# Patient Record
Sex: FEMALE | Race: BLACK OR AFRICAN AMERICAN | NOT HISPANIC OR LATINO | Employment: OTHER | URBAN - METROPOLITAN AREA
[De-identification: names, ages, dates, MRNs, and addresses within clinical notes are randomized per-mention and may not be internally consistent; named-entity substitution may affect disease eponyms.]

---

## 2023-03-27 LAB — BLOOD CULTURE: ABNORMAL

## 2023-03-30 LAB — BLOOD CULTURE: NORMAL

## 2024-10-03 ENCOUNTER — APPOINTMENT (OUTPATIENT)
Dept: DIALYSIS | Facility: HOSPITAL | Age: 69
End: 2024-10-03
Payer: MEDICARE

## 2024-10-03 ENCOUNTER — APPOINTMENT (OUTPATIENT)
Dept: RADIOLOGY | Facility: HOSPITAL | Age: 69
DRG: 480 | End: 2024-10-03
Payer: MEDICARE

## 2024-10-03 ENCOUNTER — CLINICAL SUPPORT (OUTPATIENT)
Dept: EMERGENCY MEDICINE | Facility: HOSPITAL | Age: 69
DRG: 480 | End: 2024-10-03
Payer: MEDICARE

## 2024-10-03 ENCOUNTER — HOSPITAL ENCOUNTER (INPATIENT)
Facility: HOSPITAL | Age: 69
End: 2024-10-03
Attending: EMERGENCY MEDICINE | Admitting: INTERNAL MEDICINE
Payer: MEDICARE

## 2024-10-03 ENCOUNTER — APPOINTMENT (OUTPATIENT)
Dept: CARDIOLOGY | Facility: HOSPITAL | Age: 69
DRG: 480 | End: 2024-10-03
Payer: MEDICARE

## 2024-10-03 DIAGNOSIS — N18.6 ESRD (END STAGE RENAL DISEASE) ON DIALYSIS (MULTI): ICD-10-CM

## 2024-10-03 DIAGNOSIS — E87.5 HYPERKALEMIA: Primary | ICD-10-CM

## 2024-10-03 DIAGNOSIS — R60.0 EDEMA OF BOTH UPPER EXTREMITIES: ICD-10-CM

## 2024-10-03 DIAGNOSIS — S72.144A NONDISPLACED INTERTROCHANTERIC FRACTURE OF RIGHT FEMUR, INITIAL ENCOUNTER FOR CLOSED FRACTURE: ICD-10-CM

## 2024-10-03 DIAGNOSIS — M79.89 OTHER SPECIFIED SOFT TISSUE DISORDERS: ICD-10-CM

## 2024-10-03 DIAGNOSIS — Z95.0 PACEMAKER: ICD-10-CM

## 2024-10-03 DIAGNOSIS — I27.20 PULMONARY HYPERTENSION (MULTI): ICD-10-CM

## 2024-10-03 DIAGNOSIS — E16.2 HYPOGLYCEMIA: ICD-10-CM

## 2024-10-03 DIAGNOSIS — I50.83 HIGH OUTPUT CONGESTIVE HEART FAILURE: ICD-10-CM

## 2024-10-03 DIAGNOSIS — R57.9 SHOCK (MULTI): ICD-10-CM

## 2024-10-03 DIAGNOSIS — I48.91 ATRIAL FIBRILLATION, UNSPECIFIED TYPE (MULTI): ICD-10-CM

## 2024-10-03 DIAGNOSIS — R57.0 CARDIOGENIC SHOCK (MULTI): ICD-10-CM

## 2024-10-03 DIAGNOSIS — Z99.2 ESRD (END STAGE RENAL DISEASE) ON DIALYSIS (MULTI): ICD-10-CM

## 2024-10-03 DIAGNOSIS — R09.02 HYPOXIA: ICD-10-CM

## 2024-10-03 DIAGNOSIS — I31.39 PERICARDIAL EFFUSION (HHS-HCC): ICD-10-CM

## 2024-10-03 LAB
ABO GROUP (TYPE) IN BLOOD: NORMAL
ALBUMIN SERPL BCP-MCNC: 3.3 G/DL (ref 3.4–5)
ALBUMIN SERPL BCP-MCNC: 3.3 G/DL (ref 3.4–5)
ALBUMIN SERPL BCP-MCNC: 3.5 G/DL (ref 3.4–5)
ALBUMIN SERPL BCP-MCNC: 3.6 G/DL (ref 3.4–5)
ALBUMIN SERPL BCP-MCNC: 3.7 G/DL (ref 3.4–5)
ALP SERPL-CCNC: 163 U/L (ref 33–136)
ALP SERPL-CCNC: 165 U/L (ref 33–136)
ALP SERPL-CCNC: 174 U/L (ref 33–136)
ALT SERPL W P-5'-P-CCNC: 12 U/L (ref 7–45)
ALT SERPL W P-5'-P-CCNC: 15 U/L (ref 7–45)
ALT SERPL W P-5'-P-CCNC: 17 U/L (ref 7–45)
ANION GAP BLDA CALCULATED.4IONS-SCNC: 30 MMO/L (ref 10–25)
ANION GAP BLDV CALCULATED.4IONS-SCNC: 12 MMOL/L (ref 10–25)
ANION GAP BLDV CALCULATED.4IONS-SCNC: 13 MMOL/L (ref 10–25)
ANION GAP BLDV CALCULATED.4IONS-SCNC: 20 MMOL/L (ref 10–25)
ANION GAP BLDV CALCULATED.4IONS-SCNC: 20 MMOL/L (ref 10–25)
ANION GAP BLDV CALCULATED.4IONS-SCNC: 25 MMOL/L (ref 10–25)
ANION GAP BLDV CALCULATED.4IONS-SCNC: 27 MMOL/L (ref 10–25)
ANION GAP BLDV CALCULATED.4IONS-SCNC: ABNORMAL MMOL/L
ANION GAP SERPL CALC-SCNC: 17 MMOL/L (ref 10–20)
ANION GAP SERPL CALC-SCNC: 28 MMOL/L (ref 10–20)
ANION GAP SERPL CALC-SCNC: 34 MMOL/L (ref 10–20)
ANION GAP SERPL CALC-SCNC: 35 MMOL/L (ref 10–20)
ANTIBODY SCREEN: NORMAL
APTT PPP: 30 SECONDS (ref 27–38)
APTT PPP: 33 SECONDS (ref 27–38)
AST SERPL W P-5'-P-CCNC: 22 U/L (ref 9–39)
AST SERPL W P-5'-P-CCNC: 23 U/L (ref 9–39)
AST SERPL W P-5'-P-CCNC: 24 U/L (ref 9–39)
ATRIAL RATE: 77 BPM
BASE EXCESS BLDA CALC-SCNC: -12.5 MMOL/L (ref -2–3)
BASE EXCESS BLDV CALC-SCNC: -0.5 MMOL/L (ref -2–3)
BASE EXCESS BLDV CALC-SCNC: -12.9 MMOL/L (ref -2–3)
BASE EXCESS BLDV CALC-SCNC: -2.5 MMOL/L (ref -2–3)
BASE EXCESS BLDV CALC-SCNC: -8.5 MMOL/L (ref -2–3)
BASE EXCESS BLDV CALC-SCNC: -8.8 MMOL/L (ref -2–3)
BASE EXCESS BLDV CALC-SCNC: -9.7 MMOL/L (ref -2–3)
BASE EXCESS BLDV CALC-SCNC: 3.9 MMOL/L (ref -2–3)
BASOPHILS # BLD AUTO: 0.02 X10*3/UL (ref 0–0.1)
BASOPHILS # BLD AUTO: 0.02 X10*3/UL (ref 0–0.1)
BASOPHILS # BLD AUTO: 0.03 X10*3/UL (ref 0–0.1)
BASOPHILS NFR BLD AUTO: 0.2 %
BILIRUB DIRECT SERPL-MCNC: 0.8 MG/DL (ref 0–0.3)
BILIRUB SERPL-MCNC: 1.8 MG/DL (ref 0–1.2)
BILIRUB SERPL-MCNC: 1.8 MG/DL (ref 0–1.2)
BILIRUB SERPL-MCNC: 1.9 MG/DL (ref 0–1.2)
BODY TEMPERATURE: 37 DEGREES CELSIUS
BUN SERPL-MCNC: 31 MG/DL (ref 6–23)
BUN SERPL-MCNC: 59 MG/DL (ref 6–23)
BUN SERPL-MCNC: 60 MG/DL (ref 6–23)
BUN SERPL-MCNC: 65 MG/DL (ref 6–23)
C PEPTIDE SERPL-MCNC: 26 NG/ML (ref 0.7–3.9)
CA-I BLDA-SCNC: 1.09 MMOL/L (ref 1.1–1.33)
CA-I BLDV-SCNC: 0.9 MMOL/L (ref 1.1–1.33)
CA-I BLDV-SCNC: 1.01 MMOL/L (ref 1.1–1.33)
CA-I BLDV-SCNC: 1.06 MMOL/L (ref 1.1–1.33)
CA-I BLDV-SCNC: 1.08 MMOL/L (ref 1.1–1.33)
CA-I BLDV-SCNC: 1.11 MMOL/L (ref 1.1–1.33)
CA-I BLDV-SCNC: 1.12 MMOL/L (ref 1.1–1.33)
CA-I BLDV-SCNC: 1.17 MMOL/L (ref 1.1–1.33)
CALCIUM SERPL-MCNC: 9 MG/DL (ref 8.6–10.6)
CALCIUM SERPL-MCNC: 9.2 MG/DL (ref 8.6–10.6)
CALCIUM SERPL-MCNC: 9.6 MG/DL (ref 8.6–10.6)
CALCIUM SERPL-MCNC: 9.7 MG/DL (ref 8.6–10.6)
CHLORIDE BLDA-SCNC: 97 MMOL/L (ref 98–107)
CHLORIDE BLDV-SCNC: 109 MMOL/L (ref 98–107)
CHLORIDE BLDV-SCNC: 93 MMOL/L (ref 98–107)
CHLORIDE BLDV-SCNC: 96 MMOL/L (ref 98–107)
CHLORIDE BLDV-SCNC: 98 MMOL/L (ref 98–107)
CHLORIDE BLDV-SCNC: 98 MMOL/L (ref 98–107)
CHLORIDE SERPL-SCNC: 94 MMOL/L (ref 98–107)
CHLORIDE SERPL-SCNC: 94 MMOL/L (ref 98–107)
CHLORIDE SERPL-SCNC: 95 MMOL/L (ref 98–107)
CHLORIDE SERPL-SCNC: 96 MMOL/L (ref 98–107)
CO2 SERPL-SCNC: 12 MMOL/L (ref 21–32)
CO2 SERPL-SCNC: 18 MMOL/L (ref 21–32)
CO2 SERPL-SCNC: 23 MMOL/L (ref 21–32)
CO2 SERPL-SCNC: 30 MMOL/L (ref 21–32)
CORTIS SERPL-MCNC: 10 UG/DL (ref 2.5–20)
CORTIS SERPL-MCNC: 11.3 UG/DL (ref 2.5–20)
CREAT SERPL-MCNC: 4.31 MG/DL (ref 0.5–1.05)
CREAT SERPL-MCNC: 7.4 MG/DL (ref 0.5–1.05)
CREAT SERPL-MCNC: 7.59 MG/DL (ref 0.5–1.05)
CREAT SERPL-MCNC: 7.86 MG/DL (ref 0.5–1.05)
EGFRCR SERPLBLD CKD-EPI 2021: 11 ML/MIN/1.73M*2
EGFRCR SERPLBLD CKD-EPI 2021: 5 ML/MIN/1.73M*2
EGFRCR SERPLBLD CKD-EPI 2021: 5 ML/MIN/1.73M*2
EGFRCR SERPLBLD CKD-EPI 2021: 6 ML/MIN/1.73M*2
EOSINOPHIL # BLD AUTO: 0 X10*3/UL (ref 0–0.7)
EOSINOPHIL NFR BLD AUTO: 0 %
ERYTHROCYTE [DISTWIDTH] IN BLOOD BY AUTOMATED COUNT: 17.4 % (ref 11.5–14.5)
ERYTHROCYTE [DISTWIDTH] IN BLOOD BY AUTOMATED COUNT: 18 % (ref 11.5–14.5)
ERYTHROCYTE [DISTWIDTH] IN BLOOD BY AUTOMATED COUNT: 18.4 % (ref 11.5–14.5)
GLUCOSE BLD MANUAL STRIP-MCNC: 102 MG/DL (ref 74–99)
GLUCOSE BLD MANUAL STRIP-MCNC: 12 MG/DL (ref 74–99)
GLUCOSE BLD MANUAL STRIP-MCNC: 171 MG/DL (ref 74–99)
GLUCOSE BLD MANUAL STRIP-MCNC: 419 MG/DL (ref 74–99)
GLUCOSE BLD MANUAL STRIP-MCNC: 52 MG/DL (ref 74–99)
GLUCOSE BLD MANUAL STRIP-MCNC: 79 MG/DL (ref 74–99)
GLUCOSE BLD MANUAL STRIP-MCNC: 83 MG/DL (ref 74–99)
GLUCOSE BLD MANUAL STRIP-MCNC: 95 MG/DL (ref 74–99)
GLUCOSE BLDA-MCNC: 48 MG/DL (ref 74–99)
GLUCOSE BLDV-MCNC: 114 MG/DL (ref 74–99)
GLUCOSE BLDV-MCNC: 39 MG/DL (ref 74–99)
GLUCOSE BLDV-MCNC: 444 MG/DL (ref 74–99)
GLUCOSE BLDV-MCNC: 54 MG/DL (ref 74–99)
GLUCOSE BLDV-MCNC: 72 MG/DL (ref 74–99)
GLUCOSE BLDV-MCNC: 77 MG/DL (ref 74–99)
GLUCOSE BLDV-MCNC: 78 MG/DL (ref 74–99)
GLUCOSE SERPL-MCNC: 107 MG/DL (ref 74–99)
GLUCOSE SERPL-MCNC: 40 MG/DL (ref 74–99)
GLUCOSE SERPL-MCNC: 71 MG/DL (ref 74–99)
GLUCOSE SERPL-MCNC: 87 MG/DL (ref 74–99)
HBV SURFACE AB SER-ACNC: 4 MIU/ML
HBV SURFACE AG SERPL QL IA: NONREACTIVE
HCO3 BLDA-SCNC: 12.5 MMOL/L (ref 22–26)
HCO3 BLDV-SCNC: 15.6 MMOL/L (ref 22–26)
HCO3 BLDV-SCNC: 17.6 MMOL/L (ref 22–26)
HCO3 BLDV-SCNC: 19.5 MMOL/L (ref 22–26)
HCO3 BLDV-SCNC: 19.9 MMOL/L (ref 22–26)
HCO3 BLDV-SCNC: 24.5 MMOL/L (ref 22–26)
HCO3 BLDV-SCNC: 25.8 MMOL/L (ref 22–26)
HCO3 BLDV-SCNC: 29.7 MMOL/L (ref 22–26)
HCT VFR BLD AUTO: 31.2 % (ref 36–46)
HCT VFR BLD AUTO: 33.6 % (ref 36–46)
HCT VFR BLD AUTO: 42 % (ref 36–46)
HCT VFR BLD EST: 28 % (ref 36–46)
HCT VFR BLD EST: 30 % (ref 36–46)
HCT VFR BLD EST: 34 % (ref 36–46)
HCT VFR BLD EST: 35 % (ref 36–46)
HCT VFR BLD EST: 37 % (ref 36–46)
HCT VFR BLD EST: 37 % (ref 36–46)
HCT VFR BLD EST: 38 % (ref 36–46)
HCT VFR BLD EST: 39 % (ref 36–46)
HGB BLD-MCNC: 10.8 G/DL (ref 12–16)
HGB BLD-MCNC: 11.2 G/DL (ref 12–16)
HGB BLD-MCNC: 13.4 G/DL (ref 12–16)
HGB BLDA-MCNC: 12.2 G/DL (ref 12–16)
HGB BLDV-MCNC: 10.1 G/DL (ref 12–16)
HGB BLDV-MCNC: 11.2 G/DL (ref 12–16)
HGB BLDV-MCNC: 11.8 G/DL (ref 12–16)
HGB BLDV-MCNC: 12.3 G/DL (ref 12–16)
HGB BLDV-MCNC: 12.6 G/DL (ref 12–16)
HGB BLDV-MCNC: 13.1 G/DL (ref 12–16)
HGB BLDV-MCNC: 9.3 G/DL (ref 12–16)
HOLD SPECIMEN: NORMAL
IMM GRANULOCYTES # BLD AUTO: 0.12 X10*3/UL (ref 0–0.7)
IMM GRANULOCYTES # BLD AUTO: 0.16 X10*3/UL (ref 0–0.7)
IMM GRANULOCYTES # BLD AUTO: 0.28 X10*3/UL (ref 0–0.7)
IMM GRANULOCYTES NFR BLD AUTO: 1.1 % (ref 0–0.9)
IMM GRANULOCYTES NFR BLD AUTO: 1.4 % (ref 0–0.9)
IMM GRANULOCYTES NFR BLD AUTO: 1.6 % (ref 0–0.9)
INHALED O2 CONCENTRATION: 0 %
INHALED O2 CONCENTRATION: 50 %
INHALED O2 CONCENTRATION: 50 %
INR PPP: 1.9 (ref 0.9–1.1)
INR PPP: 2 (ref 0.9–1.1)
LACTATE BLDA-SCNC: 10.7 MMOL/L (ref 0.4–2)
LACTATE BLDV-SCNC: 2.5 MMOL/L (ref 0.4–2)
LACTATE BLDV-SCNC: 4 MMOL/L (ref 0.4–2)
LACTATE BLDV-SCNC: 4.3 MMOL/L (ref 0.4–2)
LACTATE BLDV-SCNC: 5.7 MMOL/L (ref 0.4–2)
LACTATE BLDV-SCNC: 7.4 MMOL/L (ref 0.4–2)
LACTATE BLDV-SCNC: 8.5 MMOL/L (ref 0.4–2)
LACTATE BLDV-SCNC: 9.3 MMOL/L (ref 0.4–2)
LACTATE SERPL-SCNC: 2.5 MMOL/L (ref 0.4–2)
LACTATE SERPL-SCNC: 5.1 MMOL/L (ref 0.4–2)
LYMPHOCYTES # BLD AUTO: 0.37 X10*3/UL (ref 1.2–4.8)
LYMPHOCYTES # BLD AUTO: 0.49 X10*3/UL (ref 1.2–4.8)
LYMPHOCYTES # BLD AUTO: 1.17 X10*3/UL (ref 1.2–4.8)
LYMPHOCYTES NFR BLD AUTO: 10.1 %
LYMPHOCYTES NFR BLD AUTO: 2.8 %
LYMPHOCYTES NFR BLD AUTO: 3.4 %
MAGNESIUM SERPL-MCNC: 2 MG/DL (ref 1.6–2.4)
MAGNESIUM SERPL-MCNC: 2.39 MG/DL (ref 1.6–2.4)
MCH RBC QN AUTO: 30.5 PG (ref 26–34)
MCH RBC QN AUTO: 30.9 PG (ref 26–34)
MCH RBC QN AUTO: 31.5 PG (ref 26–34)
MCHC RBC AUTO-ENTMCNC: 31.9 G/DL (ref 32–36)
MCHC RBC AUTO-ENTMCNC: 33.3 G/DL (ref 32–36)
MCHC RBC AUTO-ENTMCNC: 34.6 G/DL (ref 32–36)
MCV RBC AUTO: 89 FL (ref 80–100)
MCV RBC AUTO: 92 FL (ref 80–100)
MCV RBC AUTO: 99 FL (ref 80–100)
MONOCYTES # BLD AUTO: 0.58 X10*3/UL (ref 0.1–1)
MONOCYTES # BLD AUTO: 1.15 X10*3/UL (ref 0.1–1)
MONOCYTES # BLD AUTO: 1.7 X10*3/UL (ref 0.1–1)
MONOCYTES NFR BLD AUTO: 10 %
MONOCYTES NFR BLD AUTO: 5.3 %
MONOCYTES NFR BLD AUTO: 9.8 %
NEUTROPHILS # BLD AUTO: 14.76 X10*3/UL (ref 1.2–7.7)
NEUTROPHILS # BLD AUTO: 9.03 X10*3/UL (ref 1.2–7.7)
NEUTROPHILS # BLD AUTO: 9.76 X10*3/UL (ref 1.2–7.7)
NEUTROPHILS NFR BLD AUTO: 78.3 %
NEUTROPHILS NFR BLD AUTO: 85.6 %
NEUTROPHILS NFR BLD AUTO: 90 %
NRBC BLD-RTO: 0.4 /100 WBCS (ref 0–0)
NRBC BLD-RTO: 0.5 /100 WBCS (ref 0–0)
NRBC BLD-RTO: 0.6 /100 WBCS (ref 0–0)
OXYHGB MFR BLDA: 98.3 % (ref 94–98)
OXYHGB MFR BLDV: 19.3 % (ref 45–75)
OXYHGB MFR BLDV: 22.7 % (ref 45–75)
OXYHGB MFR BLDV: 29.1 % (ref 45–75)
OXYHGB MFR BLDV: 33 % (ref 45–75)
OXYHGB MFR BLDV: 35.8 % (ref 45–75)
OXYHGB MFR BLDV: 40.1 % (ref 45–75)
OXYHGB MFR BLDV: 47.3 % (ref 45–75)
P OFFSET: 143 MS
P ONSET: 112 MS
PCO2 BLDA: 26 MM HG (ref 38–42)
PCO2 BLDV: 43 MM HG (ref 41–51)
PCO2 BLDV: 47 MM HG (ref 41–51)
PCO2 BLDV: 49 MM HG (ref 41–51)
PCO2 BLDV: 49 MM HG (ref 41–51)
PCO2 BLDV: 51 MM HG (ref 41–51)
PCO2 BLDV: 51 MM HG (ref 41–51)
PCO2 BLDV: 52 MM HG (ref 41–51)
PH BLDA: 7.29 PH (ref 7.38–7.42)
PH BLDV: 7.13 PH (ref 7.33–7.43)
PH BLDV: 7.19 PH (ref 7.33–7.43)
PH BLDV: 7.19 PH (ref 7.33–7.43)
PH BLDV: 7.22 PH (ref 7.33–7.43)
PH BLDV: 7.29 PH (ref 7.33–7.43)
PH BLDV: 7.33 PH (ref 7.33–7.43)
PH BLDV: 7.39 PH (ref 7.33–7.43)
PHOSPHATE SERPL-MCNC: 10.5 MG/DL (ref 2.5–4.9)
PHOSPHATE SERPL-MCNC: 5.6 MG/DL (ref 2.5–4.9)
PLATELET # BLD AUTO: 122 X10*3/UL (ref 150–450)
PLATELET # BLD AUTO: 127 X10*3/UL (ref 150–450)
PLATELET # BLD AUTO: 133 X10*3/UL (ref 150–450)
PO2 BLDA: 502 MM HG (ref 85–95)
PO2 BLDV: 28 MM HG (ref 35–45)
PO2 BLDV: 29 MM HG (ref 35–45)
PO2 BLDV: 33 MM HG (ref 35–45)
PO2 BLDV: 34 MM HG (ref 35–45)
PO2 BLDV: 36 MM HG (ref 35–45)
PO2 BLDV: 36 MM HG (ref 35–45)
PO2 BLDV: 38 MM HG (ref 35–45)
POTASSIUM BLDA-SCNC: 7.8 MMOL/L (ref 3.5–5.3)
POTASSIUM BLDV-SCNC: 4.6 MMOL/L (ref 3.5–5.3)
POTASSIUM BLDV-SCNC: 5 MMOL/L (ref 3.5–5.3)
POTASSIUM BLDV-SCNC: 5.6 MMOL/L (ref 3.5–5.3)
POTASSIUM BLDV-SCNC: 6.1 MMOL/L (ref 3.5–5.3)
POTASSIUM BLDV-SCNC: 7.7 MMOL/L (ref 3.5–5.3)
POTASSIUM BLDV-SCNC: 8.8 MMOL/L (ref 3.5–5.3)
POTASSIUM BLDV-SCNC: >10 MMOL/L (ref 3.5–5.3)
POTASSIUM SERPL-SCNC: 4.5 MMOL/L (ref 3.5–5.3)
POTASSIUM SERPL-SCNC: 5.8 MMOL/L (ref 3.5–5.3)
POTASSIUM SERPL-SCNC: 7.4 MMOL/L (ref 3.5–5.3)
POTASSIUM SERPL-SCNC: 7.6 MMOL/L (ref 3.5–5.3)
PR INTERVAL: 126 MS
PROT SERPL-MCNC: 7.2 G/DL (ref 6.4–8.2)
PROT SERPL-MCNC: 7.3 G/DL (ref 6.4–8.2)
PROT SERPL-MCNC: 7.7 G/DL (ref 6.4–8.2)
PROTHROMBIN TIME: 21.5 SECONDS (ref 9.8–12.8)
PROTHROMBIN TIME: 22.6 SECONDS (ref 9.8–12.8)
Q ONSET: 175 MS
QRS COUNT: 12 BEATS
QRS DURATION: 188 MS
QT INTERVAL: 470 MS
QTC CALCULATION(BAZETT): 531 MS
QTC FREDERICIA: 510 MS
R AXIS: 149 DEGREES
RBC # BLD AUTO: 3.5 X10*6/UL (ref 4–5.2)
RBC # BLD AUTO: 3.67 X10*6/UL (ref 4–5.2)
RBC # BLD AUTO: 4.26 X10*6/UL (ref 4–5.2)
RH FACTOR (ANTIGEN D): NORMAL
SAO2 % BLDA: 100 % (ref 94–100)
SAO2 % BLDV: 19 % (ref 45–75)
SAO2 % BLDV: 23 % (ref 45–75)
SAO2 % BLDV: 29 % (ref 45–75)
SAO2 % BLDV: 33 % (ref 45–75)
SAO2 % BLDV: 36 % (ref 45–75)
SAO2 % BLDV: 41 % (ref 45–75)
SAO2 % BLDV: 48 % (ref 45–75)
SODIUM BLDA-SCNC: 132 MMOL/L (ref 136–145)
SODIUM BLDV-SCNC: 127 MMOL/L (ref 136–145)
SODIUM BLDV-SCNC: 131 MMOL/L (ref 136–145)
SODIUM BLDV-SCNC: 133 MMOL/L (ref 136–145)
SODIUM BLDV-SCNC: 133 MMOL/L (ref 136–145)
SODIUM BLDV-SCNC: 134 MMOL/L (ref 136–145)
SODIUM BLDV-SCNC: 134 MMOL/L (ref 136–145)
SODIUM BLDV-SCNC: 139 MMOL/L (ref 136–145)
SODIUM SERPL-SCNC: 136 MMOL/L (ref 136–145)
SODIUM SERPL-SCNC: 136 MMOL/L (ref 136–145)
SODIUM SERPL-SCNC: 139 MMOL/L (ref 136–145)
SODIUM SERPL-SCNC: 139 MMOL/L (ref 136–145)
T AXIS: -28 DEGREES
T OFFSET: 410 MS
T3FREE SERPL-MCNC: 1 PG/ML (ref 2.3–4.2)
T4 FREE SERPL-MCNC: 1.7 NG/DL (ref 0.78–1.48)
TSH SERPL-ACNC: 2.55 MIU/L (ref 0.44–3.98)
URATE SERPL-MCNC: 5.3 MG/DL (ref 2.3–6.7)
VENTRICULAR RATE: 77 BPM
WBC # BLD AUTO: 10.9 X10*3/UL (ref 4.4–11.3)
WBC # BLD AUTO: 11.5 X10*3/UL (ref 4.4–11.3)
WBC # BLD AUTO: 17.3 X10*3/UL (ref 4.4–11.3)

## 2024-10-03 PROCEDURE — 71275 CT ANGIOGRAPHY CHEST: CPT | Performed by: RADIOLOGY

## 2024-10-03 PROCEDURE — 2020000001 HC ICU ROOM DAILY

## 2024-10-03 PROCEDURE — 2500000001 HC RX 250 WO HCPCS SELF ADMINISTERED DRUGS (ALT 637 FOR MEDICARE OP)

## 2024-10-03 PROCEDURE — 85025 COMPLETE CBC W/AUTO DIFF WBC: CPT

## 2024-10-03 PROCEDURE — 76377 3D RENDER W/INTRP POSTPROCES: CPT

## 2024-10-03 PROCEDURE — 83735 ASSAY OF MAGNESIUM: CPT

## 2024-10-03 PROCEDURE — 86901 BLOOD TYPING SEROLOGIC RH(D): CPT

## 2024-10-03 PROCEDURE — 74177 CT ABD & PELVIS W/CONTRAST: CPT

## 2024-10-03 PROCEDURE — 5A1D70Z PERFORMANCE OF URINARY FILTRATION, INTERMITTENT, LESS THAN 6 HOURS PER DAY: ICD-10-PCS | Performed by: INTERNAL MEDICINE

## 2024-10-03 PROCEDURE — 2500000005 HC RX 250 GENERAL PHARMACY W/O HCPCS

## 2024-10-03 PROCEDURE — 73502 X-RAY EXAM HIP UNI 2-3 VIEWS: CPT | Mod: RIGHT SIDE | Performed by: STUDENT IN AN ORGANIZED HEALTH CARE EDUCATION/TRAINING PROGRAM

## 2024-10-03 PROCEDURE — 2500000005 HC RX 250 GENERAL PHARMACY W/O HCPCS: Mod: SE

## 2024-10-03 PROCEDURE — 70486 CT MAXILLOFACIAL W/O DYE: CPT

## 2024-10-03 PROCEDURE — 36556 INSERT NON-TUNNEL CV CATH: CPT | Performed by: EMERGENCY MEDICINE

## 2024-10-03 PROCEDURE — 73560 X-RAY EXAM OF KNEE 1 OR 2: CPT | Mod: RIGHT SIDE | Performed by: RADIOLOGY

## 2024-10-03 PROCEDURE — 84481 FREE ASSAY (FT-3): CPT

## 2024-10-03 PROCEDURE — 2550000001 HC RX 255 CONTRASTS: Mod: SE

## 2024-10-03 PROCEDURE — 2500000004 HC RX 250 GENERAL PHARMACY W/ HCPCS (ALT 636 FOR OP/ED)

## 2024-10-03 PROCEDURE — 94660 CPAP INITIATION&MGMT: CPT

## 2024-10-03 PROCEDURE — 84681 ASSAY OF C-PEPTIDE: CPT

## 2024-10-03 PROCEDURE — 84439 ASSAY OF FREE THYROXINE: CPT

## 2024-10-03 PROCEDURE — 37799 UNLISTED PX VASCULAR SURGERY: CPT

## 2024-10-03 PROCEDURE — 96365 THER/PROPH/DIAG IV INF INIT: CPT

## 2024-10-03 PROCEDURE — 76937 US GUIDE VASCULAR ACCESS: CPT | Performed by: EMERGENCY MEDICINE

## 2024-10-03 PROCEDURE — 99291 CRITICAL CARE FIRST HOUR: CPT | Performed by: EMERGENCY MEDICINE

## 2024-10-03 PROCEDURE — 94645 CONT INHLJ TX EACH ADDL HOUR: CPT

## 2024-10-03 PROCEDURE — 72125 CT NECK SPINE W/O DYE: CPT

## 2024-10-03 PROCEDURE — 83605 ASSAY OF LACTIC ACID: CPT

## 2024-10-03 PROCEDURE — 73551 X-RAY EXAM OF FEMUR 1: CPT | Mod: RT

## 2024-10-03 PROCEDURE — 96375 TX/PRO/DX INJ NEW DRUG ADDON: CPT

## 2024-10-03 PROCEDURE — 80400 ACTH STIMULATION PANEL: CPT

## 2024-10-03 PROCEDURE — 84443 ASSAY THYROID STIM HORMONE: CPT

## 2024-10-03 PROCEDURE — 93005 ELECTROCARDIOGRAM TRACING: CPT

## 2024-10-03 PROCEDURE — 93010 ELECTROCARDIOGRAM REPORT: CPT | Performed by: INTERNAL MEDICINE

## 2024-10-03 PROCEDURE — 84132 ASSAY OF SERUM POTASSIUM: CPT

## 2024-10-03 PROCEDURE — 82533 TOTAL CORTISOL: CPT

## 2024-10-03 PROCEDURE — 36415 COLL VENOUS BLD VENIPUNCTURE: CPT

## 2024-10-03 PROCEDURE — 83527 ASSAY OF INSULIN: CPT

## 2024-10-03 PROCEDURE — 84550 ASSAY OF BLOOD/URIC ACID: CPT

## 2024-10-03 PROCEDURE — 74177 CT ABD & PELVIS W/CONTRAST: CPT | Performed by: RADIOLOGY

## 2024-10-03 PROCEDURE — 73551 X-RAY EXAM OF FEMUR 1: CPT | Mod: RIGHT SIDE | Performed by: RADIOLOGY

## 2024-10-03 PROCEDURE — 82947 ASSAY GLUCOSE BLOOD QUANT: CPT

## 2024-10-03 PROCEDURE — 2500000002 HC RX 250 W HCPCS SELF ADMINISTERED DRUGS (ALT 637 FOR MEDICARE OP, ALT 636 FOR OP/ED): Mod: SE

## 2024-10-03 PROCEDURE — 85610 PROTHROMBIN TIME: CPT

## 2024-10-03 PROCEDURE — 73502 X-RAY EXAM HIP UNI 2-3 VIEWS: CPT | Mod: RT

## 2024-10-03 PROCEDURE — 80053 COMPREHEN METABOLIC PANEL: CPT

## 2024-10-03 PROCEDURE — 87040 BLOOD CULTURE FOR BACTERIA: CPT

## 2024-10-03 PROCEDURE — 82435 ASSAY OF BLOOD CHLORIDE: CPT

## 2024-10-03 PROCEDURE — 87340 HEPATITIS B SURFACE AG IA: CPT

## 2024-10-03 PROCEDURE — 2500000002 HC RX 250 W HCPCS SELF ADMINISTERED DRUGS (ALT 637 FOR MEDICARE OP, ALT 636 FOR OP/ED)

## 2024-10-03 PROCEDURE — 70450 CT HEAD/BRAIN W/O DYE: CPT

## 2024-10-03 PROCEDURE — 80076 HEPATIC FUNCTION PANEL: CPT | Mod: CCI

## 2024-10-03 PROCEDURE — 84075 ASSAY ALKALINE PHOSPHATASE: CPT

## 2024-10-03 PROCEDURE — 99285 EMERGENCY DEPT VISIT HI MDM: CPT | Mod: 25

## 2024-10-03 PROCEDURE — 71045 X-RAY EXAM CHEST 1 VIEW: CPT | Performed by: STUDENT IN AN ORGANIZED HEALTH CARE EDUCATION/TRAINING PROGRAM

## 2024-10-03 PROCEDURE — 73560 X-RAY EXAM OF KNEE 1 OR 2: CPT | Mod: RT

## 2024-10-03 PROCEDURE — 2500000004 HC RX 250 GENERAL PHARMACY W/ HCPCS (ALT 636 FOR OP/ED): Mod: SE

## 2024-10-03 PROCEDURE — 94644 CONT INHLJ TX 1ST HOUR: CPT

## 2024-10-03 PROCEDURE — 2500000004 HC RX 250 GENERAL PHARMACY W/ HCPCS (ALT 636 FOR OP/ED): Mod: JZ,JG,SE

## 2024-10-03 PROCEDURE — 8010000001 HC DIALYSIS - HEMODIALYSIS PER DAY

## 2024-10-03 PROCEDURE — 93010 ELECTROCARDIOGRAM REPORT: CPT | Performed by: EMERGENCY MEDICINE

## 2024-10-03 PROCEDURE — 71045 X-RAY EXAM CHEST 1 VIEW: CPT

## 2024-10-03 PROCEDURE — 2500000004 HC RX 250 GENERAL PHARMACY W/ HCPCS (ALT 636 FOR OP/ED): Performed by: STUDENT IN AN ORGANIZED HEALTH CARE EDUCATION/TRAINING PROGRAM

## 2024-10-03 PROCEDURE — 86706 HEP B SURFACE ANTIBODY: CPT

## 2024-10-03 PROCEDURE — 36556 INSERT NON-TUNNEL CV CATH: CPT

## 2024-10-03 PROCEDURE — 96376 TX/PRO/DX INJ SAME DRUG ADON: CPT

## 2024-10-03 PROCEDURE — 71275 CT ANGIOGRAPHY CHEST: CPT

## 2024-10-03 RX ORDER — CALCIUM CHLORIDE INJECTION 100 MG/ML
INJECTION, SOLUTION INTRAVENOUS
Status: DISPENSED
Start: 2024-10-03 | End: 2024-10-04

## 2024-10-03 RX ORDER — OXYCODONE HYDROCHLORIDE 5 MG/1
10 TABLET ORAL EVERY 4 HOURS PRN
Status: DISCONTINUED | OUTPATIENT
Start: 2024-10-03 | End: 2024-10-16

## 2024-10-03 RX ORDER — PANTOPRAZOLE SODIUM 40 MG/1
40 TABLET, DELAYED RELEASE ORAL
Status: DISCONTINUED | OUTPATIENT
Start: 2024-10-04 | End: 2024-10-18 | Stop reason: HOSPADM

## 2024-10-03 RX ORDER — NOREPINEPHRINE BITARTRATE/D5W 8 MG/250ML
.01-1 PLASTIC BAG, INJECTION (ML) INTRAVENOUS CONTINUOUS
Status: DISCONTINUED | OUTPATIENT
Start: 2024-10-03 | End: 2024-10-04

## 2024-10-03 RX ORDER — ALBUTEROL SULFATE 90 UG/1
2 INHALANT RESPIRATORY (INHALATION) EVERY 6 HOURS PRN
Status: DISCONTINUED | OUTPATIENT
Start: 2024-10-03 | End: 2024-10-18 | Stop reason: HOSPADM

## 2024-10-03 RX ORDER — HYDROMORPHONE HYDROCHLORIDE 1 MG/ML
0.5 INJECTION, SOLUTION INTRAMUSCULAR; INTRAVENOUS; SUBCUTANEOUS ONCE
Status: COMPLETED | OUTPATIENT
Start: 2024-10-03 | End: 2024-10-03

## 2024-10-03 RX ORDER — HYDROMORPHONE HYDROCHLORIDE 1 MG/ML
0.2 INJECTION, SOLUTION INTRAMUSCULAR; INTRAVENOUS; SUBCUTANEOUS ONCE
Status: COMPLETED | OUTPATIENT
Start: 2024-10-03 | End: 2024-10-03

## 2024-10-03 RX ORDER — OXYCODONE HYDROCHLORIDE 5 MG/1
5 TABLET ORAL EVERY 4 HOURS PRN
Status: DISCONTINUED | OUTPATIENT
Start: 2024-10-03 | End: 2024-10-10

## 2024-10-03 RX ORDER — ONDANSETRON HYDROCHLORIDE 2 MG/ML
4 INJECTION, SOLUTION INTRAVENOUS ONCE
Status: COMPLETED | OUTPATIENT
Start: 2024-10-03 | End: 2024-10-03

## 2024-10-03 RX ORDER — ALBUTEROL SULFATE 0.83 MG/ML
SOLUTION RESPIRATORY (INHALATION)
Status: COMPLETED
Start: 2024-10-03 | End: 2024-10-03

## 2024-10-03 RX ORDER — DEXTROSE 50 % IN WATER (D50W) INTRAVENOUS SYRINGE
Status: COMPLETED
Start: 2024-10-03 | End: 2024-10-03

## 2024-10-03 RX ORDER — HYDROMORPHONE HYDROCHLORIDE 1 MG/ML
INJECTION, SOLUTION INTRAMUSCULAR; INTRAVENOUS; SUBCUTANEOUS
Status: COMPLETED
Start: 2024-10-03 | End: 2024-10-03

## 2024-10-03 RX ORDER — DEXTROSE 50 % IN WATER (D50W) INTRAVENOUS SYRINGE
25 ONCE
Status: COMPLETED | OUTPATIENT
Start: 2024-10-03 | End: 2024-10-03

## 2024-10-03 RX ORDER — NOREPINEPHRINE BITARTRATE/D5W 8 MG/250ML
PLASTIC BAG, INJECTION (ML) INTRAVENOUS
Status: COMPLETED
Start: 2024-10-03 | End: 2024-10-03

## 2024-10-03 RX ORDER — ALBUTEROL SULFATE 5 MG/ML
10 SOLUTION RESPIRATORY (INHALATION) ONCE
Status: COMPLETED | OUTPATIENT
Start: 2024-10-03 | End: 2024-10-03

## 2024-10-03 RX ORDER — DEXTROSE MONOHYDRATE 100 MG/ML
INJECTION, SOLUTION INTRAVENOUS
Status: COMPLETED
Start: 2024-10-03 | End: 2024-10-04

## 2024-10-03 RX ORDER — DEXTROSE MONOHYDRATE 50 MG/ML
50 INJECTION, SOLUTION INTRAVENOUS CONTINUOUS
Status: DISCONTINUED | OUTPATIENT
Start: 2024-10-03 | End: 2024-10-03

## 2024-10-03 RX ORDER — AMIODARONE HYDROCHLORIDE 200 MG/1
200 TABLET ORAL DAILY
Status: DISCONTINUED | OUTPATIENT
Start: 2024-10-03 | End: 2024-10-18 | Stop reason: HOSPADM

## 2024-10-03 RX ORDER — ALLOPURINOL 100 MG/1
100 TABLET ORAL DAILY
Status: DISCONTINUED | OUTPATIENT
Start: 2024-10-03 | End: 2024-10-17

## 2024-10-03 RX ORDER — DEXTROSE 50 % IN WATER (D50W) INTRAVENOUS SYRINGE
25
Status: DISCONTINUED | OUTPATIENT
Start: 2024-10-03 | End: 2024-10-18 | Stop reason: HOSPADM

## 2024-10-03 RX ORDER — INDOMETHACIN 25 MG/1
50 CAPSULE ORAL ONCE
Status: COMPLETED | OUTPATIENT
Start: 2024-10-03 | End: 2024-10-03

## 2024-10-03 RX ORDER — ATORVASTATIN CALCIUM 40 MG/1
40 TABLET, FILM COATED ORAL NIGHTLY
Status: DISCONTINUED | OUTPATIENT
Start: 2024-10-03 | End: 2024-10-18 | Stop reason: HOSPADM

## 2024-10-03 RX ORDER — DEXTROSE 50 % IN WATER (D50W) INTRAVENOUS SYRINGE
12.5
Status: DISCONTINUED | OUTPATIENT
Start: 2024-10-03 | End: 2024-10-18 | Stop reason: HOSPADM

## 2024-10-03 RX ORDER — PREDNISONE 5 MG/1
5 TABLET ORAL DAILY
Status: DISCONTINUED | OUTPATIENT
Start: 2024-10-03 | End: 2024-10-04

## 2024-10-03 RX ORDER — ACETAMINOPHEN 325 MG/1
650 TABLET ORAL EVERY 6 HOURS
Status: DISCONTINUED | OUTPATIENT
Start: 2024-10-03 | End: 2024-10-12

## 2024-10-03 RX ORDER — PANTOPRAZOLE SODIUM 40 MG/10ML
40 INJECTION, POWDER, LYOPHILIZED, FOR SOLUTION INTRAVENOUS
Status: DISCONTINUED | OUTPATIENT
Start: 2024-10-04 | End: 2024-10-10

## 2024-10-03 RX ORDER — CALCIUM GLUCONATE 20 MG/ML
2 INJECTION, SOLUTION INTRAVENOUS ONCE
Status: COMPLETED | OUTPATIENT
Start: 2024-10-03 | End: 2024-10-03

## 2024-10-03 RX ORDER — SODIUM BICARBONATE 1 MEQ/ML
SYRINGE (ML) INTRAVENOUS
Status: COMPLETED
Start: 2024-10-03 | End: 2024-10-03

## 2024-10-03 RX ORDER — POLYETHYLENE GLYCOL 3350 17 G/17G
17 POWDER, FOR SOLUTION ORAL DAILY
Status: DISCONTINUED | OUTPATIENT
Start: 2024-10-03 | End: 2024-10-09

## 2024-10-03 RX ORDER — DEXTROSE MONOHYDRATE 100 MG/ML
10 INJECTION, SOLUTION INTRAVENOUS CONTINUOUS
Status: DISCONTINUED | OUTPATIENT
Start: 2024-10-04 | End: 2024-10-05

## 2024-10-03 RX ADMIN — DEXTROSE MONOHYDRATE 25 G: 500 INJECTION PARENTERAL at 13:05

## 2024-10-03 RX ADMIN — DEXTROSE MONOHYDRATE 25 G: 25 INJECTION, SOLUTION INTRAVENOUS at 22:54

## 2024-10-03 RX ADMIN — INDOMETHACIN 50 MEQ: 25 CAPSULE ORAL at 12:31

## 2024-10-03 RX ADMIN — AMIODARONE HYDROCHLORIDE 200 MG: 200 TABLET ORAL at 20:31

## 2024-10-03 RX ADMIN — ALBUTEROL SULFATE 10 MG: 5 SOLUTION RESPIRATORY (INHALATION) at 12:57

## 2024-10-03 RX ADMIN — ATORVASTATIN CALCIUM 40 MG: 40 TABLET, FILM COATED ORAL at 20:31

## 2024-10-03 RX ADMIN — HYDROCORTISONE SODIUM SUCCINATE 100 MG: 100 INJECTION, POWDER, FOR SOLUTION INTRAMUSCULAR; INTRAVENOUS at 22:44

## 2024-10-03 RX ADMIN — Medication 100 PERCENT: at 12:00

## 2024-10-03 RX ADMIN — CALCIUM GLUCONATE 2 G: 20 INJECTION, SOLUTION INTRAVENOUS at 12:33

## 2024-10-03 RX ADMIN — SODIUM BICARBONATE 50 MEQ: 84 INJECTION INTRAVENOUS at 12:31

## 2024-10-03 RX ADMIN — HYDROMORPHONE HYDROCHLORIDE 0.2 MG: 1 INJECTION, SOLUTION INTRAMUSCULAR; INTRAVENOUS; SUBCUTANEOUS at 14:40

## 2024-10-03 RX ADMIN — Medication 0.04 MCG/KG/MIN: at 22:37

## 2024-10-03 RX ADMIN — DEXTROSE MONOHYDRATE 25 G: 25 INJECTION, SOLUTION INTRAVENOUS at 22:42

## 2024-10-03 RX ADMIN — Medication 6 L/MIN: at 17:00

## 2024-10-03 RX ADMIN — PHYTONADIONE 10 MG: 10 INJECTION, EMULSION INTRAMUSCULAR; INTRAVENOUS; SUBCUTANEOUS at 22:41

## 2024-10-03 RX ADMIN — OXYCODONE HYDROCHLORIDE 5 MG: 5 TABLET ORAL at 20:30

## 2024-10-03 RX ADMIN — DEXTROSE MONOHYDRATE 25 G: 25 INJECTION, SOLUTION INTRAVENOUS at 12:30

## 2024-10-03 RX ADMIN — NOREPINEPHRINE BITARTRATE 0.04 MCG/KG/MIN: 8 INJECTION, SOLUTION INTRAVENOUS at 22:37

## 2024-10-03 RX ADMIN — IOHEXOL 80 ML: 350 INJECTION, SOLUTION INTRAVENOUS at 15:36

## 2024-10-03 RX ADMIN — ONDANSETRON 4 MG: 2 INJECTION INTRAMUSCULAR; INTRAVENOUS at 10:35

## 2024-10-03 RX ADMIN — DEXTROSE 50 % IN WATER (D50W) INTRAVENOUS SYRINGE 25 G: at 12:30

## 2024-10-03 RX ADMIN — DEXTROSE 50 % IN WATER (D50W) INTRAVENOUS SYRINGE 25 G: at 13:05

## 2024-10-03 RX ADMIN — HYDROMORPHONE HYDROCHLORIDE 0.2 MG: 1 INJECTION, SOLUTION INTRAMUSCULAR; INTRAVENOUS; SUBCUTANEOUS at 18:22

## 2024-10-03 RX ADMIN — Medication 6 L/MIN: at 20:00

## 2024-10-03 RX ADMIN — ACETAMINOPHEN 650 MG: 325 TABLET, FILM COATED ORAL at 20:30

## 2024-10-03 RX ADMIN — PREDNISONE 5 MG: 5 TABLET ORAL at 18:13

## 2024-10-03 RX ADMIN — HYDROMORPHONE HYDROCHLORIDE 0.5 MG: 1 INJECTION, SOLUTION INTRAMUSCULAR; INTRAVENOUS; SUBCUTANEOUS at 10:34

## 2024-10-03 RX ADMIN — INSULIN HUMAN 5 UNITS: 100 INJECTION, SOLUTION PARENTERAL at 12:34

## 2024-10-03 RX ADMIN — ALBUTEROL SULFATE 2.5 MG: 2.5 SOLUTION RESPIRATORY (INHALATION) at 12:31

## 2024-10-03 SDOH — SOCIAL STABILITY: SOCIAL INSECURITY: ARE THERE ANY APPARENT SIGNS OF INJURIES/BEHAVIORS THAT COULD BE RELATED TO ABUSE/NEGLECT?: NO

## 2024-10-03 SDOH — SOCIAL STABILITY: SOCIAL INSECURITY: WERE YOU ABLE TO COMPLETE ALL THE BEHAVIORAL HEALTH SCREENINGS?: YES

## 2024-10-03 SDOH — SOCIAL STABILITY: SOCIAL INSECURITY: WITHIN THE LAST YEAR, HAVE YOU BEEN AFRAID OF YOUR PARTNER OR EX-PARTNER?: NO

## 2024-10-03 SDOH — ECONOMIC STABILITY: TRANSPORTATION INSECURITY
IN THE PAST 12 MONTHS, HAS LACK OF TRANSPORTATION KEPT YOU FROM MEDICAL APPOINTMENTS OR FROM GETTING MEDICATIONS?: PATIENT DECLINED

## 2024-10-03 SDOH — SOCIAL STABILITY: SOCIAL INSECURITY: HAVE YOU HAD ANY THOUGHTS OF HARMING ANYONE ELSE?: NO

## 2024-10-03 SDOH — SOCIAL STABILITY: SOCIAL INSECURITY: DO YOU FEEL ANYONE HAS EXPLOITED OR TAKEN ADVANTAGE OF YOU FINANCIALLY OR OF YOUR PERSONAL PROPERTY?: NO

## 2024-10-03 SDOH — SOCIAL STABILITY: SOCIAL INSECURITY: HAS ANYONE EVER THREATENED TO HURT YOUR FAMILY OR YOUR PETS?: NO

## 2024-10-03 SDOH — SOCIAL STABILITY: SOCIAL INSECURITY
WITHIN THE LAST YEAR, HAVE TO BEEN RAPED OR FORCED TO HAVE ANY KIND OF SEXUAL ACTIVITY BY YOUR PARTNER OR EX-PARTNER?: NO

## 2024-10-03 SDOH — ECONOMIC STABILITY: HOUSING INSECURITY: AT ANY TIME IN THE PAST 12 MONTHS, WERE YOU HOMELESS OR LIVING IN A SHELTER (INCLUDING NOW)?: PATIENT DECLINED

## 2024-10-03 SDOH — SOCIAL STABILITY: SOCIAL INSECURITY: WITHIN THE LAST YEAR, HAVE YOU BEEN HUMILIATED OR EMOTIONALLY ABUSED IN OTHER WAYS BY YOUR PARTNER OR EX-PARTNER?: NO

## 2024-10-03 SDOH — ECONOMIC STABILITY: FOOD INSECURITY: WITHIN THE PAST 12 MONTHS, THE FOOD YOU BOUGHT JUST DIDN'T LAST AND YOU DIDN'T HAVE MONEY TO GET MORE.: PATIENT DECLINED

## 2024-10-03 SDOH — ECONOMIC STABILITY: INCOME INSECURITY: IN THE PAST 12 MONTHS, HAS THE ELECTRIC, GAS, OIL, OR WATER COMPANY THREATENED TO SHUT OFF SERVICE IN YOUR HOME?: NO

## 2024-10-03 SDOH — SOCIAL STABILITY: SOCIAL INSECURITY
WITHIN THE LAST YEAR, HAVE YOU BEEN KICKED, HIT, SLAPPED, OR OTHERWISE PHYSICALLY HURT BY YOUR PARTNER OR EX-PARTNER?: NO

## 2024-10-03 SDOH — ECONOMIC STABILITY: HOUSING INSECURITY: IN THE LAST 12 MONTHS, WAS THERE A TIME WHEN YOU WERE NOT ABLE TO PAY THE MORTGAGE OR RENT ON TIME?: PATIENT DECLINED

## 2024-10-03 SDOH — ECONOMIC STABILITY: HOUSING INSECURITY: IN THE PAST 12 MONTHS, HOW MANY TIMES HAVE YOU MOVED WHERE YOU WERE LIVING?: 1

## 2024-10-03 SDOH — ECONOMIC STABILITY: INCOME INSECURITY: IN THE LAST 12 MONTHS, WAS THERE A TIME WHEN YOU WERE NOT ABLE TO PAY THE MORTGAGE OR RENT ON TIME?: PATIENT DECLINED

## 2024-10-03 SDOH — SOCIAL STABILITY: SOCIAL INSECURITY
WITHIN THE LAST YEAR, HAVE YOU BEEN RAPED OR FORCED TO HAVE ANY KIND OF SEXUAL ACTIVITY BY YOUR PARTNER OR EX-PARTNER?: NO

## 2024-10-03 SDOH — ECONOMIC STABILITY: INCOME INSECURITY: IN THE PAST 12 MONTHS HAS THE ELECTRIC, GAS, OIL, OR WATER COMPANY THREATENED TO SHUT OFF SERVICES IN YOUR HOME?: NO

## 2024-10-03 SDOH — ECONOMIC STABILITY: FOOD INSECURITY: HOW HARD IS IT FOR YOU TO PAY FOR THE VERY BASICS LIKE FOOD, HOUSING, MEDICAL CARE, AND HEATING?: PATIENT DECLINED

## 2024-10-03 SDOH — SOCIAL STABILITY: SOCIAL INSECURITY: DO YOU FEEL UNSAFE GOING BACK TO THE PLACE WHERE YOU ARE LIVING?: NO

## 2024-10-03 SDOH — ECONOMIC STABILITY: FOOD INSECURITY
WITHIN THE PAST 12 MONTHS, YOU WORRIED THAT YOUR FOOD WOULD RUN OUT BEFORE YOU GOT THE MONEY TO BUY MORE.: PATIENT DECLINED

## 2024-10-03 SDOH — SOCIAL STABILITY: SOCIAL INSECURITY: DOES ANYONE TRY TO KEEP YOU FROM HAVING/CONTACTING OTHER FRIENDS OR DOING THINGS OUTSIDE YOUR HOME?: NO

## 2024-10-03 SDOH — SOCIAL STABILITY: SOCIAL INSECURITY: ABUSE: ADULT

## 2024-10-03 SDOH — ECONOMIC STABILITY: INCOME INSECURITY: HOW HARD IS IT FOR YOU TO PAY FOR THE VERY BASICS LIKE FOOD, HOUSING, MEDICAL CARE, AND HEATING?: PATIENT DECLINED

## 2024-10-03 SDOH — ECONOMIC STABILITY: TRANSPORTATION INSECURITY
IN THE PAST 12 MONTHS, HAS THE LACK OF TRANSPORTATION KEPT YOU FROM MEDICAL APPOINTMENTS OR FROM GETTING MEDICATIONS?: PATIENT DECLINED

## 2024-10-03 SDOH — ECONOMIC STABILITY: TRANSPORTATION INSECURITY
IN THE PAST 12 MONTHS, HAS LACK OF TRANSPORTATION KEPT YOU FROM MEETINGS, WORK, OR FROM GETTING THINGS NEEDED FOR DAILY LIVING?: PATIENT DECLINED

## 2024-10-03 SDOH — SOCIAL STABILITY: SOCIAL INSECURITY: HAVE YOU HAD THOUGHTS OF HARMING ANYONE ELSE?: NO

## 2024-10-03 SDOH — SOCIAL STABILITY: SOCIAL INSECURITY: ARE YOU OR HAVE YOU BEEN THREATENED OR ABUSED PHYSICALLY, EMOTIONALLY, OR SEXUALLY BY ANYONE?: NO

## 2024-10-03 SDOH — ECONOMIC STABILITY: FOOD INSECURITY: WITHIN THE PAST 12 MONTHS, YOU WORRIED THAT YOUR FOOD WOULD RUN OUT BEFORE YOU GOT MONEY TO BUY MORE.: PATIENT DECLINED

## 2024-10-03 ASSESSMENT — ACTIVITIES OF DAILY LIVING (ADL)
BATHING: NEEDS ASSISTANCE
LACK_OF_TRANSPORTATION: PATIENT DECLINED
DRESSING YOURSELF: NEEDS ASSISTANCE
HEARING - RIGHT EAR: FUNCTIONAL
LACK_OF_TRANSPORTATION: PATIENT DECLINED
PATIENT'S MEMORY ADEQUATE TO SAFELY COMPLETE DAILY ACTIVITIES?: YES
ASSISTIVE_DEVICE: EYEGLASSES
FEEDING YOURSELF: NEEDS ASSISTANCE
HEARING - LEFT EAR: FUNCTIONAL
JUDGMENT_ADEQUATE_SAFELY_COMPLETE_DAILY_ACTIVITIES: YES
WALKS IN HOME: NEEDS ASSISTANCE
TOILETING: NEEDS ASSISTANCE
GROOMING: NEEDS ASSISTANCE
ADEQUATE_TO_COMPLETE_ADL: NO
LACK_OF_TRANSPORTATION: NO

## 2024-10-03 ASSESSMENT — PAIN SCALES - GENERAL
PAINLEVEL_OUTOF10: 6
PAINLEVEL_OUTOF10: 0 - NO PAIN
PAINLEVEL_OUTOF10: 7
PAINLEVEL_OUTOF10: 7
PAINLEVEL_OUTOF10: 6
PAINLEVEL_OUTOF10: 4
PAINLEVEL_OUTOF10: 6
PAINLEVEL_OUTOF10: 7

## 2024-10-03 ASSESSMENT — COGNITIVE AND FUNCTIONAL STATUS - GENERAL
DRESSING REGULAR LOWER BODY CLOTHING: A LITTLE
CLIMB 3 TO 5 STEPS WITH RAILING: A LITTLE
WALKING IN HOSPITAL ROOM: A LITTLE
STANDING UP FROM CHAIR USING ARMS: A LITTLE
DAILY ACTIVITIY SCORE: 18
TOILETING: A LITTLE
EATING MEALS: A LITTLE
HELP NEEDED FOR BATHING: A LITTLE
MOBILITY SCORE: 18
MOVING TO AND FROM BED TO CHAIR: A LITTLE
TURNING FROM BACK TO SIDE WHILE IN FLAT BAD: A LITTLE
PATIENT BASELINE BEDBOUND: NO
DRESSING REGULAR UPPER BODY CLOTHING: A LITTLE
MOVING FROM LYING ON BACK TO SITTING ON SIDE OF FLAT BED WITH BEDRAILS: A LITTLE
PERSONAL GROOMING: A LITTLE

## 2024-10-03 ASSESSMENT — PAIN DESCRIPTION - DESCRIPTORS: DESCRIPTORS: ACHING;DISCOMFORT

## 2024-10-03 ASSESSMENT — PAIN - FUNCTIONAL ASSESSMENT
PAIN_FUNCTIONAL_ASSESSMENT: 0-10
PAIN_FUNCTIONAL_ASSESSMENT: CPOT (CRITICAL CARE PAIN OBSERVATION TOOL)

## 2024-10-03 ASSESSMENT — PATIENT HEALTH QUESTIONNAIRE - PHQ9
SUM OF ALL RESPONSES TO PHQ9 QUESTIONS 1 & 2: 0
1. LITTLE INTEREST OR PLEASURE IN DOING THINGS: NOT AT ALL
2. FEELING DOWN, DEPRESSED OR HOPELESS: NOT AT ALL

## 2024-10-03 ASSESSMENT — LIFESTYLE VARIABLES
HOW MANY STANDARD DRINKS CONTAINING ALCOHOL DO YOU HAVE ON A TYPICAL DAY: PATIENT DECLINED
SKIP TO QUESTIONS 9-10: 0
HAVE YOU EVER FELT YOU SHOULD CUT DOWN ON YOUR DRINKING: NO
TOTAL SCORE: 0
HOW OFTEN DO YOU HAVE A DRINK CONTAINING ALCOHOL: PATIENT DECLINED
HAVE PEOPLE ANNOYED YOU BY CRITICIZING YOUR DRINKING: NO
EVER FELT BAD OR GUILTY ABOUT YOUR DRINKING: NO
AUDIT-C TOTAL SCORE: -1
EVER HAD A DRINK FIRST THING IN THE MORNING TO STEADY YOUR NERVES TO GET RID OF A HANGOVER: NO
AUDIT-C TOTAL SCORE: -1
HOW OFTEN DO YOU HAVE 6 OR MORE DRINKS ON ONE OCCASION: PATIENT DECLINED

## 2024-10-03 ASSESSMENT — PAIN DESCRIPTION - LOCATION
LOCATION: FACE
LOCATION: ABDOMEN
LOCATION: LEG

## 2024-10-03 ASSESSMENT — COLUMBIA-SUICIDE SEVERITY RATING SCALE - C-SSRS
1. IN THE PAST MONTH, HAVE YOU WISHED YOU WERE DEAD OR WISHED YOU COULD GO TO SLEEP AND NOT WAKE UP?: NO
6. HAVE YOU EVER DONE ANYTHING, STARTED TO DO ANYTHING, OR PREPARED TO DO ANYTHING TO END YOUR LIFE?: NO
2. HAVE YOU ACTUALLY HAD ANY THOUGHTS OF KILLING YOURSELF?: NO

## 2024-10-03 ASSESSMENT — PAIN DESCRIPTION - ORIENTATION: ORIENTATION: RIGHT

## 2024-10-03 ASSESSMENT — PAIN DESCRIPTION - FREQUENCY: FREQUENCY: CONSTANT/CONTINUOUS

## 2024-10-03 ASSESSMENT — PAIN DESCRIPTION - PAIN TYPE: TYPE: ACUTE PAIN

## 2024-10-03 NOTE — ED PROCEDURE NOTE
Procedure  Central Line    Performed by: Denisa Santiago DO  Authorized by: Jesse Etienne MD    Consent:     Consent obtained:  Verbal and written    Consent given by:  Patient    Risks, benefits, and alternatives were discussed: yes      Risks discussed:  Arterial puncture, incorrect placement and bleeding    Alternatives discussed:  No treatment and delayed treatment  Universal protocol:     Patient identity confirmed:  Verbally with patient and arm band  Pre-procedure details:     Indication(s): central venous access      Hand hygiene: Hand hygiene performed prior to insertion      Sterile barrier technique: All elements of maximal sterile technique followed      Skin preparation:  Chlorhexidine    Skin preparation agent: Skin preparation agent completely dried prior to procedure    Sedation:     Sedation type:  Anxiolysis  Anesthesia:     Anesthesia method:  Local infiltration    Local anesthetic:  Lidocaine 1% w/o epi  Procedure details:     Location:  L femoral    Patient position:  Supine    Procedural supplies:  Triple lumen    Catheter size:  7 Fr    Landmarks identified: yes      Ultrasound guidance: yes      Ultrasound guidance timing: prior to insertion and real time      Sterile ultrasound techniques: Sterile gel and sterile probe covers were used      Number of attempts:  1    Successful placement: yes    Post-procedure details:     Post-procedure:  Dressing applied and line sutured    Assessment:  Blood return through all ports    Procedure completion:  Tolerated               Denisa Santiago DO  Resident  10/03/24 [id number]

## 2024-10-03 NOTE — ED TRIAGE NOTES
Patient states that she was at home, and went to turn a fan on as she felt hot.  As she ambulated across the room, she felt notably dizzy and light headed and fell forward striking her right occipital on the floor.  Patient denies any LOC with the fall, she is however on anticoags for Pmh of stent and mitral replacement.  Pt also is a compliant hemodialysis patient, who receives treatment t/th/sa, but has missed her treatment today due to the mechanical fall.  Pt states that she also has pmh of right femur fracture 3 years prior, and she now feels pain in the right leg and is fearful that the fall might have injured the leg.  Pt denies any dizziness or light headed feelings at assessment, but does say her right leg and face hurt.

## 2024-10-03 NOTE — H&P
Medical Intensive Care - History and Physical   Subjective    Misael Ritchie is a 69 y.o. year old female patient admitted on 10/3/2024 with following ICU needs: dialysis    HPI:  Misael Ritchie 69 y.o. female PMH aortic stenosis, atrial fibrillation, CAD, CKD, ESRD on hemodialysis, CHF (8/2024 EF ~48%) presented via EMS after a fall patient states she was feeling dizzy this morning and got up to turn a fan on because she was feeling warm and fell on her right side. Patient states she hit her head. She also reports she did not take any of her medications this morning because she fell prior to the time she normally takes her medications. Patient states she did get her dialysis on Tuesday and completed the session without issue. In the ED the patient was found to have a potassium of 7.4 and was given 5 units of regular insulin and D50. She also received calcium gluconate 2g and sodium bicarbonate.  Shortly after she was found to be obtunded and blood sugar was found to be 52. The decision was made to transfer the patient to the MICU for emergent dialysis.    On arrival to the MICU, patient is HDS she is mentating appropriately. She reports she last took her Elliquis yesterday. She reports feeling cold and somewhat short of breath which improved with albuterol inhaler in the ED and she's currently on 6L NC. She denies any chest pain,fever, nausea, vomiting, diarrhea. Patient reports she has a past surgical history of cholecystectomy and surgery for SBO (unclear if she had SBR or just BRIAN).           Review of Systems:  Review of Systems       Meds    Home medications:  No current outpatient medications     Inpatient medications:  Scheduled medications  acetaminophen, 650 mg, oral, q6h  [Held by provider] allopurinol, 100 mg, oral, Daily  amiodarone, 200 mg, oral, Daily  atorvastatin, 40 mg, oral, Nightly  calcium chloride, , ,   oxygen, , inhalation, Continuous - Inhalation  [START ON 10/4/2024]  "pantoprazole, 40 mg, oral, Daily before breakfast   Or  [START ON 10/4/2024] pantoprazole, 40 mg, intravenous, Daily before breakfast  polyethylene glycol, 17 g, oral, Daily  predniSONE, 5 mg, oral, Daily      Continuous medications     PRN medications  PRN medications: albuterol, calcium chloride, dextrose, dextrose, glucagon, glucagon, oxyCODONE, oxyCODONE     Objective    Blood pressure (!) 123/38, pulse 75, temperature 35.8 °C (96.4 °F), temperature source Temporal, resp. rate 13, height 1.549 m (5' 1\"), weight 53.9 kg (118 lb 13.3 oz), SpO2 95%.     Physical Exam   General: Awake, alert, in no acute distress  Eyes: Gaze conjugate.  No scleral icterus or injection  HENT: Normo-cephalic, atraumatic. No stridor  CV: Regular rate, regular rhythm. Radial pulses 2+ bilaterally  Resp: Breathing non-labored, speaking in full sentences.  Clear to auscultation bilaterally  GI: Soft, non-distended, non-tender. No rebound or guarding.  : deferred   MSK/Extremities: No gross bony deformities. Moving all extremities  Skin: Warm. Appropriate color  Neuro: Aox4 Face symmetric. Speech is fluent.  Gross strength and sensation intact in b/l UE and LEs  Psych: Appropriate mood and affect       Intake/Output Summary (Last 24 hours) at 10/3/2024 1931  Last data filed at 10/3/2024 1733  Gross per 24 hour   Intake 400 ml   Output --   Net 400 ml     Labs:   Results from last 72 hours   Lab Units 10/03/24  1239 10/03/24  1008   SODIUM mmol/L 139 136   POTASSIUM mmol/L 7.6* 7.4*   CHLORIDE mmol/L 95* 96*   CO2 mmol/L 18* 12*   BUN mg/dL 60* 59*   CREATININE mg/dL 7.86* 7.40*   GLUCOSE mg/dL 40* 87   CALCIUM mg/dL 9.7 9.6   ANION GAP mmol/L 34* 35*   EGFR mL/min/1.73m*2 5* 6*      Results from last 72 hours   Lab Units 10/03/24  1735 10/03/24  1008   WBC AUTO x10*3/uL 17.3* 11.5*   HEMOGLOBIN g/dL 11.2* 13.4   HEMATOCRIT % 33.6* 42.0   PLATELETS AUTO x10*3/uL 133* 127*   NEUTROS PCT AUTO % 85.6 78.3   LYMPHS PCT AUTO % 2.8 10.1   MONOS " PCT AUTO % 9.8 10.0   EOS PCT AUTO % 0.0 0.0      Results from last 72 hours   Lab Units 10/03/24  1225   POCT PH, ARTERIAL pH 7.29*   POCT PCO2, ARTERIAL mm Hg 26*   POCT PO2, ARTERIAL mm Hg 502*   POCT SO2, ARTERIAL % 100     Results from last 72 hours   Lab Units 10/03/24  1720 10/03/24  1222 10/03/24  1211   POCT PH, VENOUS pH 7.29* 7.22* 7.19*   POCT PCO2, VENOUS mm Hg 51 43 51   POCT PO2, VENOUS mm Hg 33* 36 36        Micro/ID:     Lab Results   Component Value Date    BLOODCULT Gram-negative bacillus (A) 03/26/2023       Summary of Key Imaging Results  Echo 8/10/2024  - Exam indication: Re-evaluation of known heart failure with a change in clinical   status without change in med/diet   - The left ventricle is normal in size. There is mild left ventricular   hypertrophy. Left ventricular systolic function is mildly decreased. EF = 48 ± 5%   (2D biplane) Grade III left ventricular diastolic dysfunction.   - The right ventricle is dilated. Right ventricular systolic function is mildly   decreased.   - The left atrial cavity is severely dilated.   - The right atrial cavity is severely dilated.   - There is a moderate pericardial effusion adjacent to the left ventricle   measuring 1.2 cm and a small pericardial effusion adjacent to the right ventricle,    left atrium and right atrium. The inferior vena cava appears dilated measuring   2.5 cm. The vessel decreases less than 50 percent with inspiration.   - There is severe (3+ - 4+) tricuspid valve regurgitation.   - Navitor prosthetic aortic valve. There is trace aortic valve regurgitation. The   peak gradient is 20 mmHg, the mean gradient is 10 mmHg and the dimensionless valve    index is 0.44. Prior AVR gradient 15/7 mmHg.   - Estimated right ventricular systolic pressure is 52 mmHg consistent with   moderate pulmonary hypertension. Estimated right atrial pressure is 15 mmHg based   on IVC assessment. Prior RVSP 44 mmHg.   - Exam was compared with the prior CC  echocardiographic exam performed on   5/20/2024, stable to modest increase in LVEF on direct comparison.    CT angio chest for pulmonary embolism    Result Date: 10/3/2024  Interpreted By:  Brynn Castellano and Beyersdorf Conner STUDY: CT ANGIO CHEST FOR PULMONARY EMBOLISM;  10/3/2024 3:44 pm   INDICATION: Signs/Symptoms:shock, RHS on POCUS.     COMPARISON: None   ACCESSION NUMBER(S): [id number]   ORDERING CLINICIAN: RAY POTTER   TECHNIQUE: Helical data acquisition of the chest was obtained after intravenous administration of 80 mL of Omnipaque 350, as per PE protocol. Images were reformatted in coronal and sagittal planes. Axial and coronal maximum intensity projection (MIP) images were created and reviewed.   FINDINGS: POTENTIAL LIMITATIONS OF THE STUDY: The assessment is limited by streak artifact from the patient's arms and atelectasis of the bilateral lower lobes.   HEART AND VESSELS: No discrete filling defects within the main pulmonary artery or its branches to segmental level. Please note that, assessment of subsegmental branches is limited and small peripheral emboli are not entirely excluded. Main pulmonary artery and its branches are normal in caliber.   The thoracic aorta normal in course and caliber.There is severe atherosclerosis present, including calcified and noncalcified plaques.Although, the study is not tailored for evaluation of aorta, there is no definite evidence of acute aortic pathology. Postsurgical changes of TAVR. Severe coronary artery calcifications are seen. Please note,the study is not optimized for evaluation of coronary arteries.Suggestion of coronary artery stents.   Cardiomegaly with marked enlargement of the right atrium and ventricle and left atrium.There is significant contrast reflux into the dilated IVC, hepatic veins, and azygous vein, suggestive of chronic right heart failure. There is a left subclavian approach biventricular cardiac pacemaker/ICD seen in  placewith leads terminating within right atrium and apical right ventricle, and overlying left ventricular free wall. Moderate size pericardial effusion.   MEDIASTINUM AND JOHN, LOWER NECK AND AXILLA: The left thyroid lobe is enalarged and heterogenous with multiple hypodense nodules and coarse calcifications, which likely relates to multinodular goiter. Correlate with thyroid ultrasound. No evidence of thoracic lymphadenopathy by CT criteria. Esophagus appears within normal limits as seen.   LUNGS AND AIRWAYS: The trachea and central airways are patent. No endobronchial lesion is seen.   Moderate-to-large right pleural effusion with partial collapse of the right lower lobe. Trace left pleural effusion. There are centrally located branching and nodular opacities with questionable calcifications. Findings may represent sequelae of chronic pulmonary venous congestion or possibly sequelae from chronic infectious or inflammatory etiology.   There is also mosaic attenuation of the lungs with   UPPER ABDOMEN: Moderate volume abdominopelvic ascites with fluid layering near the spleen and liver. Severe atherosclerotic vascular disease of the abdominal aorta and its branches. Please see dedicated imaging of the abdomen and pelvis for further evaluation.     CHEST WALL AND OSSEOUS STRUCTURES: Chest wall is within normal limits. No acute osseous pathology.There are no suspicious osseous lesions.Mild multilevel degenerative changes within visualized spine.       1. No evidence of acute pulmonary embolism to segmental level. Please note that, assessment of subsegmental branches is limited and small peripheral emboli are not entirely excluded. 2. Marked cardiomegaly with enlargement of the right atrium, left atrium, and right ventricle. There is enlargement of the IVC with contrast reflux into the IVC and hepatic veins. Findings are suggestive of right heart strain/dysfunction and/or tricuspid regurgitation. Correlate with  echocardiographic findings. There is also moderate pericardial effusion. Recommend further evaluation with echocardiography correlate with concern for component of cardiac tamponade 3. Moderate-to-large right and trace left pleural effusions, pericardial effusion, and abdominopelvic ascites. Correlate with fluid volume status. 4. Multifocal central branching ground-glass opacities in the bilateral lungs with questionable multifocal calcifications and pulmonary ossification. Findings may represent sequelae of chronic pulmonary venous congestion which can be secondary to mitral valve stenosis. Alternatively findings may be sequela of chronic infectious or inflammatory process. 5. Please see dedicated CT imaging of the abdomen and pelvis for further evaluation.   I personally reviewed the image(s)/study and resident interpretation. I agree with the findings as stated by resident Jay Rowe. Data analyzed and images interpreted at [facility], [place], [place].   MACRO: Critical Finding:  See findings. Notification was initiated on 10/3/2024 at 4:51 pm by  Jay Rowe.  (**-OCF-**) Instructions:   Signed by: Brynn Amaya 10/3/2024 5:00 PM Dictation workstation:   [id number]    CT abdomen pelvis w IV contrast    Result Date: 10/3/2024  Interpreted By:  Michael Bundy, STUDY: CT ABDOMEN PELVIS W IV CONTRAST;  10/3/2024 3:44 pm   INDICATION: Signs/Symptoms:shock, abdo distension, recurrent hypoglycemia.     COMPARISON: None.   ACCESSION NUMBER(S): [id number]   ORDERING CLINICIAN: RAY POTTER   TECHNIQUE: CT of the abdomen and pelvis was performed.  Standard contiguous axial images were obtained at 3 mm slice thickness through the abdomen and pelvis. Coronal and sagittal reconstructions at 3 mm slice thickness were performed.   80 ml of contrast Omnipaque 350 were administered intravenously without immediate complication.   FINDINGS: LOWER CHEST: There is a moderate  right-sided pleural effusion with associated atelectatic changes of the lung. Multiple calcified, diffuse, centrilobular micronodules in the right midlung and to a lesser extent the left. Findings are suggestive of benign chronic process such as metastatic pulmonary calcifications. Leftward mediastinal shift.   Aortic vascular stent is in place through the ascending aorta. Multiple coronary stents are noted through the all major coronary arteries.   Dual-chamber pacemaker in place with leads seen in the right atrium, right ventricle, and left ventricle.   Mild-to-moderate pericardial effusion   ABDOMEN:   LIVER: Shows heterogeneous enhancement with a nodular contour. There is widening of the periportal space with associated periportal edema. Findings suggest cirrhosis.   BILE DUCTS: Normal caliber.   GALLBLADDER: The gallbladder surgically absent with metallic clips seen in place   PANCREAS: Pancreatic duct appears prominent measuring 5 mm at the head, but there is normal caliber of the duct throughout the remaining portions of the pancreas. Small subcentimeter hypodense lesions in the head of pancreas are also noted.   SPLEEN: Spleen is unremarkable.   ADRENAL GLANDS: Bilateral adrenal glands are unremarkable.   KIDNEYS AND URETERS: The native left kidney is extremely atrophic with multiple cysts seen this area. The native right kidney is extremely atrophic as well. There is an atrophic transplant kidney in the right iliac fossa. Multiple calcifications are seen throughout the native left, native right, and transplant kidney which may be related to degeneration. No hydronephrosis to suggest an obstructive stone.   PELVIS:   BLADDER: The bladder is decompressed which limits evaluation. Within this limitation, the bladder appears unremarkable.   REPRODUCTIVE ORGANS: The uterus is present.   BOWEL: The stomach, small and large bowel are normal in appearance without wall thickening or obstruction.      The appendix  appears normal.   VESSELS: Extensive atherosclerotic disease burden of the aorta and its major branches   PERITONEUM/RETROPERITONEUM/LYMPH NODES: There is a moderate amount of free fluid throughout the abdomen and pelvis, suggesting ascites. No abdominopelvic lymphadenopathy is present.   There is a collection in the right pelvis that measures 6.8 x 4.3 x 6.6 cm and attenuates slightly greater fluid. There is a rim of near-complete calcification surrounding this collection which attenuates slightly greater than water. Findings are felt to represent an old, chronic seroma or hematoma.     BONES AND ABDOMINAL WALL: Severe degenerative changes at the L2-L3 level. No acute vertebral compression fractures identified. There is a cortical defect along the greater trochanter of the right femur which would suggest an acute fracture. Soft tissues thickening along the subcutaneous tissues of the right lateral hip suggesting small hematoma. Collection of hyperdense material between the planes of the right gluteal muscles, likely hematoma. Metallic clips seen in the posterior right upper abdominal wall, may be related to prior surgery.       1.  Moderate right-sided pleural effusion with atelectatic changes. Multiple calcified, diffuse, centrilobular micronodules are suggestive of metastatic pulmonary calcifications (a benign, chronic process). Consider correlation with history of renal failure, secondary hyperparathyroidism, hypercalcemia. 2. Mild-to-moderate pericardial effusion 3. Cortical irregularity at the greater trochanter of the right femur suggestive of an acute, minimally displaced fracture. Adjacent to the fracture site is an acute hematoma in the planes of the gluteal muscles. 4. Consider nonemergent, outpatient MRI for further evaluation of hypodense lesions noted in the head of the pancreas. 5. Other chronic incidental findings as detailed above.   I personally reviewed the images/study and I agree with the  findings as stated by Michael Bundy MD (resident). This study was interpreted at [facility], [place], [place].   MACRO: Michael Bundy discussed the significance and urgency of this critical finding via epic secure chat with  Aimee Cavanaugh MD on 10/3/2024 at 4:50 pm.  (**-RCF-**) Findings:  See findings.     Dictation workstation:   [id number]    XR chest 1 view    Result Date: 10/3/2024  Interpreted By:  Kirby Canada and Gupta Jayesh STUDY: XR CHEST 1 VIEW;  10/3/2024 1:10 pm   INDICATION: Signs/Symptoms:sob, hypoxia.   COMPARISON: Comparison radiograph 05/09/2022.   ACCESSION NUMBER(S): [id number]   ORDERING CLINICIAN: RAY POTTER   TECHNIQUE: AP radiograph of the chest was provided.   FINDINGS: Medical devices:Left chest wall pacemaker with 1 lead within the right atrium, and 2 leads within the right ventricle. Patient is status post TAVR.   CARDIOMEDIASTINAL SILHOUETTE: Cardiomediastinal silhouette is enlarged but stable in size and configuration.   LUNGS: Consolidative opacities within the right lower lung with diffuse hazy appearance bilateral lungs, right-greater-than-left. There is also possible bilateral costophrenic angle blunting representing trace pleural effusions. No pneumothorax.   ABDOMEN: No upper abdominal findings.   BONES: No osseous changes.       1. Increased consolidative opacities within the right lower lung with diffuse hazy opacities of bilateral lungs. Bilateral costophrenic angle blunting representing trace pleural effusions. Correlate with volume status. 2. Unchanged cardiomediastinal enlargement.   I personally reviewed the images/study and I agree with the findings as stated by Jeyson York MD. This study was interpreted at [facility], [place], [place].   MACRO: None   Signed by: Kirby Canada 10/3/2024 1:45 PM Dictation workstation:   [id number]    XR hip right with pelvis when performed 2 or 3  views    Result Date: 10/3/2024  Interpreted By:  Kirby Canada and Gupta Jayesh STUDY: XR HIP RIGHT WITH PELVIS WHEN PERFORMED 2 OR 3 VIEWS; 10/3/2024 10:41 am   INDICATION: Signs/Symptoms:fall R hip pain.   COMPARISON: Comparison radiograph 05/09/2022.   ACCESSION NUMBER(S): [id number]   ORDERING CLINICIAN: RAY POTTER   FINDINGS: One-view of the AP pelvis. Two views of the right hip.   Similar surgical clips over the right hemipelvis. Similar severe vascular calcifications. Similar bilateral hip arthrosis. There is no acute fracture or dislocation. The visualized bony pelvic ring is intact. The femoral heads are well-seated under the acetabular roofs. Remote right superior and inferior pubic ramus fracture deformities.       No acute fracture or dislocation of the right hip. Similar bilateral hip arthrosis.   I personally reviewed the images/study and I agree with the findings as stated by Jeyson York MD. This study was interpreted at [facility], [place], [place].   Signed by: Kirby Canada 10/3/2024 11:10 AM Dictation workstation:   [id number]    CT head wo IV contrast    Result Date: 10/3/2024  Interpreted By:  Stacy Mishra, STUDY: CT HEAD WO IV CONTRAST; CT FACIAL BONES WO IV CONTRAST; CT CERVICAL SPINE WO IV CONTRAST;  10/3/2024 10:37 am   INDICATION: Signs/Symptoms:Fall r facial pain and swelling.   COMPARISON: Head CT May 15, 2022   ACCESSION NUMBER(S): [id number]; [id number]; [id number]   ORDERING CLINICIAN: RIGOBERTO MCCANN   TECHNIQUE: Noncontrast axial CT scan of head was performed. Angled reformats in brain and bone windows were generated. The images were reviewed in bone, brain, blood and soft tissue windows. Coronal and sagittal reformats were provided for review.   Axial CT images of the facial bones were obtained. Coronal and sagittal reformats are provided for review. 3D reconstructions were also performed utilizing an independent workstation.   Axial  CT images of the cervical spine with coronal and sagittal reformats are provided for review.   FINDINGS: Head CT:   CSF Spaces: There is prominence of ventricles and sulci compatible with mild parenchymal volume loss. There is no extraaxial fluid collection.   Parenchyma:  There are nonspecific areas of diminished attenuation in the subcortical and periventricular white matter. Small hypodensities in the basal ganglia and external capsules may represent lacunar infarcts or perivascular spaces. Otherwise, the grey-white differentiation is intact. There is no mass effect or midline shift. There is no intracranial hemorrhage.   Calvarium: The bony calvarium is intact.   Facial bone CT:   There is right periorbital soft tissue swelling and hematoma extending inferiorly along the right maxilla. No measurable intraconal hematoma is identified. The optic nerve sheath complexes are symmetric. There may be very minimal fat stranding adjacent to the far anterior aspect of the superior rectus, superior oblique, and lateral rectus muscle bellies. There is prominent intraorbital and periorbital fat and suspected proptosis bilaterally.   No displaced orbital fracture is identified.   The mandible and temporomandibular articulations are intact. The zygomatic arches are intact.   There is a small retention cyst or polyp in the right frontal sinus. The visualized paranasal sinuses are otherwise clear. No air-fluid levels are identified. There is opacification of a few mastoid air cells.   Cervical spine CT:   There is loss of the normal cervical lordosis with grade 1-2 anterolisthesis of C4 on C5 and grade 1 retrolisthesis of C5 on C6.   There is mild loss of height of C5 through C7 favored to be remote/degenerative in nature.   The patient is tilted and rotated within the scanner. Otherwise, the craniocervical junction appears intact. The odontoid process is intact. There are degenerative changes of the atlantoaxial and  craniocervical articulations.   There is vacuum disc phenomenon and loss of disc height from C4-5 through C6-7. There is also multilevel endplate spurring. There is multilevel degenerative facet arthropathy and uncovertebral joint hypertrophy contributing to neuroforaminal stenosis. Posterior disc/osteophyte complex formation contributes to central canal stenosis most severe at C5-6 and C6-7.   No definite linear lucency suggestive of an acute fracture of the cervical spine is identified.   There is no significant prevertebral soft tissue swelling.   There are atherosclerotic vascular calcifications.   There is a partially imaged heterogeneous and partially calcified nodule in the left lobe of the thyroid gland measuring at least 4.1 cm.   There is partially imaged right apical pleural thickening.       1. Right periorbital and facial soft tissue swelling and hematoma without evidence for orbital or facial bone fracture. There is also very subtle fat stranding along the far anterior aspects of the extraocular muscles. No measurable intraconal hematoma is identified. 2. There is prominent intraorbital and periorbital fat and suspected proptosis bilaterally which may reflect underlying thyroid orbitopathy. 3. Loss of the normal cervical lordosis with grade 1-2 anterolisthesis of C4 on C5 is favored to be remote/degenerative in nature. Traumatic subluxation is thought to be less likely but is difficult to entirely exclude. There is no definitive evidence for an acute fracture of the cervical spine. 4. Multilevel degenerative changes of the cervical spine with associated central canal and neuroforaminal stenosis. 5. No evidence of acute intracranial hemorrhage. 6. Partially imaged large thyroid nodule for which nonemergent thyroid ultrasound is recommended.       MACRO: None   Signed by: Stacy Mishra 10/3/2024 10:54 AM Dictation workstation:   [id number]    CT maxillofacial bones wo IV contrast    Result Date:  10/3/2024  Interpreted By:  Stacy Mishra, STUDY: CT HEAD WO IV CONTRAST; CT FACIAL BONES WO IV CONTRAST; CT CERVICAL SPINE WO IV CONTRAST;  10/3/2024 10:37 am   INDICATION: Signs/Symptoms:Fall r facial pain and swelling.   COMPARISON: Head CT May 15, 2022   ACCESSION NUMBER(S): [id number]; [id number]; [id number]   ORDERING CLINICIAN: RIGOBERTO MCCANN   TECHNIQUE: Noncontrast axial CT scan of head was performed. Angled reformats in brain and bone windows were generated. The images were reviewed in bone, brain, blood and soft tissue windows. Coronal and sagittal reformats were provided for review.   Axial CT images of the facial bones were obtained. Coronal and sagittal reformats are provided for review. 3D reconstructions were also performed utilizing an independent workstation.   Axial CT images of the cervical spine with coronal and sagittal reformats are provided for review.   FINDINGS: Head CT:   CSF Spaces: There is prominence of ventricles and sulci compatible with mild parenchymal volume loss. There is no extraaxial fluid collection.   Parenchyma:  There are nonspecific areas of diminished attenuation in the subcortical and periventricular white matter. Small hypodensities in the basal ganglia and external capsules may represent lacunar infarcts or perivascular spaces. Otherwise, the grey-white differentiation is intact. There is no mass effect or midline shift. There is no intracranial hemorrhage.   Calvarium: The bony calvarium is intact.   Facial bone CT:   There is right periorbital soft tissue swelling and hematoma extending inferiorly along the right maxilla. No measurable intraconal hematoma is identified. The optic nerve sheath complexes are symmetric. There may be very minimal fat stranding adjacent to the far anterior aspect of the superior rectus, superior oblique, and lateral rectus muscle bellies. There is prominent intraorbital and periorbital fat and suspected proptosis bilaterally.   No  displaced orbital fracture is identified.   The mandible and temporomandibular articulations are intact. The zygomatic arches are intact.   There is a small retention cyst or polyp in the right frontal sinus. The visualized paranasal sinuses are otherwise clear. No air-fluid levels are identified. There is opacification of a few mastoid air cells.   Cervical spine CT:   There is loss of the normal cervical lordosis with grade 1-2 anterolisthesis of C4 on C5 and grade 1 retrolisthesis of C5 on C6.   There is mild loss of height of C5 through C7 favored to be remote/degenerative in nature.   The patient is tilted and rotated within the scanner. Otherwise, the craniocervical junction appears intact. The odontoid process is intact. There are degenerative changes of the atlantoaxial and craniocervical articulations.   There is vacuum disc phenomenon and loss of disc height from C4-5 through C6-7. There is also multilevel endplate spurring. There is multilevel degenerative facet arthropathy and uncovertebral joint hypertrophy contributing to neuroforaminal stenosis. Posterior disc/osteophyte complex formation contributes to central canal stenosis most severe at C5-6 and C6-7.   No definite linear lucency suggestive of an acute fracture of the cervical spine is identified.   There is no significant prevertebral soft tissue swelling.   There are atherosclerotic vascular calcifications.   There is a partially imaged heterogeneous and partially calcified nodule in the left lobe of the thyroid gland measuring at least 4.1 cm.   There is partially imaged right apical pleural thickening.       1. Right periorbital and facial soft tissue swelling and hematoma without evidence for orbital or facial bone fracture. There is also very subtle fat stranding along the far anterior aspects of the extraocular muscles. No measurable intraconal hematoma is identified. 2. There is prominent intraorbital and periorbital fat and suspected  proptosis bilaterally which may reflect underlying thyroid orbitopathy. 3. Loss of the normal cervical lordosis with grade 1-2 anterolisthesis of C4 on C5 is favored to be remote/degenerative in nature. Traumatic subluxation is thought to be less likely but is difficult to entirely exclude. There is no definitive evidence for an acute fracture of the cervical spine. 4. Multilevel degenerative changes of the cervical spine with associated central canal and neuroforaminal stenosis. 5. No evidence of acute intracranial hemorrhage. 6. Partially imaged large thyroid nodule for which nonemergent thyroid ultrasound is recommended.       MACRO: None   Signed by: Stacy Mishra 10/3/2024 10:54 AM Dictation workstation:   [id number]    CT cervical spine wo IV contrast    Result Date: 10/3/2024  Interpreted By:  Stacy Mishra, STUDY: CT HEAD WO IV CONTRAST; CT FACIAL BONES WO IV CONTRAST; CT CERVICAL SPINE WO IV CONTRAST;  10/3/2024 10:37 am   INDICATION: Signs/Symptoms:Fall r facial pain and swelling.   COMPARISON: Head CT May 15, 2022   ACCESSION NUMBER(S): [id number]; [id number]; [id number]   ORDERING CLINICIAN: RIGOBERTO MCCANN   TECHNIQUE: Noncontrast axial CT scan of head was performed. Angled reformats in brain and bone windows were generated. The images were reviewed in bone, brain, blood and soft tissue windows. Coronal and sagittal reformats were provided for review.   Axial CT images of the facial bones were obtained. Coronal and sagittal reformats are provided for review. 3D reconstructions were also performed utilizing an independent workstation.   Axial CT images of the cervical spine with coronal and sagittal reformats are provided for review.   FINDINGS: Head CT:   CSF Spaces: There is prominence of ventricles and sulci compatible with mild parenchymal volume loss. There is no extraaxial fluid collection.   Parenchyma:  There are nonspecific areas of diminished attenuation in the subcortical and  periventricular white matter. Small hypodensities in the basal ganglia and external capsules may represent lacunar infarcts or perivascular spaces. Otherwise, the grey-white differentiation is intact. There is no mass effect or midline shift. There is no intracranial hemorrhage.   Calvarium: The bony calvarium is intact.   Facial bone CT:   There is right periorbital soft tissue swelling and hematoma extending inferiorly along the right maxilla. No measurable intraconal hematoma is identified. The optic nerve sheath complexes are symmetric. There may be very minimal fat stranding adjacent to the far anterior aspect of the superior rectus, superior oblique, and lateral rectus muscle bellies. There is prominent intraorbital and periorbital fat and suspected proptosis bilaterally.   No displaced orbital fracture is identified.   The mandible and temporomandibular articulations are intact. The zygomatic arches are intact.   There is a small retention cyst or polyp in the right frontal sinus. The visualized paranasal sinuses are otherwise clear. No air-fluid levels are identified. There is opacification of a few mastoid air cells.   Cervical spine CT:   There is loss of the normal cervical lordosis with grade 1-2 anterolisthesis of C4 on C5 and grade 1 retrolisthesis of C5 on C6.   There is mild loss of height of C5 through C7 favored to be remote/degenerative in nature.   The patient is tilted and rotated within the scanner. Otherwise, the craniocervical junction appears intact. The odontoid process is intact. There are degenerative changes of the atlantoaxial and craniocervical articulations.   There is vacuum disc phenomenon and loss of disc height from C4-5 through C6-7. There is also multilevel endplate spurring. There is multilevel degenerative facet arthropathy and uncovertebral joint hypertrophy contributing to neuroforaminal stenosis. Posterior disc/osteophyte complex formation contributes to central canal  stenosis most severe at C5-6 and C6-7.   No definite linear lucency suggestive of an acute fracture of the cervical spine is identified.   There is no significant prevertebral soft tissue swelling.   There are atherosclerotic vascular calcifications.   There is a partially imaged heterogeneous and partially calcified nodule in the left lobe of the thyroid gland measuring at least 4.1 cm.   There is partially imaged right apical pleural thickening.       1. Right periorbital and facial soft tissue swelling and hematoma without evidence for orbital or facial bone fracture. There is also very subtle fat stranding along the far anterior aspects of the extraocular muscles. No measurable intraconal hematoma is identified. 2. There is prominent intraorbital and periorbital fat and suspected proptosis bilaterally which may reflect underlying thyroid orbitopathy. 3. Loss of the normal cervical lordosis with grade 1-2 anterolisthesis of C4 on C5 is favored to be remote/degenerative in nature. Traumatic subluxation is thought to be less likely but is difficult to entirely exclude. There is no definitive evidence for an acute fracture of the cervical spine. 4. Multilevel degenerative changes of the cervical spine with associated central canal and neuroforaminal stenosis. 5. No evidence of acute intracranial hemorrhage. 6. Partially imaged large thyroid nodule for which nonemergent thyroid ultrasound is recommended.       MACRO: None   Signed by: Stacy Mishra 10/3/2024 10:54 AM Dictation workstation:   [id number]    CT abdomen pelvis wo IV contrast    Result Date: 10/2/2024  * * *Final Report* * * DATE OF EXAM: Oct  2 2024  8:58AM   [facility]   0529  -  CT FLANK WO IVCON  / ACCESSION #  [id number] PROCEDURE REASON: Flank pain, kidney stone suspected      * * * * Physician Interpretation * * * * RESULT: HISTORY:  Flank pain. Technique: CT scan of the abdomen and pelvis was performed without intravenous or oral contrast. CT  Dose-Length Product (DLP): 275 mGy*cm. CT Dose Reduction Employed: Automated exposure control(AEC) and iterative recon Comparison: CT abdomen/pelvis 01/27/2024. FINDINGS: Abdomen: Subtle nodular contour of the liver, raising the possibility of cirrhosis. Cholecystectomy. Unenhanced appearance of the spleen, pancreas, adrenal glands, demonstrate no significant findings.  Atrophic changes of the native bilateral kidneys are similar to prior.  Nonobstructing calcifications of the bilateral native kidneys.  The native left kidney is again seen to be severely chronically hydronephrotic with marked cortical thinning.  Some interval atrophy of a transplanted right pelvic kidney which contains nonobstructing calcifications at a lower pole calyx..  No hydronephrosis of the transplanted right kidney.  Markedly atrophic transplanted left pelvic kidney is again demonstrated. Pelvis: 7.1 x 4.1 cm chronic right pelvic collection with peripheral calcifications, previously 7.4 x 5.4 cm.  Uterus is present.  No adnexal thickening.  Urinary bladder is decompressed which limits its evaluation.  No urinary bladder calculus identified. Other findings: There is no bowel obstruction. Appendix is normal.  Colonic diverticulosis is noted without evidence for diverticulitis.  There is no significant abdominal or pelvic lymphadenopathy.  Small volume ascites which may be partially loculated.There is no free intraperitoneal air. There is no abdominal aortic aneurysm. Atheromatous changes of the aorta and its branch vasculature are noted. There is again marked cardiomegaly.  Cardiac wires within the heart.   Atheromatous changes of the partially imaged thoracic aorta and coronary arteries.  Partially imaged right pleural effusion with adjacent atelectasis.  Bibasilar centrilobular nodularity.  3 mm nodule left lower lobe (series 2 image 12).  This is minimally more prominent compared with 01/24/2022.  4 mm nodule medial left lower lobe (series  2 image 8), previously 3 mm on 01/24/2022. Remote fracture deformity of the right inferior pubic ramus and right superior pubic ramus.  Generalized osteopenia.  Degenerative changes of the spine.  Rightward curvature of the upper lumbar spine.  (topogram images): No additional findings.    IMPRESSION: Some interval atrophy of a transplanted right pelvic kidney which contains nonobstructing calcifications at a lower pole calyx, without hydronephrosis. Severe atrophy of a transplanted left pelvic kidney as well as bilateral native kidneys, similar to prior.  Chronically severely hydronephrotic native left kidney, similar to prior. Possible hepatic cirrhosis.  Small volume ascites. Right pleural effusion.  Given the possible cirrhosis, this could relate to hepatic hydrothorax. Bibasilar centrilobular nodularity of the lungs which can be seen with chronic small airways infection. 3 mm nodule in the left lower lobe and 4 mm nodule of the left lower lobe, minimally more prominent compared with 01/24/2022.  CT chest follow-up in 6 months is recommended. ACTIONABLE RESULT: FOLLOW-UP Acuity: Actionable Findings: Thoracic-Other Routing Code:  CT_1 Recommendation: CT Chest WO IVCON Time Frame: Additional evaluation as described in the impression COMMUNICATION: Results will be communicated with the ordering provider via Epic staff message or phone message by [facility] within 2 business days of report finalization. --END OF FINDING-- Transcribed Using Voice Recognition Transcribe Date/Time: Oct  2 2024  9:09A Dictated by: PATRICIA HERNANDEZ MD This examination was interpreted and the report reviewed and electronically signed by: PATRICIA HERNANDEZ MD on Oct  2 2024  9:45AM  EST      Assessment and Plan     Assessment:  Misael Ritchie is a 69 y.o. female PMH aortic stenosis s/p TAVR 11/23, atrial fibrillation (on Eliquis) s/p DCCV, CAD, ESRD on hemodialysis T/Th/Sat, HFrEF (6/2023 EF 48%), MR, TR, pacemaker CRT-P  5/2023, adrenal insufficiency admitted to the MICU on 10/03/24 for emergent dialysis for hyperkalemia 7.4.    Mechanical Ventilation: none  Sedation/Analgesia:  none  Restraints: no    Plan:  NEUROLOGY/PSYCH:  #Pain s/p fall   Scheduled Tylenol  PRN Oxy 5/10    CARDIOVASCULAR:  #PMH A-fib, CAD s/p stent 2003, MR, TR  #pericardial effusion  Management:  Hold Eliquis and Plavix  Continue home amiodarone, atorvastatin  Hold metoprolol for HR  If patient becomes unstable consider STAT POCUS to rule out tamponade    PULMONARY:  #COPD  Management:  Wean O2  Continue home albuterol PRN    RENAL/GENITOURINARY:  #ESRD  Management:  Emergent dialysis  Hold home torsemide   Hold home allopurinol   Follow up uric acid    GASTROENTEROLOGY:  Continue home Protonix, miralax    ENDOCRINOLOGY:  #Adrenal insufficiency  #hypoglycemia 2/2 insulin patient received for hyperkalemia  Management:  Prednisone 5 mg   Can consider stress dose steroids   TSH, T4, T3  D5 infusion and Q1h Blood sugar    HEMATOLOGY:  #hematoma   Management:  Consider reversing Eliquis if Hb drops    SKIN:  No acute concerns    MUSCULOSKELETAL:  #Right troc fracture with hematoma  Management:  Ortho consulted, recs appreciated (MRI ordered)    INFECTIOUS DISEASE:  Rule out sepsis low suspicion  Management:  Follow up Bcx, UA    ICU Check List     FEN  Fluids: PRN  Electrolytes: PRN  Nutrition: NPO at midnight for possible OR  Prophylaxis:  DVT ppx: hold for possible OR  GI ppx: Home Protonix  Bowel care: Home Miralax  Hardware:                CVC 10/03/24 Triple lumen Non-tunneled Left Femoral (Active)   Placement Date/Time: 10/03/24 1516   Site Prep: Chlorhexidine   All 5 Sterile Barriers Used (Gloves, Gown, Cap, Mask, Large Sterile Drape): Yes  Lumen Type: Triple lumen  CVC Line Catheter Size (Fr): 7 Fr  CVC Type: Non-tunneled  CVC Line Length (cm):...   Number of days: 0       Social:  Code: Full Code    HPOA: Zeke Ritchie () [phone]    Disposition: ORA Cavanaugh MD   10/03/24 at 7:31 PM     Disclaimer: Documentation completed with the information available at the time of input. The times in the chart may not be reflective of actual patient care times, interventions, or procedures. Documentation occurs after the physical care of the patient.

## 2024-10-03 NOTE — SIGNIFICANT EVENT
Patient has been identified as having an emergent need for administration of iodinated contrast for CT scan prior to result of laboratory studies OR despite known elevated GFR due to possibility of life and/or limb threatening pathology.    I acknowledge the risks and benefits of emergently proceeding with contrast administration including that, at present, it is the position of the [employer] that contrast induced nephropathy (SUKUMAR) is a rare but possible consequence. At this time the benefits of proceeding with contrast administration outweigh the risks.    Attempts will be made to mitigate possible SUKUMAR risk with IV fluid hydration if able.  Malu Sullivan, DO  PGY-3 Emergency Medicine

## 2024-10-03 NOTE — ED PROVIDER NOTES
History of Present Illness     History provided by: Patient  Limitations to History: None  External Records Reviewed with Brief Summary:  Previous ED and hospitalization records, patient was seen yesterday at outside hospital for left leg pain discharge.    HPI:  Misael Ritchie is a 69 y.o. female with past medical history of hypertension CAD CHF with EF of 40% atrial fibrillation on Eliquis aortic stenosis status post TAVR mitral regurg, status post PPM ESRD on dialysis  who presents after a syncopal fall.  She was standing up to turn off a fan when she felt lightheaded and syncopized falling onto her right head.  Did not pass out however she is on Eliquis.  She reports pain over her right hip which she fell onto as well as on the right side of her face.  She is not having any vision changes but does have swelling over her eyes.  No chest pain abdominal pain numbness tingling or weakness.  She did not go to dialysis yet today.    Physical Exam   Triage vitals:  T 36.8 °C (98.2 °F)  HR 60  /80  RR 16  O2 99 % None (Room air)    General: Awake, alert, in no acute distress  Eyes: Gaze conjugate.  No scleral icterus or injection  HENT: Cephalhematoma over the right zygomatic arch and periorbital region with orbital ecchymoses.  Trachea midline, no CT or L-spine tenderness to palpation.    CV: Regular rate, AV paced rhythm. Radial pulses 2+ bilaterally, PT 2+ bilaterally  Resp: Breathing non-labored, speaking in full sentences.  Clear to auscultation bilaterally  GI: Soft, non-distended, non-tender. No rebound or guarding.  : Deferred  MSK/Extremities: No gross bony deformities. Moving all extremities  Skin: Warm. Appropriate color  Neuro: Alert. Oriented. Face symmetric. Speech is fluent.  Gross strength and sensation intact in b/l UE and LEs  Psych: Appropriate mood and affect      Medical Decision Making & ED Course   Medical Decision Makin y.o. female who presents  after a fall on Eliquis.  No focal deficits on arrival is hemodynamically stable received basic blood work that shows mild leukocytosis and mild thrombocytopenia without anemia, elevated PT/INR in the setting of being on blood thinners.  Initial blood gas within normal limits no elevation in lactate.  Patient received CT imaging that did not show any acute facial fracture or intracranial hemorrhage cervical spine fracture and x-ray of the hip and pelvis is atraumatic.  Patient became acutely altered clammy cool and O2 saturation reading in the 80s.  Poor waveform.  Does not have B-lines on ultrasound, with hypoxia and decreased responsiveness though still talking and protecting airway she was placed on BiPAP.  ABG was obtained which showed elevated PaO2 not consistent with true hypoxia.  She was found to be acutely hypoglycemic and was given an amp of D50.  On recheck patient remains hypoglycemic and an additional amp was given.  At this time patient's blood pressure which had bottomed out improved as well as her mental status improved back to her baseline.  She was found to be acutely hyperkalemic on her CMP which was 7.6 and she received a hyperkalemia treatment cocktail including bicarb calcium gluconate D50.  She was given insulin after her blood sugar had improved.  We were unable to obtain appropriate peripheral access for CT imaging given vasculopath dialysis and a left femoral central line was placed.  Discussed with the patient the risks and benefits of the line and patient is agreeable.  Patient was given 0.2 of Dilaudid prior to procedure for analgesia.  Patient was discussed with nephrology team who will send for emergent dialysis in the setting of hyperkalemia uremia altered mental status.  Patient was the MICU who accepted for admission..----      Differential diagnoses considered include but are not limited to: Hypoglycemia with unclear etiology, per chart review does have history of intermittent  adrenal insufficiency, no changes in medicines per patient.  States this is only ever happened in the hospital.  She is hyperkalemic from missed dialysis today.  Last session was on Tuesday.     Social Determinants of Health which Significantly Impact Care: None identified     EKG Independent Interpretation: EKG interpreted by myself. EKG with AV paced rhythm heart rate of 77  MS QRS 1 8 MS QTc 531 MS with prolonged QT secondary to paced rhythm no acute ST segment elevations or T wave inversions concerning for acute ischemia, no previous for comparison.    Independent Result Review and Interpretation: Results were independently reviewed and interpreted by myself. Please see ED course and MDM for full interpretation.    Chronic conditions affecting the patient's care: As documented in the MDM    The patient was discussed with the following consultants/services:  Dr. Bailey ICU attending who accepted the patient for admission \    Care Considerations: As per [facility]    ED Course:  ED Course as of 10/03/24 1632   Thu Oct 03, 2024   1052 CT head wo IV contrast [SC]   1053 CT maxillofacial bones wo IV contrast  No acute intracranial hemorrhage no obvious fracture of the facial bone, patient does have cephalhematoma over the right thigh [SC]   1053 CT cervical spine wo IV contrast  No acute fracture, anterolisthesis of C4-C5 without point tenderness correlating on exam, low suspicion for acute traumatic injury/subluxation [SC]   1053 XR hip right with pelvis when performed 2 or 3 views  No acute fracture or malalignment of the right hip [SC]   1053  or pelvis [SC]   1515 SODIUM: 139 [AL]   1515 GLUCOSE(!!): 40 [AL]   1515 POTASSIUM(!!): 7.6 [AL]      ED Course User Index  [AL] Jesse Etienne MD  [SC] Malu Sullivan DO         Diagnoses as of 10/03/24 1632   Hyperkalemia   Hypoglycemia   Hypoxia     Disposition   Patient was admitted to MICU    Procedures   Procedures    Patient seen and discussed with ED attending  physician.    Malu Sullivan DO  Emergency Medicine     Malu Sullivan DO  Resident  10/03/24 [id number]

## 2024-10-03 NOTE — SIGNIFICANT EVENT
Nephrology contacted regarding ESRD patient with hyperkalemia presenting with a fall.  Was found to have elevated Potassium of 7.6 with repeat of 7.8. Did not go to HD today due to mechanical fall.     Is to be admitted for Emergent hemodialysis and orders are placed. Will plan for HD in am as well.   -2 hrs of HD 2K bath  -Full treatment in am.  -Consult note to follow.

## 2024-10-04 ENCOUNTER — APPOINTMENT (OUTPATIENT)
Dept: CARDIOLOGY | Facility: HOSPITAL | Age: 69
DRG: 480 | End: 2024-10-04
Payer: MEDICARE

## 2024-10-04 ENCOUNTER — APPOINTMENT (OUTPATIENT)
Dept: DIALYSIS | Facility: HOSPITAL | Age: 69
End: 2024-10-04
Payer: MEDICARE

## 2024-10-04 ENCOUNTER — APPOINTMENT (OUTPATIENT)
Dept: CARDIOLOGY | Facility: HOSPITAL | Age: 69
End: 2024-10-04
Payer: MEDICARE

## 2024-10-04 ENCOUNTER — ANESTHESIA EVENT (OUTPATIENT)
Dept: OPERATING ROOM | Facility: HOSPITAL | Age: 69
End: 2024-10-04
Payer: MEDICARE

## 2024-10-04 PROBLEM — I50.9 CHF (CONGESTIVE HEART FAILURE): Status: ACTIVE | Noted: 2024-10-04

## 2024-10-04 PROBLEM — I10 HTN (HYPERTENSION): Status: ACTIVE | Noted: 2024-10-04

## 2024-10-04 PROBLEM — S72.144A NONDISPLACED INTERTROCHANTERIC FRACTURE OF RIGHT FEMUR, INITIAL ENCOUNTER FOR CLOSED FRACTURE: Status: ACTIVE | Noted: 2024-10-03

## 2024-10-04 PROBLEM — I25.10 CAD (CORONARY ARTERY DISEASE): Status: ACTIVE | Noted: 2024-10-04

## 2024-10-04 PROBLEM — Z95.0 PACEMAKER: Status: ACTIVE | Noted: 2024-10-04

## 2024-10-04 PROBLEM — I48.91 ATRIAL FIBRILLATION (MULTI): Status: ACTIVE | Noted: 2024-10-04

## 2024-10-04 LAB
ALBUMIN SERPL BCP-MCNC: 3 G/DL (ref 3.4–5)
ALP SERPL-CCNC: 137 U/L (ref 33–136)
ALT SERPL W P-5'-P-CCNC: 90 U/L (ref 7–45)
ANION GAP BLDA CALCULATED.4IONS-SCNC: 13 MMO/L (ref 10–25)
ANION GAP BLDA CALCULATED.4IONS-SCNC: 16 MMO/L (ref 10–25)
ANION GAP BLDA CALCULATED.4IONS-SCNC: 25 MMO/L (ref 10–25)
ANION GAP SERPL CALC-SCNC: 23 MMOL/L (ref 10–20)
AORTIC VALVE MEAN GRADIENT: 18 MMHG
AORTIC VALVE PEAK VELOCITY: 2.83 M/S
APTT PPP: 31 SECONDS (ref 27–38)
AST SERPL W P-5'-P-CCNC: 114 U/L (ref 9–39)
AV PEAK GRADIENT: 32 MMHG
AVA (PEAK VEL): 1.49 CM2
AVA (VTI): 0.89 CM2
BASE EXCESS BLDA CALC-SCNC: -2.1 MMOL/L (ref -2–3)
BASE EXCESS BLDA CALC-SCNC: -7.9 MMOL/L (ref -2–3)
BASE EXCESS BLDA CALC-SCNC: 1.3 MMOL/L (ref -2–3)
BILIRUB DIRECT SERPL-MCNC: 1.1 MG/DL (ref 0–0.3)
BILIRUB SERPL-MCNC: 1.8 MG/DL (ref 0–1.2)
BODY TEMPERATURE: 37 DEGREES CELSIUS
BUN SERPL-MCNC: 40 MG/DL (ref 6–23)
CA-I BLD-SCNC: 1.18 MMOL/L (ref 1.1–1.33)
CA-I BLDA-SCNC: 1.06 MMOL/L (ref 1.1–1.33)
CA-I BLDA-SCNC: 1.09 MMOL/L (ref 1.1–1.33)
CA-I BLDA-SCNC: 1.21 MMOL/L (ref 1.1–1.33)
CALCIUM SERPL-MCNC: 8.9 MG/DL (ref 8.6–10.6)
CHLORIDE BLDA-SCNC: 93 MMOL/L (ref 98–107)
CHLORIDE BLDA-SCNC: 94 MMOL/L (ref 98–107)
CHLORIDE BLDA-SCNC: 97 MMOL/L (ref 98–107)
CHLORIDE SERPL-SCNC: 91 MMOL/L (ref 98–107)
CO2 SERPL-SCNC: 21 MMOL/L (ref 21–32)
CORTIS SERPL-MCNC: >150 UG/DL (ref 2.5–20)
CREAT SERPL-MCNC: 4.67 MG/DL (ref 0.5–1.05)
EGFRCR SERPLBLD CKD-EPI 2021: 10 ML/MIN/1.73M*2
EJECTION FRACTION APICAL 4 CHAMBER: 49
EJECTION FRACTION: 53 %
ERYTHROCYTE [DISTWIDTH] IN BLOOD BY AUTOMATED COUNT: 17.3 % (ref 11.5–14.5)
FIBRINOGEN PPP-MCNC: 268 MG/DL (ref 200–400)
GLUCOSE BLD MANUAL STRIP-MCNC: 107 MG/DL (ref 74–99)
GLUCOSE BLD MANUAL STRIP-MCNC: 110 MG/DL (ref 74–99)
GLUCOSE BLD MANUAL STRIP-MCNC: 117 MG/DL (ref 74–99)
GLUCOSE BLD MANUAL STRIP-MCNC: 130 MG/DL (ref 74–99)
GLUCOSE BLD MANUAL STRIP-MCNC: 131 MG/DL (ref 74–99)
GLUCOSE BLD MANUAL STRIP-MCNC: 149 MG/DL (ref 74–99)
GLUCOSE BLD MANUAL STRIP-MCNC: 151 MG/DL (ref 74–99)
GLUCOSE BLD MANUAL STRIP-MCNC: 193 MG/DL (ref 74–99)
GLUCOSE BLD MANUAL STRIP-MCNC: 200 MG/DL (ref 74–99)
GLUCOSE BLD MANUAL STRIP-MCNC: 213 MG/DL (ref 74–99)
GLUCOSE BLD MANUAL STRIP-MCNC: 217 MG/DL (ref 74–99)
GLUCOSE BLD MANUAL STRIP-MCNC: 232 MG/DL (ref 74–99)
GLUCOSE BLD MANUAL STRIP-MCNC: 76 MG/DL (ref 74–99)
GLUCOSE BLDA-MCNC: 125 MG/DL (ref 74–99)
GLUCOSE BLDA-MCNC: 284 MG/DL (ref 74–99)
GLUCOSE BLDA-MCNC: 331 MG/DL (ref 74–99)
GLUCOSE SERPL-MCNC: 188 MG/DL (ref 74–99)
HAPTOGLOB SERPL NEPH-MCNC: <30 MG/DL (ref 30–200)
HCO3 BLDA-SCNC: 15.2 MMOL/L (ref 22–26)
HCO3 BLDA-SCNC: 22.3 MMOL/L (ref 22–26)
HCO3 BLDA-SCNC: 24.7 MMOL/L (ref 22–26)
HCT VFR BLD AUTO: 31.2 % (ref 36–46)
HCT VFR BLD EST: 32 % (ref 36–46)
HCT VFR BLD EST: 33 % (ref 36–46)
HCT VFR BLD EST: 34 % (ref 36–46)
HGB BLD-MCNC: 10.8 G/DL (ref 12–16)
HGB BLDA-MCNC: 10.5 G/DL (ref 12–16)
HGB BLDA-MCNC: 11 G/DL (ref 12–16)
HGB BLDA-MCNC: 11.4 G/DL (ref 12–16)
HGB RETIC QN: 30 PG (ref 28–38)
IMMATURE RETIC FRACTION: 47.9 %
INHALED O2 CONCENTRATION: 10 %
INHALED O2 CONCENTRATION: 50 %
INHALED O2 CONCENTRATION: 50 %
INR PPP: 2 (ref 0.9–1.1)
LACTATE BLDA-SCNC: 1.9 MMOL/L (ref 0.4–2)
LACTATE BLDA-SCNC: 3.5 MMOL/L (ref 0.4–2)
LACTATE BLDA-SCNC: 9.2 MMOL/L (ref 0.4–2)
LACTATE SERPL-SCNC: 3.9 MMOL/L (ref 0.4–2)
LACTATE SERPL-SCNC: 8.7 MMOL/L (ref 0.4–2)
LDH SERPL L TO P-CCNC: 399 U/L (ref 84–246)
LEFT ATRIUM VOLUME AREA LENGTH INDEX BSA: 55.8 ML/M2
LEFT VENTRICLE INTERNAL DIMENSION DIASTOLE: 4 CM (ref 3.5–6)
LEFT VENTRICULAR OUTFLOW TRACT DIAMETER: 1.7 CM
MAGNESIUM SERPL-MCNC: 1.75 MG/DL (ref 1.6–2.4)
MCH RBC QN AUTO: 31 PG (ref 26–34)
MCHC RBC AUTO-ENTMCNC: 34.6 G/DL (ref 32–36)
MCV RBC AUTO: 90 FL (ref 80–100)
MITRAL VALVE E/A RATIO: 0.94
MRSA DNA SPEC QL NAA+PROBE: DETECTED
NRBC BLD-RTO: 1 /100 WBCS (ref 0–0)
OXYHGB MFR BLDA: 97.4 % (ref 94–98)
OXYHGB MFR BLDA: 97.5 % (ref 94–98)
OXYHGB MFR BLDA: 97.8 % (ref 94–98)
PCO2 BLDA: 24 MM HG (ref 38–42)
PCO2 BLDA: 34 MM HG (ref 38–42)
PCO2 BLDA: 36 MM HG (ref 38–42)
PH BLDA: 7.4 PH (ref 7.38–7.42)
PH BLDA: 7.41 PH (ref 7.38–7.42)
PH BLDA: 7.47 PH (ref 7.38–7.42)
PHOSPHATE SERPL-MCNC: 7.1 MG/DL (ref 2.5–4.9)
PLATELET # BLD AUTO: 133 X10*3/UL (ref 150–450)
PO2 BLDA: 165 MM HG (ref 85–95)
PO2 BLDA: 201 MM HG (ref 85–95)
PO2 BLDA: 207 MM HG (ref 85–95)
POTASSIUM BLDA-SCNC: 3.6 MMOL/L (ref 3.5–5.3)
POTASSIUM BLDA-SCNC: 5 MMOL/L (ref 3.5–5.3)
POTASSIUM BLDA-SCNC: 5.2 MMOL/L (ref 3.5–5.3)
POTASSIUM SERPL-SCNC: 5.6 MMOL/L (ref 3.5–5.3)
PROT SERPL-MCNC: 5.8 G/DL (ref 6.4–8.2)
PROTHROMBIN TIME: 22.3 SECONDS (ref 9.8–12.8)
RBC # BLD AUTO: 3.48 X10*6/UL (ref 4–5.2)
RETICS #: 0.09 X10*6/UL (ref 0.02–0.11)
RETICS/RBC NFR AUTO: 2.6 % (ref 0.5–2)
RIGHT VENTRICLE PEAK SYSTOLIC PRESSURE: 72.5 MMHG
SAO2 % BLDA: 100 % (ref 94–100)
SAO2 % BLDA: 100 % (ref 94–100)
SAO2 % BLDA: 99 % (ref 94–100)
SODIUM BLDA-SCNC: 127 MMOL/L (ref 136–145)
SODIUM BLDA-SCNC: 128 MMOL/L (ref 136–145)
SODIUM BLDA-SCNC: 131 MMOL/L (ref 136–145)
SODIUM SERPL-SCNC: 129 MMOL/L (ref 136–145)
TRICUSPID ANNULAR PLANE SYSTOLIC EXCURSION: 1.2 CM
UFH PPP CHRO-ACNC: 1.5 IU/ML
WBC # BLD AUTO: 16.9 X10*3/UL (ref 4.4–11.3)

## 2024-10-04 PROCEDURE — 2500000004 HC RX 250 GENERAL PHARMACY W/ HCPCS (ALT 636 FOR OP/ED)

## 2024-10-04 PROCEDURE — 85384 FIBRINOGEN ACTIVITY: CPT

## 2024-10-04 PROCEDURE — 84132 ASSAY OF SERUM POTASSIUM: CPT

## 2024-10-04 PROCEDURE — 80076 HEPATIC FUNCTION PANEL: CPT

## 2024-10-04 PROCEDURE — 93306 TTE W/DOPPLER COMPLETE: CPT | Performed by: INTERNAL MEDICINE

## 2024-10-04 PROCEDURE — 99222 1ST HOSP IP/OBS MODERATE 55: CPT | Performed by: INTERNAL MEDICINE

## 2024-10-04 PROCEDURE — 2500000002 HC RX 250 W HCPCS SELF ADMINISTERED DRUGS (ALT 637 FOR MEDICARE OP, ALT 636 FOR OP/ED)

## 2024-10-04 PROCEDURE — 93005 ELECTROCARDIOGRAM TRACING: CPT

## 2024-10-04 PROCEDURE — 83735 ASSAY OF MAGNESIUM: CPT

## 2024-10-04 PROCEDURE — 37799 UNLISTED PX VASCULAR SURGERY: CPT

## 2024-10-04 PROCEDURE — 87040 BLOOD CULTURE FOR BACTERIA: CPT

## 2024-10-04 PROCEDURE — 2500000005 HC RX 250 GENERAL PHARMACY W/O HCPCS

## 2024-10-04 PROCEDURE — 8010000001 HC DIALYSIS - HEMODIALYSIS PER DAY

## 2024-10-04 PROCEDURE — 2500000001 HC RX 250 WO HCPCS SELF ADMINISTERED DRUGS (ALT 637 FOR MEDICARE OP)

## 2024-10-04 PROCEDURE — 99222 1ST HOSP IP/OBS MODERATE 55: CPT

## 2024-10-04 PROCEDURE — 93281 PM DEVICE PROGR EVAL MULTI: CPT | Performed by: INTERNAL MEDICINE

## 2024-10-04 PROCEDURE — 82533 TOTAL CORTISOL: CPT

## 2024-10-04 PROCEDURE — 85027 COMPLETE CBC AUTOMATED: CPT

## 2024-10-04 PROCEDURE — 93306 TTE W/DOPPLER COMPLETE: CPT

## 2024-10-04 PROCEDURE — 83615 LACTATE (LD) (LDH) ENZYME: CPT

## 2024-10-04 PROCEDURE — 83605 ASSAY OF LACTIC ACID: CPT

## 2024-10-04 PROCEDURE — 99223 1ST HOSP IP/OBS HIGH 75: CPT | Performed by: INTERNAL MEDICINE

## 2024-10-04 PROCEDURE — 36415 COLL VENOUS BLD VENIPUNCTURE: CPT

## 2024-10-04 PROCEDURE — 84100 ASSAY OF PHOSPHORUS: CPT

## 2024-10-04 PROCEDURE — 93010 ELECTROCARDIOGRAM REPORT: CPT | Performed by: INTERNAL MEDICINE

## 2024-10-04 PROCEDURE — 82947 ASSAY GLUCOSE BLOOD QUANT: CPT

## 2024-10-04 PROCEDURE — 85610 PROTHROMBIN TIME: CPT

## 2024-10-04 PROCEDURE — 99222 1ST HOSP IP/OBS MODERATE 55: CPT | Performed by: STUDENT IN AN ORGANIZED HEALTH CARE EDUCATION/TRAINING PROGRAM

## 2024-10-04 PROCEDURE — 85045 AUTOMATED RETICULOCYTE COUNT: CPT

## 2024-10-04 PROCEDURE — 87641 MR-STAPH DNA AMP PROBE: CPT

## 2024-10-04 PROCEDURE — 93281 PM DEVICE PROGR EVAL MULTI: CPT

## 2024-10-04 PROCEDURE — 2020000001 HC ICU ROOM DAILY

## 2024-10-04 PROCEDURE — 82330 ASSAY OF CALCIUM: CPT

## 2024-10-04 PROCEDURE — 85520 HEPARIN ASSAY: CPT

## 2024-10-04 PROCEDURE — 83010 ASSAY OF HAPTOGLOBIN QUANT: CPT

## 2024-10-04 RX ORDER — ONDANSETRON HYDROCHLORIDE 2 MG/ML
4 INJECTION, SOLUTION INTRAVENOUS ONCE
Status: COMPLETED | OUTPATIENT
Start: 2024-10-04 | End: 2024-10-04

## 2024-10-04 RX ORDER — DEXTROSE 50 % IN WATER (D50W) INTRAVENOUS SYRINGE
12.5 ONCE
Status: DISCONTINUED | OUTPATIENT
Start: 2024-10-04 | End: 2024-10-07

## 2024-10-04 RX ORDER — DIPHENHYDRAMINE HYDROCHLORIDE 50 MG/ML
12.5 INJECTION INTRAMUSCULAR; INTRAVENOUS
Status: COMPLETED | OUTPATIENT
Start: 2024-10-04 | End: 2024-10-04

## 2024-10-04 RX ORDER — CALCIUM GLUCONATE 20 MG/ML
2 INJECTION, SOLUTION INTRAVENOUS ONCE
Status: DISCONTINUED | OUTPATIENT
Start: 2024-10-04 | End: 2024-10-04

## 2024-10-04 RX ORDER — FLUDROCORTISONE ACETATE 0.1 MG/1
0.1 TABLET ORAL DAILY
Status: DISCONTINUED | OUTPATIENT
Start: 2024-10-04 | End: 2024-10-04

## 2024-10-04 RX ORDER — EPINEPHRINE 1 MG/ML
INJECTION INTRAMUSCULAR; INTRAVENOUS; SUBCUTANEOUS
Status: COMPLETED
Start: 2024-10-04 | End: 2024-10-04

## 2024-10-04 RX ORDER — VANCOMYCIN HYDROCHLORIDE 1 G/20ML
INJECTION, POWDER, LYOPHILIZED, FOR SOLUTION INTRAVENOUS DAILY PRN
Status: DISCONTINUED | OUTPATIENT
Start: 2024-10-04 | End: 2024-10-06

## 2024-10-04 RX ORDER — ONDANSETRON HYDROCHLORIDE 2 MG/ML
INJECTION, SOLUTION INTRAVENOUS
Status: COMPLETED
Start: 2024-10-04 | End: 2024-10-04

## 2024-10-04 RX ORDER — CALCIUM CHLORIDE INJECTION 100 MG/ML
INJECTION, SOLUTION INTRAVENOUS
Status: COMPLETED
Start: 2024-10-04 | End: 2024-10-04

## 2024-10-04 RX ORDER — CALCIUM CHLORIDE INJECTION 100 MG/ML
1 INJECTION, SOLUTION INTRAVENOUS ONCE
Status: COMPLETED | OUTPATIENT
Start: 2024-10-04 | End: 2024-10-04

## 2024-10-04 RX ADMIN — DEXTROSE MONOHYDRATE 75 ML/HR: 100 INJECTION, SOLUTION INTRAVENOUS at 00:17

## 2024-10-04 RX ADMIN — GLUCAGON 1 MG: KIT at 02:49

## 2024-10-04 RX ADMIN — EPINEPHRINE 0.14 MCG/KG/MIN: 1 INJECTION INTRAMUSCULAR; INTRAVENOUS; SUBCUTANEOUS at 22:57

## 2024-10-04 RX ADMIN — ONDANSETRON 4 MG: 2 INJECTION INTRAMUSCULAR; INTRAVENOUS at 00:42

## 2024-10-04 RX ADMIN — ACETAMINOPHEN 650 MG: 325 TABLET, FILM COATED ORAL at 14:11

## 2024-10-04 RX ADMIN — VASOPRESSIN 0.03 UNITS/MIN: 0.2 INJECTION INTRAVENOUS at 08:28

## 2024-10-04 RX ADMIN — EPINEPHRINE 10 MG: 1 INJECTION INTRAMUSCULAR; INTRAVENOUS; SUBCUTANEOUS at 01:30

## 2024-10-04 RX ADMIN — ACETAMINOPHEN 650 MG: 325 TABLET, FILM COATED ORAL at 20:27

## 2024-10-04 RX ADMIN — PERFLUTREN 10 ML OF DILUTION: 6.52 INJECTION, SUSPENSION INTRAVENOUS at 08:51

## 2024-10-04 RX ADMIN — CALCIUM CHLORIDE 1 G: 100 INJECTION INTRAVENOUS; INTRAVENTRICULAR at 01:52

## 2024-10-04 RX ADMIN — PANTOPRAZOLE SODIUM 40 MG: 20 TABLET, DELAYED RELEASE ORAL at 06:28

## 2024-10-04 RX ADMIN — DIPHENHYDRAMINE HYDROCHLORIDE 12.5 MG: 50 INJECTION INTRAMUSCULAR; INTRAVENOUS at 17:53

## 2024-10-04 RX ADMIN — DEXTROSE MONOHYDRATE 50 ML/HR: 100 INJECTION, SOLUTION INTRAVENOUS at 08:18

## 2024-10-04 RX ADMIN — ACETAMINOPHEN 650 MG: 325 TABLET, FILM COATED ORAL at 06:28

## 2024-10-04 RX ADMIN — PIPERACILLIN SODIUM AND TAZOBACTAM SODIUM 2.25 G: 2; .25 INJECTION, SOLUTION INTRAVENOUS at 08:18

## 2024-10-04 RX ADMIN — VASOPRESSIN 0.03 UNITS/MIN: 0.2 INJECTION INTRAVENOUS at 01:02

## 2024-10-04 RX ADMIN — Medication 8 L/MIN: at 12:24

## 2024-10-04 RX ADMIN — HYDROCORTISONE SODIUM SUCCINATE 50 MG: 100 INJECTION, POWDER, FOR SOLUTION INTRAMUSCULAR; INTRAVENOUS at 16:29

## 2024-10-04 RX ADMIN — PIPERACILLIN SODIUM AND TAZOBACTAM SODIUM 2.25 G: 2; .25 INJECTION, SOLUTION INTRAVENOUS at 02:15

## 2024-10-04 RX ADMIN — ATORVASTATIN CALCIUM 40 MG: 40 TABLET, FILM COATED ORAL at 20:27

## 2024-10-04 RX ADMIN — HYDROCORTISONE SODIUM SUCCINATE 50 MG: 100 INJECTION, POWDER, FOR SOLUTION INTRAMUSCULAR; INTRAVENOUS at 23:03

## 2024-10-04 RX ADMIN — CALCIUM CHLORIDE INJECTION 1 G: 100 INJECTION, SOLUTION INTRAVENOUS at 01:52

## 2024-10-04 RX ADMIN — HYDROCORTISONE SODIUM SUCCINATE 50 MG: 100 INJECTION, POWDER, FOR SOLUTION INTRAMUSCULAR; INTRAVENOUS at 11:15

## 2024-10-04 RX ADMIN — AMIODARONE HYDROCHLORIDE 200 MG: 200 TABLET ORAL at 08:59

## 2024-10-04 RX ADMIN — EPINEPHRINE 0.2 MCG/KG/MIN: 1 INJECTION INTRAMUSCULAR; INTRAVENOUS; SUBCUTANEOUS at 01:45

## 2024-10-04 RX ADMIN — PIPERACILLIN SODIUM AND TAZOBACTAM SODIUM 2.25 G: 2; .25 INJECTION, SOLUTION INTRAVENOUS at 16:28

## 2024-10-04 RX ADMIN — HYDROCORTISONE SODIUM SUCCINATE 50 MG: 100 INJECTION, POWDER, FOR SOLUTION INTRAMUSCULAR; INTRAVENOUS at 04:21

## 2024-10-04 RX ADMIN — VANCOMYCIN HYDROCHLORIDE 1500 MG: 1.5 INJECTION, POWDER, LYOPHILIZED, FOR SOLUTION INTRAVENOUS at 18:21

## 2024-10-04 RX ADMIN — DEXTROSE MONOHYDRATE 25 G: 25 INJECTION, SOLUTION INTRAVENOUS at 00:09

## 2024-10-04 RX ADMIN — ONDANSETRON HYDROCHLORIDE 4 MG: 2 INJECTION, SOLUTION INTRAVENOUS at 00:42

## 2024-10-04 SDOH — HEALTH STABILITY: PHYSICAL HEALTH
HOW OFTEN DO YOU NEED TO HAVE SOMEONE HELP YOU WHEN YOU READ INSTRUCTIONS, PAMPHLETS, OR OTHER WRITTEN MATERIAL FROM YOUR DOCTOR OR PHARMACY?: RARELY

## 2024-10-04 SDOH — ECONOMIC STABILITY: FOOD INSECURITY: WITHIN THE PAST 12 MONTHS, THE FOOD YOU BOUGHT JUST DIDN'T LAST AND YOU DIDN'T HAVE MONEY TO GET MORE.: NEVER TRUE

## 2024-10-04 SDOH — SOCIAL STABILITY: SOCIAL INSECURITY: WITHIN THE LAST YEAR, HAVE YOU BEEN AFRAID OF YOUR PARTNER OR EX-PARTNER?: NO

## 2024-10-04 SDOH — SOCIAL STABILITY: SOCIAL INSECURITY: WITHIN THE LAST YEAR, HAVE YOU BEEN HUMILIATED OR EMOTIONALLY ABUSED IN OTHER WAYS BY YOUR PARTNER OR EX-PARTNER?: NO

## 2024-10-04 SDOH — ECONOMIC STABILITY: INCOME INSECURITY: IN THE PAST 12 MONTHS HAS THE ELECTRIC, GAS, OIL, OR WATER COMPANY THREATENED TO SHUT OFF SERVICES IN YOUR HOME?: NO

## 2024-10-04 SDOH — HEALTH STABILITY: MENTAL HEALTH: HOW OFTEN DO YOU HAVE SIX OR MORE DRINKS ON ONE OCCASION?: NEVER

## 2024-10-04 SDOH — HEALTH STABILITY: MENTAL HEALTH: HOW OFTEN DO YOU HAVE A DRINK CONTAINING ALCOHOL?: NEVER

## 2024-10-04 SDOH — HEALTH STABILITY: MENTAL HEALTH
STRESS IS WHEN SOMEONE FEELS TENSE, NERVOUS, ANXIOUS, OR CAN'T SLEEP AT NIGHT BECAUSE THEIR MIND IS TROUBLED. HOW STRESSED ARE YOU?: ONLY A LITTLE

## 2024-10-04 SDOH — HEALTH STABILITY: MENTAL HEALTH: HOW MANY DRINKS CONTAINING ALCOHOL DO YOU HAVE ON A TYPICAL DAY WHEN YOU ARE DRINKING?: PATIENT DOES NOT DRINK

## 2024-10-04 SDOH — SOCIAL STABILITY: SOCIAL INSECURITY: ARE YOU MARRIED, WIDOWED, DIVORCED, SEPARATED, NEVER MARRIED, OR LIVING WITH A PARTNER?: MARRIED

## 2024-10-04 SDOH — SOCIAL STABILITY: SOCIAL NETWORK: HOW OFTEN DO YOU GET TOGETHER WITH FRIENDS OR RELATIVES?: THREE TIMES A WEEK

## 2024-10-04 SDOH — HEALTH STABILITY: MENTAL HEALTH: HOW OFTEN DO YOU HAVE 6 OR MORE DRINKS ON ONE OCCASION?: NEVER

## 2024-10-04 SDOH — SOCIAL STABILITY: SOCIAL NETWORK
DO YOU BELONG TO ANY CLUBS OR ORGANIZATIONS SUCH AS CHURCH GROUPS, UNIONS, FRATERNAL OR ATHLETIC GROUPS, OR SCHOOL GROUPS?: NO

## 2024-10-04 SDOH — ECONOMIC STABILITY: FOOD INSECURITY: WITHIN THE PAST 12 MONTHS, YOU WORRIED THAT YOUR FOOD WOULD RUN OUT BEFORE YOU GOT MONEY TO BUY MORE.: NEVER TRUE

## 2024-10-04 SDOH — SOCIAL STABILITY: SOCIAL NETWORK
DO YOU BELONG TO ANY CLUBS OR ORGANIZATIONS SUCH AS CHURCH GROUPS UNIONS, FRATERNAL OR ATHLETIC GROUPS, OR SCHOOL GROUPS?: NO

## 2024-10-04 SDOH — ECONOMIC STABILITY: FOOD INSECURITY: WITHIN THE PAST 12 MONTHS, YOU WORRIED THAT YOUR FOOD WOULD RUN OUT BEFORE YOU GOT THE MONEY TO BUY MORE.: NEVER TRUE

## 2024-10-04 SDOH — SOCIAL STABILITY: SOCIAL NETWORK: IN A TYPICAL WEEK, HOW MANY TIMES DO YOU TALK ON THE PHONE WITH FAMILY, FRIENDS, OR NEIGHBORS?: THREE TIMES A WEEK

## 2024-10-04 SDOH — SOCIAL STABILITY: SOCIAL NETWORK: HOW OFTEN DO YOU ATTEND CHURCH OR RELIGIOUS SERVICES?: NEVER

## 2024-10-04 SDOH — HEALTH STABILITY: MENTAL HEALTH: HOW MANY STANDARD DRINKS CONTAINING ALCOHOL DO YOU HAVE ON A TYPICAL DAY?: PATIENT DOES NOT DRINK

## 2024-10-04 SDOH — ECONOMIC STABILITY: INCOME INSECURITY: IN THE PAST 12 MONTHS, HAS THE ELECTRIC, GAS, OIL, OR WATER COMPANY THREATENED TO SHUT OFF SERVICE IN YOUR HOME?: NO

## 2024-10-04 SDOH — HEALTH STABILITY: PHYSICAL HEALTH: ON AVERAGE, HOW MANY DAYS PER WEEK DO YOU ENGAGE IN MODERATE TO STRENUOUS EXERCISE (LIKE A BRISK WALK)?: 0 DAYS

## 2024-10-04 SDOH — HEALTH STABILITY: MENTAL HEALTH
DO YOU FEEL STRESS - TENSE, RESTLESS, NERVOUS, OR ANXIOUS, OR UNABLE TO SLEEP AT NIGHT BECAUSE YOUR MIND IS TROUBLED ALL THE TIME - THESE DAYS?: ONLY A LITTLE

## 2024-10-04 SDOH — SOCIAL STABILITY: SOCIAL NETWORK: ARE YOU MARRIED, WIDOWED, DIVORCED, SEPARATED, NEVER MARRIED, OR LIVING WITH A PARTNER?: MARRIED

## 2024-10-04 ASSESSMENT — PAIN SCALES - GENERAL
PAINLEVEL_OUTOF10: 0 - NO PAIN

## 2024-10-04 ASSESSMENT — PAIN - FUNCTIONAL ASSESSMENT
PAIN_FUNCTIONAL_ASSESSMENT: 0-10
PAIN_FUNCTIONAL_ASSESSMENT: 0-10
PAIN_FUNCTIONAL_ASSESSMENT: CPOT (CRITICAL CARE PAIN OBSERVATION TOOL)
PAIN_FUNCTIONAL_ASSESSMENT: 0-10
PAIN_FUNCTIONAL_ASSESSMENT: 0-10

## 2024-10-04 ASSESSMENT — ACTIVITIES OF DAILY LIVING (ADL): LACK_OF_TRANSPORTATION: NO

## 2024-10-04 ASSESSMENT — LIFESTYLE VARIABLES
SKIP TO QUESTIONS 9-10: 1
AUDIT-C TOTAL SCORE: 0

## 2024-10-04 NOTE — SIGNIFICANT EVENT
10/03/24 2215   Vitals   Heart Rate 75   Resp 20   BP (!) 67/24   MAP (mmHg) 37   Medical Gas Therapy   SpO2 (!) 89 %       Patient's son called RN into the room stating his mother doesn't feel well, BP cuff cycled. Resident notified. Started bolus of LR. Stopped after about 250ml of fluid given. Patients BG was 12 patient given 2 amps of D50, labs drawn. Levophed started after bedside echo.  Resident, fellow and attending at bedside. Patients Son at bedside and updated on situation.

## 2024-10-04 NOTE — CONSULTS
Vancomycin Dosing by Pharmacy- INITIAL    Misael Ritchie is a 69 y.o. year old female who Pharmacy has been consulted for vancomycin dosing for pneumonia. Based on the patient's indication and renal status this patient will be dosed based on a goal pre-HD level of 20-25.     Patient has ESRD and is on iHD outpatient. Nephrology consulted. Will follow their plan.     Visit Vitals  BP (!) 187/27   Pulse 75   Temp 36.2 °C (97.2 °F)   Resp 13        Lab Results   Component Value Date    CREATININE 4.31 (H) 10/03/2024    CREATININE 7.59 (H) 10/03/2024    CREATININE 7.86 (H) 10/03/2024    CREATININE 7.40 (H) 10/03/2024        Patient weight is as follows:   Vitals:    10/04/24 0400   Weight: 56.2 kg (123 lb 14.4 oz)       Cultures:  No results found for the encounter in last 14 days.        I/O last 3 completed shifts:  In: 2405.8 (44.6 mL/kg) [P.O.:120; I.V.:1384.8 (25.7 mL/kg); Other:800; IV Piggyback:101]  Out: 1440 (26.7 mL/kg) [Emesis/NG output:100; Other:1340]  Dosing Weight: 53.9 kg   I/O during current shift:  I/O this shift:  In: 165.5 [I.V.:165.5]  Out: -     Temp (24hrs), Av.2 °C (97.1 °F), Min:35.6 °C (96.1 °F), Max:37.1 °C (98.8 °F)         Assessment/Plan     Patient will be given a loading dose of 1500 mg.  Will initiate vancomycin maintenance, once renal plan is determined.   Follow-up level will be ordered on 10/5/24 with morning labs if not needed sooner and as long as it makes sense once renal plan updated.  Will continue to monitor renal function daily while on vancomycin and order serum creatinine at least every 48 hours if not already ordered.  Follow for continued vancomycin needs, clinical response, and signs/symptoms of toxicity.       Stacy Joy, PharmD

## 2024-10-04 NOTE — PROCEDURES
Arterial Line Placement    The patient was prepped and draped in the usual sterile manner using chlorhexidine scrub. 1% lidocaine was used to numb the region. The L axillary artery was palpated and visualized with ultrasound and successfully cannulated on the first pass. Pulsatile, arterial blood was visualized and the artery was then threaded using the Seldinger technique and a catheter was then sutured into place. Good wave-form was obtained. The patient tolerated the procedure well without any immediate complications. The area was cleaned and Tegaderm was applied.

## 2024-10-04 NOTE — CONSULTS
Advanced Heart Failure Initial Consultation Note   Consulting Team: [facility]  Primary Cardiologist: Dr. Pricila Zayas ([facility])  Reason for Consult: RV strain    Subjective   Misael Ritchie is a 69 y.o. 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 Heart failure was consulted regarding RV strain.    She was admitted yesterday after she called EMS feeling dizzy and falling on her side w/ head strike. On arrival to ED, her K was 7.4 and she was given insulin dextrose, Ca, and sodium bicarb. She was found to be obtunded thereafter with blood sugar of 52. She was transferred to the MICU for emergent HD, which she received last night with 1.4L out. Later on, she felt unwell with hypotension to 67/24, glucose 12. She received 2 amps of D50, hydrocortisone 100 mg IV, and 250cc of LR. Bedside TTE showed severe TR and dilated RV+RA, so fluids were stopped and was started on levophed with transition to vasopressin and epinephrine overnight.    On exam, she is on CVVH on vasopressin 0.03 and epinephrine 0.09. She reports that she has had dizziness during dialysis in the past, but no episodes of miranda hypotension or syncope related to HD that she can recall. She denies missing any episodes of dialysis and states that her last session prior to admission was on Tuesday 10/1.    Of note, she had a LHC + RHC on 5/9/24 at [facility]: RA 10, PA 64/27 (40), PCWP 10, CI 2.10. CAD with mod LCx disease, severe ostial ramus disease, non-obstructive disease in LAD and RCA.    The following portions of the chart were reviewed this encounter and updated as appropriate:         Review of Systems  Otherwise, limited cardiovascular review of systems is negative.    No past medical history on file.  No past  surgical history on file.  Social History     Socioeconomic History    Marital status:      Spouse name: Not on file    Number of children: Not on file    Years of education: Not on file    Highest education level: Not on file   Occupational History    Not on file   Tobacco Use    Smoking status: Never    Smokeless tobacco: Never   Substance and Sexual Activity    Alcohol use: Not on file    Drug use: Not on file    Sexual activity: Not on file   Other Topics Concern    Not on file   Social History Narrative    Not on file     Social Determinants of Health     Financial Resource Strain: Patient Declined (10/3/2024)    Overall Financial Resource Strain (CARDIA)     Difficulty of Paying Living Expenses: Patient declined   Food Insecurity: Patient Declined (10/3/2024)    Hunger Vital Sign     Worried About Running Out of Food in the Last Year: Patient declined     Ran Out of Food in the Last Year: Patient declined   Transportation Needs: Patient Declined (10/3/2024)    PRAPARE - Transportation     Lack of Transportation (Medical): Patient declined     Lack of Transportation (Non-Medical): Patient declined   Physical Activity: Inactive (1/27/2021)    Received from [facility]    Exercise Vital Sign     Days of Exercise per Week: 0 days     Minutes of Exercise per Session: 0 min   Stress: No Stress Concern Present (1/27/2021)    Received from [facility]    [employer] [place] [employer] - Occupational Stress Questionnaire     Feeling of Stress : Not at all   Social Connections: Moderately Isolated (1/27/2021)    Received from [facility]    Social Connection and Isolation Panel [NHANES]     Frequency of Communication with Friends and Family: More than three times a week     Frequency of Social Gatherings with Friends and Family: More than three times a week     Attends Bahai Services: Never     Active Member of Clubs or Organizations: No     Attends Club or Organization Meetings:  "Never     Marital Status:    Intimate Partner Violence: Not At Risk (10/3/2024)    Humiliation, Afraid, Rape, and Kick questionnaire     Fear of Current or Ex-Partner: No     Emotionally Abused: No     Physically Abused: No     Sexually Abused: No   Housing Stability: Patient Declined (10/3/2024)    Housing Stability Vital Sign     Unable to Pay for Housing in the Last Year: Patient declined     Number of Times Moved in the Last Year: 1     Homeless in the Last Year: Patient declined     No family history on file.    No current outpatient medications      Objective   Physical Exam  Vitals:    10/04/24 1100   BP:    Pulse: 80   Resp: 19   Temp:    SpO2: (!) 81%       GEN: Ill-appearing, frail, in NAD.  CV: RRR, III/VI holosystolic murmur  LUNGS: Diminished breath sounds bilaterally.  ABD: Soft, NT/ND, NBS, no masses or organomegaly.  SKIN: Warm, well perfused. No skin rashes or abnormal lesions. LE edema absent  MSK: Normal gait. No deformities.  EXT: No clubbing, cyanosis, or edema.  NEURO: Fatigued, moves all extremities spontaneously. No focal deficits.    I have personally reviewed the following images and laboratory findings:    ECG: AV-paced rhythm    Lab Review     No results found for: \"TROPHS\", \"BNP\", \"LDLF\", \"TRIG\", \"NMRTOT\"     Assessment and Plan     Misael Ritchie is a 69 y.o. 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 She had an episode of hypotension overnight on 10/4 with POCUS c/f RV failure. She was started on epinephrine and vasopressin. Heart failure was consulted regarding RV failure.    #Combined HFrecEF and HFpEF  #Shock 2/2 RV failure  #Severe TR  #Pulmonary HTN, Group 2 + 5, due to CHF and ESRD  Patient presented with hyperkalemia requiring " emergent HD. Afterwards, she developed profound hypotension and hypoglycemia w/ POCUS c/f RV failure and severe TR. She follows with [facility] and severe TR has been noted on previous echos as well, most recently in August 2024. The etiology of her hypotension is likely due to RV failure after HD, given that the RV is very pre-load dependent and sensitive to fluid shifts. We agree with gradual volume removal with CRRT supported by pressors. She likely has a component of Group V pulmonary HTN as well, but is unlikely to improve with pulmonary vasodilators given her LVH.  - Formal TTE completed, final read pending.  - Diuresis: Agree with CVVH  - GDMT   - SGLT-2i: None due to ESRD   - Beta-blocker: Hold given RV dysfunction.   - ACE-I/ARB/ARNI: None due to ESRD    - MRA: None due to ESRD  - RV support: continue epinephrine and vasopressin for RV support while on CVVH to achieve euvolemia. Give preference to weaning epinephrine first  - Mechanical support: None  - AICD/CRT-D/Lifevest: Has CRT-P    For any questions or concerns, feel free to reach out via [employer] chat or page at [phone].         SIGNATURE: Darron Mills MD PATIENT NAME: Misael Ritchie   DATE/TIME: October 4, 2024 11:53 AM MRN: [id number]     This plan was discussed with Dr. Veliz, HF attending.

## 2024-10-04 NOTE — CONSULTS
"Nutrition Initial Assessment:   Nutrition Assessment    Reason for Assessment: Admission nursing screening    Patient is a 69 y.o. female presenting with dizziness after a fall.  She was obtunded with low sugars.  Admitted to MICU for higher level of care for hyperkalemia.  She is currently on levo and vaso for pressure support.    Past medical history includes aortic stenosis s/p TAVR 11/23, atrial fibrillation (on Eliquis) s/p DCCV, CAD, ESRD on hemodialysis T/Th/Sat, HFrEF (6/2023 EF 48%), MR, TR, pacemaker CRT-P 5/2023, adrenal insufficiency       Nutrition History:  Food and Nutrient History: Met with patient.  She reports a poor to fair appetite and PO intake PTA.  Says that she has been drinking 2 Ensure supplements daily to help with her nutrition.  Cannot pinpoint why she has not had an appetite.  Denies N/V issues.  No trouble chewing or swallowing.         Anthropometrics:  Height: 154.9 cm (5' 1\")   Weight: 56.2 kg (123 lb 14.4 oz)   BMI (Calculated): 23.42  IBW/kg (Dietitian Calculated): 47.7 kg  Percent of IBW: 118 %     Weight History:     10/4/24: 56.2kg  10/3/24: 53.5kg (admit)  No weight history in EMR. Pt says she has been 52.3kg lately but at one time had been 60kg.  Based on admit weight, she is down 11%.    Weight Change %:       Nutrition Focused Physical Exam Findings:    Subcutaneous Fat Loss:   Orbital Fat Pads: Mild-Moderate (slight dark circles and slight hollowing)  Buccal Fat Pads: Well nourished (full, rounded cheeks)  Triceps: Severe (negligible fat tissue)  Muscle Wasting:  Temporalis: Mild-Moderate (slight depression)  Pectoralis (Clavicular Region): Mild-Moderate (some protrusion of clavicle)  Deltoid/Trapezius: Mild-Moderate (slight protrusion of acromion process)  Quadriceps: Well nourished (well developed, well rounded)  Gastrocnemius: Well nourished (well developed bulbous muscle)  Edema:  Edema: none  Physical Findings:  Skin: Positive (facial bruising)    Nutrition " "Significant Labs:  BMP Trend:   Results from last 7 days   Lab Units 10/03/24  2114 10/03/24  1720 10/03/24  1239 10/03/24  1008   GLUCOSE mg/dL 71* 107* 40* 87   CALCIUM mg/dL 9.0 9.2 9.7 9.6   SODIUM mmol/L 136 139 139 136   POTASSIUM mmol/L 4.5 5.8* 7.6* 7.4*   CO2 mmol/L 30 23 18* 12*   CHLORIDE mmol/L 94* 94* 95* 96*   BUN mg/dL 31* 65* 60* 59*   CREATININE mg/dL 4.31* 7.59* 7.86* 7.40*    , A1C:  Lab Results   Component Value Date    HGBA1C 4.5 01/04/2024   , BG POCT trend:   Results from last 7 days   Lab Units 10/04/24  1038 10/04/24  0835 10/04/24  0607 10/04/24  0400 10/04/24  0123   POCT GLUCOSE mg/dL 200* 232* 193* 130* 151*    , Renal Lab Trend:   Results from last 7 days   Lab Units 10/03/24  2114 10/03/24  1720 10/03/24  1239 10/03/24  1239 10/03/24  1008   POTASSIUM mmol/L 4.5 5.8*  --  7.6* 7.4*   PHOSPHORUS mg/dL 5.6* 10.5*   < >  --   --    SODIUM mmol/L 136 139  --  139 136   MAGNESIUM mg/dL 2.00 2.39   < >  --   --    EGFR mL/min/1.73m*2 11* 5*  --  5* 6*   BUN mg/dL 31* 65*  --  60* 59*   CREATININE mg/dL 4.31* 7.59*  --  7.86* 7.40*    < > = values in this interval not displayed.    , Vit D: No results found for: \"VITD25\"     Nutrition Specific Medications:  Scheduled medications  acetaminophen, 650 mg, oral, q6h  [Held by provider] allopurinol, 100 mg, oral, Daily  amiodarone, 200 mg, oral, Daily  atorvastatin, 40 mg, oral, Nightly  dextrose, 12.5 g, intravenous, Once  hydrocortisone sodium succinate, 50 mg, intravenous, q6h  oxygen, , inhalation, Continuous - Inhalation  pantoprazole, 40 mg, oral, Daily before breakfast   Or  pantoprazole, 40 mg, intravenous, Daily before breakfast  piperacillin-tazobactam, 2.25 g, intravenous, q8h  polyethylene glycol, 17 g, oral, Daily  vancomycin, 25 mg/kg, intravenous, Once      Continuous medications  dextrose 10 % in water (D10W), 30 mL/hr, Last Rate: 30 mL/hr (10/04/24 1014)  EPINEPHrine, 0-1 mcg/kg/min, Last Rate: 0.12 mcg/kg/min (10/04/24 " 1004)  norepinephrine, 0.01-1 mcg/kg/min, Last Rate: Stopped (10/04/24 0433)  vasopressin, 0.03 Units/min, Last Rate: 0.03 Units/min (10/04/24 0828)      PRN medications  PRN medications: albuterol, dextrose, dextrose, diphenhydrAMINE, glucagon, glucagon, oxyCODONE, oxyCODONE, vancomycin     I/O:    ;        Dietary Orders (From admission, onward)       Start     Ordered    10/04/24 0001  NPO Diet; Effective midnight  Diet effective midnight         10/03/24 2247                     Estimated Needs:   Total Energy Estimated Needs (kCal):  (3454-4627)  Method for Estimating Needs: MSJ= 1002 using 53.5kg  Total Protein Estimated Needs (g):  (70+)  Method for Estimating Needs: 1.5 x 47.7kg            Nutrition Diagnosis   Malnutrition Diagnosis  Patient has Malnutrition Diagnosis: Yes  Diagnosis Status: New  Malnutrition Diagnosis: Severe malnutrition related to chronic disease or condition  As Evidenced by: pt reports a decrease in appetite and intake for a few weeks along with an overall weight loss of 11% and moderate to severe fat and muscle wasting on physical exam            Nutrition Interventions/Recommendations         Nutrition Prescription:   PO diet advancement to low potassium  Check Vitamin D level.          Nutrition Interventions:    RDN to order Ensure Clear TID once diet advances: 240kcals, 8grams protein each       Nutrition Education:   Not appropriate       Nutrition Monitoring and Evaluation   Food/Nutrient Related History Monitoring  Monitoring and Evaluation Plan:  (ability to advance PO diet)           Time Spent/Follow-up Reminder:   Time Spent (min): 60 minutes  Last Date of Nutrition Visit: 10/04/24  Nutrition Follow-Up Needed?: Dietitian to reassess per policy  Follow up Comment: NPO, needs supps once adv'd; severe malnut

## 2024-10-04 NOTE — SIGNIFICANT EVENT
Called by RN as patient not feeling well. Patient arousable to sternal rub. BP low. LR started and received 250cc of it. Glucose 12. 2 Amps D50 given. Hydrocortisone 100mg IV push given (Hx of adrenal insufficieny) . C-peptide added on to labs prior to D50 administration. Insulin level drawn after correction of hypoglycemia.      POCUS with Attending with severe TR and dilated right atrium and ventricle. Fluids stopped. Levophed started.      Son consented.   Arterial line attempted in left radial artery, failed x4.     Plan:   -monitor Blood sugar   -D10 at 75cc/hr   -glucagon   -another amp of D50   -consider endocrinology consult in the AM   -continue levophed   -Bcx   -Deandra Joy   Internal Medicine PGY-3  [facility]        Addendum:   Patient with profound hypotension on Art line placed in left axillary artery. Vaso and epi started. Eventually weaned off levo. Decreasing D10 rate given improving glucose     -lactate 8, now down to 3.5 (on pressors)   -day team to consider consulting Heart failure team

## 2024-10-04 NOTE — CONSULTS
[facility] Department of Orthopaedic Surgery   Initial Consult Note    HPI:     Orthopaedic Problems/Injuries: R GT fx   Other Injuries:     69F (HTN, CAD, CHF EF 40%, Afib on eliquis, AS s/p TAVR, ESRD on dialysis TTS) p/a syncopal fall. Has not been able to walk since the fall. CT w above. Closed. NVI. Currently admitted to MICU for hyperkalemia on pressors.     PMH: per above/EMR  PSH: per above/EMR  SocHx:      -  Denies tobacco use      -  Denies EtOH use      -  Denies other drug use  FamHx:  Non-contributory to this patient's acute orthopaedic problem.   Allergies: Reviewed in EMR  Meds: Reviewed in EMR    ROS  12-point review of systems is negative other than what is mentioned above.    Physical Exam:  Gen: AOx3, NAD  HEENT: normocephalic, atraumatic  Psych: appropriate mood and affect  Resp: nonlabored breathing  Cardiac: Extremities WWP, regular rate on peripheral palpation  Neuro: moves all extremities spontaneously   Skin: no rashes  MSK:   Right lower extremity:  - Closed injury  - Compartments soft and compressible  - Fires TA/GS/EHL  - SILT in [place]/Sa/SP/DP/T distributions  - Palpable DP pulse      A full secondary exam was performed and all relevant findings discussed and noted above.    Imaging:  CT demonstrates Right greater troch fracture    Assessment:  Orthopaedic Problems/Injuries: R GT fx    69F (HTN, CAD, CHF EF 40%, Afib on eliquis, AS s/p TAVR, ESRD on dialysis TTS) p/a syncopal fall. CT w above. Closed. NVI. Currently admitted to MICU for hyperkalemia on pressors.     Plan:  - Obtain pelvis MRI to look for intertrochanteric extension. Will determine if patient needs operative fixation  - Clearance pending for OR; appreciate documentation of clearance by primary team  - Please obtain pre-operative labs/studies: T&S, PT/INR, CBC, BMP, CXR, EKG, bHCG  (for <55yoF)  - WB: NWB RLE  - Abx: Per primary  - DVT: Please hold home eliquis for potential OR    This patient was seen and  evaluated within 30 minutes of initial consult.     Staffed and discussed with attending. Please don't hesitate to reach out with any questions.    Toni Delgado MD  Orthopaedic Surgery PGY-2   Epic Chat preferred    Please page [phone] (on-call pager) on weekends and between 6p-7a on weekdays.  _________________________________________________________    This patient will be followed by the ortho trauma service while in-house. Please contact the following team members for any additional questions/concerns.

## 2024-10-04 NOTE — CONSULTS
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 She also reports no recent changes to her medications, being taken off early or having reduced flows that she is aware of during her sessions. She presented after a fall and was found to have increased potassium >10 on VBG and recheck of 7.6. she was admitted to MICU for emergent HD. Afterwards was found to be hypotensive with echo US showing valvular insufficiency. Cardiology has been contacted and she is on minimal vasopressors.          Social Connections: Moderately Isolated (10/4/2024)    Social Connection and Isolation Panel [NHANES]     Frequency of Communication with Friends and Family: Three times a week     Frequency of Social Gatherings with Friends and Family: Three times a week     Attends Buddhist Services: Never     Active Member of Clubs or Organizations: No     Attends Club or Organization Meetings: Not on file     Marital Status:                PROBLEM LIST:  Principal Problem:    Hyperkalemia  Active Problems:    ESRD (end stage renal disease) on dialysis (Multi)    Pacemaker    HTN (hypertension)    CAD (coronary artery disease)    CHF (congestive heart failure)    Atrial fibrillation (Multi)    Nondisplaced intertrochanteric fracture of right femur, initial encounter for closed fracture           ALLERGIES:  Allergies   Allergen Reactions    Codeine Hallucinations and Palpitations     \" fast heart beat\"     heart palpitations    Azithromycin Hives    Nifedipine Dizziness     Other reaction(s): Other: See Comments   Nausea,restless ness,dizziness    Nifedipine (Bulk) Nausea/vomiting     Nausea,restless " "ness,dizziness    Nsaids (Non-Steroidal Anti-Inflammatory Drug) Other     Contraindicated in transplant patients    Prochlorperazine Other    Vancomycin Itching    Erythromycin Itching    Erythromycin Base Palpitations     heart palpitations               CURRENT MEDICATIONS:  Scheduled medications  acetaminophen, 650 mg, oral, q6h  [Held by provider] allopurinol, 100 mg, oral, Daily  amiodarone, 200 mg, oral, Daily  atorvastatin, 40 mg, oral, Nightly  dextrose, 12.5 g, intravenous, Once  hydrocortisone sodium succinate, 50 mg, intravenous, q6h  oxygen, , inhalation, Continuous - Inhalation  pantoprazole, 40 mg, oral, Daily before breakfast   Or  pantoprazole, 40 mg, intravenous, Daily before breakfast  piperacillin-tazobactam, 2.25 g, intravenous, q8h  polyethylene glycol, 17 g, oral, Daily  vancomycin, 25 mg/kg, intravenous, Once      Continuous medications  dextrose 10 % in water (D10W), 20 mL/hr, Last Rate: 30 mL/hr (10/04/24 1014)  EPINEPHrine, 0-1 mcg/kg/min, Last Rate: 0.12 mcg/kg/min (10/04/24 1550)  vasopressin, 0.03 Units/min, Last Rate: Stopped (10/04/24 1702)      PRN medications  PRN medications: albuterol, dextrose, dextrose, glucagon, glucagon, oxyCODONE, oxyCODONE, vancomycin         OBJECTIVE:     VITALS: Visit Vitals  BP (!) 134/43   Pulse 80   Temp 35.6 °C (96.1 °F)   Resp 11   Ht 1.549 m (5' 1\")   Wt 56.2 kg (123 lb 14.4 oz)   SpO2 92%   BMI 23.41 kg/m²   Smoking Status Never   BSA 1.56 m²         General: No distress   Mucosa moist   AI, AC, AF      HEENT: PEERLA  CVS: S1 S2 no murmurs  RESP:  Lungs clear to auscultation   ABDO: Soft, non-tender   Neuro: A + O x 3  Skin: No rash   Extremities: +edema   Access: RUE AVF     LABS:  Results from last 72 hours   Lab Units 10/04/24  1124 10/04/24  0138   WBC AUTO x10*3/uL  --  16.9*   HEMOGLOBIN g/dL  --  10.8*   MCV fL  --  90   PLATELETS AUTO x10*3/uL  --  133*   BUN mg/dL 40*  --    CREATININE mg/dL 4.67*  --    CALCIUM mg/dL 8.9  --           "     Intake/Output Summary (Last 24 hours) at 10/4/2024 1758  Last data filed at 10/4/2024 1700  Gross per 24 hour   Intake 3171.24 ml   Output 2440 ml   Net 731.24 ml            ASSESSMENT AND PLAN:    Misael Ritchie is a 69 y.o female with a history of CKD, 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 There is no clear etiology of her hyperkalemia given that she appears to be compliant with sessions and there is no evidence of poor access or incomplete sessions. she was admitted to MICU for emergent HD. Afterwards was found to be hypotensive with echo US showing valvular insufficiency. Cardiology has been contacted for further eval and Nephrology will follow for RRT management. At this time, she appears stable to attempt further iHD to best utilize her functioning access but can be converted to PIRRT or CVVH if hemodynamics do not allow.    Emergent HD on 10/3 pm  Clearance 10/4 afternoon  Plan for isolated UF 10/5     Home unit: [facility] [place]  Access: RUE AVF. Previous failed LUE AV graft  Etilogy: PKD (2 previous transplant 2004 and 2010)  Schedule: [facility]  EDW: 56kg    #Fluid status:  Overloaded. On HFNC     #Hemodynamics   Labile. On vasopressors. Titrating down.     #Electrolytes  Hyperkalemic.      #MBD  Phos 7.1  No recent PTH level     #Anemia   No recent iron studies  Hgb 10.8 at goal.     #Acid-base   Bicarb 21 pre HD today. Acceptable    Recommendations:  -Completed 2h emergent HD.  -Plan for full session today. To titrate pressors to allow SBP>90 goal  -Plan for UF tomorrow considering respiratory failure, fluid overload, and elevated right-sided pressures.  -Daily eval  -Renally dose medications  -Renal diet  -Renal MV  -Will Follow

## 2024-10-04 NOTE — CARE PLAN
Problem: Skin  Goal: Participates in plan/prevention/treatment measures  Outcome: Progressing  Goal: Prevent/manage excess moisture  Outcome: Progressing  Goal: Prevent/minimize sheer/friction injuries  Outcome: Progressing  Flowsheets (Taken 10/4/2024 0604)  Prevent/minimize sheer/friction injuries:   HOB 30 degrees or less   Complete micro-shifts as needed if patient unable. Adjust patient position to relieve pressure points, not a full turn   Turn/reposition every 2 hours/use positioning/transfer devices  Goal: Promote/optimize nutrition  Outcome: Progressing  Goal: Promote skin healing  Outcome: Progressing  Flowsheets (Taken 10/4/2024 0604)  Promote skin healing:   Protective dressings over bony prominences   Rotate device position/do not position patient on device   Turn/reposition every 2 hours/use positioning/transfer devices   Assess skin/pad under line(s)/device(s)     Problem: Fall/Injury  Goal: Not fall by end of shift  Outcome: Progressing  Goal: Be free from injury by end of the shift  Outcome: Progressing  Goal: Verbalize understanding of personal risk factors for fall in the hospital  Outcome: Progressing  Goal: Verbalize understanding of risk factor reduction measures to prevent injury from fall in the home  Outcome: Progressing     Problem: Pain - Adult  Goal: Verbalizes/displays adequate comfort level or baseline comfort level  Outcome: Progressing     Problem: Safety - Adult  Goal: Free from fall injury  Outcome: Progressing     Problem: Pain  Goal: Turns in bed with improved pain control throughout the shift  Outcome: Progressing  Goal: Participates in PT with improved pain control throughout the shift  Outcome: Progressing  Goal: Free from opioid side effects throughout the shift  Outcome: Progressing  Goal: Free from acute confusion related to pain meds throughout the shift  Outcome: Progressing     Problem: Nutrition  Goal: Less than 5 days NPO/clear liquids  Outcome: Progressing  Goal:  Oral intake greater than 50%  Outcome: Progressing  Goal: Oral intake greater 75%  Outcome: Progressing  Goal: Consume prescribed supplement  Outcome: Progressing  Goal: Adequate PO fluid intake  Outcome: Progressing  Goal: Nutrition support goals are met within 48 hrs  Outcome: Progressing  Goal: Nutrition support is meeting 75% of nutrient needs  Outcome: Progressing  Goal: BG  mg/dL  Outcome: Progressing  Goal: Lab values WNL  Outcome: Progressing  Goal: Electrolytes WNL  Outcome: Progressing  Goal: Promote healing  Outcome: Progressing  Goal: Maintain stable weight  Outcome: Progressing  Goal: Reduce weight from edema/fluid  Outcome: Progressing  Goal: Gradual weight gain  Outcome: Progressing   The patient's goals for the shift include      The clinical goals for the shift include patient will remain free from injury

## 2024-10-04 NOTE — PROGRESS NOTES
10/04/24 1427   Discharge Planning   Living Arrangements Spouse/significant other   Support Systems Spouse/significant other;Children  (spouse and two sons (Thomas & Spenser))   Assistance Needed cane prn  / has hha 13h a week (Monday thru Sat) / relies on spouse & insurance for transport / HD (TTS) @ [facility] [place] (Nephrologist, Yeison Garrido)   Type of Residence Private residence  (two story home with basement)   Number of Stairs Within Residence   (Yes;stairs within the home (has chair lift))   Do you have animals or pets at home? No   Who is requesting discharge planning? Provider   Home or Post Acute Services Post acute facilities (Rehab/SNF/etc)   Type of Post Acute Facility Services Other (Comment)  (TBD)   Expected Discharge Disposition [place]  (anticipate possible post acute facility)   Financial Resource Strain   How hard is it for you to pay for the very basics like food, housing, medical care, and heating? Not hard   Housing Stability   In the last 12 months, was there a time when you were not able to pay the mortgage or rent on time? N   In the past 12 months, how many times have you moved where you were living? 0   At any time in the past 12 months, were you homeless or living in a shelter (including now)? N   Transportation Needs   In the past 12 months, has lack of transportation kept you from medical appointments or from getting medications? no   In the past 12 months, has lack of transportation kept you from meetings, work, or from getting things needed for daily living? No   Patient Choice   Patient / Family choosing to utilize agency / facility established prior to hospitalization Yes  (hha 13h/week Mon thru Sat)     DC/SDOH Assessments with patient. Verified EPIC info of address, contact (spouse), support (sons), NP, and pharmacy is [facility] on [address].      Yajaira Katz (LSW, MSW)

## 2024-10-04 NOTE — ANESTHESIA PREPROCEDURE EVALUATION
"Patient: Misael Ritchie    Procedure Information       Date/Time: 10/07/24 0650    Procedure: Hip Fracture ORIF w/ Nail Trochanteric (Right: Hip)    Location: [facility] OR 08 / [facility] OR    Surgeons: Theo Park MD        Misael Ritchie is a 69 y.o. female PMH aortic stenosis s/p TAVR 11/23, atrial fibrillation (on Eliquis) s/p DCCV, CAD, ESRD on hemodialysis T/Th/Sat, HFrEF (6/2023 EF 48%), MR, TR, pacemaker CRT-P 5/2023, adrenal insufficiency admitted to the MICU on 10/03/24 for emergent dialysis for hyperkalemia     Relevant Problems   Anesthesia   (+) Difficult intubation      Cardiac   (+) Atrial fibrillation (Multi)   (+) CAD (coronary artery disease)   (+) CHF (congestive heart failure)   (+) HTN (hypertension)   (+) Pacemaker      Pulmonary (within normal limits)      Neuro (within normal limits)      GI (within normal limits)      /Renal   (+) ESRD (end stage renal disease) on dialysis (Multi)      Endocrine (within normal limits)      Hematology   (+) Anemia      Musculoskeletal (within normal limits)      HEENT (within normal limits)      ID (within normal limits)      Skin (within normal limits)      GYN (within normal limits)     No past surgical history on file.  Social Connections: Moderately Isolated (10/4/2024)    Social Connection and Isolation Panel [NHANES]     Frequency of Communication with Friends and Family: Three times a week     Frequency of Social Gatherings with Friends and Family: Three times a week     Attends Uatsdin Services: Never     Active Member of Clubs or Organizations: No     Attends Club or Organization Meetings: Not on file     Marital Status:       Clinical information reviewed:    Allergies  Meds             Allergies   Allergen Reactions    Codeine Hallucinations and Palpitations     \" fast heart beat\"     heart palpitations    Azithromycin Hives    Nifedipine Dizziness     Other reaction(s): Other: See Comments   " Nausea,restless ness,dizziness    Nifedipine (Bulk) Nausea/vomiting     Nausea,restless ness,dizziness    Nsaids (Non-Steroidal Anti-Inflammatory Drug) Other     Contraindicated in transplant patients    Prochlorperazine Other    Vancomycin Itching    Erythromycin Itching    Erythromycin Base Palpitations     heart palpitations      Medication Documentation Review Audit       Reviewed by Kathy Ogden RN (Registered Nurse) on 10/03/24 at 1759      Medication Order Taking? Sig Documenting Provider Last Dose Status            No Medications to Display                                Echo Conclusion 2024  CONCLUSIONS:   1. The left ventricular systolic function is low normal, with a visually estimated ejection fraction of 50-55%.   2. Spectral Doppler shows a pseudonormal pattern of left ventricular diastolic filling.   3. Abnormal septal motion consistent with RV pacemaker.   4. There is reduced right ventricular systolic function.   5. Severely enlarged right ventricle.   6. The left atrium is severely dilated.   7. The right atrium is severely dilated.   8. Small pericardial effusion.   9. There is moderate mitral annular calcification.  10. Severe tricuspid regurgitation visualized.  11. There is a transcatheter aortic valve replacement.  12. Severely elevated pulmonary artery pressure.  13. The inferior vena cava appears mildly dilated, with IVC inspiratory collapse less than 50%.  14. There is reversed systolic hepatic vein flow.           NPO Detail:  No data recorded     Physical Exam    Airway  Mallampati: III  Neck ROM: full     Cardiovascular   Rhythm: regular  Rate: normal     Dental - normal exam     Pulmonary    Abdominal            Anesthesia Plan    History of general anesthesia?: yes  History of complications of general anesthesia?: no    ASA 4     general   (pre induction [place])  intravenous induction   Postoperative administration of opioids is intended.  Anesthetic plan and risks discussed with  patient.  Use of blood products discussed with patient who consented to blood products.    Plan discussed with attending and CAA.         Latest Reference Range & Units 10/04/24 11:24 10/04/24 12:19 10/04/24 12:26 10/04/24 12:46 10/04/24 14:17   GLUCOSE 74 - 99 mg/dL 188 (H)       SODIUM 136 - 145 mmol/L 129 (L)       POTASSIUM 3.5 - 5.3 mmol/L 5.6 (H)       CHLORIDE 98 - 107 mmol/L 91 (L)       Bicarbonate 21 - 32 mmol/L 21       Anion Gap 10 - 20 mmol/L 23 (H)       Blood Urea Nitrogen 6 - 23 mg/dL 40 (H)       Creatinine 0.50 - 1.05 mg/dL 4.67 (H)       EGFR >60 mL/min/1.73m*2 10 (L)       Calcium 8.6 - 10.6 mg/dL 8.9       PHOSPHORUS 2.5 - 4.9 mg/dL 7.1 (H)       Albumin 3.4 - 5.0 g/dL 3.0 (L)       Alkaline Phosphatase 33 - 136 U/L 137 (H)       ALT 7 - 45 U/L 90 (H)       AST 9 - 39 U/L 114 (H)       Bilirubin Total 0.0 - 1.2 mg/dL 1.8 (H)       Bilirubin, Direct 0.0 - 0.3 mg/dL 1.1 (H)       Haptoglobin 30 - 200 mg/dL <30 (L)       Total Protein 6.4 - 8.2 g/dL 5.8 (L)       MAGNESIUM 1.60 - 2.40 mg/dL 1.75       LDH 84 - 246 U/L 399 (H)       Heparin Unfractionated See Comment Below for Therapeutic Ranges IU/mL   1.5 (HH)     MAPCR Not Detected   Detected !      POCT pH, Arterial 7.38 - 7.42 pH     7.47 (H)   POCT pCO2, Arterial 38 - 42 mm Hg     34 (L)   POCT pO2, Arterial 85 - 95 mm Hg     165 (H)   POCT SO2, Arterial 94 - 100 %     99   POCT Oxy Hemoglobin, Arterial 94.0 - 98.0 %     97.4   POCT Hematocrit Calculated, Arterial 36.0 - 46.0 %     32.0 (L)   POCT Sodium, Arterial 136 - 145 mmol/L     131 (L)   POCT Potassium, Arterial 3.5 - 5.3 mmol/L     3.6   POCT Chloride, Arterial 98 - 107 mmol/L     97 (L)   POCT Ionized Calcium, Arterial 1.10 - 1.33 mmol/L     1.09 (L)   POCT Glucose, Arterial 74 - 99 mg/dL     125 (H)   POCT Lactate, Arterial 0.4 - 2.0 mmol/L     1.9   POCT Base Excess, Arterial -2.0 - 3.0 mmol/L     1.3   POCT HCO3 Calculated, Arterial 22.0 - 26.0 mmol/L     24.7   POCT Hemoglobin,  Arterial 12.0 - 16.0 g/dL     10.5 (L)   POCT Anion Gap, Arterial 10 - 25 mmo/L     13   FiO2 %     50   Patient Temperature degrees Celsius     37.0   POCT Glucose 74 - 99 mg/dL    149 (H)    (HH): Data is critically high  (H): Data is abnormally high  (L): Data is abnormally low  !: Data is abnormal

## 2024-10-04 NOTE — CARE PLAN
Problem: Skin  Goal: Participates in plan/prevention/treatment measures  Outcome: Progressing  Flowsheets (Taken 10/4/2024 0753)  Participates in plan/prevention/treatment measures: Elevate heels  Goal: Prevent/manage excess moisture  Outcome: Progressing  Flowsheets (Taken 10/4/2024 0753)  Prevent/manage excess moisture: Monitor for/manage infection if present  Goal: Prevent/minimize sheer/friction injuries  Outcome: Progressing  Flowsheets (Taken 10/4/2024 0753)  Prevent/minimize sheer/friction injuries:   Turn/reposition every 2 hours/use positioning/transfer devices   Use pull sheet  Goal: Promote/optimize nutrition  Outcome: Progressing  Flowsheets (Taken 10/4/2024 0753)  Promote/optimize nutrition: Monitor/record intake including meals  Goal: Promote skin healing  Outcome: Progressing  Flowsheets (Taken 10/4/2024 0753)  Promote skin healing: Turn/reposition every 2 hours/use positioning/transfer devices     Problem: Fall/Injury  Goal: Not fall by end of shift  Outcome: Progressing  Goal: Be free from injury by end of the shift  Outcome: Progressing     Problem: Safety - Adult  Goal: Free from fall injury  Outcome: Progressing

## 2024-10-04 NOTE — PROGRESS NOTES
"Medical Intensive Care - Daily Progress Note   Subjective    Misael Ritchie is a 69 y.o. year old female patient admitted on 10/3/2024 with following ICU needs: Dialysis    Interval History:  Patient became hypoglycemic and hypotensive overnight and POCUS showed severe TR and dilated right atrium and ventricle fluids were stopped and levophed was added. She was given 2 amps D50 and C-peptide added on to labs prior to D50 administration. Insulin level drawn after correction of hypoglycemia. She required levo/vaso/epi but was able to be weaned off levo. She was started on zosyn overnight.    Meds    Scheduled medications  acetaminophen, 650 mg, oral, q6h  [Held by provider] allopurinol, 100 mg, oral, Daily  amiodarone, 200 mg, oral, Daily  atorvastatin, 40 mg, oral, Nightly  dextrose, 12.5 g, intravenous, Once  hydrocortisone sodium succinate, 50 mg, intravenous, q6h  oxygen, , inhalation, Continuous - Inhalation  pantoprazole, 40 mg, oral, Daily before breakfast   Or  pantoprazole, 40 mg, intravenous, Daily before breakfast  piperacillin-tazobactam, 2.25 g, intravenous, q8h  polyethylene glycol, 17 g, oral, Daily  vancomycin, 25 mg/kg, intravenous, Once      Continuous medications  dextrose 10 % in water (D10W), 30 mL/hr, Last Rate: 30 mL/hr (10/04/24 1014)  EPINEPHrine, 0-1 mcg/kg/min, Last Rate: 0.12 mcg/kg/min (10/04/24 1004)  vasopressin, 0.03 Units/min, Last Rate: 0.03 Units/min (10/04/24 0828)      PRN medications  PRN medications: albuterol, dextrose, dextrose, diphenhydrAMINE, glucagon, glucagon, oxyCODONE, oxyCODONE, vancomycin     Objective    Blood pressure (!) 188/40, pulse 75, temperature 36.4 °C (97.5 °F), temperature source Temporal, resp. rate 12, height 1.549 m (5' 1\"), weight 56.2 kg (123 lb 14.4 oz), SpO2 (!) 78%.     Physical Exam  General: Awake, alert, in no acute distress  Eyes: Gaze conjugate.  No scleral icterus or injection  HENT: Normo-cephalic, atraumatic. No stridor  CV: Regular " rate, regular rhythm. Radial pulses 2+ bilaterally  Resp: Breathing non-labored, speaking in full sentences.  Clear to auscultation bilaterally  GI: Soft, non-distended, non-tender. No rebound or guarding.  : deferred   MSK/Extremities: No gross bony deformities. Moving all extremities  Skin: Warm. Appropriate color  Neuro: Aox4 Face symmetric. Speech is fluent.  Gross strength and sensation intact in b/l UE and LEs  Psych: Appropriate mood and affect        Intake/Output Summary (Last 24 hours) at 10/4/2024 1342  Last data filed at 10/4/2024 1318  Gross per 24 hour   Intake 2971.24 ml   Output 1440 ml   Net 1531.24 ml     Labs:   Results from last 72 hours   Lab Units 10/03/24  2114 10/03/24  1720 10/03/24  1239   SODIUM mmol/L 136 139 139   POTASSIUM mmol/L 4.5 5.8* 7.6*   CHLORIDE mmol/L 94* 94* 95*   CO2 mmol/L 30 23 18*   BUN mg/dL 31* 65* 60*   CREATININE mg/dL 4.31* 7.59* 7.86*   GLUCOSE mg/dL 71* 107* 40*   CALCIUM mg/dL 9.0 9.2 9.7   ANION GAP mmol/L 17 28* 34*   EGFR mL/min/1.73m*2 11* 5* 5*   PHOSPHORUS mg/dL 5.6* 10.5*  --       Results from last 72 hours   Lab Units 10/04/24  0138 10/03/24  2114 10/03/24  1735 10/03/24  1008   WBC AUTO x10*3/uL 16.9* 10.9 17.3* 11.5*   HEMOGLOBIN g/dL 10.8* 10.8* 11.2* 13.4   HEMATOCRIT % 31.2* 31.2* 33.6* 42.0   PLATELETS AUTO x10*3/uL 133* 122* 133* 127*   NEUTROS PCT AUTO %  --  90.0 85.6 78.3   LYMPHS PCT AUTO %  --  3.4 2.8 10.1   MONOS PCT AUTO %  --  5.3 9.8 10.0   EOS PCT AUTO %  --  0.0 0.0 0.0      Results from last 72 hours   Lab Units 10/04/24  0428 10/04/24  0137 10/03/24  1225   POCT PH, ARTERIAL pH 7.40 7.41 7.29*   POCT PCO2, ARTERIAL mm Hg 36* 24* 26*   POCT PO2, ARTERIAL mm Hg 201* 207* 502*   POCT SO2, ARTERIAL % 100 100 100     Results from last 72 hours   Lab Units 10/03/24  2251 10/03/24  2114 10/03/24  1720   POCT PH, VENOUS pH 7.33 7.39 7.29*   POCT PCO2, VENOUS mm Hg 49 49 51   POCT PO2, VENOUS mm Hg 38 34* 33*        Micro/ID:     Lab Results    Component Value Date    BLOODCULT Loaded on Instrument - Culture in progress 10/04/2024       Summary of key imaging results from the last 24 hours  XR knee right 1-2 views    Result Date: 10/4/2024  Interpreted By:  Ortega Palmer and Tippareddy Charit STUDY: XR KNEE RIGHT 1-2 VIEWS; XR FEMUR RIGHT 1 VIEW; ; 10/3/2024 7:04 pm; 10/3/2024 7:07 pm   INDICATION: Signs/Symptoms:fx.   COMPARISON: Femoral radiograph 05/09/2022   ACCESSION NUMBER(S): [id number]; [id number]   ORDERING CLINICIAN: JOHNSON TOVAR   FINDINGS: Right knee, two views. Femur, 1 view.   There is a nondisplaced fracture through the right greater trochanter. This is poorly visualized on this portable study. Moderate degenerative changes of the right hip. Trace knee joint effusion. No radiopaque foreign bodies or soft tissue gas. Vascular calcifications are seen.       Fracture through the right greater trochanter, partially evaluated on this portable study. Recommend CT of the right hip to evaluate intertrochanteric extension   I personally reviewed the images/study and I agree with the findings as stated. This study was interpreted at [place], [place].   MACRO: None   Signed by: Ortega Palmer 10/4/2024 10:10 AM Dictation workstation:   [id number]    XR femur right 1 view    Result Date: 10/4/2024  Interpreted By:  Ortega Palmer and Tippareddy Charit STUDY: XR KNEE RIGHT 1-2 VIEWS; XR FEMUR RIGHT 1 VIEW; ; 10/3/2024 7:04 pm; 10/3/2024 7:07 pm   INDICATION: Signs/Symptoms:fx.   COMPARISON: Femoral radiograph 05/09/2022   ACCESSION NUMBER(S): [id number]; [id number]   ORDERING CLINICIAN: JOHNSON TOVAR   FINDINGS: Right knee, two views. Femur, 1 view.   There is a nondisplaced fracture through the right greater trochanter. This is poorly visualized on this portable study. Moderate degenerative changes of the right hip. Trace knee joint effusion. No radiopaque foreign bodies or soft tissue  gas. Vascular calcifications are seen.       Fracture through the right greater trochanter, partially evaluated on this portable study. Recommend CT of the right hip to evaluate intertrochanteric extension   I personally reviewed the images/study and I agree with the findings as stated. This study was interpreted at [facility], [place], [place].   MACRO: None   Signed by: Ortega Palmer 10/4/2024 10:10 AM Dictation workstation:   [id number]    CT abdomen pelvis w IV contrast    Result Date: 10/4/2024  Interpreted By:  Alec Carbone and Elsamaloty Mazzin STUDY: CT ABDOMEN PELVIS W IV CONTRAST;  10/3/2024 3:44 pm   INDICATION: Signs/Symptoms:shock, abdo distension, recurrent hypoglycemia.     COMPARISON: None.   ACCESSION NUMBER(S): [id number]   ORDERING CLINICIAN: RAY POTTER   TECHNIQUE: CT of the abdomen and pelvis was performed.  Standard contiguous axial images were obtained at 3 mm slice thickness through the abdomen and pelvis. Coronal and sagittal reconstructions at 3 mm slice thickness were performed.   80 ml of contrast Omnipaque 350 were administered intravenously without immediate complication.   FINDINGS: LOWER CHEST: There is a moderate right-sided pleural effusion with associated atelectatic changes of the lung. Multiple calcified, diffuse, centrilobular micronodules in the right midlung and to a lesser extent the left. Findings are suggestive of benign chronic process such as metastatic pulmonary calcifications. Leftward mediastinal shift.   Aortic vascular stent is in place through the ascending aorta. Multiple coronary stents are noted through the all major coronary arteries.   Dual-chamber pacemaker in place with leads seen in the right atrium, right ventricle, and left ventricle.   Mild-to-moderate pericardial effusion   ABDOMEN:   LIVER: Shows heterogeneous enhancement with a nodular contour. There is widening of the periportal space with associated  periportal edema. Findings suggest cirrhosis.   BILE DUCTS: Normal caliber.   GALLBLADDER: The gallbladder surgically absent with metallic clips seen in place   PANCREAS: Pancreatic duct appears prominent measuring 5 mm at the head, but there is normal caliber of the duct throughout the remaining portions of the pancreas. Small subcentimeter hypodense lesions in the head of pancreas are also noted.   SPLEEN: Spleen is unremarkable.   ADRENAL GLANDS: Bilateral adrenal glands are unremarkable.   KIDNEYS AND URETERS: The native left kidney is extremely atrophic with multiple cysts seen this area. The native right kidney is extremely atrophic as well. There is an atrophic transplant kidney in the right iliac fossa. Multiple calcifications are seen throughout the native left, native right, and transplant kidney which may be related to degeneration. No hydronephrosis to suggest an obstructive stone.   PELVIS:   BLADDER: The bladder is decompressed which limits evaluation. Within this limitation, the bladder appears unremarkable.   REPRODUCTIVE ORGANS: The uterus is present.   BOWEL: The stomach, small and large bowel are normal in appearance without wall thickening or obstruction.      The appendix appears normal.   VESSELS: Extensive atherosclerotic disease burden of the aorta and its major branches   PERITONEUM/RETROPERITONEUM/LYMPH NODES: There is a moderate amount of free fluid throughout the abdomen and pelvis, suggesting ascites. No abdominopelvic lymphadenopathy is present.   There is a collection in the right pelvis that measures 6.8 x 4.3 x 6.6 cm and attenuates slightly greater fluid. There is a rim of near-complete calcification surrounding this collection which attenuates slightly greater than water. Findings are felt to represent an old, chronic seroma or hematoma.     BONES AND ABDOMINAL WALL: Severe degenerative changes at the L2-L3 level. No acute vertebral compression fractures identified. There is a  cortical defect along the greater trochanter of the right femur which would suggest an acute fracture. Soft tissues thickening along the subcutaneous tissues of the right lateral hip suggesting small hematoma. Collection of hyperdense material between the planes of the right gluteal muscles, likely hematoma. Metallic clips seen in the posterior right upper abdominal wall, may be related to prior surgery.       1. Cortical irregularity at the greater trochanter of the right femur suggestive of an acute, minimally displaced fracture. Adjacent to the fracture site is an acute hematoma in the planes of the gluteal muscles. 2. Moderate right pleural effusion with adjacent atelectasis as well as a small to moderate pericardial effusion. There is also a small to moderate amount of simple appearing ascites. 3. Incidental findings include cirrhosis and a probable cystic lesion in the area of the pancreatic head. Further characterization by contrast MRI on a routine outpatient basis is recommended if not previously assessed. 4. Other chronic incidental findings as detailed above.   I personally reviewed the images/study and I agree with the findings as stated by Michael Bundy MD (resident). This study was interpreted at [place], [place].   MACRO: Michael Bundy discussed the significance and urgency of this critical finding via epic secure chat with  Aimee Cavanaugh MD on 10/3/2024 at 4:50 pm.  (**-RCF-**) Findings:  See findings.   Signed by: Alec Carbone 10/4/2024 7:36 AM Dictation workstation:   [id number]    ECG 12 lead    Result Date: 10/3/2024  AV dual-paced rhythm Abnormal ECG No previous ECGs available See ED provider note for full interpretation and clinical correlation Confirmed by Chantel Hawthorne ([id number]) on 10/3/2024 9:32:46 PM    CT angio chest for pulmonary embolism    Result Date: 10/3/2024  Interpreted By:  Brynn Castellano,  and Soledad Thompson  STUDY: CT ANGIO CHEST FOR PULMONARY EMBOLISM;  10/3/2024 3:44 pm   INDICATION: Signs/Symptoms:shock, RHS on POCUS.     COMPARISON: None   ACCESSION NUMBER(S): [id number]   ORDERING CLINICIAN: RAY POTTER   TECHNIQUE: Helical data acquisition of the chest was obtained after intravenous administration of 80 mL of Omnipaque 350, as per PE protocol. Images were reformatted in coronal and sagittal planes. Axial and coronal maximum intensity projection (MIP) images were created and reviewed.   FINDINGS: POTENTIAL LIMITATIONS OF THE STUDY: The assessment is limited by streak artifact from the patient's arms and atelectasis of the bilateral lower lobes.   HEART AND VESSELS: No discrete filling defects within the main pulmonary artery or its branches to segmental level. Please note that, assessment of subsegmental branches is limited and small peripheral emboli are not entirely excluded. Main pulmonary artery and its branches are normal in caliber.   The thoracic aorta normal in course and caliber.There is severe atherosclerosis present, including calcified and noncalcified plaques.Although, the study is not tailored for evaluation of aorta, there is no definite evidence of acute aortic pathology. Postsurgical changes of TAVR. Severe coronary artery calcifications are seen. Please note,the study is not optimized for evaluation of coronary arteries.Suggestion of coronary artery stents.   Cardiomegaly with marked enlargement of the right atrium and ventricle and left atrium.There is significant contrast reflux into the dilated IVC, hepatic veins, and azygous vein, suggestive of chronic right heart failure. There is a left subclavian approach biventricular cardiac pacemaker/ICD seen in placewith leads terminating within right atrium and apical right ventricle, and overlying left ventricular free wall. Moderate size pericardial effusion.   MEDIASTINUM AND JOHN, LOWER NECK AND AXILLA: The left thyroid lobe is enalarged and  heterogenous with multiple hypodense nodules and coarse calcifications, which likely relates to multinodular goiter. Correlate with thyroid ultrasound. No evidence of thoracic lymphadenopathy by CT criteria. Esophagus appears within normal limits as seen.   LUNGS AND AIRWAYS: The trachea and central airways are patent. No endobronchial lesion is seen.   Moderate-to-large right pleural effusion with partial collapse of the right lower lobe. Trace left pleural effusion. There are centrally located branching and nodular opacities with questionable calcifications. Findings may represent sequelae of chronic pulmonary venous congestion or possibly sequelae from chronic infectious or inflammatory etiology.   There is also mosaic attenuation of the lungs with   UPPER ABDOMEN: Moderate volume abdominopelvic ascites with fluid layering near the spleen and liver. Severe atherosclerotic vascular disease of the abdominal aorta and its branches. Please see dedicated imaging of the abdomen and pelvis for further evaluation.     CHEST WALL AND OSSEOUS STRUCTURES: Chest wall is within normal limits. No acute osseous pathology.There are no suspicious osseous lesions.Mild multilevel degenerative changes within visualized spine.       1. No evidence of acute pulmonary embolism to segmental level. Please note that, assessment of subsegmental branches is limited and small peripheral emboli are not entirely excluded. 2. Marked cardiomegaly with enlargement of the right atrium, left atrium, and right ventricle. There is enlargement of the IVC with contrast reflux into the IVC and hepatic veins. Findings are suggestive of right heart strain/dysfunction and/or tricuspid regurgitation. Correlate with echocardiographic findings. There is also moderate pericardial effusion. Recommend further evaluation with echocardiography correlate with concern for component of cardiac tamponade 3. Moderate-to-large right and trace left pleural effusions,  pericardial effusion, and abdominopelvic ascites. Correlate with fluid volume status. 4. Multifocal central branching ground-glass opacities in the bilateral lungs with questionable multifocal calcifications and pulmonary ossification. Findings may represent sequelae of chronic pulmonary venous congestion which can be secondary to mitral valve stenosis. Alternatively findings may be sequela of chronic infectious or inflammatory process. 5. Please see dedicated CT imaging of the abdomen and pelvis for further evaluation.   I personally reviewed the image(s)/study and resident interpretation. I agree with the findings as stated by resident Jay Rowe. Data analyzed and images interpreted at [facility], [place], [place].   MACRO: Critical Finding:  See findings. Notification was initiated on 10/3/2024 at 4:51 pm by  Jay Rowe.  (**-OCF-**) Instructions:   Signed by: Brynn Amaya 10/3/2024 5:00 PM Dictation workstation:   [id number]    XR chest 1 view    Result Date: 10/3/2024  Interpreted By:  Kirby Canada and Gupta Jayesh STUDY: XR CHEST 1 VIEW;  10/3/2024 1:10 pm   INDICATION: Signs/Symptoms:sob, hypoxia.   COMPARISON: Comparison radiograph 05/09/2022.   ACCESSION NUMBER(S): [id number]   ORDERING CLINICIAN: RAY POTTER   TECHNIQUE: AP radiograph of the chest was provided.   FINDINGS: Medical devices:Left chest wall pacemaker with 1 lead within the right atrium, and 2 leads within the right ventricle. Patient is status post TAVR.   CARDIOMEDIASTINAL SILHOUETTE: Cardiomediastinal silhouette is enlarged but stable in size and configuration.   LUNGS: Consolidative opacities within the right lower lung with diffuse hazy appearance bilateral lungs, right-greater-than-left. There is also possible bilateral costophrenic angle blunting representing trace pleural effusions. No pneumothorax.   ABDOMEN: No upper abdominal findings.   BONES: No osseous changes.       1.  Increased consolidative opacities within the right lower lung with diffuse hazy opacities of bilateral lungs. Bilateral costophrenic angle blunting representing trace pleural effusions. Correlate with volume status. 2. Unchanged cardiomediastinal enlargement.   I personally reviewed the images/study and I agree with the findings as stated by Jeyson York MD. This study was interpreted at [place], [place].   MACRO: None   Signed by: Kirby Canada 10/3/2024 1:45 PM Dictation workstation:   [id number]    XR hip right with pelvis when performed 2 or 3 views    Result Date: 10/3/2024  Interpreted By:  Kirby Canada and Gupta Jayesh STUDY: XR HIP RIGHT WITH PELVIS WHEN PERFORMED 2 OR 3 VIEWS; 10/3/2024 10:41 am   INDICATION: Signs/Symptoms:fall R hip pain.   COMPARISON: Comparison radiograph 05/09/2022.   ACCESSION NUMBER(S): [id number]   ORDERING CLINICIAN: RAY POTTER   FINDINGS: One-view of the AP pelvis. Two views of the right hip.   Similar surgical clips over the right hemipelvis. Similar severe vascular calcifications. Similar bilateral hip arthrosis. There is no acute fracture or dislocation. The visualized bony pelvic ring is intact. The femoral heads are well-seated under the acetabular roofs. Remote right superior and inferior pubic ramus fracture deformities.       No acute fracture or dislocation of the right hip. Similar bilateral hip arthrosis.   I personally reviewed the images/study and I agree with the findings as stated by Jeyson York MD. This study was interpreted at [facility], [place], [place].   Signed by: Kirby Canada 10/3/2024 11:10 AM Dictation workstation:   [id number]    CT head wo IV contrast    Result Date: 10/3/2024  Interpreted By:  Stacy Mishra, STUDY: CT HEAD WO IV CONTRAST; CT FACIAL BONES WO IV CONTRAST; CT CERVICAL SPINE WO IV CONTRAST;  10/3/2024 10:37 am   INDICATION: Signs/Symptoms:Fall r  facial pain and swelling.   COMPARISON: Head CT May 15, 2022   ACCESSION NUMBER(S): [id number]; [id number]; [id number]   ORDERING CLINICIAN: RIGOBERTO MCCANN   TECHNIQUE: Noncontrast axial CT scan of head was performed. Angled reformats in brain and bone windows were generated. The images were reviewed in bone, brain, blood and soft tissue windows. Coronal and sagittal reformats were provided for review.   Axial CT images of the facial bones were obtained. Coronal and sagittal reformats are provided for review. 3D reconstructions were also performed utilizing an independent workstation.   Axial CT images of the cervical spine with coronal and sagittal reformats are provided for review.   FINDINGS: Head CT:   CSF Spaces: There is prominence of ventricles and sulci compatible with mild parenchymal volume loss. There is no extraaxial fluid collection.   Parenchyma:  There are nonspecific areas of diminished attenuation in the subcortical and periventricular white matter. Small hypodensities in the basal ganglia and external capsules may represent lacunar infarcts or perivascular spaces. Otherwise, the grey-white differentiation is intact. There is no mass effect or midline shift. There is no intracranial hemorrhage.   Calvarium: The bony calvarium is intact.   Facial bone CT:   There is right periorbital soft tissue swelling and hematoma extending inferiorly along the right maxilla. No measurable intraconal hematoma is identified. The optic nerve sheath complexes are symmetric. There may be very minimal fat stranding adjacent to the far anterior aspect of the superior rectus, superior oblique, and lateral rectus muscle bellies. There is prominent intraorbital and periorbital fat and suspected proptosis bilaterally.   No displaced orbital fracture is identified.   The mandible and temporomandibular articulations are intact. The zygomatic arches are intact.   There is a small retention cyst or polyp in the right  frontal sinus. The visualized paranasal sinuses are otherwise clear. No air-fluid levels are identified. There is opacification of a few mastoid air cells.   Cervical spine CT:   There is loss of the normal cervical lordosis with grade 1-2 anterolisthesis of C4 on C5 and grade 1 retrolisthesis of C5 on C6.   There is mild loss of height of C5 through C7 favored to be remote/degenerative in nature.   The patient is tilted and rotated within the scanner. Otherwise, the craniocervical junction appears intact. The odontoid process is intact. There are degenerative changes of the atlantoaxial and craniocervical articulations.   There is vacuum disc phenomenon and loss of disc height from C4-5 through C6-7. There is also multilevel endplate spurring. There is multilevel degenerative facet arthropathy and uncovertebral joint hypertrophy contributing to neuroforaminal stenosis. Posterior disc/osteophyte complex formation contributes to central canal stenosis most severe at C5-6 and C6-7.   No definite linear lucency suggestive of an acute fracture of the cervical spine is identified.   There is no significant prevertebral soft tissue swelling.   There are atherosclerotic vascular calcifications.   There is a partially imaged heterogeneous and partially calcified nodule in the left lobe of the thyroid gland measuring at least 4.1 cm.   There is partially imaged right apical pleural thickening.       1. Right periorbital and facial soft tissue swelling and hematoma without evidence for orbital or facial bone fracture. There is also very subtle fat stranding along the far anterior aspects of the extraocular muscles. No measurable intraconal hematoma is identified. 2. There is prominent intraorbital and periorbital fat and suspected proptosis bilaterally which may reflect underlying thyroid orbitopathy. 3. Loss of the normal cervical lordosis with grade 1-2 anterolisthesis of C4 on C5 is favored to be remote/degenerative in  nature. Traumatic subluxation is thought to be less likely but is difficult to entirely exclude. There is no definitive evidence for an acute fracture of the cervical spine. 4. Multilevel degenerative changes of the cervical spine with associated central canal and neuroforaminal stenosis. 5. No evidence of acute intracranial hemorrhage. 6. Partially imaged large thyroid nodule for which nonemergent thyroid ultrasound is recommended.       MACRO: None   Signed by: Stacy Mishra 10/3/2024 10:54 AM Dictation workstation:   [id number]    CT maxillofacial bones wo IV contrast    Result Date: 10/3/2024  Interpreted By:  Stacy Mishra, STUDY: CT HEAD WO IV CONTRAST; CT FACIAL BONES WO IV CONTRAST; CT CERVICAL SPINE WO IV CONTRAST;  10/3/2024 10:37 am   INDICATION: Signs/Symptoms:Fall r facial pain and swelling.   COMPARISON: Head CT May 15, 2022   ACCESSION NUMBER(S): [id number]; [id number]; [id number]   ORDERING CLINICIAN: RIGOBERTO MCCANN   TECHNIQUE: Noncontrast axial CT scan of head was performed. Angled reformats in brain and bone windows were generated. The images were reviewed in bone, brain, blood and soft tissue windows. Coronal and sagittal reformats were provided for review.   Axial CT images of the facial bones were obtained. Coronal and sagittal reformats are provided for review. 3D reconstructions were also performed utilizing an independent workstation.   Axial CT images of the cervical spine with coronal and sagittal reformats are provided for review.   FINDINGS: Head CT:   CSF Spaces: There is prominence of ventricles and sulci compatible with mild parenchymal volume loss. There is no extraaxial fluid collection.   Parenchyma:  There are nonspecific areas of diminished attenuation in the subcortical and periventricular white matter. Small hypodensities in the basal ganglia and external capsules may represent lacunar infarcts or perivascular spaces. Otherwise, the grey-white differentiation is  intact. There is no mass effect or midline shift. There is no intracranial hemorrhage.   Calvarium: The bony calvarium is intact.   Facial bone CT:   There is right periorbital soft tissue swelling and hematoma extending inferiorly along the right maxilla. No measurable intraconal hematoma is identified. The optic nerve sheath complexes are symmetric. There may be very minimal fat stranding adjacent to the far anterior aspect of the superior rectus, superior oblique, and lateral rectus muscle bellies. There is prominent intraorbital and periorbital fat and suspected proptosis bilaterally.   No displaced orbital fracture is identified.   The mandible and temporomandibular articulations are intact. The zygomatic arches are intact.   There is a small retention cyst or polyp in the right frontal sinus. The visualized paranasal sinuses are otherwise clear. No air-fluid levels are identified. There is opacification of a few mastoid air cells.   Cervical spine CT:   There is loss of the normal cervical lordosis with grade 1-2 anterolisthesis of C4 on C5 and grade 1 retrolisthesis of C5 on C6.   There is mild loss of height of C5 through C7 favored to be remote/degenerative in nature.   The patient is tilted and rotated within the scanner. Otherwise, the craniocervical junction appears intact. The odontoid process is intact. There are degenerative changes of the atlantoaxial and craniocervical articulations.   There is vacuum disc phenomenon and loss of disc height from C4-5 through C6-7. There is also multilevel endplate spurring. There is multilevel degenerative facet arthropathy and uncovertebral joint hypertrophy contributing to neuroforaminal stenosis. Posterior disc/osteophyte complex formation contributes to central canal stenosis most severe at C5-6 and C6-7.   No definite linear lucency suggestive of an acute fracture of the cervical spine is identified.   There is no significant prevertebral soft tissue swelling.    There are atherosclerotic vascular calcifications.   There is a partially imaged heterogeneous and partially calcified nodule in the left lobe of the thyroid gland measuring at least 4.1 cm.   There is partially imaged right apical pleural thickening.       1. Right periorbital and facial soft tissue swelling and hematoma without evidence for orbital or facial bone fracture. There is also very subtle fat stranding along the far anterior aspects of the extraocular muscles. No measurable intraconal hematoma is identified. 2. There is prominent intraorbital and periorbital fat and suspected proptosis bilaterally which may reflect underlying thyroid orbitopathy. 3. Loss of the normal cervical lordosis with grade 1-2 anterolisthesis of C4 on C5 is favored to be remote/degenerative in nature. Traumatic subluxation is thought to be less likely but is difficult to entirely exclude. There is no definitive evidence for an acute fracture of the cervical spine. 4. Multilevel degenerative changes of the cervical spine with associated central canal and neuroforaminal stenosis. 5. No evidence of acute intracranial hemorrhage. 6. Partially imaged large thyroid nodule for which nonemergent thyroid ultrasound is recommended.       MACRO: None   Signed by: Stacy Mishra 10/3/2024 10:54 AM Dictation workstation:   [id number]    CT cervical spine wo IV contrast    Result Date: 10/3/2024  Interpreted By:  Stacy Mishra, STUDY: CT HEAD WO IV CONTRAST; CT FACIAL BONES WO IV CONTRAST; CT CERVICAL SPINE WO IV CONTRAST;  10/3/2024 10:37 am   INDICATION: Signs/Symptoms:Fall r facial pain and swelling.   COMPARISON: Head CT May 15, 2022   ACCESSION NUMBER(S): [id number]; [id number]; [id number]   ORDERING CLINICIAN: RIGOBERTO MCCANN   TECHNIQUE: Noncontrast axial CT scan of head was performed. Angled reformats in brain and bone windows were generated. The images were reviewed in bone, brain, blood and soft tissue windows. Coronal and  sagittal reformats were provided for review.   Axial CT images of the facial bones were obtained. Coronal and sagittal reformats are provided for review. 3D reconstructions were also performed utilizing an independent workstation.   Axial CT images of the cervical spine with coronal and sagittal reformats are provided for review.   FINDINGS: Head CT:   CSF Spaces: There is prominence of ventricles and sulci compatible with mild parenchymal volume loss. There is no extraaxial fluid collection.   Parenchyma:  There are nonspecific areas of diminished attenuation in the subcortical and periventricular white matter. Small hypodensities in the basal ganglia and external capsules may represent lacunar infarcts or perivascular spaces. Otherwise, the grey-white differentiation is intact. There is no mass effect or midline shift. There is no intracranial hemorrhage.   Calvarium: The bony calvarium is intact.   Facial bone CT:   There is right periorbital soft tissue swelling and hematoma extending inferiorly along the right maxilla. No measurable intraconal hematoma is identified. The optic nerve sheath complexes are symmetric. There may be very minimal fat stranding adjacent to the far anterior aspect of the superior rectus, superior oblique, and lateral rectus muscle bellies. There is prominent intraorbital and periorbital fat and suspected proptosis bilaterally.   No displaced orbital fracture is identified.   The mandible and temporomandibular articulations are intact. The zygomatic arches are intact.   There is a small retention cyst or polyp in the right frontal sinus. The visualized paranasal sinuses are otherwise clear. No air-fluid levels are identified. There is opacification of a few mastoid air cells.   Cervical spine CT:   There is loss of the normal cervical lordosis with grade 1-2 anterolisthesis of C4 on C5 and grade 1 retrolisthesis of C5 on C6.   There is mild loss of height of C5 through C7 favored to be  remote/degenerative in nature.   The patient is tilted and rotated within the scanner. Otherwise, the craniocervical junction appears intact. The odontoid process is intact. There are degenerative changes of the atlantoaxial and craniocervical articulations.   There is vacuum disc phenomenon and loss of disc height from C4-5 through C6-7. There is also multilevel endplate spurring. There is multilevel degenerative facet arthropathy and uncovertebral joint hypertrophy contributing to neuroforaminal stenosis. Posterior disc/osteophyte complex formation contributes to central canal stenosis most severe at C5-6 and C6-7.   No definite linear lucency suggestive of an acute fracture of the cervical spine is identified.   There is no significant prevertebral soft tissue swelling.   There are atherosclerotic vascular calcifications.   There is a partially imaged heterogeneous and partially calcified nodule in the left lobe of the thyroid gland measuring at least 4.1 cm.   There is partially imaged right apical pleural thickening.       1. Right periorbital and facial soft tissue swelling and hematoma without evidence for orbital or facial bone fracture. There is also very subtle fat stranding along the far anterior aspects of the extraocular muscles. No measurable intraconal hematoma is identified. 2. There is prominent intraorbital and periorbital fat and suspected proptosis bilaterally which may reflect underlying thyroid orbitopathy. 3. Loss of the normal cervical lordosis with grade 1-2 anterolisthesis of C4 on C5 is favored to be remote/degenerative in nature. Traumatic subluxation is thought to be less likely but is difficult to entirely exclude. There is no definitive evidence for an acute fracture of the cervical spine. 4. Multilevel degenerative changes of the cervical spine with associated central canal and neuroforaminal stenosis. 5. No evidence of acute intracranial hemorrhage. 6. Partially imaged large thyroid  nodule for which nonemergent thyroid ultrasound is recommended.       MACRO: None   Signed by: Stacy Mishra 10/3/2024 10:54 AM Dictation workstation:   [id number]      Assessment and Plan     Assessment:   Misael Ritchie is a 69 y.o. female PMH aortic stenosis s/p TAVR 11/23, atrial fibrillation (on Eliquis) s/p DCCV, CAD, ESRD on hemodialysis T/Th/Sat, HFrEF (6/2023 EF 48%), MR, TR, pacemaker CRT-P 5/2023, adrenal insufficiency admitted to the MICU on 10/03/24 for emergent dialysis for hyperkalemia 7.4.     Mechanical Ventilation: none  Sedation/Analgesia:  none  Restraints: no    Summary for 10/04/24 - Patient hypoglycemia and hypotensive overnight requiring Levophed, vaso and epi. She is currently off Levo remains on epi and vaso.  Heparin assay  Endocrine consult given patient's repeated hypoglycemia c/f possible insulinoma   C peptide - 26 elevated  Heart failure consult  Cardiac device consult ->Interrogate pacer and confirmed it is MRI safe  Start Vancomycin   Continue Zosyn  Nephrology following    Plan:  NEUROLOGY/PSYCH:  #Pain s/p fall   Scheduled Tylenol  PRN Oxy 5/10     CARDIOVASCULAR:  #PMH A-fib, CAD s/p stent 2003, MR, TR  #pericardial effusion  Management:  Hold Eliquis and Plavix  Continue home amiodarone, atorvastatin  Hold metoprolol for HR  If patient becomes unstable consider STAT POCUS to rule out tamponade  HF consult  Wean pressors as tolerated     PULMONARY:  #COPD  Management:  Wean O2  Continue home albuterol PRN     RENAL/GENITOURINARY:  #ESRD  Management:  Emergent dialysis, Nephrology following  Hold home torsemide   Hold home allopurinol   Follow up uric acid     GASTROENTEROLOGY:  Continue home Protonix, miralax     ENDOCRINOLOGY:  #Adrenal insufficiency  #hypoglycemia 2/2 insulin patient received for hyperkalemia  Management:  Prednisone 5 mg   Can consider stress dose steroids   TSH, T4, T3  D5 infusion and Q1h Blood sugar  Endocrine consult given patient's repeated  hypoglycemia c/f possible insulinoma      HEMATOLOGY:  #hematoma   Management:  Consider reversing Eliquis if Hb drops     SKIN:  No acute concerns     MUSCULOSKELETAL:  #Right troc fracture with hematoma  Management:  Ortho consulted, recs appreciated (MRI ordered)     INFECTIOUS DISEASE:  Rule out sepsis low suspicion  Management:  Follow up Bcx, UA  Vancomycin 10/4-present  Zosyn 10/4-present    ICU Check List         FEN  Fluids: D10W  Electrolytes: PRN  Nutrition: NPO  Prophylaxis:  DVT ppx: holding given hematoma  GI ppx: PPI  Bowel care: Miralax  Hardware:         CVC 10/03/24 Triple lumen Non-tunneled Left Femoral (Active)   Placement Date/Time: 10/03/24 1516   Site Prep: Chlorhexidine   All 5 Sterile Barriers Used (Gloves, Gown, Cap, Mask, Large Sterile Drape): Yes  Lumen Type: Triple lumen  CVC Line Catheter Size (Fr): 7 Fr  CVC Type: Non-tunneled  CVC Line Length (cm):...   Number of days: 0       Arterial Line 10/04/24 Left Brachial (Active)   Placement Date/Time: 10/04/24 0200   Orientation: Left  Location: Brachial  Site Prep: Chlorhexidine   Local Anesthetic: Injectable  Technique: Ultrasound guidance  Securement Method: Sutured  Patient Tolerance: Tolerated well   Number of days: 0       Social:  Code: Full Code    HPOA:  Zeke Ritchie () [phone]   Disposition: ORA Cavanaugh MD   10/04/24 at 1:42 PM     Disclaimer: Documentation completed with the information available at the time of input. The times in the chart may not be reflective of actual patient care times, interventions, or procedures. Documentation occurs after the physical care of the patient.

## 2024-10-04 NOTE — CONSULTS
Inpatient consult to Endocrinology  Consult performed by: Alexia York MD  Consult ordered by: Sarah Cuellar MD PhD  Reason for consult: management of AI and refractory, recurrent unexplained hypoglycemia episodes. c/f insulinoma?        History Of Present Illness  Misael Ritchie is a 69 y.o. 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  Patient presenting to hospital after a fall.  Patient reports feeling lightheaded and feeling extremely hot.  While she was trying to turn on the fan, she had a syncope resulting in fall and hitting right side of her head.    On presentation to ER patient was noted to be hypotensive.  Initial workup was suggestive of hyperkalemia with K7.4 which was treated with 5 units insulin, D50, bicarb and calcium gluconate. CT maxillofacial bones wo IV contrast: Right periorbital and facial soft tissue swelling and hematoma without evidence for orbital or facial bone fracture. Patient has right trochanteric fracture with hematoma.    On arrival to ICU, patient was noted to have episodes of hypoglycemia with blood glucose of 40 following which she was started on IV dextrose and was given hydrocortisone 100 mg IV push followed by hydrocortisone 50 mg Q6 hourly.  She was continued on D10 at 75 cc/h with improvement in blood glucose.    Endocrinology service is consulted for management of adrenal insufficiency in setting of recurrent unexplained hypoglycemic episodes.    Of note, patient had similar episodes of hypoglycemia in 2024 for which she was managed at [facility].  At that time stim test was done on 2/5/2024 with results as under:  Basal cortisol: 2.6  30-minute cortisol: 2.6  60-minute cortisol: 2.7    Note is made of of random blood  "cortisol level of 8.2 on 2/5/2024.  Patient does have a history of adrenal insufficiency based on the above results.    Patient reports that she has been on prednisone 2.5 mg since 2010.  She reports being off prednisone for 6 months in 2011 (patient not sure about the year when she was off).  She reports that she felt miserable when she was off prednisone.    CT abdo/pelvis (10/3): Bilateral adrenal glands are unremarkable.    Patient reports that she recently had steroid injection to her right shoulder.  On chart review it was noted that patient received methylprednisolone acetate 40 mg on 9/20/2024.    Initially patient was on Levophed, epinephrine and vasopressin.  While patient was being examined patient was off Levophed and was on epinephrine and vasopressin.      Past Medical History  She has no past medical history on file.    Surgical History  She has no past surgical history on file.     Social History  She reports that she has never smoked. She has never used smokeless tobacco. No history on file for alcohol use and drug use.    Family History  No family history on file.     Allergies  Codeine, Azithromycin, Nifedipine, Nifedipine (bulk), Nsaids (non-steroidal anti-inflammatory drug), Prochlorperazine, Vancomycin, Erythromycin, and Erythromycin base    ROS, PMH, FH/SH, surgical history and allergies have been reviewed.    Last Recorded Vitals  Blood pressure (!) 134/43, pulse 91, temperature 35.6 °C (96.1 °F), temperature source Temporal, resp. rate 20, height 1.549 m (5' 1\"), weight 56.2 kg (123 lb 14.4 oz), SpO2 92%.  Review of Systems  All systems reviewed with the patient and they were all negative, unless noted above.     Physical Exam   General: tired appearing, somnolent  HEENT: Hematoma over right eyebrow noted  Neck: trachea in midline, no thyromegaly or nodules, negative for supraclavicular fat pads  Resp: CTA B/L  CVS: Grade 4 holosystolic murmur  Abdomen: soft and non tender to palpation, BS+, " no abdominal stria  Skin: warm, dry and intact  Neuro: Fatigued, alert oriented x 3  Psych: cooperative     ROS, PMH, FH/SH, surgical history and allergies have been reviewed.   Relevant Results  Results from last 7 days   Lab Units 10/04/24  1739 10/04/24  1639 10/04/24  1435 10/04/24  1246 10/04/24  1124 10/04/24  1038 10/03/24  2234 10/03/24  2114 10/03/24  1743 10/03/24  1720 10/03/24  1244 10/03/24  1239 10/03/24  1008   POCT GLUCOSE mg/dL 117* 107* 110* 149*  --  200*   < >  --    < >  --    < >  --   --    GLUCOSE mg/dL  --   --   --   --  188*  --   --  71*  --  107*  --  40* 87    < > = values in this interval not displayed.          Assessment/Plan   Misael Ritchie is a 69 y.o. 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  Patient presenting to hospital after a fall.  Patient reports feeling lightheaded and feeling extremely hot.  While she was trying to turn on the fan, she had a syncope resulting in fall and hitting right side of her head.    On presentation to ER patient was noted to be hypotensive.  Initial workup was suggestive of hyperkalemia with K7.4 which was treated with 5 units insulin, D50, bicarb and calcium gluconate. CT maxillofacial bones wo IV contrast: Right periorbital and facial soft tissue swelling and hematoma without evidence for orbital or facial bone fracture. Patient has right trochanteric fracture with hematoma.    On arrival to ICU, patient was noted to have episodes of hypoglycemia with blood glucose of 40 following which she was started on IV dextrose and was given hydrocortisone 100 mg IV push followed by hydrocortisone 50 mg Q6 hourly.  She was continued on D10 at 75 cc/h with improvement in blood  glucose.    Endocrinology service is consulted for management of adrenal insufficiency in setting of recurrent unexplained hypoglycemic episodes.    Of note, patient had similar episodes of hypoglycemia in 2024 for which she was managed at [facility].  At that time, stim test was done on 2/5/2024 with results as under:  Basal cortisol: 2.6  30-minute cortisol: 2.6  60-minute cortisol: 2.7    Note is made of of random blood cortisol level of 8.2 on 2/5/2024.  Patient does have a history of adrenal insufficiency based on the above results.    Patient reports that she has been on prednisone 2.5 mg since 2010.  She reports being off prednisone for 6 months in 2011 (patient not sure about the year when she was off).  She reports that she felt miserable when she was off prednisone.    CT abdo/pelvis (10/3): Bilateral adrenal glands are unremarkable.    Patient reports that she recently had steroid injection to her right shoulder.  On chart review it was noted that patient received methylprednisolone acetate 40 mg on 9/20/2024.    Initially patient was on Levophed, epinephrine and vasopressin.  While patient was being examined patient was off Levophed and was on epinephrine and vasopressin.    # Adrenal insufficiency in setting of chronic glucocorticoid use and recent steroid injection to right shoulder resulting treatment resistant hypoglycemia  #Hyperkalemia, multifactorial likely 2/2 CKD on HD,high anion gap metabolic acidosis (lactic acid/uremia), adrenal insufficiency, baseline BUN and creatinine 39/5.45 on 10/2/24 per [facility] labs    Plan:  -Continue injection hydrocortisone 50 mg IV every 6 hourly since patient does currently on shock likely septic versus cardiogenic  -Once patient is off pressors, recommend decreasing hydrocortisone dose to 25 mg IV every 6 hourly  -Patient tolerating oral feeds.  Recommend to slowly taper off D10 infusion.  Continue to monitor blood glucose every 6 hourly  -In case blood sugar drops below  55, checking C-peptide, insulin, proinsulin, POC, VBG's, beta hydroxybutyrate, and RFP, stat prior to administering dextrose. Treat with hypoglycemic protocol accordingly, okay to start D5-D10 if needed  -Repeating workup for adrenal insufficiency is not recommended at this time as patient is on stress dose steroids.  ACTH is also expected to be suppressed in setting of recent steroid injection to shoulder (9/20/2024)  -Patient was on tablet prednisone 2.5 mg daily which is subtherapeutic replacement dose for adrenal insufficiency.  Will taper current stress dose of steroid when patient is off pressors to therapeutic replacement dose for adrenal insufficiency.    Recommendations conveyed to primary team  Case seen and discussed with Dr. Garces  Endocrinology service will continue to follow    Alexia York MD  Endocrinology fellow

## 2024-10-05 ENCOUNTER — APPOINTMENT (OUTPATIENT)
Dept: CARDIOLOGY | Facility: HOSPITAL | Age: 69
DRG: 480 | End: 2024-10-05
Payer: MEDICARE

## 2024-10-05 ENCOUNTER — APPOINTMENT (OUTPATIENT)
Dept: DIALYSIS | Facility: HOSPITAL | Age: 69
End: 2024-10-05
Payer: MEDICARE

## 2024-10-05 LAB
ABO GROUP (TYPE) IN BLOOD: NORMAL
ALBUMIN SERPL BCP-MCNC: 3 G/DL (ref 3.4–5)
ALP SERPL-CCNC: 137 U/L (ref 33–136)
ALT SERPL W P-5'-P-CCNC: 86 U/L (ref 7–45)
ANION GAP BLDA CALCULATED.4IONS-SCNC: 17 MMO/L (ref 10–25)
ANION GAP SERPL CALC-SCNC: 20 MMOL/L (ref 10–20)
ANTIBODY SCREEN: NORMAL
AST SERPL W P-5'-P-CCNC: 72 U/L (ref 9–39)
ATRIAL RATE: 75 BPM
ATRIAL RATE: 75 BPM
B-OH-BUTYR SERPL-SCNC: 0.16 MMOL/L (ref 0.02–0.27)
BASE EXCESS BLDA CALC-SCNC: -1.5 MMOL/L (ref -2–3)
BASOPHILS # BLD AUTO: 0.01 X10*3/UL (ref 0–0.1)
BASOPHILS NFR BLD AUTO: 0.1 %
BILIRUB DIRECT SERPL-MCNC: 1 MG/DL (ref 0–0.3)
BILIRUB SERPL-MCNC: 2 MG/DL (ref 0–1.2)
BODY TEMPERATURE: 37 DEGREES CELSIUS
BUN SERPL-MCNC: 22 MG/DL (ref 6–23)
C PEPTIDE SERPL-MCNC: 18.2 NG/ML (ref 0.7–3.9)
CA-I BLDA-SCNC: 1.12 MMOL/L (ref 1.1–1.33)
CALCIUM SERPL-MCNC: 8.2 MG/DL (ref 8.6–10.6)
CHLORIDE BLDA-SCNC: 91 MMOL/L (ref 98–107)
CHLORIDE SERPL-SCNC: 91 MMOL/L (ref 98–107)
CO2 SERPL-SCNC: 25 MMOL/L (ref 21–32)
CREAT SERPL-MCNC: 2.91 MG/DL (ref 0.5–1.05)
EGFRCR SERPLBLD CKD-EPI 2021: 17 ML/MIN/1.73M*2
EOSINOPHIL # BLD AUTO: 0 X10*3/UL (ref 0–0.7)
EOSINOPHIL NFR BLD AUTO: 0 %
ERYTHROCYTE [DISTWIDTH] IN BLOOD BY AUTOMATED COUNT: 17.7 % (ref 11.5–14.5)
ERYTHROCYTE [DISTWIDTH] IN BLOOD BY AUTOMATED COUNT: 17.7 % (ref 11.5–14.5)
ERYTHROCYTE [DISTWIDTH] IN BLOOD BY AUTOMATED COUNT: 17.9 % (ref 11.5–14.5)
GLUCOSE BLD MANUAL STRIP-MCNC: 127 MG/DL (ref 74–99)
GLUCOSE BLD MANUAL STRIP-MCNC: 137 MG/DL (ref 74–99)
GLUCOSE BLD MANUAL STRIP-MCNC: 151 MG/DL (ref 74–99)
GLUCOSE BLD MANUAL STRIP-MCNC: 155 MG/DL (ref 74–99)
GLUCOSE BLD MANUAL STRIP-MCNC: 156 MG/DL (ref 74–99)
GLUCOSE BLD MANUAL STRIP-MCNC: 181 MG/DL (ref 74–99)
GLUCOSE BLD MANUAL STRIP-MCNC: 188 MG/DL (ref 74–99)
GLUCOSE BLDA-MCNC: 177 MG/DL (ref 74–99)
GLUCOSE SERPL-MCNC: 152 MG/DL (ref 74–99)
HCO3 BLDA-SCNC: 22.8 MMOL/L (ref 22–26)
HCT VFR BLD AUTO: 24.5 % (ref 36–46)
HCT VFR BLD AUTO: 25.1 % (ref 36–46)
HCT VFR BLD AUTO: 25.7 % (ref 36–46)
HCT VFR BLD EST: 30 % (ref 36–46)
HGB BLD-MCNC: 8.5 G/DL (ref 12–16)
HGB BLD-MCNC: 8.9 G/DL (ref 12–16)
HGB BLD-MCNC: 9.2 G/DL (ref 12–16)
HGB BLDA-MCNC: 10.1 G/DL (ref 12–16)
IMM GRANULOCYTES # BLD AUTO: 0.11 X10*3/UL (ref 0–0.7)
IMM GRANULOCYTES # BLD AUTO: 0.14 X10*3/UL (ref 0–0.7)
IMM GRANULOCYTES # BLD AUTO: 0.19 X10*3/UL (ref 0–0.7)
IMM GRANULOCYTES NFR BLD AUTO: 0.8 % (ref 0–0.9)
IMM GRANULOCYTES NFR BLD AUTO: 1 % (ref 0–0.9)
IMM GRANULOCYTES NFR BLD AUTO: 1.1 % (ref 0–0.9)
INHALED O2 CONCENTRATION: 52 %
LACTATE BLDA-SCNC: 5.6 MMOL/L (ref 0.4–2)
LACTATE SERPL-SCNC: 3 MMOL/L (ref 0.4–2)
LACTATE SERPL-SCNC: 3.1 MMOL/L (ref 0.4–2)
LYMPHOCYTES # BLD AUTO: 0.19 X10*3/UL (ref 1.2–4.8)
LYMPHOCYTES # BLD AUTO: 0.26 X10*3/UL (ref 1.2–4.8)
LYMPHOCYTES # BLD AUTO: 0.26 X10*3/UL (ref 1.2–4.8)
LYMPHOCYTES NFR BLD AUTO: 1.3 %
LYMPHOCYTES NFR BLD AUTO: 1.5 %
LYMPHOCYTES NFR BLD AUTO: 2 %
MAGNESIUM SERPL-MCNC: 1.66 MG/DL (ref 1.6–2.4)
MCH RBC QN AUTO: 30.9 PG (ref 26–34)
MCH RBC QN AUTO: 31.3 PG (ref 26–34)
MCH RBC QN AUTO: 31.5 PG (ref 26–34)
MCHC RBC AUTO-ENTMCNC: 34.7 G/DL (ref 32–36)
MCHC RBC AUTO-ENTMCNC: 35.5 G/DL (ref 32–36)
MCHC RBC AUTO-ENTMCNC: 35.8 G/DL (ref 32–36)
MCV RBC AUTO: 88 FL (ref 80–100)
MCV RBC AUTO: 88 FL (ref 80–100)
MCV RBC AUTO: 89 FL (ref 80–100)
MONOCYTES # BLD AUTO: 0.93 X10*3/UL (ref 0.1–1)
MONOCYTES # BLD AUTO: 1.05 X10*3/UL (ref 0.1–1)
MONOCYTES # BLD AUTO: 1.31 X10*3/UL (ref 0.1–1)
MONOCYTES NFR BLD AUTO: 7.1 %
MONOCYTES NFR BLD AUTO: 7.2 %
MONOCYTES NFR BLD AUTO: 7.8 %
NEUTROPHILS # BLD AUTO: 11.72 X10*3/UL (ref 1.2–7.7)
NEUTROPHILS # BLD AUTO: 13.2 X10*3/UL (ref 1.2–7.7)
NEUTROPHILS # BLD AUTO: 15.1 X10*3/UL (ref 1.2–7.7)
NEUTROPHILS NFR BLD AUTO: 89.5 %
NEUTROPHILS NFR BLD AUTO: 90 %
NEUTROPHILS NFR BLD AUTO: 90.4 %
NRBC BLD-RTO: 1.1 /100 WBCS (ref 0–0)
NRBC BLD-RTO: 1.4 /100 WBCS (ref 0–0)
NRBC BLD-RTO: 1.4 /100 WBCS (ref 0–0)
OXYHGB MFR BLDA: 88.7 % (ref 94–98)
P AXIS: -12 DEGREES
P AXIS: -17 DEGREES
P OFFSET: 167 MS
P OFFSET: 175 MS
P ONSET: 128 MS
P ONSET: 136 MS
PCO2 BLDA: 36 MM HG (ref 38–42)
PH BLDA: 7.41 PH (ref 7.38–7.42)
PHOSPHATE SERPL-MCNC: 4.5 MG/DL (ref 2.5–4.9)
PLATELET # BLD AUTO: 108 X10*3/UL (ref 150–450)
PLATELET # BLD AUTO: 79 X10*3/UL (ref 150–450)
PLATELET # BLD AUTO: 97 X10*3/UL (ref 150–450)
PO2 BLDA: 64 MM HG (ref 85–95)
POTASSIUM BLDA-SCNC: 4.7 MMOL/L (ref 3.5–5.3)
POTASSIUM SERPL-SCNC: 4 MMOL/L (ref 3.5–5.3)
PR INTERVAL: 120 MS
PR INTERVAL: 122 MS
PROT SERPL-MCNC: 5.6 G/DL (ref 6.4–8.2)
Q ONSET: 188 MS
Q ONSET: 190 MS
QRS COUNT: 12 BEATS
QRS COUNT: 12 BEATS
QRS DURATION: 156 MS
QRS DURATION: 162 MS
QT INTERVAL: 436 MS
QT INTERVAL: 452 MS
QTC CALCULATION(BAZETT): 486 MS
QTC CALCULATION(BAZETT): 504 MS
QTC FREDERICIA: 469 MS
QTC FREDERICIA: 486 MS
R AXIS: 145 DEGREES
R AXIS: 148 DEGREES
RBC # BLD AUTO: 2.75 X10*6/UL (ref 4–5.2)
RBC # BLD AUTO: 2.84 X10*6/UL (ref 4–5.2)
RBC # BLD AUTO: 2.92 X10*6/UL (ref 4–5.2)
RH FACTOR (ANTIGEN D): NORMAL
SAO2 % BLDA: 91 % (ref 94–100)
SODIUM BLDA-SCNC: 126 MMOL/L (ref 136–145)
SODIUM SERPL-SCNC: 132 MMOL/L (ref 136–145)
T AXIS: -33 DEGREES
T AXIS: -39 DEGREES
T OFFSET: 408 MS
T OFFSET: 414 MS
UFH PPP CHRO-ACNC: 0.3 IU/ML
UFH PPP CHRO-ACNC: 0.6 IU/ML
VANCOMYCIN SERPL-MCNC: 20 UG/ML (ref 5–20)
VENTRICULAR RATE: 75 BPM
VENTRICULAR RATE: 75 BPM
WBC # BLD AUTO: 13 X10*3/UL (ref 4.4–11.3)
WBC # BLD AUTO: 14.6 X10*3/UL (ref 4.4–11.3)
WBC # BLD AUTO: 16.9 X10*3/UL (ref 4.4–11.3)

## 2024-10-05 PROCEDURE — 2500000004 HC RX 250 GENERAL PHARMACY W/ HCPCS (ALT 636 FOR OP/ED)

## 2024-10-05 PROCEDURE — 82010 KETONE BODYS QUAN: CPT

## 2024-10-05 PROCEDURE — 84100 ASSAY OF PHOSPHORUS: CPT

## 2024-10-05 PROCEDURE — 84075 ASSAY ALKALINE PHOSPHATASE: CPT

## 2024-10-05 PROCEDURE — 99232 SBSQ HOSP IP/OBS MODERATE 35: CPT | Performed by: STUDENT IN AN ORGANIZED HEALTH CARE EDUCATION/TRAINING PROGRAM

## 2024-10-05 PROCEDURE — 84206 ASSAY OF PROINSULIN: CPT

## 2024-10-05 PROCEDURE — 93005 ELECTROCARDIOGRAM TRACING: CPT

## 2024-10-05 PROCEDURE — 85025 COMPLETE CBC W/AUTO DIFF WBC: CPT

## 2024-10-05 PROCEDURE — 83605 ASSAY OF LACTIC ACID: CPT

## 2024-10-05 PROCEDURE — 85520 HEPARIN ASSAY: CPT

## 2024-10-05 PROCEDURE — 2020000001 HC ICU ROOM DAILY

## 2024-10-05 PROCEDURE — 84132 ASSAY OF SERUM POTASSIUM: CPT

## 2024-10-05 PROCEDURE — 83527 ASSAY OF INSULIN: CPT

## 2024-10-05 PROCEDURE — 99232 SBSQ HOSP IP/OBS MODERATE 35: CPT | Performed by: INTERNAL MEDICINE

## 2024-10-05 PROCEDURE — 2500000005 HC RX 250 GENERAL PHARMACY W/O HCPCS

## 2024-10-05 PROCEDURE — 8010000001 HC DIALYSIS - HEMODIALYSIS PER DAY

## 2024-10-05 PROCEDURE — 37799 UNLISTED PX VASCULAR SURGERY: CPT

## 2024-10-05 PROCEDURE — 84681 ASSAY OF C-PEPTIDE: CPT

## 2024-10-05 PROCEDURE — 80202 ASSAY OF VANCOMYCIN: CPT

## 2024-10-05 PROCEDURE — 84155 ASSAY OF PROTEIN SERUM: CPT

## 2024-10-05 PROCEDURE — 2500000002 HC RX 250 W HCPCS SELF ADMINISTERED DRUGS (ALT 637 FOR MEDICARE OP, ALT 636 FOR OP/ED)

## 2024-10-05 PROCEDURE — 83735 ASSAY OF MAGNESIUM: CPT

## 2024-10-05 PROCEDURE — 93010 ELECTROCARDIOGRAM REPORT: CPT | Performed by: INTERNAL MEDICINE

## 2024-10-05 PROCEDURE — 82947 ASSAY GLUCOSE BLOOD QUANT: CPT

## 2024-10-05 PROCEDURE — 2500000001 HC RX 250 WO HCPCS SELF ADMINISTERED DRUGS (ALT 637 FOR MEDICARE OP)

## 2024-10-05 PROCEDURE — 86923 COMPATIBILITY TEST ELECTRIC: CPT

## 2024-10-05 PROCEDURE — 86901 BLOOD TYPING SEROLOGIC RH(D): CPT | Performed by: STUDENT IN AN ORGANIZED HEALTH CARE EDUCATION/TRAINING PROGRAM

## 2024-10-05 RX ORDER — HEPARIN SODIUM 10000 [USP'U]/100ML
0-4000 INJECTION, SOLUTION INTRAVENOUS CONTINUOUS
Status: DISCONTINUED | OUTPATIENT
Start: 2024-10-05 | End: 2024-10-07

## 2024-10-05 RX ORDER — VANCOMYCIN HYDROCHLORIDE 500 MG/100ML
500 INJECTION, SOLUTION INTRAVENOUS
Status: DISCONTINUED | OUTPATIENT
Start: 2024-10-07 | End: 2024-10-06

## 2024-10-05 RX ADMIN — AMIODARONE HYDROCHLORIDE 200 MG: 200 TABLET ORAL at 08:23

## 2024-10-05 RX ADMIN — DEXTROSE MONOHYDRATE 20 ML/HR: 100 INJECTION, SOLUTION INTRAVENOUS at 00:58

## 2024-10-05 RX ADMIN — PIPERACILLIN SODIUM AND TAZOBACTAM SODIUM 2.25 G: 2; .25 INJECTION, SOLUTION INTRAVENOUS at 00:01

## 2024-10-05 RX ADMIN — PIPERACILLIN SODIUM AND TAZOBACTAM SODIUM 2.25 G: 2; .25 INJECTION, SOLUTION INTRAVENOUS at 16:47

## 2024-10-05 RX ADMIN — POLYETHYLENE GLYCOL 3350 17 G: 17 POWDER, FOR SOLUTION ORAL at 08:23

## 2024-10-05 RX ADMIN — HYDROCORTISONE SODIUM SUCCINATE 50 MG: 100 INJECTION, POWDER, FOR SOLUTION INTRAMUSCULAR; INTRAVENOUS at 23:15

## 2024-10-05 RX ADMIN — HYDROCORTISONE SODIUM SUCCINATE 50 MG: 100 INJECTION, POWDER, FOR SOLUTION INTRAMUSCULAR; INTRAVENOUS at 16:47

## 2024-10-05 RX ADMIN — PIPERACILLIN SODIUM AND TAZOBACTAM SODIUM 2.25 G: 2; .25 INJECTION, SOLUTION INTRAVENOUS at 08:23

## 2024-10-05 RX ADMIN — HYDROCORTISONE SODIUM SUCCINATE 50 MG: 100 INJECTION, POWDER, FOR SOLUTION INTRAMUSCULAR; INTRAVENOUS at 11:01

## 2024-10-05 RX ADMIN — ATORVASTATIN CALCIUM 40 MG: 40 TABLET, FILM COATED ORAL at 20:09

## 2024-10-05 RX ADMIN — Medication 6 L/MIN: at 16:00

## 2024-10-05 RX ADMIN — PANTOPRAZOLE SODIUM 40 MG: 20 TABLET, DELAYED RELEASE ORAL at 06:18

## 2024-10-05 RX ADMIN — PIPERACILLIN SODIUM AND TAZOBACTAM SODIUM 2.25 G: 2; .25 INJECTION, SOLUTION INTRAVENOUS at 23:15

## 2024-10-05 RX ADMIN — ACETAMINOPHEN 650 MG: 325 TABLET, FILM COATED ORAL at 01:25

## 2024-10-05 RX ADMIN — ACETAMINOPHEN 650 MG: 325 TABLET, FILM COATED ORAL at 08:23

## 2024-10-05 RX ADMIN — ACETAMINOPHEN 650 MG: 325 TABLET, FILM COATED ORAL at 20:08

## 2024-10-05 RX ADMIN — HEPARIN SODIUM 700 UNITS/HR: 10000 INJECTION, SOLUTION INTRAVENOUS at 20:10

## 2024-10-05 RX ADMIN — ACETAMINOPHEN 650 MG: 325 TABLET, FILM COATED ORAL at 14:15

## 2024-10-05 RX ADMIN — HYDROCORTISONE SODIUM SUCCINATE 50 MG: 100 INJECTION, POWDER, FOR SOLUTION INTRAMUSCULAR; INTRAVENOUS at 04:01

## 2024-10-05 ASSESSMENT — PAIN DESCRIPTION - ORIENTATION: ORIENTATION: RIGHT

## 2024-10-05 ASSESSMENT — PAIN SCALES - GENERAL
PAINLEVEL_OUTOF10: 5 - MODERATE PAIN
PAINLEVEL_OUTOF10: 9
PAINLEVEL_OUTOF10: 0 - NO PAIN
PAINLEVEL_OUTOF10: 0 - NO PAIN
PAINLEVEL_OUTOF10: 5 - MODERATE PAIN
PAINLEVEL_OUTOF10: 0 - NO PAIN
PAINLEVEL_OUTOF10: 5 - MODERATE PAIN

## 2024-10-05 ASSESSMENT — PAIN - FUNCTIONAL ASSESSMENT
PAIN_FUNCTIONAL_ASSESSMENT: 0-10
PAIN_FUNCTIONAL_ASSESSMENT: 0-10
PAIN_FUNCTIONAL_ASSESSMENT: CPOT (CRITICAL CARE PAIN OBSERVATION TOOL)
PAIN_FUNCTIONAL_ASSESSMENT: 0-10
PAIN_FUNCTIONAL_ASSESSMENT: CPOT (CRITICAL CARE PAIN OBSERVATION TOOL)
PAIN_FUNCTIONAL_ASSESSMENT: 0-10

## 2024-10-05 ASSESSMENT — PAIN DESCRIPTION - LOCATION
LOCATION: HIP
LOCATION: HIP

## 2024-10-05 ASSESSMENT — PAIN DESCRIPTION - DESCRIPTORS: DESCRIPTORS: ACHING;DISCOMFORT

## 2024-10-05 NOTE — CARE PLAN
The patient's goals for the shift include        Problem: Skin  Goal: Participates in plan/prevention/treatment measures  Outcome: Progressing  Flowsheets (Taken 10/5/2024 1933)  Participates in plan/prevention/treatment measures: Discuss with provider PT/OT consult  Goal: Prevent/manage excess moisture  Outcome: Progressing  Flowsheets (Taken 10/5/2024 1933)  Prevent/manage excess moisture:   Cleanse incontinence/protect with barrier cream   Follow provider orders for dressing changes   Moisturize dry skin  Goal: Prevent/minimize sheer/friction injuries  Outcome: Progressing  Flowsheets (Taken 10/5/2024 1933)  Prevent/minimize sheer/friction injuries:   Complete micro-shifts as needed if patient unable. Adjust patient position to relieve pressure points, not a full turn   HOB 30 degrees or less   Turn/reposition every 2 hours/use positioning/transfer devices   Utilize specialty bed per algorithm   Use pull sheet  Goal: Promote/optimize nutrition  Outcome: Progressing  Flowsheets (Taken 10/5/2024 1933)  Promote/optimize nutrition: Monitor/record intake including meals  Goal: Promote skin healing  Outcome: Progressing  Flowsheets (Taken 10/5/2024 1933)  Promote skin healing:   Assess skin/pad under line(s)/device(s)   Turn/reposition every 2 hours/use positioning/transfer devices   Protective dressings over bony prominences  Goal: Decreased wound size/increased tissue granulation at next dressing change  Outcome: Progressing  Flowsheets (Taken 10/5/2024 1933)  Decreased wound size/increased tissue granulation at next dressing change:   Promote sleep for wound healing   Protective dressings over bony prominences     Problem: Fall/Injury  Goal: Not fall by end of shift  Outcome: Progressing  Goal: Be free from injury by end of the shift  Outcome: Progressing  Goal: Verbalize understanding of personal risk factors for fall in the hospital  Outcome: Progressing  Goal: Verbalize understanding of risk factor reduction  measures to prevent injury from fall in the home  Outcome: Progressing     Problem: Pain - Adult  Goal: Verbalizes/displays adequate comfort level or baseline comfort level  Outcome: Progressing     Problem: Safety - Adult  Goal: Free from fall injury  Outcome: Progressing     Problem: Pain  Goal: Turns in bed with improved pain control throughout the shift  Outcome: Progressing  Goal: Participates in PT with improved pain control throughout the shift  Outcome: Progressing  Goal: Free from opioid side effects throughout the shift  Outcome: Progressing  Goal: Free from acute confusion related to pain meds throughout the shift  Outcome: Progressing     Problem: Nutrition  Goal: Less than 5 days NPO/clear liquids  Outcome: Progressing  Goal: Oral intake greater than 50%  Outcome: Progressing  Goal: Oral intake greater 75%  Outcome: Progressing  Goal: Consume prescribed supplement  Outcome: Progressing  Goal: Adequate PO fluid intake  Outcome: Progressing  Goal: Nutrition support goals are met within 48 hrs  Outcome: Progressing  Goal: Nutrition support is meeting 75% of nutrient needs  Outcome: Progressing  Goal: BG  mg/dL  Outcome: Progressing  Goal: Lab values WNL  Outcome: Progressing  Goal: Electrolytes WNL  Outcome: Progressing  Goal: Promote healing  Outcome: Progressing  Goal: Maintain stable weight  Outcome: Progressing  Goal: Reduce weight from edema/fluid  Outcome: Progressing  Goal: Gradual weight gain  Outcome: Progressing

## 2024-10-05 NOTE — PROGRESS NOTES
"Misael Ritchie is a 69 y.o. female on day 2 of admission presenting with Hyperkalemia.    Subjective   Patient is seen and examined this AM.   Reports having eggs - toast.   No lightheadedness. Reports clearly the incident of orthostasis previously.   NE decreased to 0.09.   Objective   Physical Exam  Vitals:    10/05/24 1100   BP: 154/69   Pulse: 85   Resp: 12   Temp:    SpO2:    General: tired appearing, somnolent  HEENT: Hematoma over right eyebrow noted  Neck: trachea in midline, no thyromegaly or nodules, negative for supraclavicular fat pads  Resp: CTA B/L  CVS: Grade 4 holosystolic murmur  Abdomen: soft and non tender to palpation, BS+, no abdominal stria  Skin: warm, dry and intact  Neuro: Fatigued, alert oriented x 3  Psych: cooperative   Last Recorded Vitals  Blood pressure 150/66, pulse 89, temperature 35.9 °C (96.6 °F), temperature source Temporal, resp. rate 11, height 1.549 m (5' 1\"), weight 58.6 kg (129 lb 3 oz), SpO2 96%.  Intake/Output last 3 Shifts:  I/O last 3 completed shifts:  In: 4124.3 (76.5 mL/kg) [P.O.:120; I.V.:2603.3 (48.3 mL/kg); Other:1200; IV Piggyback:201]  Out: 2440 (45.3 mL/kg) [Emesis/NG output:100; Other:2340]  Dosing Weight: 53.9 kg     Relevant Results  Results from last 7 days   Lab Units 10/05/24  0741 10/05/24  0418 10/05/24  0254 10/05/24  0007 10/04/24  2223 10/04/24  1951 10/04/24  1246 10/04/24  1124 10/03/24  2234 10/03/24  2114 10/03/24  1743 10/03/24  1720 10/03/24  1244 10/03/24  1239   POCT GLUCOSE mg/dL 151*  --  181* 156* 131* 213*   < >  --    < >  --    < >  --    < >  --    GLUCOSE mg/dL  --  152*  --   --   --   --   --  188*  --  71*  --  107*  --  40*    < > = values in this interval not displayed.     Current Facility-Administered Medications:     acetaminophen (Tylenol) tablet 650 mg, 650 mg, oral, q6h, Aimee Cavanaugh MD, 650 mg at 10/05/24 0823    albuterol 90 mcg/actuation inhaler 2 puff, 2 puff, inhalation, q6h PRN, Aimee Cavanaugh MD    [Held by " provider] allopurinol (Zyloprim) tablet 100 mg, 100 mg, oral, Daily, René Zee MD    amiodarone (Pacerone) tablet 200 mg, 200 mg, oral, Daily, René Zee MD, 200 mg at 10/05/24 0823    atorvastatin (Lipitor) tablet 40 mg, 40 mg, oral, Nightly, René Zee MD, 40 mg at 10/04/24 2027    dextrose 10 % in water (D10W) infusion, 20 mL/hr, intravenous, Continuous, René Zee MD, Last Rate: 20 mL/hr at 10/05/24 1000, 20 mL/hr at 10/05/24 1000    dextrose 50 % injection 12.5 g, 12.5 g, intravenous, q15 min PRN, Malu Sullivan DO    dextrose 50 % injection 12.5 g, 12.5 g, intravenous, Once, Gato Joy MD    dextrose 50 % injection 25 g, 25 g, intravenous, q15 min PRN, Malu Sullivan DO, 25 g at 10/04/24 0009    EPINEPHrine (Adrenalin) 10 mg in dextrose 5% 250 mL (0.04 mg/mL) infusion, 0-1 mcg/kg/min, intravenous, Continuous, Gato Joy MD, Last Rate: 6.47 mL/hr at 10/05/24 1039, 0.08 mcg/kg/min at 10/05/24 1039    glucagon (Glucagen) injection 1 mg, 1 mg, intramuscular, q15 min PRN, Malu Dunaways, DO    glucagon (Glucagen) injection 1 mg, 1 mg, intramuscular, q15 min PRN, Malu Dunaways, DO    hydrocortisone sodium succinate (PF) (Solu-CORTEF) injection 50 mg, 50 mg, intravenous, q6h, René Zee MD, 50 mg at 10/05/24 1101    oxyCODONE (Roxicodone) immediate release tablet 10 mg, 10 mg, oral, q4h PRN, Aimee Cavanaugh MD    oxyCODONE (Roxicodone) immediate release tablet 5 mg, 5 mg, oral, q4h PRN, Aimee Cavanaugh MD, 5 mg at 10/03/24 2030    oxygen (O2) therapy, , inhalation, Continuous - Inhalation, Malu Sullivan, DO, 8 L/min at 10/04/24 1224    pantoprazole (ProtoNix) EC tablet 40 mg, 40 mg, oral, Daily before breakfast, 40 mg at 10/05/24 0618 **OR** pantoprazole (ProtoNix) injection 40 mg, 40 mg, intravenous, Daily before breakfast, Aimee Cavanaugh MD    piperacillin-tazobactam (Zosyn) 2.25 g in dextrose (iso) IV 50 mL, 2.25 g, intravenous, q8h, Gato Joy MD, Stopped at 10/05/24 0933    polyethylene  glycol (Glycolax, Miralax) packet 17 g, 17 g, oral, Daily, Aimee Cavanaugh MD, 17 g at 10/05/24 0823    [START ON 10/7/2024] vancomycin (Vancocin) 500 mg in dextrose 5%  mL, 500 mg, intravenous, Once per day on Monday Wednesday Friday, René Zee MD    vancomycin (Vancocin) pharmacy to dose - pharmacy monitoring, , miscellaneous, Daily PRN, René Zee MD    vasopressin (Vasostrict) 0.2 unit/mL in 5% dextrose 100 mL IV, 0.03 Units/min, intravenous, Continuous, Gato Joy MD, Stopped at 10/04/24 1702         Assessment/Plan   Assessment & Plan  Hyperkalemia    ESRD (end stage renal disease) on dialysis (Multi)    Pacemaker    HTN (hypertension)    CAD (coronary artery disease)    CHF (congestive heart failure)    Atrial fibrillation (Multi)    Nondisplaced intertrochanteric fracture of right femur, initial encounter for closed fracture  Misael Ritchie is a 69 y.o. 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  Patient presenting to hospital after a fall.  Patient reports feeling lightheaded and feeling extremely hot.  While she was trying to turn on the fan, she had a syncope resulting in fall and hitting right side of her head.    On presentation to ER patient was noted to be hypotensive.  Initial workup was suggestive of hyperkalemia with K7.4 which was treated with 5 units insulin, D50, bicarb and calcium gluconate. CT maxillofacial bones wo IV contrast: Right periorbital and facial soft tissue swelling and hematoma without evidence for orbital or facial bone fracture. Patient has right trochanteric fracture with hematoma.     On arrival to ICU, patient was noted to have episodes of hypoglycemia with blood glucose of 40 following which she was  started on IV dextrose and was given hydrocortisone 100 mg IV push followed by hydrocortisone 50 mg Q6 hourly.  She was continued on D10 at 75 cc/h with improvement in blood glucose.     Endocrinology service is consulted for management of adrenal insufficiency in setting of recurrent unexplained hypoglycemic episodes.     Of note, patient had similar episodes of hypoglycemia in 2024 for which she was managed at [facility].  At that time, stim test was done on 2/5/2024 with results as under:  Basal cortisol: 2.6  30-minute cortisol: 2.6  60-minute cortisol: 2.7     Note is made of of random blood cortisol level of 8.2 on 2/5/2024.  Patient does have a history of adrenal insufficiency based on the above results.     Patient reports that she has been on prednisone 2.5 mg since 2010.  She reports being off prednisone for 6 months in 2011 (patient not sure about the year when she was off).  She reports that she felt miserable when she was off prednisone.     CT abdo/pelvis (10/3): Bilateral adrenal glands are unremarkable.     Patient reports that she recently had steroid injection to her right shoulder.  On chart review it was noted that patient received methylprednisolone acetate 40 mg on 9/20/2024.     # Adrenal insufficiency in setting of chronic glucocorticoid use and recent steroid injection to right shoulder resulting treatment resistant hypoglycemia  #Hyperkalemia, multifactorial likely 2/2 CKD on HD,high anion gap metabolic acidosis (lactic acid/uremia), adrenal insufficiency, baseline BUN and creatinine 39/5.45 on 10/2/24 per f labs     -Patient was on tablet prednisone 5 mg daily which is subtherapeutic replacement dose for adrenal insufficiency - Tentative plan for outpatient may include 2.5 mg orally daily.    Endocrine Recommendations -10/5/24:     Dextrose 10% tapering down - currently at 10 ml.hr    Continue injection hydrocortisone 50 mg IV every 6 hourly     Will taper current stress dose of steroid when  patient is off pressors to therapeutic replacement dose for adrenal insufficiency.    In case blood sugar drops below 55 off dextrose, checking C-peptide, insulin, proinsulin, POC, VBG's, beta hydroxybutyrate, and RFP, stat prior to administering dextrose. Treat with hypoglycemic protocol accordingly, okay to start D5-D10 if needed    Repeating workup for adrenal insufficiency is not recommended at this time as patient is on stress dose steroids.  ACTH is also expected to be suppressed in setting of recent steroid injection to shoulder (9/20/2024)     Recommendations conveyed to primary team  Case seen and discussed with Dr. Garces  Endocrinology service will continue to follow.     Katja Burt MD

## 2024-10-05 NOTE — CARE PLAN
Problem: Skin  Goal: Participates in plan/prevention/treatment measures  10/5/2024 1800 by Elaina Palma RN  Outcome: Progressing  Flowsheets (Taken 10/5/2024 1800)  Participates in plan/prevention/treatment measures:   Discuss with provider PT/OT consult   Elevate heels  10/5/2024 1759 by Elaina Palma RN  Outcome: Progressing  Goal: Prevent/manage excess moisture  10/5/2024 1800 by Elaina Palma RN  Outcome: Progressing  Flowsheets (Taken 10/5/2024 1800)  Prevent/manage excess moisture:   Cleanse incontinence/protect with barrier cream   Moisturize dry skin   Follow provider orders for dressing changes   Monitor for/manage infection if present  10/5/2024 1759 by Elaina Palma RN  Outcome: Progressing  Goal: Prevent/minimize sheer/friction injuries  10/5/2024 1800 by Elaina Palma RN  Outcome: Progressing  Flowsheets (Taken 10/5/2024 1800)  Prevent/minimize sheer/friction injuries:   Complete micro-shifts as needed if patient unable. Adjust patient position to relieve pressure points, not a full turn   Increase activity/out of bed for meals   HOB 30 degrees or less   Turn/reposition every 2 hours/use positioning/transfer devices  10/5/2024 1759 by Elaina Palma RN  Outcome: Progressing  Goal: Promote/optimize nutrition  10/5/2024 1800 by Elaina Palma RN  Outcome: Progressing  Flowsheets (Taken 10/5/2024 1800)  Promote/optimize nutrition:   Monitor/record intake including meals   Consume > 50% meals/supplements  10/5/2024 1759 by Elaina Palma RN  Outcome: Progressing  Goal: Promote skin healing  10/5/2024 1800 by Elaina Palma RN  Outcome: Progressing  Flowsheets (Taken 10/5/2024 1800)  Promote skin healing:   Assess skin/pad under line(s)/device(s)   Protective dressings over bony prominences   Turn/reposition every 2 hours/use positioning/transfer devices   Ensure correct size (line/device) and apply per  instructions  10/5/2024 1759 by Elaina Palma RN  Outcome:  Progressing  Goal: Decreased wound size/increased tissue granulation at next dressing change  Outcome: Progressing  Flowsheets (Taken 10/5/2024 1800)  Decreased wound size/increased tissue granulation at next dressing change:   Promote sleep for wound healing   Protective dressings over bony prominences     Problem: Fall/Injury  Goal: Not fall by end of shift  10/5/2024 1800 by Elaina Palma RN  Outcome: Progressing  10/5/2024 1759 by Elaina Palma RN  Outcome: Progressing  Goal: Be free from injury by end of the shift  10/5/2024 1800 by Elaina Palma RN  Outcome: Progressing  10/5/2024 1759 by Elaina Palma RN  Outcome: Progressing  Goal: Verbalize understanding of personal risk factors for fall in the hospital  10/5/2024 1800 by Elaina Palma RN  Outcome: Progressing  10/5/2024 1759 by Elaina Palma RN  Outcome: Progressing  Goal: Verbalize understanding of risk factor reduction measures to prevent injury from fall in the home  10/5/2024 1800 by Elaina Palma RN  Outcome: Progressing  10/5/2024 1759 by Elaina Palma RN  Outcome: Progressing     Problem: Pain - Adult  Goal: Verbalizes/displays adequate comfort level or baseline comfort level  10/5/2024 1800 by Elaina Palma RN  Outcome: Progressing  10/5/2024 1759 by Elaina Palma RN  Outcome: Progressing     Problem: Safety - Adult  Goal: Free from fall injury  10/5/2024 1800 by Elaina Palma RN  Outcome: Progressing  10/5/2024 1759 by Elaina Palma RN  Outcome: Progressing     Problem: Pain  Goal: Turns in bed with improved pain control throughout the shift  10/5/2024 1800 by Elaina Palma RN  Outcome: Progressing  10/5/2024 1759 by Elaina Palma RN  Outcome: Progressing  Goal: Participates in PT with improved pain control throughout the shift  10/5/2024 1800 by Elaina Palma RN  Outcome: Progressing  10/5/2024 1759 by Elaina Palma RN  Outcome: Progressing  Goal: Free from opioid side effects throughout the shift  10/5/2024 1800  by Elaina Palma, RN  Outcome: Progressing  10/5/2024 1759 by Elaina Palma, RN  Outcome: Progressing  Goal: Free from acute confusion related to pain meds throughout the shift  10/5/2024 1800 by Elaina Palma, RN  Outcome: Progressing  10/5/2024 1759 by Elaina Palma, RN  Outcome: Progressing     Problem: Nutrition  Goal: Less than 5 days NPO/clear liquids  10/5/2024 1800 by Elaina Palma, RN  Outcome: Progressing  10/5/2024 1759 by Elaina Palma, RN  Outcome: Progressing  Goal: Oral intake greater than 50%  10/5/2024 1800 by Elaina Palma, RN  Outcome: Progressing  10/5/2024 1759 by Elaina Palma, RN  Outcome: Progressing  Goal: Oral intake greater 75%  10/5/2024 1800 by Elaina Palma, RN  Outcome: Progressing  10/5/2024 1759 by Elaina Palma, RN  Outcome: Progressing  Goal: Consume prescribed supplement  10/5/2024 1800 by Elaina Palma, RN  Outcome: Progressing  10/5/2024 1759 by Elaina Palma, RN  Outcome: Progressing  Goal: Adequate PO fluid intake  10/5/2024 1800 by Elaina Palma, RN  Outcome: Progressing  10/5/2024 1759 by Elaina Palma, NANCY  Outcome: Progressing  Goal: Nutrition support goals are met within 48 hrs  10/5/2024 1800 by Elaina Palma, RN  Outcome: Progressing  10/5/2024 1759 by Elaina Palma, NANCY  Outcome: Progressing  Goal: Nutrition support is meeting 75% of nutrient needs  10/5/2024 1800 by Elaina Palma, RN  Outcome: Progressing  10/5/2024 1759 by Elaina Palma, RN  Outcome: Progressing  Goal: BG  mg/dL  10/5/2024 1800 by Elaina Palma, RN  Outcome: Progressing  10/5/2024 1759 by Elaina Palma, RN  Outcome: Progressing  Goal: Lab values WNL  10/5/2024 1800 by Elaina Palma, RN  Outcome: Progressing  10/5/2024 1759 by Elaina Palma, RN  Outcome: Progressing  Goal: Electrolytes WNL  10/5/2024 1800 by Elaina Palma, RN  Outcome: Progressing  10/5/2024 1759 by Elaina Palma, RN  Outcome: Progressing  Goal: Promote healing  10/5/2024 1800 by Elaina Palma,  RN  Outcome: Progressing  10/5/2024 1759 by Elaina Palma RN  Outcome: Progressing  Goal: Maintain stable weight  10/5/2024 1800 by Elaina Palma RN  Outcome: Progressing  10/5/2024 1759 by Elaina Palma RN  Outcome: Progressing  Goal: Reduce weight from edema/fluid  10/5/2024 1800 by Elaina Palma RN  Outcome: Progressing  10/5/2024 1759 by Elaina Palma RN  Outcome: Progressing  Goal: Gradual weight gain  10/5/2024 1800 by Elaina Palma RN  Outcome: Progressing  10/5/2024 1759 by Elaina Palma RN  Outcome: Progressing

## 2024-10-05 NOTE — PROGRESS NOTES
"Nephrology Progress Note     Date of admission: 10/3/2024      Interval History:        Social Connections: Moderately Isolated (10/4/2024)    Social Connection and Isolation Panel [NHANES]     Frequency of Communication with Friends and Family: Three times a week     Frequency of Social Gatherings with Friends and Family: Three times a week     Attends Restoration[employer] Services: Never     Active Member of Clubs or Organizations: No     Attends Club or Organization Meetings: Not on file     Marital Status:                PROBLEM LIST:  Principal Problem:    Hyperkalemia  Active Problems:    ESRD (end stage renal disease) on dialysis (Multi)    Pacemaker    HTN (hypertension)    CAD (coronary artery disease)    CHF (congestive heart failure)    Atrial fibrillation (Multi)    Nondisplaced intertrochanteric fracture of right femur, initial encounter for closed fracture           ALLERGIES:  Allergies   Allergen Reactions    Codeine Hallucinations and Palpitations     \" fast heart beat\"     heart palpitations    Azithromycin Hives    Nifedipine Dizziness     Other reaction(s): Other: See Comments   Nausea,restless ness,dizziness    Nifedipine (Bulk) Nausea/vomiting     Nausea,restless ness,dizziness    Nsaids (Non-Steroidal Anti-Inflammatory Drug) Other     Contraindicated in transplant patients    Prochlorperazine Other    Vancomycin Itching    Erythromycin Itching    Erythromycin Base Palpitations     heart palpitations               CURRENT MEDICATIONS:  Scheduled medications  acetaminophen, 650 mg, oral, q6h  [Held by provider] allopurinol, 100 mg, oral, Daily  amiodarone, 200 mg, oral, Daily  atorvastatin, 40 mg, oral, Nightly  dextrose, 12.5 g, intravenous, Once  hydrocortisone sodium succinate, 50 mg, intravenous, q6h  oxygen, , inhalation, Continuous - Inhalation  pantoprazole, 40 mg, oral, Daily before breakfast   Or  pantoprazole, 40 mg, intravenous, Daily before breakfast  piperacillin-tazobactam, 2.25 g, " "intravenous, q8h  polyethylene glycol, 17 g, oral, Daily  [START ON 10/7/2024] vancomycin, 500 mg, intravenous, Once per day on Monday Wednesday Friday      Continuous medications  EPINEPHrine, 0-1 mcg/kg/min, Last Rate: 0.06 mcg/kg/min (10/05/24 1200)  vasopressin, 0.03 Units/min, Last Rate: Stopped (10/04/24 1702)      PRN medications  PRN medications: albuterol, dextrose, dextrose, glucagon, glucagon, oxyCODONE, oxyCODONE, vancomycin         OBJECTIVE:     VITALS: Visit Vitals  /68   Pulse 90   Temp 35.8 °C (96.4 °F) (Temporal)   Resp 16   Ht 1.549 m (5' 1\")   Wt 58.6 kg (129 lb 3 oz)   SpO2 91%   BMI 24.41 kg/m²   Smoking Status Never   BSA 1.59 m²      HEENT: PEERLA  CVS: S1 S2 no murmurs  RESP:  Lungs clear to auscultation   ABDO: Soft, non-tender   Neuro: A + O x 3  Skin: No rash   Extremities: +edema   Access: RUE AVF     LABS:  Results from last 72 hours   Lab Units 10/05/24  0833 10/05/24  0418   WBC AUTO x10*3/uL 14.6* 16.9*   HEMOGLOBIN g/dL 8.9* 9.2*   MCV fL 88 88   PLATELETS AUTO x10*3/uL 97* 108*   BUN mg/dL  --  22   CREATININE mg/dL  --  2.91*   CALCIUM mg/dL  --  8.2*              Intake/Output Summary (Last 24 hours) at 10/5/2024 1557  Last data filed at 10/5/2024 1200  Gross per 24 hour   Intake 1733.74 ml   Output 1000 ml   Net 733.74 ml            ASSESSMENT AND PLAN:    Misael Ritchie is a 69 y.o female with a history of CKD, ESRD 2/2 PKD on hemodialysis with failed kidney transplant x 2 (2004 / 2010) , CHF (8/2024 EF ~48%), emale PMH aortic stenosis, atrial fibrillation, CAD,  She presented after a fall and was found to have increased potassium >10 on VBG and recheck of 7.6 on BMP. After emergent HD was hypotensive requiring pressors and Echo showing valvular insufficiency. Heart failure team is consulted. Nephrology consulted for HD needs.    Emergent HD on 10/3 pm  Clearance 10/4 afternoon  Plan for isolated UF 10/5     Home unit: [facility]  Access: RUE AVF. Previous failed " JUDITH AV graft  Etilogy: PKD (2 previous transplant 2004 and 2010)  Schedule: MWF  EDW: 56kg    #Fluid status:  Overloaded. On HFNC     #Hemodynamics   Pressors trending down     #Electrolytes  Hyperkalemic.      #MBD  Phos WNL  No recent PTH level     #Anemia   No recent iron studies  Hgb stable     #Acid-base   Bicarb 21 pre HD today. Acceptable    Recommendations:  -Completed 2hrs followed by 3.5 hour session yesterday  -Plan for UF today for fluid removal as tolerated.  -Renally dose medications  -Renal diet  -Renal MV  -Will Follow

## 2024-10-05 NOTE — PROGRESS NOTES
Vancomycin Dosing by Pharmacy  FOLLOW UP    Misael Ritchie is a 69 y.o. female who Pharmacy is consulted to dose vancomycin for pneumonia.     Based on the patient's indication and renal status, this patient is being dosed based on a goal vancomycin pre-HD level of 20-25.     Current vancomycin dose: Received 1500mg loading dose.    Most recent vancomycin trough: 20 mcg/mL    Renal function is currently dependent on Intermittent Hemodialysis (IHD).    Estimated Creatinine Clearance: 15 mL/min (A) (by C-G formula based on SCr of 2.91 mg/dL (H)).    Vancomycin   Date Value Ref Range Status   10/05/2024 20.0 5.0 - 20.0 ug/mL Final       Results from last 7 days   Lab Units 10/05/24  0418 10/04/24  1124 10/04/24  0138 10/03/24  2114 10/03/24  1735 10/03/24  1720   CREATININE mg/dL 2.91* 4.67*  --  4.31*  --  7.59*   BUN mg/dL 22 40*  --  31*  --  65*   WBC AUTO x10*3/uL 16.9*  --  16.9* 10.9 17.3*  --         Visit Vitals  BP (!) 134/43   Pulse 86   Temp 36.2 °C (97.2 °F) (Temporal)   Resp 14       Blood Culture   Date/Time Value Ref Range Status   10/04/2024 01:45 AM No growth at 1 day  Preliminary        Assessment/Plan    Vancomycin level is within goal range of 20-25.  Will start 500 mg after each dialysis session with increased infusion time due to vanco intolerance.     The next vancomycin level will be ordered for 10/9 at AM labs, unless clinically indicated sooner.  Will continue to monitor renal function daily while on vancomycin and order serum creatinine at least every 48 hours if not already ordered.  Will follow for continued vancomycin needs, clinical response, and signs/symptoms of toxicity.       Pascale Blackman, KizzyD

## 2024-10-05 NOTE — CARE PLAN
The patient's goals for the shift include        Problem: Skin  Goal: Participates in plan/prevention/treatment measures  Outcome: Progressing  Flowsheets (Taken 10/4/2024 2130)  Participates in plan/prevention/treatment measures: Elevate heels  Goal: Prevent/manage excess moisture  Outcome: Progressing  Flowsheets (Taken 10/4/2024 2130)  Prevent/manage excess moisture: Monitor for/manage infection if present  Goal: Prevent/minimize sheer/friction injuries  Outcome: Progressing  Flowsheets (Taken 10/4/2024 2130)  Prevent/minimize sheer/friction injuries:   Complete micro-shifts as needed if patient unable. Adjust patient position to relieve pressure points, not a full turn   HOB 30 degrees or less   Turn/reposition every 2 hours/use positioning/transfer devices   Utilize specialty bed per algorithm   Use pull sheet  Goal: Promote/optimize nutrition  Outcome: Progressing  Flowsheets (Taken 10/4/2024 2130)  Promote/optimize nutrition: Monitor/record intake including meals  Goal: Promote skin healing  Outcome: Progressing  Flowsheets (Taken 10/4/2024 2130)  Promote skin healing: Turn/reposition every 2 hours/use positioning/transfer devices     Problem: Fall/Injury  Goal: Not fall by end of shift  Outcome: Progressing  Goal: Be free from injury by end of the shift  Outcome: Progressing  Goal: Verbalize understanding of personal risk factors for fall in the hospital  Outcome: Progressing  Goal: Verbalize understanding of risk factor reduction measures to prevent injury from fall in the home  Outcome: Progressing     Problem: Pain - Adult  Goal: Verbalizes/displays adequate comfort level or baseline comfort level  Outcome: Progressing     Problem: Safety - Adult  Goal: Free from fall injury  Outcome: Progressing     Problem: Pain  Goal: Turns in bed with improved pain control throughout the shift  Outcome: Progressing  Goal: Participates in PT with improved pain control throughout the shift  Outcome: Progressing  Goal:  Free from opioid side effects throughout the shift  Outcome: Progressing  Goal: Free from acute confusion related to pain meds throughout the shift  Outcome: Progressing     Problem: Nutrition  Goal: Less than 5 days NPO/clear liquids  Outcome: Progressing  Goal: Oral intake greater than 50%  Outcome: Progressing  Goal: Oral intake greater 75%  Outcome: Progressing  Goal: Consume prescribed supplement  Outcome: Progressing  Goal: Adequate PO fluid intake  Outcome: Progressing  Goal: Nutrition support goals are met within 48 hrs  Outcome: Progressing  Goal: Nutrition support is meeting 75% of nutrient needs  Outcome: Progressing  Goal: BG  mg/dL  Outcome: Progressing  Goal: Lab values WNL  Outcome: Progressing  Goal: Electrolytes WNL  Outcome: Progressing  Goal: Promote healing  Outcome: Progressing  Goal: Maintain stable weight  Outcome: Progressing  Goal: Reduce weight from edema/fluid  Outcome: Progressing  Goal: Gradual weight gain  Outcome: Progressing

## 2024-10-05 NOTE — ASSESSMENT & PLAN NOTE
Misael Ritchie is a 69 y.o. 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  Patient presenting to hospital after a fall.  Patient reports feeling lightheaded and feeling extremely hot.  While she was trying to turn on the fan, she had a syncope resulting in fall and hitting right side of her head.    On presentation to ER patient was noted to be hypotensive.  Initial workup was suggestive of hyperkalemia with K7.4 which was treated with 5 units insulin, D50, bicarb and calcium gluconate. CT maxillofacial bones wo IV contrast: Right periorbital and facial soft tissue swelling and hematoma without evidence for orbital or facial bone fracture. Patient has right trochanteric fracture with hematoma.     On arrival to ICU, patient was noted to have episodes of hypoglycemia with blood glucose of 40 following which she was started on IV dextrose and was given hydrocortisone 100 mg IV push followed by hydrocortisone 50 mg Q6 hourly.  She was continued on D10 at 75 cc/h with improvement in blood glucose.     Endocrinology service is consulted for management of adrenal insufficiency in setting of recurrent unexplained hypoglycemic episodes.     Of note, patient had similar episodes of hypoglycemia in 2024 for which she was managed at [facility].  At that time, stim test was done on 2/5/2024 with results as under:  Basal cortisol: 2.6  30-minute cortisol: 2.6  60-minute cortisol: 2.7     Note is made of of random blood cortisol level of 8.2 on 2/5/2024.  Patient does have a history of adrenal insufficiency based on the above results.     Patient reports that she has been on prednisone 2.5 mg since 2010.  She reports being off prednisone for 6 months in 2011 (patient not sure about  the year when she was off).  She reports that she felt miserable when she was off prednisone.     CT abdo/pelvis (10/3): Bilateral adrenal glands are unremarkable.     Patient reports that she recently had steroid injection to her right shoulder.  On chart review it was noted that patient received methylprednisolone acetate 40 mg on 9/20/2024.     # Adrenal insufficiency in setting of chronic glucocorticoid use and recent steroid injection to right shoulder resulting treatment resistant hypoglycemia  #Hyperkalemia, multifactorial likely 2/2 CKD on HD,high anion gap metabolic acidosis (lactic acid/uremia), adrenal insufficiency, baseline BUN and creatinine 39/5.45 on 10/2/24 per [facility] labs     -Patient was on tablet prednisone 5 mg daily which is subtherapeutic replacement dose for adrenal insufficiency - Tentative plan for outpatient may include 2.5 mg orally daily.    Endocrine Recommendations -10/5/24:     Dextrose 10% tapering down - currently at 10 ml.hr    Continue injection hydrocortisone 50 mg IV every 6 hourly     Will taper current stress dose of steroid when patient is off pressors to therapeutic replacement dose for adrenal insufficiency.    In case blood sugar drops below 55 off dextrose, checking C-peptide, insulin, proinsulin, POC, VBG's, beta hydroxybutyrate, and RFP, stat prior to administering dextrose. Treat with hypoglycemic protocol accordingly, okay to start D5-D10 if needed    Repeating workup for adrenal insufficiency is not recommended at this time as patient is on stress dose steroids.  ACTH is also expected to be suppressed in setting of recent steroid injection to shoulder (9/20/2024)     Recommendations conveyed to primary team  Case seen and discussed with Dr. Garces  Endocrinology service will continue to follow.

## 2024-10-05 NOTE — SIGNIFICANT EVENT
Provisional plan for OR (R femur CMN) tomorrow for presumed intertrochanteric extension of hip fracture.     Plan:  - Please document medical readiness for OR once patient is acceptable for OR  -Please make patient NPO after midnight  -Please ensure that type and screen will be up to date at the time of surgery (these  after 72 hours)  -Please ensure all preoperative labs are complete: BMP, CBC, Type & Screen, Coags (PT/INR, PTT), CXR, EKG    Kameron Tobin MD  PGY-4 Orthopedic Surgery  [facility]  Available by Epic Message     While admitted, this patient will be followed by the Ortho Trauma Team. Please contact below residents with any questions (available via Epic Chat).     First call: Erick Slater, PGY-2  Second call: Sofya Phillips, PGY-3

## 2024-10-05 NOTE — PROGRESS NOTES
"Medical Intensive Care - Daily Progress Note   Subjective    Misael Ritchie is a 69 y.o. female patient admitted on 10/3/2024 with following ICU needs: [urgent dialysis for hyperkalemia]    Interval History:  No acute events overnight. Coming down on the levo to 0.06. Lactate ovenright was elevated at 5.6 but the repeat lactate this AM was 3.    Meds    Scheduled medications  acetaminophen, 650 mg, oral, q6h  [Held by provider] allopurinol, 100 mg, oral, Daily  amiodarone, 200 mg, oral, Daily  atorvastatin, 40 mg, oral, Nightly  dextrose, 12.5 g, intravenous, Once  hydrocortisone sodium succinate, 50 mg, intravenous, q6h  oxygen, , inhalation, Continuous - Inhalation  pantoprazole, 40 mg, oral, Daily before breakfast   Or  pantoprazole, 40 mg, intravenous, Daily before breakfast  piperacillin-tazobactam, 2.25 g, intravenous, q8h  polyethylene glycol, 17 g, oral, Daily  [START ON 10/7/2024] vancomycin, 500 mg, intravenous, Once per day on Monday Wednesday Friday      Continuous medications  dextrose 10 % in water (D10W), 10 mL/hr, Last Rate: 10 mL/hr (10/05/24 1247)  EPINEPHrine, 0-1 mcg/kg/min, Last Rate: 0.06 mcg/kg/min (10/05/24 1200)  vasopressin, 0.03 Units/min, Last Rate: Stopped (10/04/24 1702)      PRN medications  PRN medications: albuterol, dextrose, dextrose, glucagon, glucagon, oxyCODONE, oxyCODONE, vancomycin     Objective    Blood pressure 140/68, pulse 90, temperature 35.8 °C (96.4 °F), temperature source Temporal, resp. rate 16, height 1.549 m (5' 1\"), weight 58.6 kg (129 lb 3 oz), SpO2 91%.     Temp  Min: 35.6 °C (96.1 °F)  Max: 36.8 °C (98.2 °F)  Pulse  Min: 80  Max: 112  BP  Min: 114/70  Max: 158/71  Resp  Min: 11  Max: 26  SpO2  Min: 87 %  Max: 100 %    Physical Exam   General: Awake, alert, in no acute distress  Eyes: Gaze conjugate.  No scleral icterus or injection  HENT: Normo-cephalic, atraumatic. No stridor  CV: Regular rate, regular rhythm. Radial pulses 2+ bilaterally  Resp: " Breathing non-labored, speaking in full sentences.  Clear to auscultation bilaterally  GI: Soft, non-distended, non-tender. No rebound or guarding.  : deferred   MSK/Extremities: No gross bony deformities. Moving all extremities  Skin: Warm. Appropriate color  Neuro: Aox4 Face symmetric. Speech is fluent.  Gross strength and sensation intact in b/l UE and LEs  Psych: Appropriate mood and affect          Intake/Output Summary (Last 24 hours) at 10/5/2024 1503  Last data filed at 10/5/2024 1200  Gross per 24 hour   Intake 1733.74 ml   Output 1000 ml   Net 733.74 ml     Net IO Since Admission: 2,264.98 mL [10/05/24 1503]     Labs:   Results from last 72 hours   Lab Units 10/05/24  0833 10/05/24  0418 10/04/24  0138 10/03/24  2114   HEMOGLOBIN g/dL 8.9* 9.2* 10.8* 10.8*   HEMATOCRIT % 25.1* 25.7* 31.2* 31.2*   WBC AUTO x10*3/uL 14.6* 16.9* 16.9* 10.9   PLATELETS AUTO x10*3/uL 97* 108* 133* 122*   NEUTROS PCT AUTO % 90.4 89.5  --  90.0   LYMPHS PCT AUTO % 1.3 1.5  --  3.4   MONOS PCT AUTO % 7.2 7.8  --  5.3   EOS PCT AUTO % 0.0 0.0  --  0.0      Results from last 72 hours   Lab Units 10/05/24  0418 10/04/24  1124 10/03/24  2114   SODIUM mmol/L 132* 129* 136   POTASSIUM mmol/L 4.0 5.6* 4.5   CHLORIDE mmol/L 91* 91* 94*   CO2 mmol/L 25 21 30   BUN mg/dL 22 40* 31*   CREATININE mg/dL 2.91* 4.67* 4.31*   GLUCOSE mg/dL 152* 188* 71*   CALCIUM mg/dL 8.2* 8.9 9.0   ANION GAP mmol/L 20 23* 17   EGFR mL/min/1.73m*2 17* 10* 11*   PHOSPHORUS mg/dL 4.5 7.1* 5.6*        Micro/ID:   Lab Results   Component Value Date    BLOODCULT No growth at 1 day 10/04/2024       Summary of key imaging results from the last 24 hours  XR knee right 1-2 views    Result Date: 10/4/2024  Fracture through the right greater trochanter, partially evaluated on this portable study. Recommend CT of the right hip to evaluate intertrochanteric extension   I personally reviewed the images/study and I agree with the findings as stated. This study was  interpreted at [place], [place].   MACRO: None   Signed by: Ortega Palmer 10/4/2024 10:10 AM Dictation workstation:   [id number]    XR femur right 1 view    Result Date: 10/4/2024  Fracture through the right greater trochanter, partially evaluated on this portable study. Recommend CT of the right hip to evaluate intertrochanteric extension   I personally reviewed the images/study and I agree with the findings as stated. This study was interpreted at [place], [place].   MACRO: None   Signed by: Ortega Palmer 10/4/2024 10:10 AM Dictation workstation:   [id number]    CT abdomen pelvis w IV contrast    Result Date: 10/4/2024  1. Cortical irregularity at the greater trochanter of the right femur suggestive of an acute, minimally displaced fracture. Adjacent to the fracture site is an acute hematoma in the planes of the gluteal muscles. 2. Moderate right pleural effusion with adjacent atelectasis as well as a small to moderate pericardial effusion. There is also a small to moderate amount of simple appearing ascites. 3. Incidental findings include cirrhosis and a probable cystic lesion in the area of the pancreatic head. Further characterization by contrast MRI on a routine outpatient basis is recommended if not previously assessed. 4. Other chronic incidental findings as detailed above.   I personally reviewed the images/study and I agree with the findings as stated by Michael Bundy MD (resident). This study was interpreted at [place], [place].   MACRO: Michael Bundy discussed the significance and urgency of this critical finding via epic secure chat with  Aimee Cavanaugh MD on 10/3/2024 at 4:50 pm.  (**-RCF-**) Findings:  See findings.   Signed by: Alec Carbone 10/4/2024 7:36 AM Dictation workstation:   [id number]    CT angio chest for pulmonary embolism    Result  Date: 10/3/2024  1. No evidence of acute pulmonary embolism to segmental level. Please note that, assessment of subsegmental branches is limited and small peripheral emboli are not entirely excluded. 2. Marked cardiomegaly with enlargement of the right atrium, left atrium, and right ventricle. There is enlargement of the IVC with contrast reflux into the IVC and hepatic veins. Findings are suggestive of right heart strain/dysfunction and/or tricuspid regurgitation. Correlate with echocardiographic findings. There is also moderate pericardial effusion. Recommend further evaluation with echocardiography correlate with concern for component of cardiac tamponade 3. Moderate-to-large right and trace left pleural effusions, pericardial effusion, and abdominopelvic ascites. Correlate with fluid volume status. 4. Multifocal central branching ground-glass opacities in the bilateral lungs with questionable multifocal calcifications and pulmonary ossification. Findings may represent sequelae of chronic pulmonary venous congestion which can be secondary to mitral valve stenosis. Alternatively findings may be sequela of chronic infectious or inflammatory process. 5. Please see dedicated CT imaging of the abdomen and pelvis for further evaluation.   I personally reviewed the image(s)/study and resident interpretation. I agree with the findings as stated by resident Jay Rowe. Data analyzed and images interpreted at [facility], [place], [place].   MACRO: Critical Finding:  See findings. Notification was initiated on 10/3/2024 at 4:51 pm by  Jay Rowe.  (**-OCF-**) Instructions:   Signed by: Brynn Amaya 10/3/2024 5:00 PM Dictation workstation:   [id number]    XR chest 1 view    Result Date: 10/3/2024  1. Increased consolidative opacities within the right lower lung with diffuse hazy opacities of bilateral lungs. Bilateral costophrenic angle blunting representing trace pleural  effusions. Correlate with volume status. 2. Unchanged cardiomediastinal enlargement.   I personally reviewed the images/study and I agree with the findings as stated by Jeyson York MD. This study was interpreted at [place], [place].   MACRO: None   Signed by: Kirby Canada 10/3/2024 1:45 PM Dictation workstation:   [id number]    XR hip right with pelvis when performed 2 or 3 views    Result Date: 10/3/2024  No acute fracture or dislocation of the right hip. Similar bilateral hip arthrosis.   I personally reviewed the images/study and I agree with the findings as stated by Jeyson York MD. This study was interpreted at [place], [place].   Signed by: Kirby Canada 10/3/2024 11:10 AM Dictation workstation:   [id number]    CT head wo IV contrast    Result Date: 10/3/2024  1. Right periorbital and facial soft tissue swelling and hematoma without evidence for orbital or facial bone fracture. There is also very subtle fat stranding along the far anterior aspects of the extraocular muscles. No measurable intraconal hematoma is identified. 2. There is prominent intraorbital and periorbital fat and suspected proptosis bilaterally which may reflect underlying thyroid orbitopathy. 3. Loss of the normal cervical lordosis with grade 1-2 anterolisthesis of C4 on C5 is favored to be remote/degenerative in nature. Traumatic subluxation is thought to be less likely but is difficult to entirely exclude. There is no definitive evidence for an acute fracture of the cervical spine. 4. Multilevel degenerative changes of the cervical spine with associated central canal and neuroforaminal stenosis. 5. No evidence of acute intracranial hemorrhage. 6. Partially imaged large thyroid nodule for which nonemergent thyroid ultrasound is recommended.       MACRO: None   Signed by: Stacy Mishra 10/3/2024 10:54 AM Dictation workstation:    [id number]    CT maxillofacial bones wo IV contrast    Result Date: 10/3/2024  1. Right periorbital and facial soft tissue swelling and hematoma without evidence for orbital or facial bone fracture. There is also very subtle fat stranding along the far anterior aspects of the extraocular muscles. No measurable intraconal hematoma is identified. 2. There is prominent intraorbital and periorbital fat and suspected proptosis bilaterally which may reflect underlying thyroid orbitopathy. 3. Loss of the normal cervical lordosis with grade 1-2 anterolisthesis of C4 on C5 is favored to be remote/degenerative in nature. Traumatic subluxation is thought to be less likely but is difficult to entirely exclude. There is no definitive evidence for an acute fracture of the cervical spine. 4. Multilevel degenerative changes of the cervical spine with associated central canal and neuroforaminal stenosis. 5. No evidence of acute intracranial hemorrhage. 6. Partially imaged large thyroid nodule for which nonemergent thyroid ultrasound is recommended.       MACRO: None   Signed by: Stacy Mishra 10/3/2024 10:54 AM Dictation workstation:   [id number]    CT cervical spine wo IV contrast    Result Date: 10/3/2024  1. Right periorbital and facial soft tissue swelling and hematoma without evidence for orbital or facial bone fracture. There is also very subtle fat stranding along the far anterior aspects of the extraocular muscles. No measurable intraconal hematoma is identified. 2. There is prominent intraorbital and periorbital fat and suspected proptosis bilaterally which may reflect underlying thyroid orbitopathy. 3. Loss of the normal cervical lordosis with grade 1-2 anterolisthesis of C4 on C5 is favored to be remote/degenerative in nature. Traumatic subluxation is thought to be less likely but is difficult to entirely exclude. There is no definitive evidence for an acute fracture of the cervical spine. 4. Multilevel degenerative  changes of the cervical spine with associated central canal and neuroforaminal stenosis. 5. No evidence of acute intracranial hemorrhage. 6. Partially imaged large thyroid nodule for which nonemergent thyroid ultrasound is recommended.       MACRO: None   Signed by: Stacy Mishra 10/3/2024 10:54 AM Dictation workstation:   [id number]    CT abdomen pelvis wo IV contrast    Result Date: 10/2/2024  IMPRESSION: Some interval atrophy of a transplanted right pelvic kidney which contains nonobstructing calcifications at a lower pole calyx, without hydronephrosis. Severe atrophy of a transplanted left pelvic kidney as well as bilateral native kidneys, similar to prior.  Chronically severely hydronephrotic native left kidney, similar to prior. Possible hepatic cirrhosis.  Small volume ascites. Right pleural effusion.  Given the possible cirrhosis, this could relate to hepatic hydrothorax. Bibasilar centrilobular nodularity of the lungs which can be seen with chronic small airways infection. 3 mm nodule in the left lower lobe and 4 mm nodule of the left lower lobe, minimally more prominent compared with 01/24/2022.  CT chest follow-up in 6 months is recommended. ACTIONABLE RESULT: FOLLOW-UP Acuity: Actionable Findings: Thoracic-Other Routing Code:  CT_1 Recommendation: CT Chest WO IVCON Time Frame: Additional evaluation as described in the impression COMMUNICATION: Results will be communicated with the ordering provider via Epic staff message or phone message by [facility] within 2 business days of report finalization. --END OF FINDING-- Transcribed Using Voice Recognition Transcribe Date/Time: Oct  2 2024  9:09A Dictated by: PATRICIA HERNANDEZ MD This examination was interpreted and the report reviewed and electronically signed by: PATRICIA HERNANDEZ MD on Oct  2 2024  9:45AM  EST       Assessment and Plan     Assessment: Misael Ritchie is a 69 y.o. female PMH aortic stenosis s/p TAVR 11/23, atrial  fibrillation (on Eliquis) s/p DCCV, CAD, ESRD on hemodialysis T/Th/Sat, HFrEF (6/2023 EF 48%), MR, TR, pacemaker CRT-P 5/2023, adrenal insufficiency admitted to the MICU on 10/03/24 for emergent dialysis for hyperkalemia 7.4.        Mechanical Ventilation: none  Sedation/Analgesia:  none  Restraints: no    Summary for 10/05/24:  Ortho will take the pt to the OR once pt is off of pressors for 24 hrs, pending cards and anesthesia clearance.  Lactate overnight was 5.6, repeat lactate this AM was 3  BC showed NGTD  Hb is down trending at 9..2 (from 10.8). will monitor the hb for 1 day and if Hb is stable and heparin assay is <0.2 will restart low dose heparin without a bolus  Repeat cbc and heparin assay at 4 pm. Heparin assay this morning 0.6 (down from 1.5)  Went down to D10 to 10 ml/hr. If POC glucose stays stbale, will discontinue.    Plan:  NEUROLOGY/PSYCH:  #Pain s/p fall   Scheduled Tylenol  PRN Oxy 5/10     CARDIOVASCULAR:  #PMH A-fib, CAD s/p stent 2003, MR, TR  #pericardial effusion  Management:  Hold Eliquis and Plavix  Continue home amiodarone, atorvastatin  Hold metoprolol for HR  If patient becomes unstable consider STAT POCUS to rule out tamponade  HF consult  Wean pressors as tolerated     PULMONARY:  #COPD  Management:  Wean O2  Continue home albuterol PRN     RENAL/GENITOURINARY:  #ESRD  Management:  Emergent dialysis, Nephrology following  Hold home torsemide   Hold home allopurinol   Follow up uric acid     GASTROENTEROLOGY:  Continue home Protonix, miralax     ENDOCRINOLOGY:  #Adrenal insufficiency  #hypoglycemia 2/2 insulin patient received for hyperkalemia  Management:  Prednisone 5 mg   Can consider stress dose steroids   TSH, T4, T3  D5 infusion and Q1h Blood sugar  Endocrine consult given patient's repeated hypoglycemia c/f possible insulinoma   Went down to D10 to 10 ml/hr. If POC glucose stays stbale, will discontinue.      HEMATOLOGY:  #hematoma   Management:  Consider reversing Eliquis if  Hb drops  Hb is down trending at 9..2 (from 10.8). will monitor the hb for 1 day and if Hb is stable and heparin assay is <0.2 will restart low dose heparin without a bolus   Repeat cbc and heparin assay at 4 pm. Heparin assay this morning 0.6 (down from 1.5)      SKIN:  No acute concerns     MUSCULOSKELETAL:  #Right troc fracture with hematoma  Management:  Ortho consulted, recs appreciated (MRI ordered)  Ortho will take the pt to the OR once pt is off of pressors for 24 hrs, pending cards and anesthesia clearance.      INFECTIOUS DISEASE:  Rule out sepsis low suspicion  :: BC showed NGTD  Management:  Follow up Bcx, UA  Vancomycin 10/4-present  Zosyn 10/4-present    ICU Check List         FEN  Fluids: D10W  Electrolytes: PRN  Nutrition: NPO  Prophylaxis:  DVT ppx: holding given hematoma  GI ppx: PPI  Bowel care: Miralax    Hardware:  CVC 10/03/24 Triple lumen Non-tunneled Left Femoral (Active)   Placement Date/Time: 10/03/24 1516   Site Prep: Chlorhexidine   All 5 Sterile Barriers Used (Gloves, Gown, Cap, Mask, Large Sterile Drape): Yes  Lumen Type: Triple lumen  CVC Line Catheter Size (Fr): 7 Fr  CVC Type: Non-tunneled  CVC Line Length (cm):...   Number of days: 1       Arterial Line 10/04/24 Left Brachial (Active)   Placement Date/Time: 10/04/24 0200   Orientation: Left  Location: Brachial  Site Prep: Chlorhexidine   Local Anesthetic: Injectable  Technique: Ultrasound guidance  Securement Method: Sutured  Patient Tolerance: Tolerated well   Number of days: 1       Code: Full Code    HPOA:  Zeke Ritchie () [phone]   Disposition: ORA Cazares MD   10/05/24 at 3:03 PM     Disclaimer: Documentation completed with the information available at the time of input. The times in the chart may not be reflective of actual patient care times, interventions, or procedures. Documentation occurs after the physical care of the patient.

## 2024-10-06 ENCOUNTER — APPOINTMENT (OUTPATIENT)
Dept: RADIOLOGY | Facility: HOSPITAL | Age: 69
DRG: 480 | End: 2024-10-06
Payer: MEDICARE

## 2024-10-06 LAB
ALBUMIN SERPL BCP-MCNC: 2.9 G/DL (ref 3.4–5)
ALP SERPL-CCNC: 144 U/L (ref 33–136)
ALT SERPL W P-5'-P-CCNC: 56 U/L (ref 7–45)
ANION GAP BLDA CALCULATED.4IONS-SCNC: 18 MMO/L (ref 10–25)
ANION GAP SERPL CALC-SCNC: 21 MMOL/L (ref 10–20)
AST SERPL W P-5'-P-CCNC: 25 U/L (ref 9–39)
BACTERIA BLD CULT: NORMAL
BASE EXCESS BLDA CALC-SCNC: -4.3 MMOL/L (ref -2–3)
BASOPHILS # BLD AUTO: 0.02 X10*3/UL (ref 0–0.1)
BASOPHILS # BLD AUTO: 0.03 X10*3/UL (ref 0–0.1)
BASOPHILS NFR BLD AUTO: 0.1 %
BASOPHILS NFR BLD AUTO: 0.2 %
BILIRUB DIRECT SERPL-MCNC: 1 MG/DL (ref 0–0.3)
BILIRUB SERPL-MCNC: 1.8 MG/DL (ref 0–1.2)
BODY TEMPERATURE: 37 DEGREES CELSIUS
BUN SERPL-MCNC: 35 MG/DL (ref 6–23)
CA-I BLDA-SCNC: 1.04 MMOL/L (ref 1.1–1.33)
CALCIUM SERPL-MCNC: 8.2 MG/DL (ref 8.6–10.6)
CHLORIDE BLDA-SCNC: 92 MMOL/L (ref 98–107)
CHLORIDE SERPL-SCNC: 91 MMOL/L (ref 98–107)
CO2 SERPL-SCNC: 24 MMOL/L (ref 21–32)
CREAT SERPL-MCNC: 4.69 MG/DL (ref 0.5–1.05)
EGFRCR SERPLBLD CKD-EPI 2021: 10 ML/MIN/1.73M*2
EOSINOPHIL # BLD AUTO: 0 X10*3/UL (ref 0–0.7)
EOSINOPHIL # BLD AUTO: 0 X10*3/UL (ref 0–0.7)
EOSINOPHIL NFR BLD AUTO: 0 %
EOSINOPHIL NFR BLD AUTO: 0 %
ERYTHROCYTE [DISTWIDTH] IN BLOOD BY AUTOMATED COUNT: 17.7 % (ref 11.5–14.5)
ERYTHROCYTE [DISTWIDTH] IN BLOOD BY AUTOMATED COUNT: 18.1 % (ref 11.5–14.5)
GLUCOSE BLD MANUAL STRIP-MCNC: 132 MG/DL (ref 74–99)
GLUCOSE BLD MANUAL STRIP-MCNC: 140 MG/DL (ref 74–99)
GLUCOSE BLD MANUAL STRIP-MCNC: 141 MG/DL (ref 74–99)
GLUCOSE BLD MANUAL STRIP-MCNC: 141 MG/DL (ref 74–99)
GLUCOSE BLD MANUAL STRIP-MCNC: 152 MG/DL (ref 74–99)
GLUCOSE BLD MANUAL STRIP-MCNC: 158 MG/DL (ref 74–99)
GLUCOSE BLDA-MCNC: 177 MG/DL (ref 74–99)
GLUCOSE SERPL-MCNC: 136 MG/DL (ref 74–99)
HCO3 BLDA-SCNC: 19.6 MMOL/L (ref 22–26)
HCT VFR BLD AUTO: 24.9 % (ref 36–46)
HCT VFR BLD AUTO: 25.5 % (ref 36–46)
HCT VFR BLD EST: 29 % (ref 36–46)
HGB BLD-MCNC: 8.7 G/DL (ref 12–16)
HGB BLD-MCNC: 9.3 G/DL (ref 12–16)
HGB BLDA-MCNC: 9.8 G/DL (ref 12–16)
HGB RETIC QN: 34 PG (ref 28–38)
IMM GRANULOCYTES # BLD AUTO: 0.18 X10*3/UL (ref 0–0.7)
IMM GRANULOCYTES # BLD AUTO: 0.21 X10*3/UL (ref 0–0.7)
IMM GRANULOCYTES NFR BLD AUTO: 1.2 % (ref 0–0.9)
IMM GRANULOCYTES NFR BLD AUTO: 1.3 % (ref 0–0.9)
IMMATURE RETIC FRACTION: 37.9 %
INHALED O2 CONCENTRATION: 21 %
INSULIN FREE SERPL-ACNC: 10 UIU/ML (ref 3–25)
INSULIN SERPL-ACNC: 14 UIU/ML (ref 3–25)
LACTATE BLDA-SCNC: 3.8 MMOL/L (ref 0.4–2)
LACTATE SERPL-SCNC: 3.9 MMOL/L (ref 0.4–2)
LYMPHOCYTES # BLD AUTO: 0.14 X10*3/UL (ref 1.2–4.8)
LYMPHOCYTES # BLD AUTO: 0.22 X10*3/UL (ref 1.2–4.8)
LYMPHOCYTES NFR BLD AUTO: 0.9 %
LYMPHOCYTES NFR BLD AUTO: 1.3 %
MAGNESIUM SERPL-MCNC: 1.96 MG/DL (ref 1.6–2.4)
MCH RBC QN AUTO: 30.7 PG (ref 26–34)
MCH RBC QN AUTO: 31.6 PG (ref 26–34)
MCHC RBC AUTO-ENTMCNC: 34.9 G/DL (ref 32–36)
MCHC RBC AUTO-ENTMCNC: 36.5 G/DL (ref 32–36)
MCV RBC AUTO: 87 FL (ref 80–100)
MCV RBC AUTO: 88 FL (ref 80–100)
MONOCYTES # BLD AUTO: 1.02 X10*3/UL (ref 0.1–1)
MONOCYTES # BLD AUTO: 1.07 X10*3/UL (ref 0.1–1)
MONOCYTES NFR BLD AUTO: 6.4 %
MONOCYTES NFR BLD AUTO: 6.9 %
NEUTROPHILS # BLD AUTO: 13.38 X10*3/UL (ref 1.2–7.7)
NEUTROPHILS # BLD AUTO: 15.25 X10*3/UL (ref 1.2–7.7)
NEUTROPHILS NFR BLD AUTO: 90.8 %
NEUTROPHILS NFR BLD AUTO: 90.9 %
NRBC BLD-RTO: 0.7 /100 WBCS (ref 0–0)
NRBC BLD-RTO: 0.8 /100 WBCS (ref 0–0)
OXYHGB MFR BLDA: 89.5 % (ref 94–98)
PCO2 BLDA: 31 MM HG (ref 38–42)
PH BLDA: 7.41 PH (ref 7.38–7.42)
PHOSPHATE SERPL-MCNC: 6.3 MG/DL (ref 2.5–4.9)
PLATELET # BLD AUTO: 79 X10*3/UL (ref 150–450)
PLATELET # BLD AUTO: 84 X10*3/UL (ref 150–450)
PO2 BLDA: 64 MM HG (ref 85–95)
POTASSIUM BLDA-SCNC: 5 MMOL/L (ref 3.5–5.3)
POTASSIUM SERPL-SCNC: 4.1 MMOL/L (ref 3.5–5.3)
PROT SERPL-MCNC: 5.9 G/DL (ref 6.4–8.2)
RBC # BLD AUTO: 2.83 X10*6/UL (ref 4–5.2)
RBC # BLD AUTO: 2.94 X10*6/UL (ref 4–5.2)
RETICS #: 0.1 X10*6/UL (ref 0.02–0.11)
RETICS/RBC NFR AUTO: 3.5 % (ref 0.5–2)
SAO2 % BLDA: 91 % (ref 94–100)
SODIUM BLDA-SCNC: 125 MMOL/L (ref 136–145)
SODIUM SERPL-SCNC: 132 MMOL/L (ref 136–145)
UFH PPP CHRO-ACNC: 0.2 IU/ML
UFH PPP CHRO-ACNC: 0.3 IU/ML
UFH PPP CHRO-ACNC: 0.4 IU/ML
WBC # BLD AUTO: 14.7 X10*3/UL (ref 4.4–11.3)
WBC # BLD AUTO: 16.8 X10*3/UL (ref 4.4–11.3)

## 2024-10-06 PROCEDURE — 37799 UNLISTED PX VASCULAR SURGERY: CPT

## 2024-10-06 PROCEDURE — 84075 ASSAY ALKALINE PHOSPHATASE: CPT

## 2024-10-06 PROCEDURE — 83735 ASSAY OF MAGNESIUM: CPT

## 2024-10-06 PROCEDURE — 70450 CT HEAD/BRAIN W/O DYE: CPT | Performed by: RADIOLOGY

## 2024-10-06 PROCEDURE — 70450 CT HEAD/BRAIN W/O DYE: CPT

## 2024-10-06 PROCEDURE — 85025 COMPLETE CBC W/AUTO DIFF WBC: CPT

## 2024-10-06 PROCEDURE — 85520 HEPARIN ASSAY: CPT

## 2024-10-06 PROCEDURE — 2500000004 HC RX 250 GENERAL PHARMACY W/ HCPCS (ALT 636 FOR OP/ED)

## 2024-10-06 PROCEDURE — 2500000001 HC RX 250 WO HCPCS SELF ADMINISTERED DRUGS (ALT 637 FOR MEDICARE OP)

## 2024-10-06 PROCEDURE — 2020000001 HC ICU ROOM DAILY

## 2024-10-06 PROCEDURE — 80048 BASIC METABOLIC PNL TOTAL CA: CPT

## 2024-10-06 PROCEDURE — 82947 ASSAY GLUCOSE BLOOD QUANT: CPT

## 2024-10-06 PROCEDURE — 83605 ASSAY OF LACTIC ACID: CPT

## 2024-10-06 PROCEDURE — 2500000002 HC RX 250 W HCPCS SELF ADMINISTERED DRUGS (ALT 637 FOR MEDICARE OP, ALT 636 FOR OP/ED)

## 2024-10-06 PROCEDURE — 2500000005 HC RX 250 GENERAL PHARMACY W/O HCPCS

## 2024-10-06 PROCEDURE — 85045 AUTOMATED RETICULOCYTE COUNT: CPT

## 2024-10-06 PROCEDURE — 84100 ASSAY OF PHOSPHORUS: CPT

## 2024-10-06 PROCEDURE — 84132 ASSAY OF SERUM POTASSIUM: CPT

## 2024-10-06 PROCEDURE — 37799 UNLISTED PX VASCULAR SURGERY: CPT | Performed by: STUDENT IN AN ORGANIZED HEALTH CARE EDUCATION/TRAINING PROGRAM

## 2024-10-06 RX ORDER — CALCIUM CARBONATE 200(500)MG
500 TABLET,CHEWABLE ORAL ONCE
Status: COMPLETED | OUTPATIENT
Start: 2024-10-06 | End: 2024-10-06

## 2024-10-06 RX ORDER — OXYMETAZOLINE HCL 0.05 %
2 SPRAY, NON-AEROSOL (ML) NASAL EVERY 12 HOURS PRN
Status: ACTIVE | OUTPATIENT
Start: 2024-10-06 | End: 2024-10-09

## 2024-10-06 RX ADMIN — PIPERACILLIN SODIUM AND TAZOBACTAM SODIUM 2.25 G: 2; .25 INJECTION, SOLUTION INTRAVENOUS at 08:36

## 2024-10-06 RX ADMIN — ACETAMINOPHEN 650 MG: 325 TABLET, FILM COATED ORAL at 20:02

## 2024-10-06 RX ADMIN — ACETAMINOPHEN 650 MG: 325 TABLET, FILM COATED ORAL at 14:19

## 2024-10-06 RX ADMIN — ACETAMINOPHEN 650 MG: 325 TABLET, FILM COATED ORAL at 08:36

## 2024-10-06 RX ADMIN — PANTOPRAZOLE SODIUM 40 MG: 40 INJECTION, POWDER, FOR SOLUTION INTRAVENOUS at 06:08

## 2024-10-06 RX ADMIN — HYDROCORTISONE SODIUM SUCCINATE 50 MG: 100 INJECTION, POWDER, FOR SOLUTION INTRAMUSCULAR; INTRAVENOUS at 22:56

## 2024-10-06 RX ADMIN — HYDROCORTISONE SODIUM SUCCINATE 50 MG: 100 INJECTION, POWDER, FOR SOLUTION INTRAMUSCULAR; INTRAVENOUS at 04:10

## 2024-10-06 RX ADMIN — AMIODARONE HYDROCHLORIDE 200 MG: 200 TABLET ORAL at 08:36

## 2024-10-06 RX ADMIN — HYDROCORTISONE SODIUM SUCCINATE 50 MG: 100 INJECTION, POWDER, FOR SOLUTION INTRAMUSCULAR; INTRAVENOUS at 16:57

## 2024-10-06 RX ADMIN — CALCIUM CARBONATE (ANTACID) CHEW TAB 500 MG 500 MG: 500 CHEW TAB at 22:56

## 2024-10-06 RX ADMIN — Medication 8 L/MIN: at 08:00

## 2024-10-06 RX ADMIN — HYDROCORTISONE SODIUM SUCCINATE 50 MG: 100 INJECTION, POWDER, FOR SOLUTION INTRAMUSCULAR; INTRAVENOUS at 11:30

## 2024-10-06 RX ADMIN — EPINEPHRINE 0.08 MCG/KG/MIN: 1 INJECTION INTRAMUSCULAR; INTRAVENOUS; SUBCUTANEOUS at 19:21

## 2024-10-06 RX ADMIN — SODIUM CHLORIDE, POTASSIUM CHLORIDE, SODIUM LACTATE AND CALCIUM CHLORIDE 500 ML: 600; 310; 30; 20 INJECTION, SOLUTION INTRAVENOUS at 16:40

## 2024-10-06 RX ADMIN — ACETAMINOPHEN 650 MG: 325 TABLET, FILM COATED ORAL at 03:44

## 2024-10-06 RX ADMIN — ATORVASTATIN CALCIUM 40 MG: 40 TABLET, FILM COATED ORAL at 20:02

## 2024-10-06 ASSESSMENT — PAIN - FUNCTIONAL ASSESSMENT
PAIN_FUNCTIONAL_ASSESSMENT: 0-10
PAIN_FUNCTIONAL_ASSESSMENT: CPOT (CRITICAL CARE PAIN OBSERVATION TOOL)
PAIN_FUNCTIONAL_ASSESSMENT: 0-10
PAIN_FUNCTIONAL_ASSESSMENT: 0-10
PAIN_FUNCTIONAL_ASSESSMENT: CPOT (CRITICAL CARE PAIN OBSERVATION TOOL)

## 2024-10-06 ASSESSMENT — PAIN SCALES - GENERAL
PAINLEVEL_OUTOF10: 5 - MODERATE PAIN

## 2024-10-06 ASSESSMENT — PAIN DESCRIPTION - DESCRIPTORS: DESCRIPTORS: ACHING;DISCOMFORT

## 2024-10-06 NOTE — PROGRESS NOTES
Vancomycin Dosing by Pharmacy- Cessation of Therapy    Consult to pharmacy for vancomycin dosing has been discontinued by the prescriber, pharmacy will sign off at this time.    Please call pharmacy if there are further questions or re-enter a consult if vancomycin is resumed.     Chantel Christian, PharmD

## 2024-10-06 NOTE — SIGNIFICANT EVENT
Case discussed with primary service (MICU) and anesthesia team. Patient requiring low dose of pressors currently. MICU requesting anesthesia pre operative evaluation. Will plan to proceed with surgery tomorrow (10/7) pending AM evaluation by anesthesia team. NPO at MN order placed. Case posted for tomorrow pending anesthesia clearance.     Tami Anderson, PGY-3  Orthopaedic Surgery   Pager: [phone]

## 2024-10-06 NOTE — H&P
[facility] Department of Orthopaedic Surgery   Surgical History & Physical <30 Days    Reason for Surgery: L GT w possible IT extension  Planned Procedure: L CMN femur    History & Physical Reviewed:  I have reviewed the History and Physical within 30 days dated 10/6. Relevant findings and updates are noted below:    Patient is not medically clear for OR yet.    Home medications were reviewed with significant updates noted below:  No significant changes.    ERAS patient?: No    COVID-19 Risk Consent:   Surgeon has reviewed the key risks related to jose COVID-19 and subsequent sequelae.     10/06/24 at 5:58 AM - Freya Campbell MD

## 2024-10-06 NOTE — SIGNIFICANT EVENT
Surgical Risk Stratification    Surgery: R femur CMN with ortho  Planned date: 10/6/24    Misael Ritchie is a 69 y.o. female with aortic stenosis s/p TAVR 11/23, atrial fibrillation (on Eliquis) s/p DCCV, CAD, ESRD on hemodialysis T/Th/Sat, HFrEF (6/2023 EF 48%), MR, TR, pacemaker CRT-P 5/2023, adrenal insufficiency admitted to the MICU on 10/03/24 for emergent dialysis for hyperkalemia 7.4 and fluid overload. Found to have right greater trochanteric fracture from fall. Ortho planning urgent R femur CMN on 10/6.     Risk stratification  -last stress test 5/2023: negative  -[facility] 2003: 3 v disease, stent to RI   -TTE 10/3: EF 50-55%, RV paced, small pericardial effusion, severe tricuspid regurgitation, severely elevated pulmonary artery pressure, inferior vena cava appears mildly dilated with IVC inspiratory collapse less than 50%, reversed systolic hepatic vein flow   -EKG 10/3: AV paced (RA and RV)  -Fluid status: fluid overloaded, removed ~5L UF today net negative and stable on 4-6L NC so probably improved  -CAD: no signs/sxs of ACS  -RCRI 3 (CAD, CHF, Cr > 2), which confers 15.0% 30-day risk of death, MI, or cardiac arrest  -On epi 0.05 for cardiogenic (RV failure) vs septic shock, MAPs 60-70s    She is a class IV risk for a low/moderate risk procedure. Given the urgency of the intervention, would recommend forgoing further cardiopulmonary testing before intervention as it would be of clear benefit and would delay intervention. However, with her fluid overload state, will discuss with day team first thing in the morning to determine if more fluid needs to be removed prior to OR.     Plan discussed with overnight MICU attending Dr. Rachel Hayes MD  PGY-4

## 2024-10-06 NOTE — CARE PLAN
Problem: Skin  Goal: Participates in plan/prevention/treatment measures  Outcome: Progressing  Flowsheets (Taken 10/6/2024 1121)  Participates in plan/prevention/treatment measures: Elevate heels  Goal: Prevent/manage excess moisture  Outcome: Progressing  Flowsheets (Taken 10/6/2024 1121)  Prevent/manage excess moisture:   Cleanse incontinence/protect with barrier cream   Moisturize dry skin   Follow provider orders for dressing changes   Monitor for/manage infection if present  Goal: Prevent/minimize sheer/friction injuries  Outcome: Progressing  Flowsheets (Taken 10/6/2024 1121)  Prevent/minimize sheer/friction injuries:   Increase activity/out of bed for meals   Complete micro-shifts as needed if patient unable. Adjust patient position to relieve pressure points, not a full turn   HOB 30 degrees or less   Turn/reposition every 2 hours/use positioning/transfer devices  Goal: Promote/optimize nutrition  Outcome: Progressing  Flowsheets (Taken 10/6/2024 1121)  Promote/optimize nutrition:   Monitor/record intake including meals   Consume > 50% meals/supplements  Goal: Promote skin healing  Outcome: Progressing  Flowsheets (Taken 10/6/2024 1121)  Promote skin healing:   Assess skin/pad under line(s)/device(s)   Protective dressings over bony prominences   Turn/reposition every 2 hours/use positioning/transfer devices   Ensure correct size (line/device) and apply per  instructions   Rotate device position/do not position patient on device  Goal: Decreased wound size/increased tissue granulation at next dressing change  Outcome: Progressing  Flowsheets (Taken 10/6/2024 1121)  Decreased wound size/increased tissue granulation at next dressing change:   Promote sleep for wound healing   Protective dressings over bony prominences     Problem: Fall/Injury  Goal: Not fall by end of shift  Outcome: Progressing  Goal: Be free from injury by end of the shift  Outcome: Progressing  Goal: Verbalize understanding of  personal risk factors for fall in the hospital  Outcome: Progressing  Goal: Verbalize understanding of risk factor reduction measures to prevent injury from fall in the home  Outcome: Progressing     Problem: Pain - Adult  Goal: Verbalizes/displays adequate comfort level or baseline comfort level  Outcome: Progressing     Problem: Safety - Adult  Goal: Free from fall injury  Outcome: Progressing     Problem: Pain  Goal: Turns in bed with improved pain control throughout the shift  Outcome: Progressing  Goal: Participates in PT with improved pain control throughout the shift  Outcome: Progressing  Goal: Free from opioid side effects throughout the shift  Outcome: Progressing  Goal: Free from acute confusion related to pain meds throughout the shift  Outcome: Progressing     Problem: Nutrition  Goal: Oral intake greater than 50%  Outcome: Progressing  Goal: Oral intake greater 75%  Outcome: Progressing  Goal: Consume prescribed supplement  Outcome: Progressing  Goal: Adequate PO fluid intake  Outcome: Progressing  Goal: Nutrition support goals are met within 48 hrs  Outcome: Progressing  Goal: Nutrition support is meeting 75% of nutrient needs  Outcome: Progressing  Goal: BG  mg/dL  Outcome: Progressing  Goal: Lab values WNL  Outcome: Progressing  Goal: Electrolytes WNL  Outcome: Progressing  Goal: Promote healing  Outcome: Progressing  Goal: Maintain stable weight  Outcome: Progressing  Goal: Reduce weight from edema/fluid  Outcome: Progressing  Goal: Gradual weight gain  Outcome: Progressing     Problem: Nutrition  Goal: Less than 5 days NPO/clear liquids  Outcome: Met

## 2024-10-06 NOTE — CARE PLAN
The patient's goals for the shift include        Problem: Skin  Goal: Participates in plan/prevention/treatment measures  Outcome: Progressing  Flowsheets (Taken 10/6/2024 1940)  Participates in plan/prevention/treatment measures: Elevate heels  Goal: Prevent/manage excess moisture  Outcome: Progressing  Flowsheets (Taken 10/6/2024 1940)  Prevent/manage excess moisture:   Cleanse incontinence/protect with barrier cream   Moisturize dry skin   Monitor for/manage infection if present  Goal: Prevent/minimize sheer/friction injuries  Outcome: Progressing  Flowsheets (Taken 10/6/2024 1940)  Prevent/minimize sheer/friction injuries:   Complete micro-shifts as needed if patient unable. Adjust patient position to relieve pressure points, not a full turn   HOB 30 degrees or less   Turn/reposition every 2 hours/use positioning/transfer devices   Utilize specialty bed per algorithm   Use pull sheet  Goal: Promote/optimize nutrition  Outcome: Progressing  Flowsheets (Taken 10/6/2024 1940)  Promote/optimize nutrition: Monitor/record intake including meals  Goal: Promote skin healing  Outcome: Progressing  Flowsheets (Taken 10/6/2024 1940)  Promote skin healing:   Assess skin/pad under line(s)/device(s)   Ensure correct size (line/device) and apply per  instructions   Rotate device position/do not position patient on device   Turn/reposition every 2 hours/use positioning/transfer devices  Goal: Decreased wound size/increased tissue granulation at next dressing change  Outcome: Progressing  Flowsheets (Taken 10/6/2024 1940)  Decreased wound size/increased tissue granulation at next dressing change:   Promote sleep for wound healing   Protective dressings over bony prominences   Utilize specialty bed per algorithm     Problem: Fall/Injury  Goal: Not fall by end of shift  Outcome: Progressing  Goal: Be free from injury by end of the shift  Outcome: Progressing  Goal: Verbalize understanding of personal risk factors for  fall in the hospital  Outcome: Progressing  Goal: Verbalize understanding of risk factor reduction measures to prevent injury from fall in the home  Outcome: Progressing     Problem: Pain - Adult  Goal: Verbalizes/displays adequate comfort level or baseline comfort level  Outcome: Progressing     Problem: Safety - Adult  Goal: Free from fall injury  Outcome: Progressing     Problem: Pain  Goal: Turns in bed with improved pain control throughout the shift  Outcome: Progressing  Goal: Participates in PT with improved pain control throughout the shift  Outcome: Progressing  Goal: Free from opioid side effects throughout the shift  Outcome: Progressing  Goal: Free from acute confusion related to pain meds throughout the shift  Outcome: Progressing     Problem: Nutrition  Goal: Oral intake greater than 50%  Outcome: Progressing  Goal: Oral intake greater 75%  Outcome: Progressing  Goal: Consume prescribed supplement  Outcome: Progressing  Goal: Adequate PO fluid intake  Outcome: Progressing  Goal: Nutrition support goals are met within 48 hrs  Outcome: Progressing  Goal: Nutrition support is meeting 75% of nutrient needs  Outcome: Progressing  Goal: BG  mg/dL  Outcome: Progressing  Goal: Lab values WNL  Outcome: Progressing  Goal: Electrolytes WNL  Outcome: Progressing  Goal: Promote healing  Outcome: Progressing  Goal: Maintain stable weight  Outcome: Progressing  Goal: Reduce weight from edema/fluid  Outcome: Progressing  Goal: Gradual weight gain  Outcome: Progressing

## 2024-10-06 NOTE — PROGRESS NOTES
"Medical Intensive Care - Daily Progress Note   Subjective    Misael Ritchie is a 69 y.o. female patient admitted on 10/3/2024 with following ICU needs: [urgent dialysis for hyperkalemia]    Interval History:  No acute events overnight. On EPI 0.04. Per nursing Arterial line has not been picking up her BP well. Patient complaining of right sided muffled hearing    Meds    Scheduled medications  acetaminophen, 650 mg, oral, q6h  [Held by provider] allopurinol, 100 mg, oral, Daily  amiodarone, 200 mg, oral, Daily  atorvastatin, 40 mg, oral, Nightly  dextrose, 12.5 g, intravenous, Once  hydrocortisone sodium succinate, 50 mg, intravenous, q6h  oxygen, , inhalation, Continuous - Inhalation  pantoprazole, 40 mg, oral, Daily before breakfast   Or  pantoprazole, 40 mg, intravenous, Daily before breakfast  piperacillin-tazobactam, 2.25 g, intravenous, q8h  polyethylene glycol, 17 g, oral, Daily  [START ON 10/7/2024] vancomycin, 500 mg, intravenous, Once per day on Monday Wednesday Friday      Continuous medications  EPINEPHrine, 0-1 mcg/kg/min, Last Rate: 0.03 mcg/kg/min (10/06/24 0700)  [Held by provider] heparin, 0-4,000 Units/hr, Last Rate: Stopped (10/05/24 2310)      PRN medications  PRN medications: albuterol, dextrose, dextrose, glucagon, glucagon, oxyCODONE, oxyCODONE, vancomycin     Objective    Blood pressure (!) 109/39, pulse 96, temperature 35.9 °C (96.6 °F), resp. rate 17, height 1.549 m (5' 1\"), weight 58.6 kg (129 lb 3 oz), SpO2 96%.     Temp  Min: 35.5 °C (95.9 °F)  Max: 35.9 °C (96.6 °F)  Pulse  Min: 75  Max: 112  BP  Min: 97/41  Max: 154/69  Resp  Min: 11  Max: 25  SpO2  Min: 90 %  Max: 100 %    Physical Exam   General: Awake, alert, in no acute distress  Eyes: Gaze conjugate.  No scleral icterus or injection  HENT: Normo-cephalic, right periorbital hematoma No stridor  CV: Regular rate, regular rhythm. Radial pulses 2+ bilaterally  Resp: Breathing non-labored, speaking in full sentences.  Clear to " auscultation bilaterally  GI: Soft, non-distended, non-tender. No rebound or guarding.  : deferred   MSK/Extremities: No gross bony deformities. Moving all extremities  Skin: Warm. Appropriate color  Neuro: Aox4 Face symmetric. Speech is fluent.  Gross strength and sensation intact in b/l UE and LEs  Psych: Appropriate mood and affect          Intake/Output Summary (Last 24 hours) at 10/6/2024 0851  Last data filed at 10/6/2024 0700  Gross per 24 hour   Intake 1846.99 ml   Output 4800 ml   Net -2953.01 ml     Net IO Since Admission: -794.44 mL [10/06/24 0851]     Labs:   Results from last 72 hours   Lab Units 10/06/24  0347 10/05/24  1713 10/05/24  0833   HEMOGLOBIN g/dL 8.7* 8.5* 8.9*   HEMATOCRIT % 24.9* 24.5* 25.1*   WBC AUTO x10*3/uL 14.7* 13.0* 14.6*   PLATELETS AUTO x10*3/uL 84* 79* 97*   NEUTROS PCT AUTO % 90.9 90.0 90.4   LYMPHS PCT AUTO % 0.9 2.0 1.3   MONOS PCT AUTO % 6.9 7.1 7.2   EOS PCT AUTO % 0.0 0.0 0.0      Results from last 72 hours   Lab Units 10/06/24  0347 10/05/24  0418 10/04/24  1124   SODIUM mmol/L 132* 132* 129*   POTASSIUM mmol/L 4.1 4.0 5.6*   CHLORIDE mmol/L 91* 91* 91*   CO2 mmol/L 24 25 21   BUN mg/dL 35* 22 40*   CREATININE mg/dL 4.69* 2.91* 4.67*   GLUCOSE mg/dL 136* 152* 188*   CALCIUM mg/dL 8.2* 8.2* 8.9   ANION GAP mmol/L 21* 20 23*   EGFR mL/min/1.73m*2 10* 17* 10*   PHOSPHORUS mg/dL 6.3* 4.5 7.1*        Micro/ID:   Lab Results   Component Value Date    BLOODCULT No growth at 2 days 10/04/2024       Summary of key imaging results from the last 24 hours  Transthoracic Echo (TTE) Complete    Result Date: 10/4/2024   [facility], [address] [place] [address]                Tel [phone] and Fax [phone] TRANSTHORACIC ECHOCARDIOGRAM REPORT  Patient Name:      CHRYSANTHEMUM MAEVE Reading Physician:    [id number] Renato Hoang MD Study Date:        10/4/2024             Ordering Provider:     [id number] JOHNSON TOVAR MRN/PID:           [id number]              Fellow:               Kerwin Mcdowell MD Accession#:        [id number]          Nurse: Date of Birth/Age: 1955 / 69 years  Sonographer:          Edie Lee RDCS Gender:            F                     Additional Staff: Height:            154.94 cm             Admit Date:           10/3/2024 Weight:            55.79 kg              Admission Status:     Inpatient - STAT BSA / BMI:         1.54 m2 / 23.24 kg/m2 Encounter#:           [id number] Blood Pressure:    165/54 mmHg           Department Location:  [address] Study Type:    TRANSTHORACIC ECHO (TTE) COMPLETE Diagnosis/ICD: Other pericardial effusion (noninflammatory)-I31.39 Indication:    Pericardial effusion CPT Code:      Echo Complete w Full Doppler-[id number] Patient History: Pertinent History: HTN, ESRD on HD, AS s/p TAVR (valve type and size unknown),                    afib, CAD, CHF. Study Detail: The following Echo studies were performed: 2D, M-Mode, Doppler and               color flow. Definity used as a contrast agent for endocardial               border definition and agitated saline used as a contrast agent for               intraseptal flow evaluation. Total contrast used for this               procedure was 4.0 mL via IV push. Unable to obtain suprasternal               notch view.  PHYSICIAN INTERPRETATION: Left Ventricle: The left ventricular systolic function is low normal, with a visually estimated ejection fraction of 50-55%. There are no regional left ventricular wall motion abnormalities. The left ventricular cavity size is normal. There is mild to moderate concentric left ventricular hypertrophy. Abnormal (paradoxical) septal motion, consistent with RV pacemaker. Spectral Doppler shows a pseudonormal pattern of left ventricular diastolic  filling. Left Atrium: The left atrium is severely dilated. Right Ventricle: The right ventricle is severely enlarged. There is reduced right ventricular systolic function. A device is visualized in the right ventricle. Right Atrium: The right atrium is severely dilated. There is a device visualized in the right atrium. Aortic Valve: There is a prosthetic aortic valve present. The aortic valve dimensionless index is 0.39. There is a transcatheter aortic valve replacement. There is no evidence of aortic valve regurgitation. The peak instantaneous gradient of the aortic valve is 32.0 mmHg. The mean gradient of the aortic valve is 18.0 mmHg. Mitral Valve: The mitral valve is mildly thickened. There is moderate mitral annular calcification. There is mild mitral valve regurgitation. Tricuspid Valve: The tricuspid valve is structurally normal. There is severe tricuspid regurgitation. There is reversed systolic hepatic vein flow. Pulmonic Valve: The pulmonic valve is structurally normal. There is physiologic pulmonic valve regurgitation. Pericardium: Small pericardial effusion posterior to the left ventricle. Aorta: The aortic root is normal. Pulmonary Artery: The tricuspid regurgitant velocity is 3.79 m/s, and with an estimated right atrial pressure of 15 mmHg, the estimated pulmonary artery pressure is severely elevated with the RVSP at 72.5 mmHg. Systemic Veins: The inferior vena cava appears mildly dilated, with IVC inspiratory collapse less than 50%. In comparison to the previous echocardiogram(s): There are no prior studies on this patient for comparison purposes.  CONCLUSIONS:  1. The left ventricular systolic function is low normal, with a visually estimated ejection fraction of 50-55%.  2. Spectral Doppler shows a pseudonormal pattern of left ventricular diastolic filling.  3. Abnormal septal motion consistent with RV pacemaker.  4. There is reduced right ventricular systolic function.  5. Severely enlarged right  ventricle.  6. The left atrium is severely dilated.  7. The right atrium is severely dilated.  8. Small pericardial effusion.  9. There is moderate mitral annular calcification. 10. Severe tricuspid regurgitation visualized. 11. There is a transcatheter aortic valve replacement. 12. Severely elevated pulmonary artery pressure. 13. The inferior vena cava appears mildly dilated, with IVC inspiratory collapse less than 50%. 14. There is reversed systolic hepatic vein flow. RECOMMENDATIONS: Utilizing an FDA cleared automated machine learning algorithm (EchoGo Heart Failure by [employer]), the analysis of the apical 4-chamber echocardiogram suggests the presence of heart failure with preserved ejection fraction (HFpEF)*. Clinical correlation looking for additional heart failure signs and symptoms is recommended, as a definite diagnosis of heart failure cannot be made by imaging alone. *Per ACC/AHA/HFSA universal diagnosis of heart failure, HFpEF is defined as 1) signs and symptoms leading to clinical diagnosis of heart failure, 2) an ejection fraction of at least 50%, and 3) evidence of elevated intra-cardiac filling pressures by echocardiography, BNP elevation, or catheterization.  QUANTITATIVE DATA SUMMARY:  2D MEASUREMENTS:          Normal Ranges: LAs:             4.50 cm  (2.7-4.0cm) IVSd:            1.10 cm  (0.6-1.1cm) LVPWd:           1.10 cm  (0.6-1.1cm) LVIDd:           4.00 cm  (3.9-5.9cm) LVIDs:           2.40 cm LV Mass Index:   95 g/m2 LVEDV Index:     58 ml/m2 LV % FS          40.0 %  LA VOLUME:                    Normal Ranges: LA Vol A4C:        84.4 ml    (22+/-6mL/m2) LA Vol A2C:        83.5 ml LA Vol BP:         85.7 ml LA Vol Index A4C:  54.9ml/m2 LA Vol Index A2C:  54.3 ml/m2 LA Vol Index BP:   55.8 ml/m2 LA Area A4C:       26.6 cm2 LA Area A2C:       25.9 cm2 LA Major Axis A4C: 7.1 cm LA Major Axis A2C: 6.8 cm LA Volume Index:   55.8 ml/m2  RA VOLUME BY A/L METHOD:          Normal Ranges: RA Area  A4C:             38.5 cm2  LV SYSTOLIC FUNCTION BY 2D PLANIMETRY (MOD):                      Normal Ranges: EF-A4C View:    49 % (>=55%) EF-A2C View:    46 % EF-Biplane:     50 % EF-Visual:      53 % LV EF Reported: 53 %  LV DIASTOLIC FUNCTION:             Normal Ranges: MV Peak E:             0.58 m/s    (0.7-1.2 m/s) MV Peak A:             0.62 m/s    (0.42-0.7 m/s) E/A Ratio:             0.94        (1.0-2.2) MV lateral e'          0.07 m/s MV A Dur:              111.00 msec MV DT:                 172 msec    (150-240 msec)  MITRAL VALVE:          Normal Ranges: MV DT:        172 msec (150-240msec)  AORTIC VALVE:                      Normal Ranges: AoV Vmax:                2.83 m/s  (<=1.7m/s) AoV Peak P.0 mmHg (<20mmHg) AoV Mean P.0 mmHg (1.7-11.5mmHg) LVOT Max Stef:            1.86 m/s  (<=1.1m/s) AoV VTI:                 65.90 cm  (18-25cm) LVOT VTI:                25.90 cm LVOT Diameter:           1.70 cm   (1.8-2.4cm) AoV Area, VTI:           0.89 cm2  (2.5-5.5cm2) AoV Area,Vmax:           1.49 cm2  (2.5-4.5cm2) AoV Dimensionless Index: 0.39  RIGHT VENTRICLE: TAPSE: 11.5 mm  TRICUSPID VALVE/RVSP:          Normal Ranges: Peak TR Velocity:     3.79 m/s RV Syst Pressure:     72 mmHg  (< 30mmHg) IVC Diam:             2.50 cm  [id number] Renato Hoang MD Electronically signed on 10/4/2024 at 4:03:23 PM  ** Final **         Assessment and Plan     Assessment: Misael Ritchie is a 69 y.o. female PMH aortic stenosis s/p TAVR , atrial fibrillation (on Eliquis) s/p DCCV, CAD, ESRD on hemodialysis T//Sat, HFrEF (2023 EF 48%), MR, TR, pacemaker CRT-P 2023, adrenal insufficiency admitted to the MICU on 10/03/24 for emergent dialysis for hyperkalemia 7.4.      Summary for 10/06/24:  Follow up MRI   BC showed NGTD  Resume heparin gtt  Follow up anesthesiology for pre op clearance  DC D10 given POC glucose is stable  CT head given patient complaining of right sided muffled  hearing may need ENT consult  DC vanc/zosyn    Plan:  NEUROLOGY/PSYCH:  #Pain s/p fall   Scheduled Tylenol  PRN Oxy 5/10     CARDIOVASCULAR:  #PMH A-fib, CAD s/p stent 2003, MR, TR  #pericardial effusion  Management:  Hold Eliquis and Plavix  Continue home amiodarone, atorvastatin  Hold metoprolol for HR  If patient becomes unstable consider STAT POCUS to rule out tamponade  HF consult  Wean pressors as tolerated     PULMONARY:  #COPD  Management:  Wean O2  Continue home albuterol PRN     RENAL/GENITOURINARY:  #ESRD  Management:  Emergent dialysis, Nephrology following  Hold home torsemide   Hold home allopurinol   Follow up uric acid     GASTROENTEROLOGY:  Continue home Protonix, miralax     ENDOCRINOLOGY:  #Adrenal insufficiency  #hypoglycemia 2/2 insulin patient received for hyperkalemia  Management:  Prednisone 5 mg   Can consider stress dose steroids   TSH, T4, T3  D5 infusion and Q1h Blood sugar  Endocrine consult given patient's repeated hypoglycemia c/f possible insulinoma   Went down to D10 to 10 ml/hr. If POC glucose stays stbale, will discontinue.      HEMATOLOGY:  #hematoma   Management:  Consider reversing Eliquis if Hb drops  Hb is down trending at 9..2 (from 10.8). will monitor the hb for 1 day and if Hb is stable and heparin assay is <0.2 will restart low dose heparin without a bolus   Repeat cbc and heparin assay at 4 pm. Heparin assay this morning 0.6 (down from 1.5)      SKIN:  No acute concerns     MUSCULOSKELETAL:  #Right troc fracture with hematoma  Management:  Ortho consulted, recs appreciated (MRI ordered)  Ortho will take the pt to the OR once pt is off of pressors for 24 hrs, pending cards and anesthesia clearance.      INFECTIOUS DISEASE:  Rule out sepsis low suspicion  :: BC showed NGTD  Management:  Follow up Bcx, UA  MRSA +  Vancomycin 10/4-10/6  Zosyn 10/4-10/6    ICU Check List         FEN  Fluids: D10W  Electrolytes: PRN  Nutrition: NPO  Prophylaxis:  DVT ppx: holding given  hematoma  GI ppx: PPI  Bowel care: Miralax    Hardware:  CVC 10/03/24 Triple lumen Non-tunneled Left Femoral (Active)   Placement Date/Time: 10/03/24 1516   Site Prep: Chlorhexidine   All 5 Sterile Barriers Used (Gloves, Gown, Cap, Mask, Large Sterile Drape): Yes  Lumen Type: Triple lumen  CVC Line Catheter Size (Fr): 7 Fr  CVC Type: Non-tunneled  CVC Line Length (cm):...   Number of days: 1       Arterial Line 10/04/24 Left Brachial (Active)   Placement Date/Time: 10/04/24 0200   Orientation: Left  Location: Brachial  Site Prep: Chlorhexidine   Local Anesthetic: Injectable  Technique: Ultrasound guidance  Securement Method: Sutured  Patient Tolerance: Tolerated well   Number of days: 1       Code: Full Code    HPOA:  ErmaZeke () [phone]   Disposition: ORA Cavanaugh MD   10/06/24 at 8:51 AM     Disclaimer: Documentation completed with the information available at the time of input. The times in the chart may not be reflective of actual patient care times, interventions, or procedures. Documentation occurs after the physical care of the patient.

## 2024-10-06 NOTE — PROGRESS NOTES
Advanced Heart Failure Progress Note   Consulting Team: ORA Andrade  Primary Cardiologist: Dr. Pricila Zayas ([facility])  Reason for Consult: RV strain    Subjective   NAEO. Started on SDS per endocrine due to c/f adrenal insufficiency. Patient was weaned off vasopressin and CVVH and remains on epi at 0.09 w/ slight hypertension. She reports no new symptoms this morning.    The following portions of the chart were reviewed this encounter and updated as appropriate:         Review of Systems  Otherwise, limited cardiovascular review of systems is negative.    No past medical history on file.  No past surgical history on file.  Social History     Socioeconomic History    Marital status:      Spouse name: Not on file    Number of children: Not on file    Years of education: Not on file    Highest education level: Not on file   Occupational History    Not on file   Tobacco Use    Smoking status: Never    Smokeless tobacco: Never   Substance and Sexual Activity    Alcohol use: Not on file    Drug use: Not on file    Sexual activity: Not on file   Other Topics Concern    Not on file   Social History Narrative    Not on file     Social Determinants of Health     Financial Resource Strain: Low Risk  (10/4/2024)    Overall Financial Resource Strain (CARDIA)     Difficulty of Paying Living Expenses: Not hard at all   Food Insecurity: No Food Insecurity (10/4/2024)    Hunger Vital Sign     Worried About Running Out of Food in the Last Year: Never true     Ran Out of Food in the Last Year: Never true   Transportation Needs: No Transportation Needs (10/4/2024)    PRAPARE - Transportation     Lack of Transportation (Medical): No     Lack of Transportation (Non-Medical): No   Physical Activity: Unknown (10/4/2024)    Exercise Vital Sign     Days of Exercise per Week: 0 days     Minutes of Exercise per Session: Not on file   Stress: No Stress Concern Present (10/4/2024)    [employer] [place] [employer] - Occupational  "Stress Questionnaire     Feeling of Stress : Only a little   Social Connections: Moderately Isolated (10/4/2024)    Social Connection and Isolation Panel [NHANES]     Frequency of Communication with Friends and Family: Three times a week     Frequency of Social Gatherings with Friends and Family: Three times a week     Attends Christianity Services: Never     Active Member of Clubs or Organizations: No     Attends Club or Organization Meetings: Not on file     Marital Status:    Intimate Partner Violence: Not At Risk (10/4/2024)    Humiliation, Afraid, Rape, and Kick questionnaire     Fear of Current or Ex-Partner: No     Emotionally Abused: No     Physically Abused: No     Sexually Abused: No   Housing Stability: Low Risk  (10/4/2024)    Housing Stability Vital Sign     Unable to Pay for Housing in the Last Year: No     Number of Times Moved in the Last Year: 0     Homeless in the Last Year: No     No family history on file.    No current outpatient medications      Objective   Physical Exam  Vitals:    10/06/24 0600   BP: (!) 117/28   Pulse: 90   Resp: 12   Temp:    SpO2: 98%       GEN: Ill-appearing, frail, in NAD.  CV: RRR, III/VI holosystolic murmur  LUNGS: Diminished breath sounds bilaterally.  ABD: Soft, NT/ND, NBS, no masses or organomegaly.  SKIN: Warm, well perfused. No skin rashes or abnormal lesions. LE edema absent  MSK: Normal gait. No deformities.  EXT: No clubbing, cyanosis, or edema.  NEURO: Fatigued, moves all extremities spontaneously. No focal deficits.    I have personally reviewed the following images and laboratory findings:    ECG: AV-paced rhythm    Lab Review     No results found for: \"TROPHS\", \"BNP\", \"LDLF\", \"TRIG\", \"NMRTOT\"     Assessment and Plan     Misael Ritchie is a 69 y.o. female with a PMHx of HFrecEF/ICM(33->48% on 8/10/2024) s/p CRT-P, CAD s/p PCI SHASHI to LAD 5/2023, MR/TR, aortic stenosis s/p TAVR (11/2023), atrial fibrillation, CAD, CKD, ESRD s/p failed KT x 2 on " hemodialysis, who was admitted on 10/3/2024 for hyperkalemia and need for emergent HD. She had an episode of hypotension overnight on 10/4 with POCUS c/f RV failure. She was started on epinephrine and vasopressin. Heart failure was consulted regarding RV failure.    #Combined HFrecEF and HFpEF  #Shock 2/2 RV failure  #Severe TR  #Pulmonary HTN, Group 2 + 5, due to CHF and ESRD  Patient presented with hyperkalemia requiring emergent HD. Afterwards, she developed profound hypotension and hypoglycemia w/ POCUS c/f RV failure and severe TR. She follows with [facility] and severe TR has been noted on previous echos as well, most recently in August 2024. The etiology of her hypotension is likely due to RV failure after HD, given that the RV is very pre-load dependent and sensitive to fluid shifts. We agree with gradual volume removal with CRRT supported by pressors. She likely has a component of Group V pulmonary HTN as well, but is unlikely to improve with pulmonary vasodilators given her LVH.  - Formal TTE completed, final read pending.  - Diuresis: Transitioning to HD/UF per neph  - GDMT   - SGLT-2i: None due to ESRD   - Beta-blocker: Hold given RV dysfunction.   - ACE-I/ARB/ARNI: None due to ESRD    - MRA: None due to ESRD  - RV support: continue epinephrine for RV support while removing volume to offload the right ventricle  - Mechanical support: None  - AICD/CRT-D/Lifevest: Has CRT-P  - Patient appears to be clinically improving with stress dose steroids and volume removal. Continue excellent management per MICU team. We will sign off.    For any questions or concerns, feel free to reach out via [employer] chat or page at [phone].         SIGNATURE: Darrno Mills MD PATIENT NAME: Misael Ritchie   DATE/TIME: October 6, 2024 7:00 AM MRN: [id number]     This plan was discussed with Dr. Veliz, HF attending.

## 2024-10-06 NOTE — ASSESSMENT & PLAN NOTE
Misael Ritchie is a 69 y.o. 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  Patient presenting to hospital after a fall.  Patient reports feeling lightheaded and feeling extremely hot.  While she was trying to turn on the fan, she had a syncope resulting in fall and hitting right side of her head.    On presentation to ER patient was noted to be hypotensive.  Initial workup was suggestive of hyperkalemia with K7.4 which was treated with 5 units insulin, D50, bicarb and calcium gluconate. CT maxillofacial bones wo IV contrast: Right periorbital and facial soft tissue swelling and hematoma without evidence for orbital or facial bone fracture. Patient has right trochanteric fracture with hematoma.     On arrival to ICU, patient was noted to have episodes of hypoglycemia with blood glucose of 40 following which she was started on IV dextrose and was given hydrocortisone 100 mg IV push followed by hydrocortisone 50 mg Q6 hourly.  She was continued on D10 at 75 cc/h with improvement in blood glucose.     Endocrinology service is consulted for management of adrenal insufficiency in setting of recurrent unexplained hypoglycemic episodes.     Of note, patient had similar episodes of hypoglycemia in 2024 for which she was managed at [facility].  At that time, stim test was done on 2/5/2024 with results as under:  Basal cortisol: 2.6  30-minute cortisol: 2.6  60-minute cortisol: 2.7     Note is made of of random blood cortisol level of 8.2 on 2/5/2024.  Patient does have a history of adrenal insufficiency based on the above results.     Patient reports that she has been on prednisone 2.5 mg since 2010.  She reports being off prednisone for 6 months in 2011 (patient not sure about  the year when she was off).  She reports that she felt miserable when she was off prednisone.     CT abdo/pelvis (10/3): Bilateral adrenal glands are unremarkable.     Patient reports that she recently had steroid injection to her right shoulder.  On chart review it was noted that patient received methylprednisolone acetate 40 mg on 9/20/2024.     # Adrenal insufficiency in setting of chronic glucocorticoid use and recent steroid injection to right shoulder resulting treatment resistant hypoglycemia  #Hyperkalemia, multifactorial likely 2/2 CKD on HD,high anion gap metabolic acidosis (lactic acid/uremia), adrenal insufficiency, baseline BUN and creatinine 39/5.45 on 10/2/24 per [facility] labs     -Patient was on tablet prednisone 5 mg daily which is subtherapeutic replacement dose for adrenal insufficiency - Tentative plan for outpatient may include 2.5 mg orally daily.    Endocrine Recommendations -10/5/24:   Dextrose 10% tapering down - 10 ml.hr  On pressors therefore hydrocortisone 50 mg IV every 6 hourly was continued.    Endocrine Recommendations - 10/6/24:           Will taper current stress dose of steroid when patient is off pressors to therapeutic replacement dose for adrenal insufficiency.    In case blood sugar drops below 55 off dextrose, checking C-peptide, insulin, proinsulin, POC, VBG's, beta hydroxybutyrate, and RFP, stat prior to administering dextrose. Treat with hypoglycemic protocol accordingly, okay to start D5-D10 if needed    Repeating workup for adrenal insufficiency is not recommended at this time as patient is on stress dose steroids.  ACTH is also expected to be suppressed in setting of recent steroid injection to shoulder (9/20/2024)     Recommendations conveyed to primary team  Case seen and discussed with Dr. Garces  Endocrinology service will continue to follow.

## 2024-10-07 ENCOUNTER — ANESTHESIA (OUTPATIENT)
Dept: OPERATING ROOM | Facility: HOSPITAL | Age: 69
End: 2024-10-07
Payer: MEDICARE

## 2024-10-07 ENCOUNTER — APPOINTMENT (OUTPATIENT)
Dept: DIALYSIS | Facility: HOSPITAL | Age: 69
End: 2024-10-07
Payer: MEDICARE

## 2024-10-07 ENCOUNTER — APPOINTMENT (OUTPATIENT)
Dept: RADIOLOGY | Facility: HOSPITAL | Age: 69
DRG: 480 | End: 2024-10-07
Payer: MEDICARE

## 2024-10-07 VITALS
WEIGHT: 129.19 LBS | DIASTOLIC BLOOD PRESSURE: 54 MMHG | SYSTOLIC BLOOD PRESSURE: 138 MMHG | HEART RATE: 96 BPM | HEIGHT: 61 IN | OXYGEN SATURATION: 97 % | TEMPERATURE: 97.2 F | RESPIRATION RATE: 13 BRPM | BODY MASS INDEX: 24.39 KG/M2

## 2024-10-07 PROBLEM — T88.4XXA DIFFICULT INTUBATION: Status: ACTIVE | Noted: 2024-10-07

## 2024-10-07 PROBLEM — D64.9 ANEMIA: Status: ACTIVE | Noted: 2024-10-07

## 2024-10-07 LAB
ALBUMIN SERPL BCP-MCNC: 3.1 G/DL (ref 3.4–5)
ALP SERPL-CCNC: 169 U/L (ref 33–136)
ALT SERPL W P-5'-P-CCNC: 42 U/L (ref 7–45)
ANION GAP BLDA CALCULATED.4IONS-SCNC: 13 MMO/L (ref 10–25)
ANION GAP BLDA CALCULATED.4IONS-SCNC: 15 MMO/L (ref 10–25)
ANION GAP SERPL CALC-SCNC: 25 MMOL/L (ref 10–20)
AORTIC VALVE MEAN GRADIENT: 18 MMHG
AORTIC VALVE PEAK VELOCITY: 2.83 M/S
AST SERPL W P-5'-P-CCNC: 21 U/L (ref 9–39)
AV PEAK GRADIENT: 32 MMHG
AVA (PEAK VEL): 1.49 CM2
AVA (VTI): 0.89 CM2
BACTERIA BLD CULT: NORMAL
BACTERIA BLD CULT: NORMAL
BASE EXCESS BLDA CALC-SCNC: -0.8 MMOL/L (ref -2–3)
BASE EXCESS BLDA CALC-SCNC: -3.9 MMOL/L (ref -2–3)
BASOPHILS # BLD AUTO: 0.01 X10*3/UL (ref 0–0.1)
BASOPHILS NFR BLD AUTO: 0.1 %
BILIRUB DIRECT SERPL-MCNC: 1.4 MG/DL (ref 0–0.3)
BILIRUB SERPL-MCNC: 2.1 MG/DL (ref 0–1.2)
BODY SURFACE AREA: 1.56 M2
BODY TEMPERATURE: 37 DEGREES CELSIUS
BODY TEMPERATURE: 37 DEGREES CELSIUS
BUN SERPL-MCNC: 53 MG/DL (ref 6–23)
CA-I BLDA-SCNC: 1.05 MMOL/L (ref 1.1–1.33)
CA-I BLDA-SCNC: 1.14 MMOL/L (ref 1.1–1.33)
CALCIUM SERPL-MCNC: 8 MG/DL (ref 8.6–10.6)
CHLORIDE BLDA-SCNC: 101 MMOL/L (ref 98–107)
CHLORIDE BLDA-SCNC: 101 MMOL/L (ref 98–107)
CHLORIDE SERPL-SCNC: 87 MMOL/L (ref 98–107)
CO2 SERPL-SCNC: 22 MMOL/L (ref 21–32)
COHGB MFR BLDA: 1.5 %
CREAT SERPL-MCNC: 5.74 MG/DL (ref 0.5–1.05)
DO-HGB MFR BLDA: 0.3 % (ref 0–5)
EGFRCR SERPLBLD CKD-EPI 2021: 8 ML/MIN/1.73M*2
EJECTION FRACTION APICAL 4 CHAMBER: 49
EJECTION FRACTION: 53 %
EOSINOPHIL # BLD AUTO: 0 X10*3/UL (ref 0–0.7)
EOSINOPHIL NFR BLD AUTO: 0 %
ERYTHROCYTE [DISTWIDTH] IN BLOOD BY AUTOMATED COUNT: 17.9 % (ref 11.5–14.5)
GLUCOSE BLD MANUAL STRIP-MCNC: 100 MG/DL (ref 74–99)
GLUCOSE BLD MANUAL STRIP-MCNC: 109 MG/DL (ref 74–99)
GLUCOSE BLD MANUAL STRIP-MCNC: 129 MG/DL (ref 74–99)
GLUCOSE BLD MANUAL STRIP-MCNC: 138 MG/DL (ref 74–99)
GLUCOSE BLD MANUAL STRIP-MCNC: 44 MG/DL (ref 74–99)
GLUCOSE BLD MANUAL STRIP-MCNC: 96 MG/DL (ref 74–99)
GLUCOSE BLDA-MCNC: 116 MG/DL (ref 74–99)
GLUCOSE BLDA-MCNC: 131 MG/DL (ref 74–99)
GLUCOSE SERPL-MCNC: 144 MG/DL (ref 74–99)
HCO3 BLDA-SCNC: 20.4 MMOL/L (ref 22–26)
HCO3 BLDA-SCNC: 21.8 MMOL/L (ref 22–26)
HCT VFR BLD AUTO: 24.2 % (ref 36–46)
HCT VFR BLD EST: 25 % (ref 36–46)
HCT VFR BLD EST: 27 % (ref 36–46)
HGB BLD-MCNC: 8.6 G/DL (ref 12–16)
HGB BLDA-MCNC: 8.4 G/DL (ref 12–16)
HGB BLDA-MCNC: 9 G/DL (ref 12–16)
HGB BLDA-MCNC: 9 G/DL (ref 12–16)
IMM GRANULOCYTES # BLD AUTO: 0.43 X10*3/UL (ref 0–0.7)
IMM GRANULOCYTES NFR BLD AUTO: 2.6 % (ref 0–0.9)
INHALED O2 CONCENTRATION: 44 %
INHALED O2 CONCENTRATION: 90 %
LACTATE BLDA-SCNC: 2.2 MMOL/L (ref 0.4–2)
LACTATE BLDA-SCNC: 2.7 MMOL/L (ref 0.4–2)
LEFT ATRIUM VOLUME AREA LENGTH INDEX BSA: 55.8 ML/M2
LEFT VENTRICLE INTERNAL DIMENSION DIASTOLE: 4 CM (ref 3.5–6)
LEFT VENTRICULAR OUTFLOW TRACT DIAMETER: 1.7 CM
LYMPHOCYTES # BLD AUTO: 0.22 X10*3/UL (ref 1.2–4.8)
LYMPHOCYTES NFR BLD AUTO: 1.3 %
MAGNESIUM SERPL-MCNC: 2.11 MG/DL (ref 1.6–2.4)
MCH RBC QN AUTO: 31 PG (ref 26–34)
MCHC RBC AUTO-ENTMCNC: 35.5 G/DL (ref 32–36)
MCV RBC AUTO: 87 FL (ref 80–100)
METHGB MFR BLDA: 0.7 % (ref 0–1.5)
MITRAL VALVE E/A RATIO: 0.94
MONOCYTES # BLD AUTO: 1.17 X10*3/UL (ref 0.1–1)
MONOCYTES NFR BLD AUTO: 7 %
NEUTROPHILS # BLD AUTO: 14.88 X10*3/UL (ref 1.2–7.7)
NEUTROPHILS NFR BLD AUTO: 89 %
NRBC BLD-RTO: 1.8 /100 WBCS (ref 0–0)
OXYHGB MFR BLDA: 97.1 % (ref 94–98)
OXYHGB MFR BLDA: 97.5 % (ref 94–98)
OXYHGB MFR BLDA: 97.5 % (ref 94–98)
PCO2 BLDA: 28 MM HG (ref 38–42)
PCO2 BLDA: 33 MM HG (ref 38–42)
PH BLDA: 7.4 PH (ref 7.38–7.42)
PH BLDA: 7.5 PH (ref 7.38–7.42)
PHOSPHATE SERPL-MCNC: 7.8 MG/DL (ref 2.5–4.9)
PLATELET # BLD AUTO: 78 X10*3/UL (ref 150–450)
PO2 BLDA: 156 MM HG (ref 85–95)
PO2 BLDA: 468 MM HG (ref 85–95)
POTASSIUM BLDA-SCNC: 4.1 MMOL/L (ref 3.5–5.3)
POTASSIUM BLDA-SCNC: 4.3 MMOL/L (ref 3.5–5.3)
POTASSIUM SERPL-SCNC: 5 MMOL/L (ref 3.5–5.3)
PROT SERPL-MCNC: 6.3 G/DL (ref 6.4–8.2)
RBC # BLD AUTO: 2.77 X10*6/UL (ref 4–5.2)
RIGHT VENTRICLE PEAK SYSTOLIC PRESSURE: 72.5 MMHG
SAO2 % BLDA: 100 % (ref 94–100)
SAO2 % BLDA: 99 % (ref 94–100)
SODIUM BLDA-SCNC: 132 MMOL/L (ref 136–145)
SODIUM BLDA-SCNC: 132 MMOL/L (ref 136–145)
SODIUM SERPL-SCNC: 129 MMOL/L (ref 136–145)
TRICUSPID ANNULAR PLANE SYSTOLIC EXCURSION: 1.2 CM
UFH PPP CHRO-ACNC: 0.5 IU/ML
WBC # BLD AUTO: 16.7 X10*3/UL (ref 4.4–11.3)

## 2024-10-07 PROCEDURE — 82947 ASSAY GLUCOSE BLOOD QUANT: CPT

## 2024-10-07 PROCEDURE — 36620 INSERTION CATHETER ARTERY: CPT | Performed by: ANESTHESIOLOGY

## 2024-10-07 PROCEDURE — 2500000004 HC RX 250 GENERAL PHARMACY W/ HCPCS (ALT 636 FOR OP/ED)

## 2024-10-07 PROCEDURE — 85025 COMPLETE CBC W/AUTO DIFF WBC: CPT

## 2024-10-07 PROCEDURE — 84132 ASSAY OF SERUM POTASSIUM: CPT

## 2024-10-07 PROCEDURE — 99221 1ST HOSP IP/OBS SF/LOW 40: CPT | Performed by: OTOLARYNGOLOGY

## 2024-10-07 PROCEDURE — 3700000001 HC GENERAL ANESTHESIA TIME - INITIAL BASE CHARGE: Performed by: ORTHOPAEDIC SURGERY

## 2024-10-07 PROCEDURE — 82375 ASSAY CARBOXYHB QUANT: CPT

## 2024-10-07 PROCEDURE — 8010000001 HC DIALYSIS - HEMODIALYSIS PER DAY

## 2024-10-07 PROCEDURE — 99232 SBSQ HOSP IP/OBS MODERATE 35: CPT | Performed by: STUDENT IN AN ORGANIZED HEALTH CARE EDUCATION/TRAINING PROGRAM

## 2024-10-07 PROCEDURE — 3600000004 HC OR TIME - INITIAL BASE CHARGE - PROCEDURE LEVEL FOUR: Performed by: ORTHOPAEDIC SURGERY

## 2024-10-07 PROCEDURE — A6213 FOAM DRG >16<=48 SQ IN W/BDR: HCPCS | Performed by: ORTHOPAEDIC SURGERY

## 2024-10-07 PROCEDURE — A27245 PR OPEN FIX INTER/SUBTROCH FX,IMPLNT: Performed by: ANESTHESIOLOGY

## 2024-10-07 PROCEDURE — 2500000002 HC RX 250 W HCPCS SELF ADMINISTERED DRUGS (ALT 637 FOR MEDICARE OP, ALT 636 FOR OP/ED)

## 2024-10-07 PROCEDURE — 83735 ASSAY OF MAGNESIUM: CPT

## 2024-10-07 PROCEDURE — 2500000004 HC RX 250 GENERAL PHARMACY W/ HCPCS (ALT 636 FOR OP/ED): Performed by: ANESTHESIOLOGIST ASSISTANT

## 2024-10-07 PROCEDURE — C1769 GUIDE WIRE: HCPCS | Performed by: ORTHOPAEDIC SURGERY

## 2024-10-07 PROCEDURE — A27245 PR OPEN FIX INTER/SUBTROCH FX,IMPLNT: Performed by: ANESTHESIOLOGIST ASSISTANT

## 2024-10-07 PROCEDURE — 2500000005 HC RX 250 GENERAL PHARMACY W/O HCPCS: Performed by: ANESTHESIOLOGIST ASSISTANT

## 2024-10-07 PROCEDURE — 3600000009 HC OR TIME - EACH INCREMENTAL 1 MINUTE - PROCEDURE LEVEL FOUR: Performed by: ORTHOPAEDIC SURGERY

## 2024-10-07 PROCEDURE — 2500000005 HC RX 250 GENERAL PHARMACY W/O HCPCS

## 2024-10-07 PROCEDURE — 2500000001 HC RX 250 WO HCPCS SELF ADMINISTERED DRUGS (ALT 637 FOR MEDICARE OP)

## 2024-10-07 PROCEDURE — 37799 UNLISTED PX VASCULAR SURGERY: CPT

## 2024-10-07 PROCEDURE — 2020000001 HC ICU ROOM DAILY

## 2024-10-07 PROCEDURE — C1713 ANCHOR/SCREW BN/BN,TIS/BN: HCPCS | Performed by: ORTHOPAEDIC SURGERY

## 2024-10-07 PROCEDURE — 99233 SBSQ HOSP IP/OBS HIGH 50: CPT | Performed by: INTERNAL MEDICINE

## 2024-10-07 PROCEDURE — 2780000003 HC OR 278 NO HCPCS: Performed by: ORTHOPAEDIC SURGERY

## 2024-10-07 PROCEDURE — 93010 ELECTROCARDIOGRAM REPORT: CPT | Performed by: INTERNAL MEDICINE

## 2024-10-07 PROCEDURE — 2720000007 HC OR 272 NO HCPCS: Performed by: ORTHOPAEDIC SURGERY

## 2024-10-07 PROCEDURE — 85520 HEPARIN ASSAY: CPT

## 2024-10-07 PROCEDURE — 2500000001 HC RX 250 WO HCPCS SELF ADMINISTERED DRUGS (ALT 637 FOR MEDICARE OP): Performed by: ANESTHESIOLOGIST ASSISTANT

## 2024-10-07 PROCEDURE — 82248 BILIRUBIN DIRECT: CPT

## 2024-10-07 PROCEDURE — 2500000005 HC RX 250 GENERAL PHARMACY W/O HCPCS: Performed by: ORTHOPAEDIC SURGERY

## 2024-10-07 PROCEDURE — 84132 ASSAY OF SERUM POTASSIUM: CPT | Performed by: STUDENT IN AN ORGANIZED HEALTH CARE EDUCATION/TRAINING PROGRAM

## 2024-10-07 PROCEDURE — 27245 TREAT THIGH FRACTURE: CPT | Performed by: ORTHOPAEDIC SURGERY

## 2024-10-07 PROCEDURE — 0QS604Z REPOSITION RIGHT UPPER FEMUR WITH INTERNAL FIXATION DEVICE, OPEN APPROACH: ICD-10-PCS | Performed by: ORTHOPAEDIC SURGERY

## 2024-10-07 PROCEDURE — 84100 ASSAY OF PHOSPHORUS: CPT

## 2024-10-07 PROCEDURE — 80048 BASIC METABOLIC PNL TOTAL CA: CPT

## 2024-10-07 PROCEDURE — P9047 ALBUMIN (HUMAN), 25%, 50ML: HCPCS | Mod: JZ | Performed by: ANESTHESIOLOGIST ASSISTANT

## 2024-10-07 PROCEDURE — 3700000002 HC GENERAL ANESTHESIA TIME - EACH INCREMENTAL 1 MINUTE: Performed by: ORTHOPAEDIC SURGERY

## 2024-10-07 PROCEDURE — 99291 CRITICAL CARE FIRST HOUR: CPT

## 2024-10-07 DEVICE — SCREW, TFNA, 85MM, STERILE: Type: IMPLANTABLE DEVICE | Site: HIP | Status: FUNCTIONAL

## 2024-10-07 DEVICE — NAIL, TFN ADV SHORT, 170MML, DIAMETER 10, 125 DEG: Type: IMPLANTABLE DEVICE | Site: HIP | Status: FUNCTIONAL

## 2024-10-07 DEVICE — SCREW, LOCKING 5.0MM X 32MM TI STERILE: Type: IMPLANTABLE DEVICE | Site: HIP | Status: FUNCTIONAL

## 2024-10-07 RX ORDER — SODIUM CHLORIDE 0.9 G/100ML
IRRIGANT IRRIGATION AS NEEDED
Status: DISCONTINUED | OUTPATIENT
Start: 2024-10-07 | End: 2024-10-07 | Stop reason: HOSPADM

## 2024-10-07 RX ORDER — EPINEPHRINE HCL IN 0.9 % NACL 4MG/250ML
PLASTIC BAG, INJECTION (ML) INTRAVENOUS CONTINUOUS PRN
Status: DISCONTINUED | OUTPATIENT
Start: 2024-10-07 | End: 2024-10-07

## 2024-10-07 RX ORDER — FENTANYL CITRATE 50 UG/ML
INJECTION, SOLUTION INTRAMUSCULAR; INTRAVENOUS AS NEEDED
Status: DISCONTINUED | OUTPATIENT
Start: 2024-10-07 | End: 2024-10-07

## 2024-10-07 RX ORDER — PROPOFOL 10 MG/ML
INJECTION, EMULSION INTRAVENOUS AS NEEDED
Status: DISCONTINUED | OUTPATIENT
Start: 2024-10-07 | End: 2024-10-07

## 2024-10-07 RX ORDER — ROCURONIUM BROMIDE 10 MG/ML
INJECTION, SOLUTION INTRAVENOUS AS NEEDED
Status: DISCONTINUED | OUTPATIENT
Start: 2024-10-07 | End: 2024-10-07

## 2024-10-07 RX ORDER — ONDANSETRON HYDROCHLORIDE 2 MG/ML
INJECTION, SOLUTION INTRAVENOUS AS NEEDED
Status: DISCONTINUED | OUTPATIENT
Start: 2024-10-07 | End: 2024-10-07

## 2024-10-07 RX ORDER — MIDAZOLAM HYDROCHLORIDE 1 MG/ML
INJECTION INTRAMUSCULAR; INTRAVENOUS CONTINUOUS PRN
Status: DISCONTINUED | OUTPATIENT
Start: 2024-10-07 | End: 2024-10-07

## 2024-10-07 RX ORDER — CALCIUM CHLORIDE INJECTION 100 MG/ML
INJECTION, SOLUTION INTRAVENOUS AS NEEDED
Status: DISCONTINUED | OUTPATIENT
Start: 2024-10-07 | End: 2024-10-07

## 2024-10-07 RX ORDER — CEFAZOLIN 1 G/1
INJECTION, POWDER, FOR SOLUTION INTRAVENOUS AS NEEDED
Status: DISCONTINUED | OUTPATIENT
Start: 2024-10-07 | End: 2024-10-07

## 2024-10-07 RX ORDER — ALBUTEROL SULFATE 90 UG/1
INHALANT RESPIRATORY (INHALATION) AS NEEDED
Status: DISCONTINUED | OUTPATIENT
Start: 2024-10-07 | End: 2024-10-07

## 2024-10-07 RX ORDER — ALBUMIN HUMAN 250 G/1000ML
SOLUTION INTRAVENOUS CONTINUOUS PRN
Status: DISCONTINUED | OUTPATIENT
Start: 2024-10-07 | End: 2024-10-07

## 2024-10-07 RX ORDER — EPINEPHRINE HCL IN DEXTROSE 5% 4MG/250ML
0-1 PLASTIC BAG, INJECTION (ML) INTRAVENOUS CONTINUOUS
Status: DISCONTINUED | OUTPATIENT
Start: 2024-10-07 | End: 2024-10-08

## 2024-10-07 RX ORDER — HYDROMORPHONE HYDROCHLORIDE 1 MG/ML
0.2 INJECTION, SOLUTION INTRAMUSCULAR; INTRAVENOUS; SUBCUTANEOUS
Status: DISCONTINUED | OUTPATIENT
Start: 2024-10-07 | End: 2024-10-10

## 2024-10-07 RX ADMIN — HYDROCORTISONE SODIUM SUCCINATE 50 MG: 100 INJECTION, POWDER, FOR SOLUTION INTRAMUSCULAR; INTRAVENOUS at 05:00

## 2024-10-07 RX ADMIN — HYDROCORTISONE SODIUM SUCCINATE 50 MG: 100 INJECTION, POWDER, FOR SOLUTION INTRAMUSCULAR; INTRAVENOUS at 18:23

## 2024-10-07 RX ADMIN — OXYCODONE HYDROCHLORIDE 10 MG: 5 TABLET ORAL at 19:47

## 2024-10-07 RX ADMIN — Medication 21 PERCENT: at 08:13

## 2024-10-07 RX ADMIN — ACETAMINOPHEN 650 MG: 325 TABLET, FILM COATED ORAL at 08:01

## 2024-10-07 RX ADMIN — PANTOPRAZOLE SODIUM 40 MG: 40 INJECTION, POWDER, FOR SOLUTION INTRAVENOUS at 06:05

## 2024-10-07 RX ADMIN — HYDROCORTISONE SODIUM SUCCINATE 50 MG: 100 INJECTION, POWDER, FOR SOLUTION INTRAMUSCULAR; INTRAVENOUS at 10:49

## 2024-10-07 RX ADMIN — AMIODARONE HYDROCHLORIDE 200 MG: 200 TABLET ORAL at 08:01

## 2024-10-07 RX ADMIN — ATORVASTATIN CALCIUM 40 MG: 40 TABLET, FILM COATED ORAL at 20:46

## 2024-10-07 RX ADMIN — OXYCODONE HYDROCHLORIDE 10 MG: 5 TABLET ORAL at 10:49

## 2024-10-07 RX ADMIN — ACETAMINOPHEN 650 MG: 325 TABLET, FILM COATED ORAL at 01:59

## 2024-10-07 RX ADMIN — HYDROMORPHONE HYDROCHLORIDE 0.2 MG: 1 INJECTION, SOLUTION INTRAMUSCULAR; INTRAVENOUS; SUBCUTANEOUS at 22:36

## 2024-10-07 RX ADMIN — ACETAMINOPHEN 650 MG: 325 TABLET, FILM COATED ORAL at 19:47

## 2024-10-07 ASSESSMENT — COGNITIVE AND FUNCTIONAL STATUS - GENERAL
STANDING UP FROM CHAIR USING ARMS: TOTAL
MOBILITY SCORE: 8
TURNING FROM BACK TO SIDE WHILE IN FLAT BAD: A LOT
MOVING FROM LYING ON BACK TO SITTING ON SIDE OF FLAT BED WITH BEDRAILS: A LOT
MOVING TO AND FROM BED TO CHAIR: TOTAL
DRESSING REGULAR UPPER BODY CLOTHING: TOTAL
WALKING IN HOSPITAL ROOM: TOTAL
PERSONAL GROOMING: TOTAL
DAILY ACTIVITIY SCORE: 9
CLIMB 3 TO 5 STEPS WITH RAILING: TOTAL
TOILETING: TOTAL
DRESSING REGULAR LOWER BODY CLOTHING: TOTAL
HELP NEEDED FOR BATHING: TOTAL

## 2024-10-07 ASSESSMENT — PAIN SCALES - GENERAL
PAINLEVEL_OUTOF10: 8
PAINLEVEL_OUTOF10: 0 - NO PAIN
PAINLEVEL_OUTOF10: 7
PAINLEVEL_OUTOF10: 0 - NO PAIN
PAINLEVEL_OUTOF10: 8

## 2024-10-07 ASSESSMENT — PAIN DESCRIPTION - LOCATION
LOCATION: HIP
LOCATION: HIP
LOCATION: SACRUM

## 2024-10-07 ASSESSMENT — PAIN - FUNCTIONAL ASSESSMENT
PAIN_FUNCTIONAL_ASSESSMENT: CPOT (CRITICAL CARE PAIN OBSERVATION TOOL)
PAIN_FUNCTIONAL_ASSESSMENT: 0-10
PAIN_FUNCTIONAL_ASSESSMENT: 0-10
PAIN_FUNCTIONAL_ASSESSMENT: CPOT (CRITICAL CARE PAIN OBSERVATION TOOL)
PAIN_FUNCTIONAL_ASSESSMENT: 0-10

## 2024-10-07 ASSESSMENT — PAIN DESCRIPTION - ORIENTATION
ORIENTATION: RIGHT
ORIENTATION: RIGHT

## 2024-10-07 ASSESSMENT — PAIN DESCRIPTION - DESCRIPTORS: DESCRIPTORS: ACHING;DISCOMFORT

## 2024-10-07 NOTE — CARE PLAN
Problem: Skin  Goal: Participates in plan/prevention/treatment measures  Outcome: Progressing  Flowsheets (Taken 10/7/2024 1044)  Participates in plan/prevention/treatment measures:   Discuss with provider PT/OT consult   Elevate heels  Goal: Prevent/manage excess moisture  Outcome: Progressing  Flowsheets (Taken 10/6/2024 1940 by Jeff Michele RN)  Prevent/manage excess moisture:   Cleanse incontinence/protect with barrier cream   Moisturize dry skin   Monitor for/manage infection if present  Goal: Prevent/minimize sheer/friction injuries  Outcome: Progressing  Flowsheets (Taken 10/6/2024 1940 by Jeff Michele RN)  Prevent/minimize sheer/friction injuries:   Complete micro-shifts as needed if patient unable. Adjust patient position to relieve pressure points, not a full turn   HOB 30 degrees or less   Turn/reposition every 2 hours/use positioning/transfer devices   Utilize specialty bed per algorithm   Use pull sheet  Goal: Promote/optimize nutrition  Outcome: Progressing  Flowsheets (Taken 10/6/2024 1940 by Jeff Michele RN)  Promote/optimize nutrition: Monitor/record intake including meals  Goal: Promote skin healing  Outcome: Progressing  Flowsheets (Taken 10/6/2024 1940 by Jeff Michele RN)  Promote skin healing:   Assess skin/pad under line(s)/device(s)   Ensure correct size (line/device) and apply per  instructions   Rotate device position/do not position patient on device   Turn/reposition every 2 hours/use positioning/transfer devices  Goal: Decreased wound size/increased tissue granulation at next dressing change  Outcome: Progressing  Flowsheets (Taken 10/6/2024 1940 by Jeff Michele RN)  Decreased wound size/increased tissue granulation at next dressing change:   Promote sleep for wound healing   Protective dressings over bony prominences   Utilize specialty bed per algorithm     Problem: Fall/Injury  Goal: Not fall by end of shift  Outcome: Progressing  Goal: Be free from injury  by end of the shift  Outcome: Progressing  Goal: Verbalize understanding of personal risk factors for fall in the hospital  Outcome: Progressing  Goal: Verbalize understanding of risk factor reduction measures to prevent injury from fall in the home  Outcome: Progressing     Problem: Pain - Adult  Goal: Verbalizes/displays adequate comfort level or baseline comfort level  Outcome: Progressing  Flowsheets (Taken 10/7/2024 1044)  Verbalizes/displays adequate comfort level or baseline comfort level:   Administer analgesics based on type and severity of pain and evaluate response   Encourage patient to monitor pain and request assistance   Implement non-pharmacological measures as appropriate and evaluate response   Assess pain using appropriate pain scale     Problem: Safety - Adult  Goal: Free from fall injury  Outcome: Progressing  Flowsheets (Taken 10/7/2024 1044)  Free from fall injury: Instruct family/caregiver on patient safety     Problem: Pain  Goal: Turns in bed with improved pain control throughout the shift  Outcome: Progressing  Goal: Participates in PT with improved pain control throughout the shift  Outcome: Progressing  Goal: Free from opioid side effects throughout the shift  Outcome: Progressing  Goal: Free from acute confusion related to pain meds throughout the shift  Outcome: Progressing     Problem: Nutrition  Goal: Oral intake greater than 50%  Outcome: Progressing  Goal: Oral intake greater 75%  Outcome: Progressing  Goal: Consume prescribed supplement  Outcome: Progressing  Goal: Adequate PO fluid intake  Outcome: Progressing  Goal: Nutrition support goals are met within 48 hrs  Outcome: Progressing  Goal: Nutrition support is meeting 75% of nutrient needs  Outcome: Progressing  Goal: BG  mg/dL  Outcome: Progressing  Goal: Lab values WNL  Outcome: Progressing  Goal: Electrolytes WNL  Outcome: Progressing  Goal: Promote healing  Outcome: Progressing  Goal: Maintain stable weight  Outcome:  Progressing  Goal: Reduce weight from edema/fluid  Outcome: Progressing  Goal: Gradual weight gain  Outcome: Progressing

## 2024-10-07 NOTE — H&P
[facility] Department of Orthopaedic Surgery   Surgical History & Physical <30 Days    Reason for Surgery: L Greater troch fracture with possible IT extension  Planned Procedure: L CMN femur    History & Physical Reviewed:  I have reviewed the History and Physical within 30 days dated 10/6. Relevant findings and updates are noted below:  No significant changes.    Home medications were reviewed with significant updates noted below:  No significant changes.    ERAS patient?: No    COVID-19 Risk Consent:   Surgeon has reviewed the key risks related to jose COVID-19 and subsequent sequelae.     10/07/24 at 4:56 AM - Dayana Phillips DO

## 2024-10-07 NOTE — CONSULTS
Wound Care Consult     Visit Date: 10/7/2024      Patient Name: Misael Ritchie         MRN: [id number]           YOB: 1955     Reason for Consult: assess sacrum         Wound History: Patient  PMH aortic stenosis, atrial fibrillation, CAD, CKD, ESRD on hemodialysis, CHF (8/2024 EF ~48%). New to dialysis, patient states last 6 months.      Pertinent Labs:   Albumin   Date Value Ref Range Status   10/07/2024 3.1 (L) 3.4 - 5.0 g/dL Final       Wound Assessment:  Wound 10/03/24 Contusion (bruising) Face Right;Upper (Active)   Wound Image   10/06/24 1947   Site Assessment Purple;Swelling 10/07/24 0847   Charmaine-Wound Assessment Intact 10/07/24 0847   Dressing Open to air 10/07/24 0847       Wound 10/05/24 Pressure Injury Sacrum (Active)   Wound Image   10/07/24 1007   Site Assessment Black;Red;Painful 10/07/24 1007   Charmaine-Wound Assessment Painful 10/06/24 1600   Pressure Injury Stage DTPI 10/07/24 1007   Wound Length (cm) 7 cm 10/07/24 1007   Wound Width (cm) 7 cm 10/07/24 1007   Wound Surface Area (cm^2) 49 cm^2 10/07/24 1007   Wound Depth (cm) 0.2 cm 10/07/24 1007   Wound Volume (cm^3) 9.8 cm^3 10/07/24 1007   Drainage Description Serous 10/07/24 1007   Drainage Amount Small 10/07/24 1007   Dressing Silicone border dressing;Xeroform 10/07/24 1007   Dressing Changed New 10/07/24 [facility]   Dressing Status Clean 10/07/24 [facility]       Wound Team Summary Assessment: patient has evolving deep tissue injury on sacrum/ coccyx area. Patient states that the wound is very painful. It started around the time of dialysis. The treatments she has tried had not been effective     Recommendations:   Cleanse wound with Vashe  Cover wound bed with xeroform dressing  Apply Mepilex border dressing  Turn every 2 hours  Maintain waffle mattress and Air taps      Patient would benefit from a referral to wound care clinic after discharge   Waffle chair pad ordered for patient when out of bed       Wound Team Plan: Please review  recommendations      Deena CINTRON   10/7/2024  12:48 PM

## 2024-10-07 NOTE — PROGRESS NOTES
"Orthopaedic Surgery Progress Note    Subjective:    Patient remains in MICU on Epi. NPO since MN for possible OR today. Clearance pending AM Anesthesia evaluation.     Objective:  BP (!) 131/45 (BP Location: Left arm, Patient Position: Lying)   Pulse 85   Temp 36.2 °C (97.2 °F) (Temporal)   Resp 11   Ht 1.549 m (5' 1\")   Wt 58.6 kg (129 lb 3 oz)   SpO2 93%   BMI 24.41 kg/m²     Gen: arousable, NAD, appropriately conversational  Cardiac: RRR to peripheral palpation  Resp: nonlabored on RA  GI: soft, non-distended  MSK:  Right lower extremity:  - Closed injury  - Compartments soft and compressible  - Fires TA/GS/EHL  - SILT in [place]/Sa/SP/DP/T distributions  - Palpable DP pulse    Results for orders placed or performed during the hospital encounter of 10/03/24 (from the past 24 hour(s))   Heparin Assay, UFH   Result Value Ref Range    Heparin Unfractionated 0.2 See Comment Below for Therapeutic Ranges IU/mL   POCT GLUCOSE   Result Value Ref Range    POCT Glucose 152 (H) 74 - 99 mg/dL   POCT GLUCOSE   Result Value Ref Range    POCT Glucose 140 (H) 74 - 99 mg/dL   BLOOD GAS ARTERIAL FULL PANEL   Result Value Ref Range    POCT pH, Arterial 7.41 7.38 - 7.42 pH    POCT pCO2, Arterial 31 (L) 38 - 42 mm Hg    POCT pO2, Arterial 64 (L) 85 - 95 mm Hg    POCT SO2, Arterial 91 (L) 94 - 100 %    POCT Oxy Hemoglobin, Arterial 89.5 (L) 94.0 - 98.0 %    POCT Hematocrit Calculated, Arterial 29.0 (L) 36.0 - 46.0 %    POCT Sodium, Arterial 125 (L) 136 - 145 mmol/L    POCT Potassium, Arterial 5.0 3.5 - 5.3 mmol/L    POCT Chloride, Arterial 92 (L) 98 - 107 mmol/L    POCT Ionized Calcium, Arterial 1.04 (L) 1.10 - 1.33 mmol/L    POCT Glucose, Arterial 177 (H) 74 - 99 mg/dL    POCT Lactate, Arterial 3.8 (H) 0.4 - 2.0 mmol/L    POCT Base Excess, Arterial -4.3 (L) -2.0 - 3.0 mmol/L    POCT HCO3 Calculated, Arterial 19.6 (L) 22.0 - 26.0 mmol/L    POCT Hemoglobin, Arterial 9.8 (L) 12.0 - 16.0 g/dL    POCT Anion Gap, Arterial 18 10 - 25 " mmo/L    Patient Temperature 37.0 degrees Celsius    FiO2 21 %   CBC and Auto Differential   Result Value Ref Range    WBC 16.8 (H) 4.4 - 11.3 x10*3/uL    nRBC 0.7 (H) 0.0 - 0.0 /100 WBCs    RBC 2.94 (L) 4.00 - 5.20 x10*6/uL    Hemoglobin 9.3 (L) 12.0 - 16.0 g/dL    Hematocrit 25.5 (L) 36.0 - 46.0 %    MCV 87 80 - 100 fL    MCH 31.6 26.0 - 34.0 pg    MCHC 36.5 (H) 32.0 - 36.0 g/dL    RDW 17.7 (H) 11.5 - 14.5 %    Platelets 79 (L) 150 - 450 x10*3/uL    Neutrophils % 90.8 40.0 - 80.0 %    Immature Granulocytes %, Automated 1.3 (H) 0.0 - 0.9 %    Lymphocytes % 1.3 13.0 - 44.0 %    Monocytes % 6.4 2.0 - 10.0 %    Eosinophils % 0.0 0.0 - 6.0 %    Basophils % 0.2 0.0 - 2.0 %    Neutrophils Absolute 15.25 (H) 1.20 - 7.70 x10*3/uL    Immature Granulocytes Absolute, Automated 0.21 0.00 - 0.70 x10*3/uL    Lymphocytes Absolute 0.22 (L) 1.20 - 4.80 x10*3/uL    Monocytes Absolute 1.07 (H) 0.10 - 1.00 x10*3/uL    Eosinophils Absolute 0.00 0.00 - 0.70 x10*3/uL    Basophils Absolute 0.03 0.00 - 0.10 x10*3/uL   Lactate   Result Value Ref Range    Lactate 3.9 (H) 0.4 - 2.0 mmol/L   POCT GLUCOSE   Result Value Ref Range    POCT Glucose 158 (H) 74 - 99 mg/dL   POCT GLUCOSE   Result Value Ref Range    POCT Glucose 132 (H) 74 - 99 mg/dL   Heparin Assay, UFH   Result Value Ref Range    Heparin Unfractionated 0.4 See Comment Below for Therapeutic Ranges IU/mL   POCT GLUCOSE   Result Value Ref Range    POCT Glucose 141 (H) 74 - 99 mg/dL   POCT GLUCOSE   Result Value Ref Range    POCT Glucose 138 (H) 74 - 99 mg/dL   Magnesium   Result Value Ref Range    Magnesium 2.11 1.60 - 2.40 mg/dL   Hepatic Function Panel   Result Value Ref Range    Albumin 3.1 (L) 3.4 - 5.0 g/dL    Bilirubin, Total 2.1 (H) 0.0 - 1.2 mg/dL    Bilirubin, Direct 1.4 (H) 0.0 - 0.3 mg/dL    Alkaline Phosphatase 169 (H) 33 - 136 U/L    ALT 42 7 - 45 U/L    AST 21 9 - 39 U/L    Total Protein 6.3 (L) 6.4 - 8.2 g/dL   CBC and Auto Differential   Result Value Ref Range    WBC  16.7 (H) 4.4 - 11.3 x10*3/uL    nRBC 1.8 (H) 0.0 - 0.0 /100 WBCs    RBC 2.77 (L) 4.00 - 5.20 x10*6/uL    Hemoglobin 8.6 (L) 12.0 - 16.0 g/dL    Hematocrit 24.2 (L) 36.0 - 46.0 %    MCV 87 80 - 100 fL    MCH 31.0 26.0 - 34.0 pg    MCHC 35.5 32.0 - 36.0 g/dL    RDW 17.9 (H) 11.5 - 14.5 %    Platelets 78 (L) 150 - 450 x10*3/uL    Neutrophils % 89.0 40.0 - 80.0 %    Immature Granulocytes %, Automated 2.6 (H) 0.0 - 0.9 %    Lymphocytes % 1.3 13.0 - 44.0 %    Monocytes % 7.0 2.0 - 10.0 %    Eosinophils % 0.0 0.0 - 6.0 %    Basophils % 0.1 0.0 - 2.0 %    Neutrophils Absolute 14.88 (H) 1.20 - 7.70 x10*3/uL    Immature Granulocytes Absolute, Automated 0.43 0.00 - 0.70 x10*3/uL    Lymphocytes Absolute 0.22 (L) 1.20 - 4.80 x10*3/uL    Monocytes Absolute 1.17 (H) 0.10 - 1.00 x10*3/uL    Eosinophils Absolute 0.00 0.00 - 0.70 x10*3/uL    Basophils Absolute 0.01 0.00 - 0.10 x10*3/uL   Heparin Assay   Result Value Ref Range    Heparin Unfractionated 0.5 See Comment Below for Therapeutic Ranges IU/mL   Phosphorus   Result Value Ref Range    Phosphorus 7.8 (H) 2.5 - 4.9 mg/dL   Basic Metabolic Panel   Result Value Ref Range    Glucose 144 (H) 74 - 99 mg/dL    Sodium 129 (L) 136 - 145 mmol/L    Potassium 5.0 3.5 - 5.3 mmol/L    Chloride 87 (L) 98 - 107 mmol/L    Bicarbonate 22 21 - 32 mmol/L    Anion Gap 25 (H) 10 - 20 mmol/L    Urea Nitrogen 53 (H) 6 - 23 mg/dL    Creatinine 5.74 (H) 0.50 - 1.05 mg/dL    eGFR 8 (L) >60 mL/min/1.73m*2    Calcium 8.0 (L) 8.6 - 10.6 mg/dL       CT head wo IV contrast   Final Result   No evidence of intracranial mass, extra-axial collection, hemorrhage   or infarction        MACRO:   none        Signed by: Trey Marquis 10/6/2024 2:12 PM   Dictation workstation:   [id number]      Cardiac device check - Inpatient         Transthoracic Echo (TTE) Complete   Final Result      XR femur right 1 view   Final Result   Fracture through the right greater trochanter, partially evaluated on   this portable  study. Recommend CT of the right hip to evaluate   intertrochanteric extension        I personally reviewed the images/study and I agree with the findings   as stated. This study was interpreted at [place], [place].        MACRO:   None        Signed by: Ortega Palmer 10/4/2024 10:10 AM   Dictation workstation:   [id number]      XR knee right 1-2 views   Final Result   Fracture through the right greater trochanter, partially evaluated on   this portable study. Recommend CT of the right hip to evaluate   intertrochanteric extension        I personally reviewed the images/study and I agree with the findings   as stated. This study was interpreted at [place], [place].        MACRO:   None        Signed by: Ortega Palmer 10/4/2024 10:10 AM   Dictation workstation:   [id number]      CT angio chest for pulmonary embolism   Final Result   1. No evidence of acute pulmonary embolism to segmental level. Please   note that, assessment of subsegmental branches is limited and small   peripheral emboli are not entirely excluded.   2. Marked cardiomegaly with enlargement of the right atrium, left   atrium, and right ventricle. There is enlargement of the IVC with   contrast reflux into the IVC and hepatic veins. Findings are   suggestive of right heart strain/dysfunction and/or tricuspid   regurgitation. Correlate with echocardiographic findings. There is   also moderate pericardial effusion. Recommend further evaluation with   echocardiography correlate with concern for component of cardiac   tamponade   3. Moderate-to-large right and trace left pleural effusions,   pericardial effusion, and abdominopelvic ascites. Correlate with   fluid volume status.   4. Multifocal central branching ground-glass opacities in the   bilateral lungs with questionable multifocal calcifications and   pulmonary ossification. Findings may represent  sequelae of chronic   pulmonary venous congestion which can be secondary to mitral valve   stenosis. Alternatively findings may be sequela of chronic infectious   or inflammatory process.   5. Please see dedicated CT imaging of the abdomen and pelvis for   further evaluation.        I personally reviewed the image(s)/study and resident interpretation.   I agree with the findings as stated by resident Jay Rowe.   Data analyzed and images interpreted at [facility], [place], [place].        MACRO:   Critical Finding:  See findings. Notification was initiated on   10/3/2024 at 4:51 pm by  Jay Rowe.  (**-OCF-**) Instructions:        Signed by: Brynn Gonzalezkit Amaya 10/3/2024 5:00 PM   Dictation workstation:   [id number]      CT abdomen pelvis w IV contrast   Final Result   1. Cortical irregularity at the greater trochanter of the right femur   suggestive of an acute, minimally displaced fracture. Adjacent to the   fracture site is an acute hematoma in the planes of the gluteal   muscles.   2. Moderate right pleural effusion with adjacent atelectasis as well   as a small to moderate pericardial effusion. There is also a small to   moderate amount of simple appearing ascites.   3. Incidental findings include cirrhosis and a probable cystic lesion   in the area of the pancreatic head. Further characterization by   contrast MRI on a routine outpatient basis is recommended if not   previously assessed.   4. Other chronic incidental findings as detailed above.        I personally reviewed the images/study and I agree with the findings   as stated by Michael Bundy MD (resident). This study was   interpreted at [facility],   [place], [place].        MACRO:   Michael Bundy discussed the significance and urgency of this   critical finding via epic secure chat with  Aimee Cavanaugh MD on   10/3/2024 at 4:50 pm.  (**-RCF-**) Findings:  See  findings.        Signed by: Alec Carbone 10/4/2024 7:36 AM   Dictation workstation:   [id number]      XR chest 1 view   Final Result   1. Increased consolidative opacities within the right lower lung with   diffuse hazy opacities of bilateral lungs. Bilateral costophrenic   angle blunting representing trace pleural effusions. Correlate with   volume status.   2. Unchanged cardiomediastinal enlargement.        I personally reviewed the images/study and I agree with the findings   as stated by Jeyson York MD. This study was interpreted at   [place], [place].        MACRO:   None        Signed by: Kirby Candaa 10/3/2024 1:45 PM   Dictation workstation:   [id number]      XR hip right with pelvis when performed 2 or 3 views   Final Result   No acute fracture or dislocation of the right hip. Similar bilateral   hip arthrosis.        I personally reviewed the images/study and I agree with the findings   as stated by Jeyson York MD. This study was interpreted at   [place], [place].        Signed by: Kirby Canada 10/3/2024 11:10 AM   Dictation workstation:   [id number]      CT head wo IV contrast   Final Result   1. Right periorbital and facial soft tissue swelling and hematoma   without evidence for orbital or facial bone fracture. There is also   very subtle fat stranding along the far anterior aspects of the   extraocular muscles. No measurable intraconal hematoma is identified.   2. There is prominent intraorbital and periorbital fat and suspected   proptosis bilaterally which may reflect underlying thyroid   orbitopathy.   3. Loss of the normal cervical lordosis with grade 1-2   anterolisthesis of C4 on C5 is favored to be remote/degenerative in   nature. Traumatic subluxation is thought to be less likely but is   difficult to entirely exclude. There is no definitive evidence for an   acute fracture of the cervical  spine.   4. Multilevel degenerative changes of the cervical spine with   associated central canal and neuroforaminal stenosis.   5. No evidence of acute intracranial hemorrhage.   6. Partially imaged large thyroid nodule for which nonemergent   thyroid ultrasound is recommended.                  MACRO:   None        Signed by: Stacy Mishra 10/3/2024 10:54 AM   Dictation workstation:   [id number]      CT maxillofacial bones wo IV contrast   Final Result   1. Right periorbital and facial soft tissue swelling and hematoma   without evidence for orbital or facial bone fracture. There is also   very subtle fat stranding along the far anterior aspects of the   extraocular muscles. No measurable intraconal hematoma is identified.   2. There is prominent intraorbital and periorbital fat and suspected   proptosis bilaterally which may reflect underlying thyroid   orbitopathy.   3. Loss of the normal cervical lordosis with grade 1-2   anterolisthesis of C4 on C5 is favored to be remote/degenerative in   nature. Traumatic subluxation is thought to be less likely but is   difficult to entirely exclude. There is no definitive evidence for an   acute fracture of the cervical spine.   4. Multilevel degenerative changes of the cervical spine with   associated central canal and neuroforaminal stenosis.   5. No evidence of acute intracranial hemorrhage.   6. Partially imaged large thyroid nodule for which nonemergent   thyroid ultrasound is recommended.                  MACRO:   None        Signed by: Stacy Mishra 10/3/2024 10:54 AM   Dictation workstation:   [id number]      CT cervical spine wo IV contrast   Final Result   1. Right periorbital and facial soft tissue swelling and hematoma   without evidence for orbital or facial bone fracture. There is also   very subtle fat stranding along the far anterior aspects of the   extraocular muscles. No measurable intraconal hematoma is identified.   2. There is prominent  intraorbital and periorbital fat and suspected   proptosis bilaterally which may reflect underlying thyroid   orbitopathy.   3. Loss of the normal cervical lordosis with grade 1-2   anterolisthesis of C4 on C5 is favored to be remote/degenerative in   nature. Traumatic subluxation is thought to be less likely but is   difficult to entirely exclude. There is no definitive evidence for an   acute fracture of the cervical spine.   4. Multilevel degenerative changes of the cervical spine with   associated central canal and neuroforaminal stenosis.   5. No evidence of acute intracranial hemorrhage.   6. Partially imaged large thyroid nodule for which nonemergent   thyroid ultrasound is recommended.                  MACRO:   None        Signed by: Stacy Mishra 10/3/2024 10:54 AM   Dictation workstation:   [id number]      FL less than 1 hour    (Results Pending)   MR pelvis wo IV contrast    (Results Pending)       Assessment/Plan: 69 y.o. female c/p R CMN on 10/7 with Dr. Park.      Plan:  - Weight bearing: NWB RLE  - DVT ppx: SCDs, please hold chemoppx for OR  - Diet: NPO for OR today   - Pain: multimodal pain control per primary  - Antibiotics: perioperative ancef 2g q8hr x3 doses postop  - Lines: maintain PIVx2 while inpatient  - PT/OT: consulted  - Ho: none   - Drains: none     Dispo: Pending possible OR today     Erick Slater MD, MD  Orthopedic Surgery PGY-2  [facility]  Available by Epic Chat    While inpatient, this patient will be followed by the Orthopaedic Trauma team. Please see contact information below:    Ortho Trauma  Erick Slater MD PGY2  Dayana Phillips MD PGY3    Please page [phone] (ortho on-call) after 6pm and on weekends.

## 2024-10-07 NOTE — ANESTHESIA POSTPROCEDURE EVALUATION
Patient: Misael Ritchie    Procedure Summary       Date: 10/07/24 Room / Location: [facility] OR 01 / [facility] OR    Anesthesia Start: 1459 Anesthesia Stop:     Procedure: Hip Fracture ORIF w/ Nail Trochanteric (Right: Hip) Diagnosis:       Nondisplaced intertrochanteric fracture of right femur, initial encounter for closed fracture      (Nondisplaced intertrochanteric fracture of right femur, initial encounter for closed fracture [S72.144A])    Surgeons: Theo Park MD Responsible Provider: Deneen Garcia MD    Anesthesia Type: general ASA Status: 4            Anesthesia Type: general    Vitals Value Taken Time   /45 10/07/24 1715   Temp 36.1 10/07/24 1715   Pulse 92 10/07/24 1715   Resp 16 10/07/24 1715   SpO2 100 10/07/24 1715       Anesthesia Post Evaluation    Patient location during evaluation: ICU  Patient participation: complete - patient participated  Level of consciousness: awake and alert  Pain management: adequate  Airway patency: patent  Cardiovascular status: acceptable and hemodynamically stable  Respiratory status: acceptable and face mask  Hydration status: acceptable  Postoperative Nausea and Vomiting: none        No notable events documented.

## 2024-10-07 NOTE — PROGRESS NOTES
Occupational Therapy    Communication Note    Patient Name: Misael Ritchie  MRN: [id number]  Today's Date: 10/7/2024   Room: 30/30-A    Discipline: Occupational Therapy      Missed Visit: Yes  Missed Visit Reason:  (Patient pending OR for hip fx; will attempt OT next visit as appropriate.)      10/07/24 at 8:52 AM   Amanda Bunn OT   Rehab Office: [phone]

## 2024-10-07 NOTE — PROGRESS NOTES
"Misael Ritchie is a 69 y.o. female on day 4 of admission presenting with Hyperkalemia.    Subjective   Patient is seen and examined today.  Had no major complaints, got her dialysis session in the morning, epinephrine was turned off right after the session, remains off dextrose, n.p.o. taken in the afternoon to OR.         Objective   General: Awake, alert, in no acute distress  Eyes: scleral icterus or injection  HENT: Normo-cephalic, right periorbital hematoma No stridor  CV: Regular rate, regular rhythm. Radial pulses 2+ bilaterally  Resp: Breathing non-labored, speaking in full sentences.    GI: Soft, non-distended, non-tender.   : deferred   MSK/Extremities: Right lower extremity:  - Closed injury  - Compartments soft and compressible  - Palpable DP pulse   Skin: Warm. Appropriate color  Neuro: Aox4 Face symmetric. Speech is fluent.  Gross strength and sensation intact in b/l UE and LEs  Psych: Appropriate mood and affect      Last Recorded Vitals  Blood pressure 119/54, pulse 94, temperature 36.3 °C (97.3 °F), temperature source Skin, resp. rate 10, height 1.549 m (5' 1\"), weight 56.9 kg (125 lb 7.1 oz), SpO2 100%.  Intake/Output last 3 Shifts:  I/O last 3 completed shifts:  In: 1886.6 (35 mL/kg) [I.V.:686.6 (12.7 mL/kg); Other:800; IV Piggyback:400.1]  Out: 4800 (89.1 mL/kg) [Other:4800]  Dosing Weight: 53.9 kg     Relevant Results  Results from last 7 days   Lab Units 10/07/24  1126 10/07/24  0901 10/07/24  0245 10/07/24  0244 10/06/24  2301 10/06/24  1925 10/06/24  0847 10/06/24  0347 10/05/24  0741 10/05/24  0418 10/04/24  1246 10/04/24  1124 10/03/24  2234 10/03/24  2114   POCT GLUCOSE mg/dL 100* 129*  --  138* 141* 132*   < >  --    < >  --    < >  --    < >  --    GLUCOSE mg/dL  --   --  144*  --   --   --   --  136*  --  152*  --  188*  --  71*    < > = values in this interval not displayed.     Scheduled medications  [Transfer Hold] acetaminophen, 650 mg, oral, q6h  [Held by provider] " allopurinol, 100 mg, oral, Daily  [Transfer Hold] amiodarone, 200 mg, oral, Daily  [Transfer Hold] atorvastatin, 40 mg, oral, Nightly  [Transfer Hold] dextrose, 12.5 g, intravenous, Once  [Transfer Hold] hydrocortisone sodium succinate, 50 mg, intravenous, q6h  oxygen, , inhalation, Continuous - Inhalation  [Transfer Hold] pantoprazole, 40 mg, oral, Daily before breakfast   Or  [Transfer Hold] pantoprazole, 40 mg, intravenous, Daily before breakfast  [Transfer Hold] polyethylene glycol, 17 g, oral, Daily      Continuous medications  EPINEPHrine, 0-1 mcg/kg/min, Last Rate: Stopped (10/07/24 1057)      PRN medications  PRN medications: [Transfer Hold] albuterol, [Transfer Hold] dextrose, [Transfer Hold] dextrose, [Transfer Hold] glucagon, [Transfer Hold] glucagon, [Transfer Hold] HYDROmorphone, [Transfer Hold] oxyCODONE, [Transfer Hold] oxyCODONE, [Transfer Hold] oxymetazoline            Results for orders placed or performed during the hospital encounter of 10/03/24 (from the past 24 hour(s))   POCT GLUCOSE   Result Value Ref Range    POCT Glucose 132 (H) 74 - 99 mg/dL   Heparin Assay, UFH   Result Value Ref Range    Heparin Unfractionated 0.4 See Comment Below for Therapeutic Ranges IU/mL   POCT GLUCOSE   Result Value Ref Range    POCT Glucose 141 (H) 74 - 99 mg/dL   POCT GLUCOSE   Result Value Ref Range    POCT Glucose 138 (H) 74 - 99 mg/dL   Magnesium   Result Value Ref Range    Magnesium 2.11 1.60 - 2.40 mg/dL   Hepatic Function Panel   Result Value Ref Range    Albumin 3.1 (L) 3.4 - 5.0 g/dL    Bilirubin, Total 2.1 (H) 0.0 - 1.2 mg/dL    Bilirubin, Direct 1.4 (H) 0.0 - 0.3 mg/dL    Alkaline Phosphatase 169 (H) 33 - 136 U/L    ALT 42 7 - 45 U/L    AST 21 9 - 39 U/L    Total Protein 6.3 (L) 6.4 - 8.2 g/dL   CBC and Auto Differential   Result Value Ref Range    WBC 16.7 (H) 4.4 - 11.3 x10*3/uL    nRBC 1.8 (H) 0.0 - 0.0 /100 WBCs    RBC 2.77 (L) 4.00 - 5.20 x10*6/uL    Hemoglobin 8.6 (L) 12.0 - 16.0 g/dL     Hematocrit 24.2 (L) 36.0 - 46.0 %    MCV 87 80 - 100 fL    MCH 31.0 26.0 - 34.0 pg    MCHC 35.5 32.0 - 36.0 g/dL    RDW 17.9 (H) 11.5 - 14.5 %    Platelets 78 (L) 150 - 450 x10*3/uL    Neutrophils % 89.0 40.0 - 80.0 %    Immature Granulocytes %, Automated 2.6 (H) 0.0 - 0.9 %    Lymphocytes % 1.3 13.0 - 44.0 %    Monocytes % 7.0 2.0 - 10.0 %    Eosinophils % 0.0 0.0 - 6.0 %    Basophils % 0.1 0.0 - 2.0 %    Neutrophils Absolute 14.88 (H) 1.20 - 7.70 x10*3/uL    Immature Granulocytes Absolute, Automated 0.43 0.00 - 0.70 x10*3/uL    Lymphocytes Absolute 0.22 (L) 1.20 - 4.80 x10*3/uL    Monocytes Absolute 1.17 (H) 0.10 - 1.00 x10*3/uL    Eosinophils Absolute 0.00 0.00 - 0.70 x10*3/uL    Basophils Absolute 0.01 0.00 - 0.10 x10*3/uL   Heparin Assay   Result Value Ref Range    Heparin Unfractionated 0.5 See Comment Below for Therapeutic Ranges IU/mL   Phosphorus   Result Value Ref Range    Phosphorus 7.8 (H) 2.5 - 4.9 mg/dL   Basic Metabolic Panel   Result Value Ref Range    Glucose 144 (H) 74 - 99 mg/dL    Sodium 129 (L) 136 - 145 mmol/L    Potassium 5.0 3.5 - 5.3 mmol/L    Chloride 87 (L) 98 - 107 mmol/L    Bicarbonate 22 21 - 32 mmol/L    Anion Gap 25 (H) 10 - 20 mmol/L    Urea Nitrogen 53 (H) 6 - 23 mg/dL    Creatinine 5.74 (H) 0.50 - 1.05 mg/dL    eGFR 8 (L) >60 mL/min/1.73m*2    Calcium 8.0 (L) 8.6 - 10.6 mg/dL   POCT GLUCOSE   Result Value Ref Range    POCT Glucose 129 (H) 74 - 99 mg/dL   POCT GLUCOSE   Result Value Ref Range    POCT Glucose 100 (H) 74 - 99 mg/dL   Prepare RBC: 4 Units   Result Value Ref Range    PRODUCT CODE S0015R83     Unit Number [id number]     Unit ABO O     Unit RH POS     XM INTEP COMP     Dispense Status XM     Blood Expiration Date 10/25/2024 11:59:00 PM EDT     PRODUCT BLOOD TYPE 5100     UNIT VOLUME 350     PRODUCT CODE Z9402E79     Unit Number [id number]     Unit ABO O     Unit RH POS     XM INTEP COMP     Dispense Status XM     Blood Expiration Date 10/30/2024 11:59:00 PM EDT      PRODUCT BLOOD TYPE 5100     UNIT VOLUME 276     PRODUCT CODE F1962G04     Unit Number [id number]     Unit ABO O     Unit RH POS     XM INTEP COMP     Dispense Status XM     Blood Expiration Date 10/30/2024 11:59:00 PM EDT     PRODUCT BLOOD TYPE 5100     UNIT VOLUME 295     PRODUCT CODE S2217Y27     Unit Number [id number]     Unit ABO O     Unit RH POS     XM INTEP COMP     Dispense Status XM     Blood Expiration Date 10/29/2024 11:59:00 PM EDT     PRODUCT BLOOD TYPE 5100     UNIT VOLUME 284    Blood Gas Arterial Full Panel Unsolicited   Result Value Ref Range    POCT pH, Arterial 7.50 (H) 7.38 - 7.42 pH    POCT pCO2, Arterial 28 (L) 38 - 42 mm Hg    POCT pO2, Arterial 468 (H) 85 - 95 mm Hg    POCT SO2, Arterial 100 94 - 100 %    POCT Oxy Hemoglobin, Arterial 97.5 94.0 - 98.0 %    POCT Hematocrit Calculated, Arterial 27.0 (L) 36.0 - 46.0 %    POCT Sodium, Arterial 132 (L) 136 - 145 mmol/L    POCT Potassium, Arterial 4.1 3.5 - 5.3 mmol/L    POCT Chloride, Arterial 101 98 - 107 mmol/L    POCT Ionized Calcium, Arterial 1.05 (L) 1.10 - 1.33 mmol/L    POCT Glucose, Arterial 116 (H) 74 - 99 mg/dL    POCT Lactate, Arterial 2.7 (H) 0.4 - 2.0 mmol/L    POCT Base Excess, Arterial -0.8 -2.0 - 3.0 mmol/L    POCT HCO3 Calculated, Arterial 21.8 (L) 22.0 - 26.0 mmol/L    POCT Hemoglobin, Arterial 9.0 (L) 12.0 - 16.0 g/dL    POCT Anion Gap, Arterial 13 10 - 25 mmo/L    Patient Temperature 37.0 degrees Celsius    FiO2 90 %   Coox Panel, Arterial Unsolicited   Result Value Ref Range    POCT Hemoglobin, Arterial 9.0 (L) 12.0 - 16.0 g/dL    POCT Oxy Hemoglobin, Arterial 97.5 94.0 - 98.0 %    POCT Carboxyhemoglobin, Arterial 1.5 %    POCT Methemoglobin, Arterial 0.7 0.0 - 1.5 %    POCT Deoxy Hemoglobin, Arterial 0.3 0.0 - 5.0 %   Blood Gas Arterial Full Panel   Result Value Ref Range    POCT pH, Arterial 7.40 7.38 - 7.42 pH    POCT pCO2, Arterial 33 (L) 38 - 42 mm Hg    POCT pO2, Arterial 156 (H) 85 - 95 mm Hg    POCT SO2,  Arterial 99 94 - 100 %    POCT Oxy Hemoglobin, Arterial 97.1 94.0 - 98.0 %    POCT Hematocrit Calculated, Arterial 25.0 (L) 36.0 - 46.0 %    POCT Sodium, Arterial 132 (L) 136 - 145 mmol/L    POCT Potassium, Arterial 4.3 3.5 - 5.3 mmol/L    POCT Chloride, Arterial 101 98 - 107 mmol/L    POCT Ionized Calcium, Arterial 1.14 1.10 - 1.33 mmol/L    POCT Glucose, Arterial 131 (H) 74 - 99 mg/dL    POCT Lactate, Arterial 2.2 (H) 0.4 - 2.0 mmol/L    POCT Base Excess, Arterial -3.9 (L) -2.0 - 3.0 mmol/L    POCT HCO3 Calculated, Arterial 20.4 (L) 22.0 - 26.0 mmol/L    POCT Hemoglobin, Arterial 8.4 (L) 12.0 - 16.0 g/dL    POCT Anion Gap, Arterial 15 10 - 25 mmo/L    Patient Temperature 37.0 degrees Celsius    FiO2 44 %          Assessment/Plan   Assessment & Plan    Misael Ritchie is a 69 y.o. 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  Patient presenting to hospital after a fall. Found to have L trochanteric fracture with hematoma, hypoglycemia requiring dextrose drip -post insulin for hyperkalemia iso acidosis.      Endocrinology service is consulted for management of adrenal insufficiency in setting of recurrent unexplained hypoglycemic episodes.       [Of note:  - patient had similar episodes of hypoglycemia in 2024 for which she was managed at [facility].   Random blood cortisol level of 8.2 on 2/5/2024.   At that time, stim test was done on 2/5/2024 with results as under:  Basal cortisol: 2.6  30-minute cortisol: 2.6  60-minute cortisol: 2.7  -Patient reports that she has been on prednisone 2.5 mg since 2010.  She reports being off prednisone for 6 months in 2011 (patient not sure about the year when she was off).  She reports that she felt miserable when she was off  prednisone.  -CT abdo/pelvis (10/3): Bilateral adrenal glands are unremarkable.  -Patient reports that she recently had steroid injection to her right shoulder.    -Received methylprednisolone acetate 40 mg on 9/20/2024.]       # Adrenal insufficiency in setting of chronic glucocorticoid use and recent steroid injection to right shoulder resulting treatment resistant hypoglycemia     Recommendations   -Continue injection hydrocortisone 50 mg IV every 6 hourly for now  -Will taper current stress dose of steroid - when patient is off pressors for 24h and stable post op- to therapeutic replacement dose for adrenal insufficiency.  (Repeating workup for adrenal insufficiency is not recommended at this time as patient is on stress dose steroids.    ACTH is also expected to be suppressed in setting of recent steroid injection to shoulder (9/20/2024))    -In case blood sugar drops below 55 off dextrose, checking C-peptide, insulin, proinsulin, POC, VBG's, beta hydroxybutyrate, and RFP, stat prior to administering dextrose. Treat with hypoglycemic protocol accordingly, okay to start D5-D10 if needed      Case seen and discussed with Dr. Dez Rivera MD

## 2024-10-07 NOTE — ANESTHESIA PROCEDURE NOTES
Airway  Date/Time: 10/7/2024 3:25 PM  Urgency: elective      Staffing  Performed: HALINA   Authorized by: Deneen Garcia MD    Performed by: HALINA Echols  Patient location during procedure: OR    Indications and Patient Condition  Indications for airway management: anesthesia  Sedation level: deep  Mask difficulty assessment: 1 - vent by mask    Final Airway Details  Final airway type: endotracheal airway      Successful airway: ETT     Successful intubation technique: video laryngoscopy  Blade size: #3  ETT size (mm): 7.0  Cormack-Lehane Classification: grade I - full view of glottis  Placement verified by: chest auscultation   Measured from: lips  ETT to lips (cm): 20  Number of attempts at approach: 1    Additional Comments  Patient stated previous difficult airway. Easy mask, easy glidescope with atraumatic placement

## 2024-10-07 NOTE — ANESTHESIA PROCEDURE NOTES
Arterial Line:    Date/Time: 10/7/2024 4:05 PM    Staffing  Performed: attending   Authorized by: Deneen Garcia MD    Performed by: HALINA Echols    An arterial line was placed. Procedure performed using ultrasound guidance.in the OR for the following indication(s): continuous blood pressure monitoring and blood sampling needed.    A 20 gauge (size), 1 and 3/4 inch (length), Angiocath (type) catheter was placed into the Left radial artery, secured by Tegaderm,   Seldinger technique used.  Events:  patient tolerated procedure well with no complications.

## 2024-10-07 NOTE — PROGRESS NOTES
SOCIAL WORK NOTE   Update provided to Selena Michael ([phone]). Social work to follow.  MADHU Chavarria, LISW-S ([id number])

## 2024-10-07 NOTE — OP NOTE
Hip Fracture ORIF w/ Nail Trochanteric (R) Operative Note     Date: 10/7/2024  OR Location: [facility] OR    Name: Misael Ritchie, : 1955, Age: 69 y.o., MRN: [id number], Sex: female    Diagnosis  Pre-op Diagnosis      * Nondisplaced intertrochanteric fracture of right femur, initial encounter for closed fracture [S72.144A] Post-op Diagnosis     * Nondisplaced intertrochanteric fracture of right femur, initial encounter for closed fracture [S72.144A]     Procedures  Hip Fracture ORIF w/ Nail Trochanteric  22841 - DC TX INTER/DC/SUBTRCHNTRIC FEM FX IMED IMPLTSCREW      Surgeons      * Theo Park - Primary    Resident/Fellow/Other Assistant:  Surgeons and Role:     * Parviz Verdin MD - Resident - Assisting     * Erick Slater MD - Resident - Assisting    Procedure Summary  Anesthesia: General  ASA: IV  Anesthesia Staff: Anesthesiologist: Deneen Garcia MD  C-AA: HALINA Echols  Estimated Blood Loss: 75 mL  Intra-op Medications:   Administrations occurring from 1345 to 1705 on 10/07/24:   Medication Name Total Dose   sodium chloride 0.9 % irrigation solution 1,000 mL   acetaminophen (Tylenol) tablet 650 mg Cannot be calculated   albuterol 90 mcg/actuation inhaler 2 puff Cannot be calculated   amiodarone (Pacerone) tablet 200 mg Cannot be calculated   atorvastatin (Lipitor) tablet 40 mg Cannot be calculated   dextrose 50 % injection 12.5 g Cannot be calculated   dextrose 50 % injection 12.5 g Cannot be calculated   dextrose 50 % injection 25 g Cannot be calculated   glucagon (Glucagen) injection 1 mg Cannot be calculated   glucagon (Glucagen) injection 1 mg Cannot be calculated   hydrocortisone sodium succinate (PF) (Solu-CORTEF) injection 50 mg Cannot be calculated   HYDROmorphone (Dilaudid) injection 0.2 mg Cannot be calculated   oxyCODONE (Roxicodone) immediate release tablet 10 mg Cannot be calculated   oxyCODONE (Roxicodone) immediate release tablet 5 mg Cannot be calculated    oxymetazoline (Afrin) 0.05 % nasal spray 2 spray Cannot be calculated   polyethylene glycol (Glycolax, Miralax) packet 17 g Cannot be calculated              Anesthesia Record               Intraprocedure I/O Totals          Intake    Epinephrine Drip 0.00 mL    The total shown is the total volume documented since Anesthesia Start was filed.    albumin human bottle 25% 100.00 mL    Total Intake 100 mL          Specimen: No specimens collected     Staff:   Circulator: My  Scrub Person: Alex  Circulator: Leonardo  Scrub Person: Fozia         Drains and/or Catheters: * None in log *    Tourniquet Times: none        Implants:  Implants       Type Name Action Serial No.      Joint Hip NAIL, TFN ADV SHORT, 170MML, DIAMETER 10, 125 DEG - SNA - [id number] Implanted NA     Screw SCREW, TFNA, 85MM, STERILE - SNA - [id number] Implanted NA     Screw SCREW, LOCKING 5.0MM X 32MM TI STERILE - SNA - [id number] Implanted NA              Findings: Stable alignment of intertrochanteric extension of fracture    Indications: Misael Ritchie is an 69 y.o. female who is having surgery for Nondisplaced intertrochanteric fracture of right femur, initial encounter for closed fracture [S72.144A].  She presents after a ground-level fall sustaining a greater trochanteric fracture.  Advanced imaging demonstrated intertrochanteric extension.  For this recommendation was made for operative fixation.  The risks, benefits, alternatives were extensively reviewed the patient wished to proceed with operative fixation.    The patient was seen in the preoperative area. The risks, benefits, complications, treatment options, non-operative alternatives, expected recovery and outcomes were discussed with the patient. The possibilities of reaction to medication, pulmonary aspiration, injury to surrounding structures, bleeding, recurrent infection, the need for additional procedures, failure to diagnose a condition, and creating a complication  requiring transfusion or operation were discussed with the patient. The patient concurred with the proposed plan, giving informed consent.  The site of surgery was properly noted/marked if necessary per policy. The patient has been actively warmed in preoperative area. Preoperative antibiotics have been ordered and given within 1 hours of incision. Venous thrombosis prophylaxis are not indicated.    Procedure Details:   Patient was met prior to surgery in pre-operative holding.  The appropriate extremity was marked and recent health status was reviewed and we found no contraindication to proceeding with the scheduled procedure.       The patient was then transported to the operating room and had an anesthetic per the anesthesia team.  The patient's ipsilateral leg was placed in the boot and the [place] table was used.  The contralateral leg was flexed, abducted and well-padded.  Pre-operative images confirmed appropriate reduction on AP/lateral fluoroscopy with longitudinal traction and internal rotation.  The ipsilateral arm was adducted and secured across the chest.    The hip and thigh were then sterilely prepped with DuraPrep then sterilely draped in the usual manner. A timeout was performed. The patient was identified, the site and laterality confirmed as was the administration of antibiotics. Next, I confirmed the appropriate starting point.  A small longitudinal  incision was made proximal to the greater trochanter.  After the fascia was split a guide pin was placed at the tip of the greater trochanter and along the anterior 1/3rd in the yuli-posterior plane.  The guidepin was advanced.  An entry reamer was then placed.  The nail was then advanced without issue.  Reaming was not necessary.     A guidepin was placed into the femoral head using the targeting guide and separate distal longitudinal incision.  The articular surface was not violated. The pin was placed with an appropriate tip-apex distance.  The  length was measured and a step reamer was placed.  After the step reamer, the cephalomedullary screw was advanced until satisfactorily seated.  Once the screw was seated the proximal locking cap was tensioned.     A distal interlocking screw was then placed bicortically without any issue.  The guide was removed and final fluoroscopic images were completed.  The wounds were irrigated and a layered closure was performed using 0 PDS in the fascial layer and 2-0 Monocryl in the deep dermal layer and staples in the skin.  An adherent, water proof dressing was placed.  Hemostasis was obtained prior to closure. All counts were reported as correct.  The patient was stable to the post anesthesia care unit.      Complications:  None; patient tolerated the procedure well.    Disposition: ICU - extubated and stable.  Condition: stable     Additional Details: Postoperatively, she will be weightbearing as tolerated on right lower extremity.  She will be started on DVT prophylaxis for 4 weeks in commendation with the medical team given her history of dialysis.  She also completed 24-hour course of renally dosed IV Ancef for surgical prophylaxis.  PT and OT will be consulted.  She will follow-up in 3 weeks for her first postoperative visit and wound check.2 views of the right hip and AP pelvis.     Attending Attestation: I was present and scrubbed for the key portions of the procedure.    Theo Park  Phone Number: [phone]

## 2024-10-07 NOTE — PROGRESS NOTES
"Orthopaedic Surgery Progress Note    Subjective:    Patient tolerated procedure well w/o complications. Intermittent Epi/Vaso requirement intra-op. Transferred to MICU extubated postop.     Objective:  /54   Pulse 94   Temp 36.3 °C (97.3 °F) (Skin)   Resp 10   Ht 1.549 m (5' 1\")   Wt 56.9 kg (125 lb 7.1 oz)   SpO2 100%   BMI 23.70 kg/m²     Gen: somnolent but arousable   Cardiac: RRR to peripheral palpation  Resp: nonlabored on RA  GI: soft, non-distended  MSK:  Right lower extremity:  - postoperative mepilex x2 in place without strikethrough   - Compartments soft and compressible  - Fires TA/GS/EHL  - SILT in [place]/Sa/SP/DP/T distributions  - Palpable DP pulse    Results for orders placed or performed during the hospital encounter of 10/03/24 (from the past 24 hour(s))   POCT GLUCOSE   Result Value Ref Range    POCT Glucose 132 (H) 74 - 99 mg/dL   Heparin Assay, UFH   Result Value Ref Range    Heparin Unfractionated 0.4 See Comment Below for Therapeutic Ranges IU/mL   POCT GLUCOSE   Result Value Ref Range    POCT Glucose 141 (H) 74 - 99 mg/dL   POCT GLUCOSE   Result Value Ref Range    POCT Glucose 138 (H) 74 - 99 mg/dL   Magnesium   Result Value Ref Range    Magnesium 2.11 1.60 - 2.40 mg/dL   Hepatic Function Panel   Result Value Ref Range    Albumin 3.1 (L) 3.4 - 5.0 g/dL    Bilirubin, Total 2.1 (H) 0.0 - 1.2 mg/dL    Bilirubin, Direct 1.4 (H) 0.0 - 0.3 mg/dL    Alkaline Phosphatase 169 (H) 33 - 136 U/L    ALT 42 7 - 45 U/L    AST 21 9 - 39 U/L    Total Protein 6.3 (L) 6.4 - 8.2 g/dL   CBC and Auto Differential   Result Value Ref Range    WBC 16.7 (H) 4.4 - 11.3 x10*3/uL    nRBC 1.8 (H) 0.0 - 0.0 /100 WBCs    RBC 2.77 (L) 4.00 - 5.20 x10*6/uL    Hemoglobin 8.6 (L) 12.0 - 16.0 g/dL    Hematocrit 24.2 (L) 36.0 - 46.0 %    MCV 87 80 - 100 fL    MCH 31.0 26.0 - 34.0 pg    MCHC 35.5 32.0 - 36.0 g/dL    RDW 17.9 (H) 11.5 - 14.5 %    Platelets 78 (L) 150 - 450 x10*3/uL    Neutrophils % 89.0 40.0 - 80.0 %    " Immature Granulocytes %, Automated 2.6 (H) 0.0 - 0.9 %    Lymphocytes % 1.3 13.0 - 44.0 %    Monocytes % 7.0 2.0 - 10.0 %    Eosinophils % 0.0 0.0 - 6.0 %    Basophils % 0.1 0.0 - 2.0 %    Neutrophils Absolute 14.88 (H) 1.20 - 7.70 x10*3/uL    Immature Granulocytes Absolute, Automated 0.43 0.00 - 0.70 x10*3/uL    Lymphocytes Absolute 0.22 (L) 1.20 - 4.80 x10*3/uL    Monocytes Absolute 1.17 (H) 0.10 - 1.00 x10*3/uL    Eosinophils Absolute 0.00 0.00 - 0.70 x10*3/uL    Basophils Absolute 0.01 0.00 - 0.10 x10*3/uL   Heparin Assay   Result Value Ref Range    Heparin Unfractionated 0.5 See Comment Below for Therapeutic Ranges IU/mL   Phosphorus   Result Value Ref Range    Phosphorus 7.8 (H) 2.5 - 4.9 mg/dL   Basic Metabolic Panel   Result Value Ref Range    Glucose 144 (H) 74 - 99 mg/dL    Sodium 129 (L) 136 - 145 mmol/L    Potassium 5.0 3.5 - 5.3 mmol/L    Chloride 87 (L) 98 - 107 mmol/L    Bicarbonate 22 21 - 32 mmol/L    Anion Gap 25 (H) 10 - 20 mmol/L    Urea Nitrogen 53 (H) 6 - 23 mg/dL    Creatinine 5.74 (H) 0.50 - 1.05 mg/dL    eGFR 8 (L) >60 mL/min/1.73m*2    Calcium 8.0 (L) 8.6 - 10.6 mg/dL   POCT GLUCOSE   Result Value Ref Range    POCT Glucose 129 (H) 74 - 99 mg/dL   POCT GLUCOSE   Result Value Ref Range    POCT Glucose 100 (H) 74 - 99 mg/dL   Prepare RBC: 4 Units   Result Value Ref Range    PRODUCT CODE J4973J27     Unit Number [id number]     Unit ABO O     Unit RH POS     XM INTEP COMP     Dispense Status XM     Blood Expiration Date 10/25/2024 11:59:00 PM EDT     PRODUCT BLOOD TYPE 5100     UNIT VOLUME 350     PRODUCT CODE R1878E55     Unit Number [id number]     Unit ABO O     Unit RH POS     XM INTEP COMP     Dispense Status XM     Blood Expiration Date 10/30/2024 11:59:00 PM EDT     PRODUCT BLOOD TYPE 5100     UNIT VOLUME 276     PRODUCT CODE A6278K19     Unit Number [id number]     Unit ABO O     Unit RH POS     XM INTEP COMP     Dispense Status XM     Blood Expiration Date 10/30/2024  11:59:00 PM EDT     PRODUCT BLOOD TYPE 5100     UNIT VOLUME 295     PRODUCT CODE N4044W24     Unit Number [id number]     Unit ABO O     Unit RH POS     XM INTEP COMP     Dispense Status XM     Blood Expiration Date 10/29/2024 11:59:00 PM EDT     PRODUCT BLOOD TYPE 5100     UNIT VOLUME 284    Blood Gas Arterial Full Panel Unsolicited   Result Value Ref Range    POCT pH, Arterial 7.50 (H) 7.38 - 7.42 pH    POCT pCO2, Arterial 28 (L) 38 - 42 mm Hg    POCT pO2, Arterial 468 (H) 85 - 95 mm Hg    POCT SO2, Arterial 100 94 - 100 %    POCT Oxy Hemoglobin, Arterial 97.5 94.0 - 98.0 %    POCT Hematocrit Calculated, Arterial 27.0 (L) 36.0 - 46.0 %    POCT Sodium, Arterial 132 (L) 136 - 145 mmol/L    POCT Potassium, Arterial 4.1 3.5 - 5.3 mmol/L    POCT Chloride, Arterial 101 98 - 107 mmol/L    POCT Ionized Calcium, Arterial 1.05 (L) 1.10 - 1.33 mmol/L    POCT Glucose, Arterial 116 (H) 74 - 99 mg/dL    POCT Lactate, Arterial 2.7 (H) 0.4 - 2.0 mmol/L    POCT Base Excess, Arterial -0.8 -2.0 - 3.0 mmol/L    POCT HCO3 Calculated, Arterial 21.8 (L) 22.0 - 26.0 mmol/L    POCT Hemoglobin, Arterial 9.0 (L) 12.0 - 16.0 g/dL    POCT Anion Gap, Arterial 13 10 - 25 mmo/L    Patient Temperature 37.0 degrees Celsius    FiO2 90 %   Coox Panel, Arterial Unsolicited   Result Value Ref Range    POCT Hemoglobin, Arterial 9.0 (L) 12.0 - 16.0 g/dL    POCT Oxy Hemoglobin, Arterial 97.5 94.0 - 98.0 %    POCT Carboxyhemoglobin, Arterial 1.5 %    POCT Methemoglobin, Arterial 0.7 0.0 - 1.5 %    POCT Deoxy Hemoglobin, Arterial 0.3 0.0 - 5.0 %       CT head wo IV contrast   Final Result   No evidence of intracranial mass, extra-axial collection, hemorrhage   or infarction        MACRO:   none        Signed by: Trey Marquis 10/6/2024 2:12 PM   Dictation workstation:   [id number]      Cardiac device check - Inpatient         Transthoracic Echo (TTE) Complete   Final Result      XR femur right 1 view   Final Result   Fracture through the right  greater trochanter, partially evaluated on   this portable study. Recommend CT of the right hip to evaluate   intertrochanteric extension        I personally reviewed the images/study and I agree with the findings   as stated. This study was interpreted at [place], [place].        MACRO:   None        Signed by: Ortega Palmer 10/4/2024 10:10 AM   Dictation workstation:   [id number]      XR knee right 1-2 views   Final Result   Fracture through the right greater trochanter, partially evaluated on   this portable study. Recommend CT of the right hip to evaluate   intertrochanteric extension        I personally reviewed the images/study and I agree with the findings   as stated. This study was interpreted at [place], [place].        MACRO:   None        Signed by: Ortega Palmer 10/4/2024 10:10 AM   Dictation workstation:   [id number]      CT angio chest for pulmonary embolism   Final Result   1. No evidence of acute pulmonary embolism to segmental level. Please   note that, assessment of subsegmental branches is limited and small   peripheral emboli are not entirely excluded.   2. Marked cardiomegaly with enlargement of the right atrium, left   atrium, and right ventricle. There is enlargement of the IVC with   contrast reflux into the IVC and hepatic veins. Findings are   suggestive of right heart strain/dysfunction and/or tricuspid   regurgitation. Correlate with echocardiographic findings. There is   also moderate pericardial effusion. Recommend further evaluation with   echocardiography correlate with concern for component of cardiac   tamponade   3. Moderate-to-large right and trace left pleural effusions,   pericardial effusion, and abdominopelvic ascites. Correlate with   fluid volume status.   4. Multifocal central branching ground-glass opacities in the   bilateral lungs with questionable multifocal  calcifications and   pulmonary ossification. Findings may represent sequelae of chronic   pulmonary venous congestion which can be secondary to mitral valve   stenosis. Alternatively findings may be sequela of chronic infectious   or inflammatory process.   5. Please see dedicated CT imaging of the abdomen and pelvis for   further evaluation.        I personally reviewed the image(s)/study and resident interpretation.   I agree with the findings as stated by resident Jay Rowe.   Data analyzed and images interpreted at [facility], [place], [place].        MACRO:   Critical Finding:  See findings. Notification was initiated on   10/3/2024 at 4:51 pm by  Jay Rowe.  (**-OCF-**) Instructions:        Signed by: Brynn Gonzalezkit Amaya 10/3/2024 5:00 PM   Dictation workstation:   [id number]      CT abdomen pelvis w IV contrast   Final Result   1. Cortical irregularity at the greater trochanter of the right femur   suggestive of an acute, minimally displaced fracture. Adjacent to the   fracture site is an acute hematoma in the planes of the gluteal   muscles.   2. Moderate right pleural effusion with adjacent atelectasis as well   as a small to moderate pericardial effusion. There is also a small to   moderate amount of simple appearing ascites.   3. Incidental findings include cirrhosis and a probable cystic lesion   in the area of the pancreatic head. Further characterization by   contrast MRI on a routine outpatient basis is recommended if not   previously assessed.   4. Other chronic incidental findings as detailed above.        I personally reviewed the images/study and I agree with the findings   as stated by Michael Bundy MD (resident). This study was   interpreted at [facility],   [place], [place].        MACRO:   Michael Bundy discussed the significance and urgency of this   critical finding via epic secure chat with  Aimee  MD Mao on   10/3/2024 at 4:50 pm.  (**-RCF-**) Findings:  See findings.        Signed by: Alec Carbone 10/4/2024 7:36 AM   Dictation workstation:   [id number]      XR chest 1 view   Final Result   1. Increased consolidative opacities within the right lower lung with   diffuse hazy opacities of bilateral lungs. Bilateral costophrenic   angle blunting representing trace pleural effusions. Correlate with   volume status.   2. Unchanged cardiomediastinal enlargement.        I personally reviewed the images/study and I agree with the findings   as stated by Jeyson York MD. This study was interpreted at   [place], [place].        MACRO:   None        Signed by: Kirby Canada 10/3/2024 1:45 PM   Dictation workstation:   [id number]      XR hip right with pelvis when performed 2 or 3 views   Final Result   No acute fracture or dislocation of the right hip. Similar bilateral   hip arthrosis.        I personally reviewed the images/study and I agree with the findings   as stated by Jeyson York MD. This study was interpreted at   [place], [place].        Signed by: Kirby Canada 10/3/2024 11:10 AM   Dictation workstation:   [id number]      CT head wo IV contrast   Final Result   1. Right periorbital and facial soft tissue swelling and hematoma   without evidence for orbital or facial bone fracture. There is also   very subtle fat stranding along the far anterior aspects of the   extraocular muscles. No measurable intraconal hematoma is identified.   2. There is prominent intraorbital and periorbital fat and suspected   proptosis bilaterally which may reflect underlying thyroid   orbitopathy.   3. Loss of the normal cervical lordosis with grade 1-2   anterolisthesis of C4 on C5 is favored to be remote/degenerative in   nature. Traumatic subluxation is thought to be less likely but is   difficult to entirely exclude. There is no  definitive evidence for an   acute fracture of the cervical spine.   4. Multilevel degenerative changes of the cervical spine with   associated central canal and neuroforaminal stenosis.   5. No evidence of acute intracranial hemorrhage.   6. Partially imaged large thyroid nodule for which nonemergent   thyroid ultrasound is recommended.                  MACRO:   None        Signed by: Stacy Mishra 10/3/2024 10:54 AM   Dictation workstation:   [id number]      CT maxillofacial bones wo IV contrast   Final Result   1. Right periorbital and facial soft tissue swelling and hematoma   without evidence for orbital or facial bone fracture. There is also   very subtle fat stranding along the far anterior aspects of the   extraocular muscles. No measurable intraconal hematoma is identified.   2. There is prominent intraorbital and periorbital fat and suspected   proptosis bilaterally which may reflect underlying thyroid   orbitopathy.   3. Loss of the normal cervical lordosis with grade 1-2   anterolisthesis of C4 on C5 is favored to be remote/degenerative in   nature. Traumatic subluxation is thought to be less likely but is   difficult to entirely exclude. There is no definitive evidence for an   acute fracture of the cervical spine.   4. Multilevel degenerative changes of the cervical spine with   associated central canal and neuroforaminal stenosis.   5. No evidence of acute intracranial hemorrhage.   6. Partially imaged large thyroid nodule for which nonemergent   thyroid ultrasound is recommended.                  MACRO:   None        Signed by: Stacy Mishra 10/3/2024 10:54 AM   Dictation workstation:   [id number]      CT cervical spine wo IV contrast   Final Result   1. Right periorbital and facial soft tissue swelling and hematoma   without evidence for orbital or facial bone fracture. There is also   very subtle fat stranding along the far anterior aspects of the   extraocular muscles. No measurable  intraconal hematoma is identified.   2. There is prominent intraorbital and periorbital fat and suspected   proptosis bilaterally which may reflect underlying thyroid   orbitopathy.   3. Loss of the normal cervical lordosis with grade 1-2   anterolisthesis of C4 on C5 is favored to be remote/degenerative in   nature. Traumatic subluxation is thought to be less likely but is   difficult to entirely exclude. There is no definitive evidence for an   acute fracture of the cervical spine.   4. Multilevel degenerative changes of the cervical spine with   associated central canal and neuroforaminal stenosis.   5. No evidence of acute intracranial hemorrhage.   6. Partially imaged large thyroid nodule for which nonemergent   thyroid ultrasound is recommended.                  MACRO:   None        Signed by: Stacy Mishra 10/3/2024 10:54 AM   Dictation workstation:   [id number]      MR pelvis wo IV contrast    (Results Pending)   FL fluoro images no charge    (Results Pending)       Assessment/Plan: 69 y.o. female c/p R CMN on 10/7 with Dr. Park.      Plan:  - Weight bearing: WBAT RLE  - DVT ppx: SCDs, chemoppx per primary  - Diet: Okay for diet from an Orthopaedic perspective   - Pain: multimodal pain control per primary  - Antibiotics: perioperative ancef 1g q8hr x2 doses   - Lines: maintain PIVx2 while inpatient  - PT/OT: consulted  - Ho: none   - Drains: none     Dispo: Pending Postop PT/OT    Erick Slater MD, MD  Orthopedic Surgery PGY-2  [facility]  Available by Epic Chat    While inpatient, this patient will be followed by the Orthopaedic Trauma team. Please see contact information below:    Ortho Trauma  Erick Slater MD PGY2  Dayana Phillips MD PGY3    Please page [phone] (ortho on-call) after 6pm and on weekends.

## 2024-10-07 NOTE — CONSULTS
"  Ear Nose & Throat Consult Note    Reason For Consult  Subjective hearing loss after fall    History Of Present Illness  Misael Ritchie is a 69 y.o. female admitted to MICU for hyperkalemia of 7.4 requiring emergent dialysis.  Patient initially presented to [facility] on 10/[facility] via EMS after a fall.  Patient states that she was dizzy and got up and fell on her right side and hit her head.  Imaging at that time revealed right periorbital hematoma without facial fractures, no acute intracranial hemorrhage, and fracture through the right greater trochanter.  Patient has been complaining about muffled hearing on the right since her fall, for which ENT is consulted.     Patient reports a \"squishing\" noise in her right ear since her fall on 10/3. She hears this when she moves her head side to side.  She says this happened to her once before a few years ago.  She was evaluated by her primary doctor and she was told that her ears appeared normal.  The noise she was hearing eventually resolved on its own.  She does not feel like her hearing has worsened.  She also denies otalgia, otorrhea, tinnitus, dizziness/vertigo, and nasal congestion.  She denies history of recent ear infections and recurrent ear infections.  She has not had her hearing tested in many years.  She says when she was younger she would have pain in her ears when flying on airplanes, he says this pain is much better now because she knows how to pop her ears.    Past Medical History  She has no past medical history on file.    Surgical History  She has no past surgical history on file.     Social History  She reports that she has never smoked. She has never used smokeless tobacco. No history on file for alcohol use and drug use.    Family History  No family history on file.     Allergies  Codeine, Azithromycin, Nifedipine, Nifedipine (bulk), Nsaids (non-steroidal anti-inflammatory drug), Prochlorperazine, Vancomycin, Erythromycin, and Erythromycin " "base    Review of Systems  Negative except per HPI     Physical Exam  CONSTITUTIONAL:  No acute distress  VOICE:  No hoarseness or other abnormality  RESPIRATION:  Breathing comfortably, no stridor  CV:  No clubbing/cyanosis/edema in hands  EYES:  EOM intact, sclera normal  NEURO:  Alert and oriented times 3, Cranial nerves II-XII grossly intact and symmetric bilaterally  HEAD AND FACE:  Symmetric facial features, no masses or lesions, sinuses non-tender to palpation, resolving R periorbital ecchymosis.edema  SALIVARY GLANDS:  Parotid and submandibular glands normal bilaterally  EARS:  Normal external ears. R- normal EAC, Blue discoloration behind TM and TM slightly opaque, suggestive of hemotympanum. L- normal EAC, cerumen partially obscuring TM, TM pearly grey  Gomez- midline. Rinne- AC>BC bilaterally  NOSE:  External nose midline, anterior rhinoscopy is normal with limited visualization to the anterior aspect of the interior turbinates, no bleeding or drainage, no lesions  ORAL CAVITY/OROPHARYNX/LIPS:  Normal mucous membranes, normal floor of mouth/tongue/OP, no masses or lesions  PHARYNGEAL WALLS:  No masses or lesions  NECK/LYMPH:  No LAD, no thyroid masses, trachea midline  SKIN:  Neck skin is without scar or injury  PSYCH:  Alert and oriented with appropriate mood and affect      Last Recorded Vitals  Blood pressure 171/65, pulse 91, temperature 36.2 °C (97.2 °F), temperature source Skin, resp. rate 12, height 1.549 m (5' 1\"), weight 56.9 kg (125 lb 7.1 oz), SpO2 99%.    Relevant Results      CT face, head, C-spine 10/3  IMPRESSION:  1. Right periorbital and facial soft tissue swelling and hematoma  without evidence for orbital or facial bone fracture. There is also  very subtle fat stranding along the far anterior aspects of the  extraocular muscles. No measurable intraconal hematoma is identified.  2. There is prominent intraorbital and periorbital fat and suspected  proptosis bilaterally which may reflect " "underlying thyroid  orbitopathy.  3. Loss of the normal cervical lordosis with grade 1-2  anterolisthesis of C4 on C5 is favored to be remote/degenerative in  nature. Traumatic subluxation is thought to be less likely but is  difficult to entirely exclude. There is no definitive evidence for an  acute fracture of the cervical spine.  4. Multilevel degenerative changes of the cervical spine with  associated central canal and neuroforaminal stenosis.  5. No evidence of acute intracranial hemorrhage.  6. Partially imaged large thyroid nodule for which nonemergent  thyroid ultrasound is recommended.         Assessment/Plan     Misael Ritchie is a 69 y.o. female with PMH aortic stenosis s/p TAVR , atrial fibrillation (on Eliquis) s/p DCCV, CAD, ESRD on hemodialysis T//Sat, HFrEF (2023 EF 48%), MR, TR, pacemaker CRT-P 2023, adrenal insufficiency who presented to [facility] after a fall and was found to have hyperkalemia requiring urgent dialysis.  ENT consulted for \" squishing\" sound that patient is intermittently hearing in her right ear.  Patient says that this has happened before and resolved on its own.  Exam with right hemotympanum likely from fall.  Findings consistent with eustachian tube dysfunction and traumatic injury.    Recs:  - afrin BID for 3 days only  - follow-up with ENT and audiology outpatient     Jose Ch MD [facility] - PGY1  Otolaryngology - Head and Neck Surgery    ENT Head & Neck phone: [phone]  ENT Consults pager: [phone]   ENT Pediatrics  pager: [phone]  ENT Subspecialty (otology, rhinology, laryngology, sleep):   M-F 6am-5pm- EpicChat or page the resident who wrote the daily note   Nights & weekends- [phone]  ENT Outpatient [phone]     "

## 2024-10-07 NOTE — PROGRESS NOTES
Misael Ritchie   69 y.o. female   MRN/Room: [id number]/30/30-A  Chief Concern: Hyperkalemia  Nephrology following for: emergent HD MICU     Subjective    Interview 10/7/2024:   Patient was seen at the bedside while getting dialysis. She says that she had a fall last week and that is what brought her into the hospital. Patient denies missing any HD sessions (typically follows a TTS schedule). She denies any chest pain or shortness of breath at this time. She says she is not having symptoms of dizziness or lightheadedness during dialysis. When asked about her episode of hypotension yesterday, she says she was not dizzy but felt more tired than usual. Patient worries about her hip surgery but looks forward to fixing the pain that she is having. No other complaints at this time.      Objective    Vitals:  Temp:  [35.9 °C (96.6 °F)-36.3 °C (97.3 °F)] 36.3 °C (97.3 °F)  Heart Rate:  [] 94  Resp:  [10-25] 10  BP: (118-171)/(39-69) 119/54  Arterial Line BP 1: ()/(47-87) 99/87  FiO2 (%):  [21 %] 21 %   FiO2 (%):  [21 %] 21 %      Medications   Scheduled Medications  acetaminophen, 650 mg, oral, q6h  [Held by provider] allopurinol, 100 mg, oral, Daily  amiodarone, 200 mg, oral, Daily  atorvastatin, 40 mg, oral, Nightly  dextrose, 12.5 g, intravenous, Once  hydrocortisone sodium succinate, 50 mg, intravenous, q6h  oxygen, , inhalation, Continuous - Inhalation  pantoprazole, 40 mg, oral, Daily before breakfast   Or  pantoprazole, 40 mg, intravenous, Daily before breakfast  polyethylene glycol, 17 g, oral, Daily     Continuous Medications  EPINEPHrine, 0-1 mcg/kg/min, Last Rate: Stopped (10/07/24 1057)     PRN Medications  PRN medications: albuterol, dextrose, dextrose, glucagon, glucagon, HYDROmorphone, oxyCODONE, oxyCODONE, oxymetazoline   I/O:   Intake/Output Summary (Last 24 hours) at 10/7/2024 1413  Last data filed at 10/7/2024 1216  Gross per 24 hour   Intake 2241.47 ml   Output 2800 ml   Net -558.53 ml      Wt Readings from Last 3 Encounters:   10/07/24 56.9 kg (125 lb 7.1 oz)       Physical Exam:  General appearance: no distress  Eyes: non-icteric  Skin: no apparent rash  Heart: rate and rhythm are regular, no pericardial friction rub.  Lungs: CTA bilaterally, no wheezing/crackles auscultated.   Abdomen: soft, nt/nd.  Extremities: trace pitting edema bilat.   Ho: none  Neuro: No FND, no asterixis. A&O x 3.   Access: Peripheral IVs in left arm, triple lumen non-tunneled left femoral, HD AV fistula Left and Right arm.     Labs:  CBC:  Results from last 7 days   Lab Units 10/07/24  0245 10/06/24  1404 10/06/24  0347   WBC AUTO x10*3/uL 16.7* 16.8* 14.7*   HEMOGLOBIN g/dL 8.6* 9.3* 8.7*   HEMATOCRIT % 24.2* 25.5* 24.9*   MCV fL 87 87 88   PLATELETS AUTO x10*3/uL 78* 79* 84*     RFP:  Results from last 7 days   Lab Units 10/07/24  0245 10/06/24  0347 10/05/24  0418   SODIUM mmol/L 129* 132* 132*   POTASSIUM mmol/L 5.0 4.1 4.0   CHLORIDE mmol/L 87* 91* 91*   CO2 mmol/L 22 24 25   BUN mg/dL 53* 35* 22   CREATININE mg/dL 5.74* 4.69* 2.91*   CALCIUM mg/dL 8.0* 8.2* 8.2*   MAGNESIUM mg/dL 2.11 1.96 1.66   PHOSPHORUS mg/dL 7.8* 6.3* 4.5     HFP:  Results from last 7 days   Lab Units 10/07/24  0245 10/06/24  0347 10/05/24  0418   AST U/L 21 25 72*   ALT U/L 42 56* 86*   ALK PHOS U/L 169* 144* 137*   BILIRUBIN TOTAL mg/dL 2.1* 1.8* 2.0*   BILIRUBIN DIRECT mg/dL 1.4* 1.0* 1.0*   ALBUMIN g/dL 3.1* 2.9* 3.0*     Cardiac:      Coag:  Results from last 7 days   Lab Units 10/04/24  0138 10/03/24  1735 10/03/24  1009   PROTIME seconds 22.3* 22.6* 21.5*   APTT seconds 31 30 33   INR  2.0* 2.0* 1.9*     ABG/VBG:   Results from last 7 days   Lab Units 10/06/24  1403 10/05/24  0252 10/04/24  1417   POCT PH, ARTERIAL pH 7.41 7.41 7.47*   POCT PCO2, ARTERIAL mm Hg 31* 36* 34*   POCT PO2, ARTERIAL mm Hg 64* 64* 165*   POCT HCO3 CALCULATED, ARTERIAL mmol/L 19.6* 22.8 24.7   POCT BASE EXCESS, ARTERIAL mmol/L -4.3* -1.5 1.3     Results from  last 7 days   Lab Units 10/03/24  2251 10/03/24  2114 10/03/24  1720   POCT PH, VENOUS pH 7.33 7.39 7.29*   POCT PCO2, VENOUS mm Hg 49 49 51   POCT PO2, VENOUS mm Hg 38 34* 33*   POCT HCO3 CALCULATED, VENOUS mmol/L 25.8 29.7* 24.5      UA:        Cultures:   No results found for the last 90 days.       Assessment/Plan    ASSESSMENT:  Misael Ritchie is a 69 y.o. 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 Nephrology is consulted for emergent dialysis in the MICU.    Etiology of hyperkalemia may be multifactorial. Patient did not miss dialysis on 10/1 but may have accumulated potassium prior to scheduled dialysis on 10/3 (day of admission). Patient may have consumed a potassium-rich diet in the interval between HD. May also be a component of dehydration/hypotension in days prior to fall, as patient endorsed lightheadedness prior to the fall. Could be an element of cellular injury from the fall.     Patient has had improvement in hyperkalemia over the course of admission, with interval increase on 10/7, with 3.5 hours of dialysis performed to optimize potassium ahead of ortho OR. Nephrology will continue to follow for RRT while admitted.     Home unit: [facility] [place]  Access: RUE AVF. Previous failed LUE AV graft  Etilogy: PKD (2 previous transplant 2004 and 2010)  Schedule: [facility]  EDW: 56kg    Updates 10/7/2024:   - HD performed at bedside to correct K; goal of removing 1 L   - Patient going to OR for ORIF    #Fluid status  :: Lungs clear to auscultation   :: Trace pitting edema BLE  :: Saturating well on room air   Plan:  - Continue HD inpatient for correction of electrolytes  - Fluid removal as tolerated     #Hemodynamics  :: Weaned off pressors on  10/7  :: Pressors increased on 10/6 PM after hypotensive episode at CT scan  Plan:  - Monitor BP during dialysis   - Continue to remove fluid during HD as tolerated    #Electrolytes  :: Hyperkalemia  :: K 7.6 on admission, downtrended  :: K 10/7 pre-dialysis 5.0 (4.1, 4.0)  Plan:  - HD while inpatient     #Metabolic Bone Disease   :: ESRD on HD   :: Phos 7.8 (6.3, 4.5)  Plan:  - Obtain PTH   - Follow Ca, phos     #Acid-Base Status   :: Bicarb WNL since 10/3  Plan:  - Continue to follow bicarb on BMP    RECOMMENDATIONS:  - HD at bedside today with goal of removing 1 L, optimizing potassium  - Follow RFP after OR  - Renally dose medications   - Renal diet  - Renal multivitamin   - Nephrology will continue to follow     Assessment and plan discussed with Dr. Patel and Dr. Garza. Recommendations not final until signed by attending physician.     ---  Patricia Earl, MS4  Nephrology Consult Service   10/7/2024

## 2024-10-07 NOTE — PROGRESS NOTES
Misael Ritchie is a 69 y.o. female on day 4 of admission presenting with Hyperkalemia.      Subjective   Complaining of R hip pain this AM. No chest pain, SOB, or palpitations. No increased O2 requirements overnight.        Objective     Last Recorded Vitals  /53   Pulse 101   Temp 36.2 °C (97.2 °F) (Skin)   Resp 12   Wt 56.9 kg (125 lb 7.1 oz)   SpO2 99%   Intake/Output last 3 Shifts:    Intake/Output Summary (Last 24 hours) at 10/7/2024 1118  Last data filed at 10/7/2024 1057  Gross per 24 hour   Intake 1046.97 ml   Output 0 ml   Net 1046.97 ml       Admission Weight  Weight: 53.5 kg (118 lb) (10/03/24 0941)    Daily Weight  10/07/24 : 56.9 kg (125 lb 7.1 oz)    Image Results  ECG 12 Lead  Ventricular-paced rhythm  Abnormal ECG  When compared with ECG of 05-OCT-2024 12:26,  No significant change was found      Physical Exam    General: Awake, alert, in no acute distress  Eyes: Gaze conjugate.  No scleral icterus or injection  HENT: Normo-cephalic, right periorbital hematoma No stridor  CV: Regular rate, regular rhythm. Radial pulses 2+ bilaterally  Resp: Breathing non-labored, speaking in full sentences.  Clear to auscultation bilaterally  GI: Soft, non-distended, non-tender. No rebound or guarding.  : deferred   MSK/Extremities: No gross bony deformities. Moving all extremities  Skin: Warm. Appropriate color  Neuro: Aox4 Face symmetric. Speech is fluent.  Gross strength and sensation intact in b/l UE and LEs  Psych: Appropriate mood and affect      Assessment & Plan  Hyperkalemia    ESRD (end stage renal disease) on dialysis (Multi)    Pacemaker    HTN (hypertension)    CAD (coronary artery disease)    CHF (congestive heart failure)    Atrial fibrillation (Multi)    Nondisplaced intertrochanteric fracture of right femur, initial encounter for closed fracture    Assessment: Misael Ritchie is a 69 y.o. female PMH aortic stenosis s/p TAVR 11/23, atrial fibrillation (on Eliquis) s/p DCCV,  CAD, ESRD on hemodialysis T/Th/Sat, HFrEF (6/2023 EF 48%), MR, TR, pacemaker CRT-P 5/2023, adrenal insufficiency admitted to the MICU on 10/03/24 for emergent dialysis for hyperkalemia 7.4.      Summary for 10/07/24:  OR today for surgery  Initiated Dilaudid for better pain control     Plan:  NEUROLOGY/PSYCH:  #Pain s/p fall   Scheduled Tylenol  PRN Oxy 5/10  Dilaudid 0.2mg q3h PRN     CARDIOVASCULAR:  #PMH A-fib, CAD s/p stent 2003, MR, TR  #pericardial effusion  Management:  Holding Eliquis and Plavix  Continue home amiodarone, atorvastatin  Hold metoprolol for HR iso of shock on admission  HF consult  Wean pressors as tolerated     PULMONARY:  #COPD  Management:  Wean O2  Continue home albuterol PRN     RENAL/GENITOURINARY:  #ESRD  Management:  Emergent dialysis, Nephrology following  Hold home torsemide   Hold home allopurinol   Follow up uric acid     GASTROENTEROLOGY:  Continue home Protonix, miralax     ENDOCRINOLOGY:  #Adrenal insufficiency  #Hypoglycemia 2/2 insulin patient received for hyperkalemia  Management:  Prednisone 5 mg   Can consider stress dose steroids   D5 infusion and Q1h Blood sugar  Endocrine consult given patient's repeated hypoglycemia c/f possible insulinoma   Went down to D10 to 10 ml/hr. If POC glucose stays stbale, will discontinue.      HEMATOLOGY:  #hematoma   Management:  Consider reversing Eliquis if Hb drops  Hb is down trending at 9..2 (from 10.8). will monitor the hb for 1 day and if Hb is stable and heparin assay is <0.2 will restart low dose heparin without a bolus   Repeat cbc and heparin assay at 4 pm. Heparin assay this morning 0.6 (down from 1.5)      SKIN:  No acute concerns     MUSCULOSKELETAL:  #Right troc fracture with hematoma  Management:  OR today with Ortho     INFECTIOUS DISEASE:  Rule out sepsis low suspicion  :: BC showed NGTD  Management:  Follow up Bcx, UA  MRSA +  Vancomycin 10/4-10/6  Zosyn 10/4-10/6     ICU Check List            FEN  Fluids:  D10W  Electrolytes: PRN  Nutrition: NPO  Prophylaxis:  DVT ppx: holding given hematoma  GI ppx: PPI  Bowel care: Miralax     Hardware:  CVC 10/03/24 Triple lumen Non-tunneled Left Femoral (Active)   Placement Date/Time: 10/03/24 1516   Site Prep: Chlorhexidine   All 5 Sterile Barriers Used (Gloves, Gown, Cap, Mask, Large Sterile Drape): Yes  Lumen Type: Triple lumen  CVC Line Catheter Size (Fr): 7 Fr  CVC Type: Non-tunneled  CVC Line Length (cm):...   Number of days: 1       Arterial Line 10/04/24 Left Brachial (Active)   Placement Date/Time: 10/04/24 0200   Orientation: Left  Location: Brachial  Site Prep: Chlorhexidine   Local Anesthetic: Injectable  Technique: Ultrasound guidance  Securement Method: Sutured  Patient Tolerance: Tolerated well   Number of days: 1         Code: Full Code    HPOA:  Zeke Ritchie () [phone]   Disposition: MICU      Malnutrition Diagnosis Status: New  Malnutrition Diagnosis: Severe malnutrition related to chronic disease or condition  As Evidenced by: pt reports a decrease in appetite and intake for a few weeks along with an overall weight loss of 11% and moderate to severe fat and muscle wasting on physical exam  I agree with the dietitian's malnutrition diagnosis.         Derick Noland MD

## 2024-10-07 NOTE — PROGRESS NOTES
Communication Note    Patient Name: Misael Ritchie  MRN: [id number]  Today's Date: 10/7/2024   Room: 30/30-A    Discipline: Physical Therapy      Missed Visit: Yes  Missed Visit Reason:  (PT evaluation reviewed, pending OR for femur fx.  Will monitor and follow up as appropriate.)      10/07/24 at 8:50 AM   Mj Lemon, PT   Rehab Office: [phone]

## 2024-10-08 ENCOUNTER — TELEPHONE (OUTPATIENT)
Dept: OTOLARYNGOLOGY | Facility: HOSPITAL | Age: 69
End: 2024-10-08
Payer: MEDICARE

## 2024-10-08 ENCOUNTER — APPOINTMENT (OUTPATIENT)
Dept: RADIOLOGY | Facility: HOSPITAL | Age: 69
DRG: 480 | End: 2024-10-08
Payer: MEDICARE

## 2024-10-08 ENCOUNTER — APPOINTMENT (OUTPATIENT)
Dept: CARDIOLOGY | Facility: HOSPITAL | Age: 69
DRG: 480 | End: 2024-10-08
Payer: MEDICARE

## 2024-10-08 LAB
ALBUMIN SERPL BCP-MCNC: 2.6 G/DL (ref 3.4–5)
ALBUMIN SERPL BCP-MCNC: 3 G/DL (ref 3.4–5)
ALP SERPL-CCNC: 131 U/L (ref 33–136)
ALT SERPL W P-5'-P-CCNC: 27 U/L (ref 7–45)
ANION GAP BLDA CALCULATED.4IONS-SCNC: 25 MMO/L (ref 10–25)
ANION GAP BLDA CALCULATED.4IONS-SCNC: 26 MMO/L (ref 10–25)
ANION GAP BLDA CALCULATED.4IONS-SCNC: 26 MMO/L (ref 10–25)
ANION GAP BLDMV CALCULATED.4IONS-SCNC: 15 MMO/L (ref 10–25)
ANION GAP SERPL CALC-SCNC: 24 MMOL/L (ref 10–20)
ANION GAP SERPL CALC-SCNC: 28 MMOL/L
APTT PPP: 35 SECONDS (ref 27–38)
APTT PPP: 41 SECONDS (ref 27–38)
AST SERPL W P-5'-P-CCNC: 21 U/L (ref 9–39)
ATRIAL RATE: 242 BPM
ATRIAL RATE: 75 BPM
ATRIAL RATE: 75 BPM
B-OH-BUTYR SERPL-SCNC: 0.22 MMOL/L (ref 0.02–0.27)
BACTERIA BLD CULT: NORMAL
BACTERIA BLD CULT: NORMAL
BASE EXCESS BLDA CALC-SCNC: -11.9 MMOL/L (ref -2–3)
BASE EXCESS BLDA CALC-SCNC: -14 MMOL/L (ref -2–3)
BASE EXCESS BLDA CALC-SCNC: -14.1 MMOL/L (ref -2–3)
BASE EXCESS BLDMV CALC-SCNC: -2.2 MMOL/L (ref -2–3)
BASOPHILS # BLD AUTO: 0.02 X10*3/UL (ref 0–0.1)
BASOPHILS # BLD AUTO: 0.05 X10*3/UL (ref 0–0.1)
BASOPHILS NFR BLD AUTO: 0.1 %
BASOPHILS NFR BLD AUTO: 0.3 %
BILIRUB DIRECT SERPL-MCNC: 1.4 MG/DL (ref 0–0.3)
BILIRUB SERPL-MCNC: 2.2 MG/DL (ref 0–1.2)
BLOOD EXPIRATION DATE: NORMAL
BODY TEMPERATURE: 37 DEGREES CELSIUS
BUN SERPL-MCNC: 33 MG/DL (ref 6–23)
BUN SERPL-MCNC: 36 MG/DL (ref 6–23)
C PEPTIDE SERPL-MCNC: 5.2 NG/ML (ref 0.7–3.9)
CA-I BLDA-SCNC: 1.04 MMOL/L (ref 1.1–1.33)
CA-I BLDA-SCNC: 1.06 MMOL/L (ref 1.1–1.33)
CA-I BLDA-SCNC: 1.07 MMOL/L (ref 1.1–1.33)
CA-I BLDMV-SCNC: 1.07 MMOL/L (ref 1.1–1.33)
CALCIUM SERPL-MCNC: 6.7 MG/DL (ref 8.6–10.6)
CALCIUM SERPL-MCNC: 8 MG/DL (ref 8.6–10.6)
CHLORIDE BLD-SCNC: 97 MMOL/L (ref 98–107)
CHLORIDE BLDA-SCNC: 94 MMOL/L (ref 98–107)
CHLORIDE BLDA-SCNC: 96 MMOL/L (ref 98–107)
CHLORIDE BLDA-SCNC: 96 MMOL/L (ref 98–107)
CHLORIDE SERPL-SCNC: 104 MMOL/L (ref 98–107)
CHLORIDE SERPL-SCNC: 96 MMOL/L (ref 98–107)
CO2 SERPL-SCNC: 11 MMOL/L (ref 21–32)
CO2 SERPL-SCNC: 19 MMOL/L (ref 21–32)
CREAT SERPL-MCNC: 3.52 MG/DL (ref 0.5–1.05)
CREAT SERPL-MCNC: 3.75 MG/DL (ref 0.5–1.05)
DISPENSE STATUS: NORMAL
EGFRCR SERPLBLD CKD-EPI 2021: 13 ML/MIN/1.73M*2
EGFRCR SERPLBLD CKD-EPI 2021: 13 ML/MIN/1.73M*2
EOSINOPHIL # BLD AUTO: 0 X10*3/UL (ref 0–0.7)
EOSINOPHIL # BLD AUTO: 0 X10*3/UL (ref 0–0.7)
EOSINOPHIL NFR BLD AUTO: 0 %
EOSINOPHIL NFR BLD AUTO: 0 %
ERYTHROCYTE [DISTWIDTH] IN BLOOD BY AUTOMATED COUNT: 17.4 % (ref 11.5–14.5)
ERYTHROCYTE [DISTWIDTH] IN BLOOD BY AUTOMATED COUNT: 18.5 % (ref 11.5–14.5)
GLUCOSE BLD MANUAL STRIP-MCNC: 108 MG/DL (ref 74–99)
GLUCOSE BLD MANUAL STRIP-MCNC: 111 MG/DL (ref 74–99)
GLUCOSE BLD MANUAL STRIP-MCNC: 114 MG/DL (ref 74–99)
GLUCOSE BLD MANUAL STRIP-MCNC: 115 MG/DL (ref 74–99)
GLUCOSE BLD MANUAL STRIP-MCNC: 119 MG/DL (ref 74–99)
GLUCOSE BLD MANUAL STRIP-MCNC: 189 MG/DL (ref 74–99)
GLUCOSE BLD MANUAL STRIP-MCNC: 25 MG/DL (ref 74–99)
GLUCOSE BLD MANUAL STRIP-MCNC: 43 MG/DL (ref 74–99)
GLUCOSE BLD MANUAL STRIP-MCNC: 43 MG/DL (ref 74–99)
GLUCOSE BLD MANUAL STRIP-MCNC: 49 MG/DL (ref 74–99)
GLUCOSE BLD MANUAL STRIP-MCNC: 55 MG/DL (ref 74–99)
GLUCOSE BLD-MCNC: 113 MG/DL (ref 74–99)
GLUCOSE BLDA-MCNC: 111 MG/DL (ref 74–99)
GLUCOSE BLDA-MCNC: 175 MG/DL (ref 74–99)
GLUCOSE BLDA-MCNC: 42 MG/DL (ref 74–99)
GLUCOSE SERPL-MCNC: 115 MG/DL (ref 74–99)
GLUCOSE SERPL-MCNC: 36 MG/DL (ref 74–99)
HCO3 BLDA-SCNC: 11.8 MMOL/L (ref 22–26)
HCO3 BLDA-SCNC: 12 MMOL/L (ref 22–26)
HCO3 BLDA-SCNC: 13.8 MMOL/L (ref 22–26)
HCO3 BLDMV-SCNC: 23.7 MMOL/L (ref 22–26)
HCT VFR BLD AUTO: 22.3 % (ref 36–46)
HCT VFR BLD AUTO: 24.4 % (ref 36–46)
HCT VFR BLD EST: 26 % (ref 36–46)
HCT VFR BLD EST: 26 % (ref 36–46)
HCT VFR BLD EST: 32 % (ref 36–46)
HCT VFR BLD EST: 34 % (ref 36–46)
HGB BLD-MCNC: 8 G/DL (ref 12–16)
HGB BLD-MCNC: 8.6 G/DL (ref 12–16)
HGB BLDA-MCNC: 10.6 G/DL (ref 12–16)
HGB BLDA-MCNC: 11.2 G/DL (ref 12–16)
HGB BLDA-MCNC: 8.8 G/DL (ref 12–16)
HGB BLDMV-MCNC: 8.5 G/DL (ref 12–16)
IMM GRANULOCYTES # BLD AUTO: 0.3 X10*3/UL (ref 0–0.7)
IMM GRANULOCYTES # BLD AUTO: 0.77 X10*3/UL (ref 0–0.7)
IMM GRANULOCYTES NFR BLD AUTO: 2.2 % (ref 0–0.9)
IMM GRANULOCYTES NFR BLD AUTO: 4.4 % (ref 0–0.9)
INHALED O2 CONCENTRATION: 21 %
INHALED O2 CONCENTRATION: 28 %
INSULIN FREE SERPL-ACNC: 6 UIU/ML (ref 3–25)
INSULIN SERPL-ACNC: 9 UIU/ML (ref 3–25)
LACTATE BLDA-SCNC: 10.3 MMOL/L (ref 0.4–2)
LACTATE BLDA-SCNC: 10.5 MMOL/L (ref 0.4–2)
LACTATE BLDA-SCNC: 8.7 MMOL/L (ref 0.4–2)
LACTATE BLDMV-SCNC: 2.6 MMOL/L (ref 0.4–2)
LYMPHOCYTES # BLD AUTO: 0.16 X10*3/UL (ref 1.2–4.8)
LYMPHOCYTES # BLD AUTO: 0.25 X10*3/UL (ref 1.2–4.8)
LYMPHOCYTES NFR BLD AUTO: 1.1 %
LYMPHOCYTES NFR BLD AUTO: 1.4 %
MAGNESIUM SERPL-MCNC: 2.05 MG/DL (ref 1.6–2.4)
MCH RBC QN AUTO: 31.1 PG (ref 26–34)
MCH RBC QN AUTO: 31.3 PG (ref 26–34)
MCHC RBC AUTO-ENTMCNC: 35.2 G/DL (ref 32–36)
MCHC RBC AUTO-ENTMCNC: 35.9 G/DL (ref 32–36)
MCV RBC AUTO: 87 FL (ref 80–100)
MCV RBC AUTO: 89 FL (ref 80–100)
MONOCYTES # BLD AUTO: 1.05 X10*3/UL (ref 0.1–1)
MONOCYTES # BLD AUTO: 1.28 X10*3/UL (ref 0.1–1)
MONOCYTES NFR BLD AUTO: 7.3 %
MONOCYTES NFR BLD AUTO: 7.5 %
NEUTROPHILS # BLD AUTO: 12.4 X10*3/UL (ref 1.2–7.7)
NEUTROPHILS # BLD AUTO: 15.09 X10*3/UL (ref 1.2–7.7)
NEUTROPHILS NFR BLD AUTO: 86.6 %
NEUTROPHILS NFR BLD AUTO: 89.1 %
NRBC BLD-RTO: 2.4 /100 WBCS (ref 0–0)
NRBC BLD-RTO: 4.1 /100 WBCS (ref 0–0)
OXYHGB MFR BLDA: 92.7 % (ref 94–98)
OXYHGB MFR BLDA: 93.1 % (ref 94–98)
OXYHGB MFR BLDA: 93.8 % (ref 94–98)
OXYHGB MFR BLDMV: 54.4 % (ref 45–75)
P AXIS: -12 DEGREES
P AXIS: -17 DEGREES
P OFFSET: 167 MS
P OFFSET: 175 MS
P ONSET: 128 MS
P ONSET: 136 MS
PCO2 BLDA: 27 MM HG (ref 38–42)
PCO2 BLDA: 28 MM HG (ref 38–42)
PCO2 BLDA: 30 MM HG (ref 38–42)
PCO2 BLDMV: 45 MM HG (ref 41–51)
PH BLDA: 7.24 PH (ref 7.38–7.42)
PH BLDA: 7.25 PH (ref 7.38–7.42)
PH BLDA: 7.27 PH (ref 7.38–7.42)
PH BLDMV: 7.33 PH (ref 7.33–7.43)
PHOSPHATE SERPL-MCNC: 6.3 MG/DL (ref 2.5–4.9)
PHOSPHATE SERPL-MCNC: 6.6 MG/DL (ref 2.5–4.9)
PLATELET # BLD AUTO: 60 X10*3/UL (ref 150–450)
PLATELET # BLD AUTO: 64 X10*3/UL (ref 150–450)
PO2 BLDA: 78 MM HG (ref 85–95)
PO2 BLDA: 79 MM HG (ref 85–95)
PO2 BLDA: 88 MM HG (ref 85–95)
PO2 BLDMV: 39 MM HG (ref 35–45)
POTASSIUM BLDA-SCNC: 4.8 MMOL/L (ref 3.5–5.3)
POTASSIUM BLDA-SCNC: 5.3 MMOL/L (ref 3.5–5.3)
POTASSIUM BLDA-SCNC: 5.5 MMOL/L (ref 3.5–5.3)
POTASSIUM BLDMV-SCNC: 5.1 MMOL/L (ref 3.5–5.3)
POTASSIUM SERPL-SCNC: 4.5 MMOL/L (ref 3.5–5.3)
POTASSIUM SERPL-SCNC: 4.5 MMOL/L (ref 3.5–5.3)
PR INTERVAL: 120 MS
PR INTERVAL: 122 MS
PRODUCT BLOOD TYPE: 5100
PRODUCT CODE: NORMAL
PROT SERPL-MCNC: 5.6 G/DL (ref 6.4–8.2)
Q ONSET: 188 MS
Q ONSET: 190 MS
Q ONSET: 206 MS
QRS COUNT: 12 BEATS
QRS COUNT: 12 BEATS
QRS COUNT: 15 BEATS
QRS DURATION: 140 MS
QRS DURATION: 156 MS
QRS DURATION: 162 MS
QT INTERVAL: 428 MS
QT INTERVAL: 436 MS
QT INTERVAL: 452 MS
QTC CALCULATION(BAZETT): 486 MS
QTC CALCULATION(BAZETT): 504 MS
QTC CALCULATION(BAZETT): 523 MS
QTC FREDERICIA: 469 MS
QTC FREDERICIA: 486 MS
QTC FREDERICIA: 490 MS
R AXIS: 145 DEGREES
R AXIS: 148 DEGREES
R AXIS: 156 DEGREES
RBC # BLD AUTO: 2.57 X10*6/UL (ref 4–5.2)
RBC # BLD AUTO: 2.75 X10*6/UL (ref 4–5.2)
SAO2 % BLDA: 95 % (ref 94–100)
SAO2 % BLDA: 95 % (ref 94–100)
SAO2 % BLDA: 96 % (ref 94–100)
SAO2 % BLDMV: 56 % (ref 45–75)
SODIUM BLDA-SCNC: 128 MMOL/L (ref 136–145)
SODIUM BLDA-SCNC: 128 MMOL/L (ref 136–145)
SODIUM BLDA-SCNC: 129 MMOL/L (ref 136–145)
SODIUM BLDMV-SCNC: 131 MMOL/L (ref 136–145)
SODIUM SERPL-SCNC: 134 MMOL/L (ref 136–145)
SODIUM SERPL-SCNC: 138 MMOL/L (ref 136–145)
T AXIS: -33 DEGREES
T AXIS: -39 DEGREES
T AXIS: -4 DEGREES
T OFFSET: 408 MS
T OFFSET: 414 MS
T OFFSET: 420 MS
UFH PPP CHRO-ACNC: 0.1 IU/ML
UNIT ABO: NORMAL
UNIT NUMBER: NORMAL
UNIT RH: NORMAL
UNIT VOLUME: 276
UNIT VOLUME: 284
UNIT VOLUME: 295
UNIT VOLUME: 350
VENTRICULAR RATE: 75 BPM
VENTRICULAR RATE: 75 BPM
VENTRICULAR RATE: 90 BPM
WBC # BLD AUTO: 13.9 X10*3/UL (ref 4.4–11.3)
WBC # BLD AUTO: 17.4 X10*3/UL (ref 4.4–11.3)
XM INTEP: NORMAL

## 2024-10-08 PROCEDURE — 85025 COMPLETE CBC W/AUTO DIFF WBC: CPT

## 2024-10-08 PROCEDURE — 93005 ELECTROCARDIOGRAM TRACING: CPT

## 2024-10-08 PROCEDURE — 2500000004 HC RX 250 GENERAL PHARMACY W/ HCPCS (ALT 636 FOR OP/ED)

## 2024-10-08 PROCEDURE — 74018 RADEX ABDOMEN 1 VIEW: CPT | Performed by: RADIOLOGY

## 2024-10-08 PROCEDURE — 93010 ELECTROCARDIOGRAM REPORT: CPT | Performed by: INTERNAL MEDICINE

## 2024-10-08 PROCEDURE — 2500000005 HC RX 250 GENERAL PHARMACY W/O HCPCS

## 2024-10-08 PROCEDURE — 2500000001 HC RX 250 WO HCPCS SELF ADMINISTERED DRUGS (ALT 637 FOR MEDICARE OP)

## 2024-10-08 PROCEDURE — 84132 ASSAY OF SERUM POTASSIUM: CPT

## 2024-10-08 PROCEDURE — 2020000001 HC ICU ROOM DAILY

## 2024-10-08 PROCEDURE — 82010 KETONE BODYS QUAN: CPT

## 2024-10-08 PROCEDURE — 97110 THERAPEUTIC EXERCISES: CPT | Mod: GP | Performed by: STUDENT IN AN ORGANIZED HEALTH CARE EDUCATION/TRAINING PROGRAM

## 2024-10-08 PROCEDURE — 94799 UNLISTED PULMONARY SVC/PX: CPT

## 2024-10-08 PROCEDURE — 84206 ASSAY OF PROINSULIN: CPT

## 2024-10-08 PROCEDURE — 97161 PT EVAL LOW COMPLEX 20 MIN: CPT | Mod: GP | Performed by: STUDENT IN AN ORGANIZED HEALTH CARE EDUCATION/TRAINING PROGRAM

## 2024-10-08 PROCEDURE — 86022 PLATELET ANTIBODIES: CPT

## 2024-10-08 PROCEDURE — 83690 ASSAY OF LIPASE: CPT

## 2024-10-08 PROCEDURE — 83605 ASSAY OF LACTIC ACID: CPT

## 2024-10-08 PROCEDURE — 83527 ASSAY OF INSULIN: CPT

## 2024-10-08 PROCEDURE — 37799 UNLISTED PX VASCULAR SURGERY: CPT

## 2024-10-08 PROCEDURE — 82947 ASSAY GLUCOSE BLOOD QUANT: CPT

## 2024-10-08 PROCEDURE — 93286 PERI-PX EVAL PM/LDLS PM IP: CPT

## 2024-10-08 PROCEDURE — 71045 X-RAY EXAM CHEST 1 VIEW: CPT

## 2024-10-08 PROCEDURE — 99291 CRITICAL CARE FIRST HOUR: CPT

## 2024-10-08 PROCEDURE — 86900 BLOOD TYPING SEROLOGIC ABO: CPT

## 2024-10-08 PROCEDURE — 85520 HEPARIN ASSAY: CPT

## 2024-10-08 PROCEDURE — 84100 ASSAY OF PHOSPHORUS: CPT

## 2024-10-08 PROCEDURE — 84484 ASSAY OF TROPONIN QUANT: CPT

## 2024-10-08 PROCEDURE — 74018 RADEX ABDOMEN 1 VIEW: CPT

## 2024-10-08 PROCEDURE — 83880 ASSAY OF NATRIURETIC PEPTIDE: CPT

## 2024-10-08 PROCEDURE — 72195 MRI PELVIS W/O DYE: CPT

## 2024-10-08 PROCEDURE — 83735 ASSAY OF MAGNESIUM: CPT

## 2024-10-08 PROCEDURE — 85730 THROMBOPLASTIN TIME PARTIAL: CPT

## 2024-10-08 PROCEDURE — 93286 PERI-PX EVAL PM/LDLS PM IP: CPT | Performed by: INTERNAL MEDICINE

## 2024-10-08 PROCEDURE — 97166 OT EVAL MOD COMPLEX 45 MIN: CPT | Mod: GO

## 2024-10-08 PROCEDURE — 99232 SBSQ HOSP IP/OBS MODERATE 35: CPT | Performed by: STUDENT IN AN ORGANIZED HEALTH CARE EDUCATION/TRAINING PROGRAM

## 2024-10-08 PROCEDURE — 82374 ASSAY BLOOD CARBON DIOXIDE: CPT

## 2024-10-08 PROCEDURE — 72195 MRI PELVIS W/O DYE: CPT | Performed by: RADIOLOGY

## 2024-10-08 PROCEDURE — 71045 X-RAY EXAM CHEST 1 VIEW: CPT | Performed by: RADIOLOGY

## 2024-10-08 PROCEDURE — 82550 ASSAY OF CK (CPK): CPT

## 2024-10-08 PROCEDURE — 94660 CPAP INITIATION&MGMT: CPT

## 2024-10-08 PROCEDURE — 99233 SBSQ HOSP IP/OBS HIGH 50: CPT | Performed by: INTERNAL MEDICINE

## 2024-10-08 PROCEDURE — 2500000002 HC RX 250 W HCPCS SELF ADMINISTERED DRUGS (ALT 637 FOR MEDICARE OP, ALT 636 FOR OP/ED)

## 2024-10-08 PROCEDURE — 84681 ASSAY OF C-PEPTIDE: CPT

## 2024-10-08 PROCEDURE — 80076 HEPATIC FUNCTION PANEL: CPT

## 2024-10-08 RX ORDER — NOREPINEPHRINE BITARTRATE/D5W 8 MG/250ML
PLASTIC BAG, INJECTION (ML) INTRAVENOUS
Status: COMPLETED
Start: 2024-10-08 | End: 2024-10-08

## 2024-10-08 RX ORDER — ONDANSETRON HYDROCHLORIDE 2 MG/ML
INJECTION, SOLUTION INTRAVENOUS
Status: COMPLETED
Start: 2024-10-08 | End: 2024-10-08

## 2024-10-08 RX ORDER — ONDANSETRON HYDROCHLORIDE 2 MG/ML
4 INJECTION, SOLUTION INTRAVENOUS ONCE
Status: COMPLETED | OUTPATIENT
Start: 2024-10-08 | End: 2024-10-08

## 2024-10-08 RX ORDER — MIDODRINE HYDROCHLORIDE 5 MG/1
5 TABLET ORAL
Status: DISCONTINUED | OUTPATIENT
Start: 2024-10-08 | End: 2024-10-12

## 2024-10-08 RX ORDER — NOREPINEPHRINE BITARTRATE/D5W 8 MG/250ML
0-.2 PLASTIC BAG, INJECTION (ML) INTRAVENOUS CONTINUOUS
Status: DISCONTINUED | OUTPATIENT
Start: 2024-10-08 | End: 2024-10-08

## 2024-10-08 RX ORDER — CALCIUM GLUCONATE 20 MG/ML
2 INJECTION, SOLUTION INTRAVENOUS ONCE
Status: COMPLETED | OUTPATIENT
Start: 2024-10-09 | End: 2024-10-09

## 2024-10-08 RX ORDER — DEXTROSE MONOHYDRATE 100 MG/ML
INJECTION, SOLUTION INTRAVENOUS
Status: COMPLETED
Start: 2024-10-08 | End: 2024-10-08

## 2024-10-08 RX ORDER — EPINEPHRINE HCL IN DEXTROSE 5% 4MG/250ML
0-1 PLASTIC BAG, INJECTION (ML) INTRAVENOUS CONTINUOUS
Status: DISCONTINUED | OUTPATIENT
Start: 2024-10-08 | End: 2024-10-13

## 2024-10-08 RX ORDER — BIVALIRUDIN 250 MG/5ML
.05-.49 INJECTION, POWDER, LYOPHILIZED, FOR SOLUTION INTRAVENOUS CONTINUOUS
Status: DISCONTINUED | OUTPATIENT
Start: 2024-10-08 | End: 2024-10-10

## 2024-10-08 RX ORDER — DEXTROSE MONOHYDRATE 100 MG/ML
50 INJECTION, SOLUTION INTRAVENOUS CONTINUOUS
Status: DISCONTINUED | OUTPATIENT
Start: 2024-10-08 | End: 2024-10-11

## 2024-10-08 RX ORDER — HYDROMORPHONE HYDROCHLORIDE 1 MG/ML
0.2 INJECTION, SOLUTION INTRAMUSCULAR; INTRAVENOUS; SUBCUTANEOUS ONCE
Status: COMPLETED | OUTPATIENT
Start: 2024-10-08 | End: 2024-10-09

## 2024-10-08 RX ORDER — EPINEPHRINE 1 MG/ML
INJECTION INTRAMUSCULAR; INTRAVENOUS; SUBCUTANEOUS
Status: DISPENSED
Start: 2024-10-08 | End: 2024-10-09

## 2024-10-08 RX ADMIN — MIDODRINE HYDROCHLORIDE 5 MG: 5 TABLET ORAL at 16:26

## 2024-10-08 RX ADMIN — HYDROCORTISONE SODIUM SUCCINATE 50 MG: 100 INJECTION, POWDER, FOR SOLUTION INTRAMUSCULAR; INTRAVENOUS at 05:02

## 2024-10-08 RX ADMIN — HYDROCORTISONE SODIUM SUCCINATE 50 MG: 100 INJECTION, POWDER, FOR SOLUTION INTRAMUSCULAR; INTRAVENOUS at 00:00

## 2024-10-08 RX ADMIN — EPINEPHRINE 0.02 MCG/KG/MIN: 1 INJECTION INTRAMUSCULAR; INTRAVENOUS; SUBCUTANEOUS at 23:14

## 2024-10-08 RX ADMIN — NOREPINEPHRINE BITARTRATE 0.05 MCG/KG/MIN: 8 INJECTION, SOLUTION INTRAVENOUS at 17:28

## 2024-10-08 RX ADMIN — BIVALIRUDIN 0.05 MG/KG/HR: 250 INJECTION, POWDER, LYOPHILIZED, FOR SOLUTION INTRAVENOUS at 13:29

## 2024-10-08 RX ADMIN — Medication 2 L/MIN: at 08:22

## 2024-10-08 RX ADMIN — AMIODARONE HYDROCHLORIDE 200 MG: 200 TABLET ORAL at 08:21

## 2024-10-08 RX ADMIN — ACETAMINOPHEN 650 MG: 325 TABLET, FILM COATED ORAL at 16:26

## 2024-10-08 RX ADMIN — OXYCODONE HYDROCHLORIDE 10 MG: 5 TABLET ORAL at 08:30

## 2024-10-08 RX ADMIN — DEXTROSE MONOHYDRATE 50 ML/HR: 100 INJECTION, SOLUTION INTRAVENOUS at 21:08

## 2024-10-08 RX ADMIN — ACETAMINOPHEN 650 MG: 325 TABLET, FILM COATED ORAL at 02:59

## 2024-10-08 RX ADMIN — Medication 0.05 MCG/KG/MIN: at 17:28

## 2024-10-08 RX ADMIN — HYDROMORPHONE HYDROCHLORIDE 0.2 MG: 1 INJECTION, SOLUTION INTRAMUSCULAR; INTRAVENOUS; SUBCUTANEOUS at 12:02

## 2024-10-08 RX ADMIN — HYDROMORPHONE HYDROCHLORIDE 0.2 MG: 1 INJECTION, SOLUTION INTRAMUSCULAR; INTRAVENOUS; SUBCUTANEOUS at 20:18

## 2024-10-08 RX ADMIN — OXYCODONE HYDROCHLORIDE 10 MG: 5 TABLET ORAL at 00:00

## 2024-10-08 RX ADMIN — ONDANSETRON HYDROCHLORIDE 4 MG: 2 INJECTION, SOLUTION INTRAVENOUS at 20:29

## 2024-10-08 RX ADMIN — ACETAMINOPHEN 650 MG: 325 TABLET, FILM COATED ORAL at 07:13

## 2024-10-08 RX ADMIN — PANTOPRAZOLE SODIUM 40 MG: 20 TABLET, DELAYED RELEASE ORAL at 07:13

## 2024-10-08 RX ADMIN — DEXTROSE MONOHYDRATE 25 G: 25 INJECTION, SOLUTION INTRAVENOUS at 21:09

## 2024-10-08 RX ADMIN — HYDROCORTISONE SODIUM SUCCINATE 50 MG: 100 INJECTION, POWDER, FOR SOLUTION INTRAMUSCULAR; INTRAVENOUS at 16:26

## 2024-10-08 RX ADMIN — HYDROCORTISONE SODIUM SUCCINATE 50 MG: 100 INJECTION, POWDER, FOR SOLUTION INTRAMUSCULAR; INTRAVENOUS at 22:40

## 2024-10-08 RX ADMIN — POLYETHYLENE GLYCOL 3350 17 G: 17 POWDER, FOR SOLUTION ORAL at 08:21

## 2024-10-08 RX ADMIN — MIDODRINE HYDROCHLORIDE 5 MG: 5 TABLET ORAL at 11:20

## 2024-10-08 RX ADMIN — HYDROCORTISONE SODIUM SUCCINATE 50 MG: 100 INJECTION, POWDER, FOR SOLUTION INTRAMUSCULAR; INTRAVENOUS at 10:01

## 2024-10-08 RX ADMIN — ONDANSETRON 4 MG: 2 INJECTION INTRAMUSCULAR; INTRAVENOUS at 20:29

## 2024-10-08 ASSESSMENT — COGNITIVE AND FUNCTIONAL STATUS - GENERAL
HELP NEEDED FOR BATHING: A LOT
PERSONAL GROOMING: A LITTLE
TURNING FROM BACK TO SIDE WHILE IN FLAT BAD: A LOT
DAILY ACTIVITIY SCORE: 15
MOBILITY SCORE: 10
CLIMB 3 TO 5 STEPS WITH RAILING: TOTAL
DRESSING REGULAR UPPER BODY CLOTHING: A LITTLE
MOVING FROM LYING ON BACK TO SITTING ON SIDE OF FLAT BED WITH BEDRAILS: A LOT
MOVING TO AND FROM BED TO CHAIR: A LOT
DRESSING REGULAR LOWER BODY CLOTHING: TOTAL
TOILETING: A LOT
STANDING UP FROM CHAIR USING ARMS: A LOT
WALKING IN HOSPITAL ROOM: TOTAL

## 2024-10-08 ASSESSMENT — PAIN - FUNCTIONAL ASSESSMENT
PAIN_FUNCTIONAL_ASSESSMENT: 0-10

## 2024-10-08 ASSESSMENT — PAIN DESCRIPTION - LOCATION
LOCATION: HIP

## 2024-10-08 ASSESSMENT — PAIN SCALES - GENERAL
PAINLEVEL_OUTOF10: 0 - NO PAIN
PAINLEVEL_OUTOF10: 8
PAINLEVEL_OUTOF10: 0 - NO PAIN
PAINLEVEL_OUTOF10: 0 - NO PAIN
PAINLEVEL_OUTOF10: 7
PAINLEVEL_OUTOF10: 8
PAINLEVEL_OUTOF10: 8
PAINLEVEL_OUTOF10: 0 - NO PAIN
PAINLEVEL_OUTOF10: 0 - NO PAIN
PAINLEVEL_OUTOF10: 8

## 2024-10-08 ASSESSMENT — ACTIVITIES OF DAILY LIVING (ADL): BATHING_ASSISTANCE: MODERATE

## 2024-10-08 ASSESSMENT — PAIN DESCRIPTION - ORIENTATION
ORIENTATION: RIGHT

## 2024-10-08 ASSESSMENT — PAIN DESCRIPTION - DESCRIPTORS
DESCRIPTORS: ACHING;DISCOMFORT
DESCRIPTORS: ACHING

## 2024-10-08 ASSESSMENT — PAIN SCALES - PAIN ASSESSMENT IN ADVANCED DEMENTIA (PAINAD): TOTALSCORE: REPOSITIONED;MEDICATION (SEE MAR)

## 2024-10-08 NOTE — PROGRESS NOTES
Misael Ritchie   69 y.o. female   MRN/Room: [id number]/30/30-A  Chief Concern: Hyperkalemia  Nephrology following for: emergent HD MICU     Subjective    Interview 10/8/2024:   Patient was seen at the bedside while resting. She says she is doing better today and has no pain while she is still. She says she had her hip surgery yesterday and that it went well. Patient notes that she is very tired today but denies chest pain or shortness of breath (she is wearing 2L NC).     Patient notes that she thinks something is wrong with her dialysis fistula in her right arm, but seems confused about whether someone has performed an intervention on it in the past vs during this hospitalization.     No other complaints at this time.      Objective    Vitals:  Temp:  [35.1 °C (95.2 °F)-36.3 °C (97.3 °F)] 35.5 °C (95.9 °F)  Heart Rate:  [79-92] 86  Resp:  [9-21] 12  BP: (130-149)/(31-54) 141/54  Arterial Line BP 1: ()/(38-55) 127/50  FiO2 (%):  [94 %] 94 %   FiO2 (%):  [94 %] 94 %      Medications   Scheduled Medications  acetaminophen, 650 mg, oral, q6h  [Held by provider] allopurinol, 100 mg, oral, Daily  amiodarone, 200 mg, oral, Daily  atorvastatin, 40 mg, oral, Nightly  hydrocortisone sodium succinate, 50 mg, intravenous, q6h  midodrine, 5 mg, oral, TID  oxygen, , inhalation, Continuous - Inhalation  pantoprazole, 40 mg, oral, Daily before breakfast   Or  pantoprazole, 40 mg, intravenous, Daily before breakfast  polyethylene glycol, 17 g, oral, Daily     Continuous Medications  bivalirudin, 0.05-0.49 mg/kg/hr, Last Rate: 0.05 mg/kg/hr (10/08/24 1329)  EPINEPHrine, 0-1 mcg/kg/min, Last Rate: 0.04 mcg/kg/min (10/08/24 1200)     PRN Medications  PRN medications: albuterol, dextrose, dextrose, glucagon, glucagon, HYDROmorphone, oxyCODONE, oxyCODONE, oxymetazoline     I/O:   Intake/Output Summary (Last 24 hours) at 10/8/2024 1555  Last data filed at 10/8/2024 1400  Gross per 24 hour   Intake 913.79 ml   Output --   Net  913.79 ml     Wt Readings from Last 3 Encounters:   10/07/24 56.9 kg (125 lb 7.1 oz)       Physical Exam:  General appearance: no distress  Eyes: non-icteric, left subconjunctival hemorrhage, left periorbital ecchymosis.   Skin: no apparent rash  Heart: rate and rhythm are regular, no pericardial friction rub.  Lungs: CTA bilaterally, no wheezing/crackles auscultated.   Abdomen: soft, nt/nd.  Extremities: 2+ pitting edema bilat.   Ho: none  Neuro: No FND, no asterixis. A&O x 3.   Access: Peripheral IVs in left arm, triple lumen non-tunneled left femoral, HD AV fistula Left and Right arm. Right arm AV fistula with two bandages over. Bruit auscultated.     Labs:  CBC:  Results from last 7 days   Lab Units 10/08/24  0506 10/07/24  0245 10/06/24  1404   WBC AUTO x10*3/uL 13.9* 16.7* 16.8*   HEMOGLOBIN g/dL 8.0* 8.6* 9.3*   HEMATOCRIT % 22.3* 24.2* 25.5*   MCV fL 87 87 87   PLATELETS AUTO x10*3/uL 64* 78* 79*     RFP:  Results from last 7 days   Lab Units 10/08/24  0506 10/07/24  0245 10/06/24  0347   SODIUM mmol/L 134* 129* 132*   POTASSIUM mmol/L 4.5 5.0 4.1   CHLORIDE mmol/L 96* 87* 91*   CO2 mmol/L 19* 22 24   BUN mg/dL 33* 53* 35*   CREATININE mg/dL 3.52* 5.74* 4.69*   CALCIUM mg/dL 8.0* 8.0* 8.2*   MAGNESIUM mg/dL 2.05 2.11 1.96   PHOSPHORUS mg/dL 6.3* 7.8* 6.3*     HFP:  Results from last 7 days   Lab Units 10/08/24  0506 10/07/24  0245 10/06/24  0347   AST U/L 21 21 25   ALT U/L 27 42 56*   ALK PHOS U/L 131 169* 144*   BILIRUBIN TOTAL mg/dL 2.2* 2.1* 1.8*   BILIRUBIN DIRECT mg/dL 1.4* 1.4* 1.0*   ALBUMIN g/dL 3.0* 3.1* 2.9*     Cardiac:      Coag:  Results from last 7 days   Lab Units 10/04/24  0138 10/03/24  1735 10/03/24  1009   PROTIME seconds 22.3* 22.6* 21.5*   APTT seconds 31 30 33   INR  2.0* 2.0* 1.9*     ABG/VBG:   Results from last 7 days   Lab Units 10/07/24  1728 10/07/24  1534 10/06/24  1403   POCT PH, ARTERIAL pH 7.40 7.50* 7.41   POCT PCO2, ARTERIAL mm Hg 33* 28* 31*   POCT PO2, ARTERIAL mm Hg  156* 468* 64*   POCT HCO3 CALCULATED, ARTERIAL mmol/L 20.4* 21.8* 19.6*   POCT BASE EXCESS, ARTERIAL mmol/L -3.9* -0.8 -4.3*     Results from last 7 days   Lab Units 10/03/24  2251 10/03/24  2114 10/03/24  1720   POCT PH, VENOUS pH 7.33 7.39 7.29*   POCT PCO2, VENOUS mm Hg 49 49 51   POCT PO2, VENOUS mm Hg 38 34* 33*   POCT HCO3 CALCULATED, VENOUS mmol/L 25.8 29.7* 24.5      UA:        Cultures:   No results found for the last 90 days.       Assessment/Plan    ASSESSMENT:  Misael Ritchie is a 69 y.o. 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 Nephrology is consulted for emergent dialysis in the MICU.    Etiology of hyperkalemia may be multifactorial. Patient did not miss dialysis on 10/1 but may have accumulated potassium prior to scheduled dialysis on 10/3 (day of admission). Patient may have consumed a potassium-rich diet in the interval between HD. May also be a component of dehydration/hypotension in days prior to fall, as patient endorsed lightheadedness prior to the fall. Could be an element of cellular injury from the fall. Hyperkalemia may also be attributed to ineffective dialysis. This is made likely in the setting of patient not missing her dialysis sessions before admission as well as her 10/8 labs not showing significant improvement of potassium and bicarb following dialysis session. If laboratory values are not satisfactory after dialysis session on 10/9, will consider recommending investigation into AV fistula access.    Patient has had improvement in hyperkalemia over the course of admission, with interval increase on 10/7 to 5.0, with 3.5 hours of dialysis performed to optimize potassium ahead of ortho OR (achieved 4.5 on 10/8).  Nephrology will continue to follow for RRT while admitted.     Home unit: [facility]  Access: RUE AVF. Previous failed LUE AV graft  Etilogy: PKD (2 previous transplant 2004 and 2010)  Schedule: MWF  EDW: 56kg    Updates 10/8/2024:   - Patient went for ORIF on 10/7  - Bicarb lower than expected for after dialysis (19 on 10/8)  - Plan for dialysis tomorrow 10/9    #Fluid status  :: Hypervolemic  :: Lungs clear to auscultation, no respiratory distress   :: Pitting edema BLE  :: Saturating well on room air   Plan:  - Continue iHD while inpatient for correction of electrolytes  - Fluid removal as tolerated     #Hemodynamics  :: Weaned off pressors on 10/7, added back on after OR   :: Pressors temporarily increased on 10/6 PM after hypotensive episode at CT scan  Plan:  - Continue to wean off pressors as tolerated  - Monitor BP during dialysis   - Continue to remove fluid during iHD as tolerated    #Electrolytes  :: Hyperkalemia  :: K 7.6 on admission, downtrended  :: K 10/8 post-dialysis 4.5 (5.0, 4.1, 4.0)  Plan:  - iHD while inpatient   - Follow K on post-dialysis labs after 10/9 dialysis for satisfactory improvement   - If K on post-dialysis labs is not satisfactory, may investigate access    #Metabolic Bone Disease   :: ESRD on HD   :: Phos 7.8 (6.3, 4.5)  Plan:  - Obtain PTH prior to dc  - Follow Ca, phos on daily RFP    #Acid-Base Status   :: Bicarb low after 10/7 dialysis  :: Concern for ineffective dialysis possibly dt fistula stenosis  Plan:  - Continue to follow bicarb on BMP  - If bicarb on post-dialysis labs is not satisfactory, may investigate access    RECOMMENDATIONS:  - HD at bedside tomorrow 10/9 with fluid removal goal TBD (nephrology will order)   - Dialysate at 36.5 C   - 400 blood flow    - 800 dialysate flow   - 2 K   - Consider US of fistula and vascular consult if 10/9 dialysis not satisfactory  - Follow RFP after OR  - Renally dose medications   - Renal diet  - Renal multivitamin   - Nephrology  will continue to follow     Assessment and plan discussed with Dr. Patel and Dr. Garza. Recommendations not final until signed by attending physician.     ---  Patricia Earl, MS4  Nephrology Consult Service   10/8/2024

## 2024-10-08 NOTE — TELEPHONE ENCOUNTER
Spoke with pt and she advised she is currently admitted and does not feel well to schedule at this time. I advised pt I will follow back up at  later time to coordinate clinical visit with ENT.

## 2024-10-08 NOTE — ED PROCEDURE NOTE
Procedure  Critical Care    Performed by: Jesse Etienne MD  Authorized by: Jesse Etienne MD    Critical care provider statement:     Critical care time (minutes):  45    Critical care was necessary to treat or prevent imminent or life-threatening deterioration of the following conditions:  Shock and CNS failure or compromise    Critical care was time spent personally by me on the following activities:  Ordering and performing treatments and interventions, ordering and review of laboratory studies, ordering and review of radiographic studies, evaluation of patient's response to treatment and re-evaluation of patient's condition    Care discussed with: admitting provider                 Jesse Etienne MD  10/08/24 0901

## 2024-10-08 NOTE — PROGRESS NOTES
"Orthopaedic Surgery Progress Note    Subjective:    Patient remains in MICU, extubated, on minimal Epi restarted overnight d/t soft pressures. Patient resting comfortably in bed this AM. States she has some pain in her right hip that is tolerable. Denies N/T, CP, SOB, fever or chills.     Objective:  BP (!) 130/34   Pulse 91   Temp 35.9 °C (96.6 °F) (Temporal)   Resp 14   Ht 1.549 m (5' 1\")   Wt 56.9 kg (125 lb 7.1 oz)   SpO2 98%   BMI 23.70 kg/m²     Gen: somnolent but arousable   Cardiac: RRR to peripheral palpation  Resp: nonlabored on RA  GI: soft, non-distended  MSK:  Right lower extremity:  - postoperative mepilex x2 in place without strikethrough   - Compartments soft and compressible  - Fires TA/GS/EHL  - SILT in [place]/Sa/SP/DP/T distributions  - Palpable DP pulse    Results for orders placed or performed during the hospital encounter of 10/03/24 (from the past 24 hour(s))   POCT GLUCOSE   Result Value Ref Range    POCT Glucose 129 (H) 74 - 99 mg/dL   POCT GLUCOSE   Result Value Ref Range    POCT Glucose 100 (H) 74 - 99 mg/dL   Prepare RBC: 4 Units   Result Value Ref Range    PRODUCT CODE A8978N15     Unit Number [id number]     Unit ABO O     Unit RH POS     XM INTEP COMP     Dispense Status XM     Blood Expiration Date 10/25/2024 11:59:00 PM EDT     PRODUCT BLOOD TYPE 5100     UNIT VOLUME 350     PRODUCT CODE M0176V95     Unit Number [id number]     Unit ABO O     Unit RH POS     XM INTEP COMP     Dispense Status XM     Blood Expiration Date 10/30/2024 11:59:00 PM EDT     PRODUCT BLOOD TYPE 5100     UNIT VOLUME 276     PRODUCT CODE V3661J04     Unit Number [id number]     Unit ABO O     Unit RH POS     XM INTEP COMP     Dispense Status XM     Blood Expiration Date 10/30/2024 11:59:00 PM EDT     PRODUCT BLOOD TYPE 5100     UNIT VOLUME 295     PRODUCT CODE C8172A88     Unit Number [id number]     Unit ABO O     Unit RH POS     XM INTEP COMP     Dispense Status XM     Blood Expiration Date " 10/29/2024 11:59:00 PM EDT     PRODUCT BLOOD TYPE 5100     UNIT VOLUME 284    Blood Gas Arterial Full Panel Unsolicited   Result Value Ref Range    POCT pH, Arterial 7.50 (H) 7.38 - 7.42 pH    POCT pCO2, Arterial 28 (L) 38 - 42 mm Hg    POCT pO2, Arterial 468 (H) 85 - 95 mm Hg    POCT SO2, Arterial 100 94 - 100 %    POCT Oxy Hemoglobin, Arterial 97.5 94.0 - 98.0 %    POCT Hematocrit Calculated, Arterial 27.0 (L) 36.0 - 46.0 %    POCT Sodium, Arterial 132 (L) 136 - 145 mmol/L    POCT Potassium, Arterial 4.1 3.5 - 5.3 mmol/L    POCT Chloride, Arterial 101 98 - 107 mmol/L    POCT Ionized Calcium, Arterial 1.05 (L) 1.10 - 1.33 mmol/L    POCT Glucose, Arterial 116 (H) 74 - 99 mg/dL    POCT Lactate, Arterial 2.7 (H) 0.4 - 2.0 mmol/L    POCT Base Excess, Arterial -0.8 -2.0 - 3.0 mmol/L    POCT HCO3 Calculated, Arterial 21.8 (L) 22.0 - 26.0 mmol/L    POCT Hemoglobin, Arterial 9.0 (L) 12.0 - 16.0 g/dL    POCT Anion Gap, Arterial 13 10 - 25 mmo/L    Patient Temperature 37.0 degrees Celsius    FiO2 90 %   Coox Panel, Arterial Unsolicited   Result Value Ref Range    POCT Hemoglobin, Arterial 9.0 (L) 12.0 - 16.0 g/dL    POCT Oxy Hemoglobin, Arterial 97.5 94.0 - 98.0 %    POCT Carboxyhemoglobin, Arterial 1.5 %    POCT Methemoglobin, Arterial 0.7 0.0 - 1.5 %    POCT Deoxy Hemoglobin, Arterial 0.3 0.0 - 5.0 %   Blood Gas Arterial Full Panel   Result Value Ref Range    POCT pH, Arterial 7.40 7.38 - 7.42 pH    POCT pCO2, Arterial 33 (L) 38 - 42 mm Hg    POCT pO2, Arterial 156 (H) 85 - 95 mm Hg    POCT SO2, Arterial 99 94 - 100 %    POCT Oxy Hemoglobin, Arterial 97.1 94.0 - 98.0 %    POCT Hematocrit Calculated, Arterial 25.0 (L) 36.0 - 46.0 %    POCT Sodium, Arterial 132 (L) 136 - 145 mmol/L    POCT Potassium, Arterial 4.3 3.5 - 5.3 mmol/L    POCT Chloride, Arterial 101 98 - 107 mmol/L    POCT Ionized Calcium, Arterial 1.14 1.10 - 1.33 mmol/L    POCT Glucose, Arterial 131 (H) 74 - 99 mg/dL    POCT Lactate, Arterial 2.2 (H) 0.4 - 2.0  mmol/L    POCT Base Excess, Arterial -3.9 (L) -2.0 - 3.0 mmol/L    POCT HCO3 Calculated, Arterial 20.4 (L) 22.0 - 26.0 mmol/L    POCT Hemoglobin, Arterial 8.4 (L) 12.0 - 16.0 g/dL    POCT Anion Gap, Arterial 15 10 - 25 mmo/L    Patient Temperature 37.0 degrees Celsius    FiO2 44 %   POCT GLUCOSE   Result Value Ref Range    POCT Glucose 44 (L) 74 - 99 mg/dL   POCT GLUCOSE   Result Value Ref Range    POCT Glucose 109 (H) 74 - 99 mg/dL   POCT GLUCOSE   Result Value Ref Range    POCT Glucose 96 74 - 99 mg/dL   POCT GLUCOSE   Result Value Ref Range    POCT Glucose 115 (H) 74 - 99 mg/dL       CT head wo IV contrast   Final Result   No evidence of intracranial mass, extra-axial collection, hemorrhage   or infarction        MACRO:   none        Signed by: Trey Marquis 10/6/2024 2:12 PM   Dictation workstation:   [id number]      Cardiac device check - Inpatient         Transthoracic Echo (TTE) Complete   Final Result      XR femur right 1 view   Final Result   Fracture through the right greater trochanter, partially evaluated on   this portable study. Recommend CT of the right hip to evaluate   intertrochanteric extension        I personally reviewed the images/study and I agree with the findings   as stated. This study was interpreted at [place], [place].        MACRO:   None        Signed by: Ortega Palmer 10/4/2024 10:10 AM   Dictation workstation:   [id number]      XR knee right 1-2 views   Final Result   Fracture through the right greater trochanter, partially evaluated on   this portable study. Recommend CT of the right hip to evaluate   intertrochanteric extension        I personally reviewed the images/study and I agree with the findings   as stated. This study was interpreted at [place], [place].        MACRO:   None        Signed by: Ortega Palmer 10/4/2024 10:10 AM   Dictation workstation:   [id number]       CT angio chest for pulmonary embolism   Final Result   1. No evidence of acute pulmonary embolism to segmental level. Please   note that, assessment of subsegmental branches is limited and small   peripheral emboli are not entirely excluded.   2. Marked cardiomegaly with enlargement of the right atrium, left   atrium, and right ventricle. There is enlargement of the IVC with   contrast reflux into the IVC and hepatic veins. Findings are   suggestive of right heart strain/dysfunction and/or tricuspid   regurgitation. Correlate with echocardiographic findings. There is   also moderate pericardial effusion. Recommend further evaluation with   echocardiography correlate with concern for component of cardiac   tamponade   3. Moderate-to-large right and trace left pleural effusions,   pericardial effusion, and abdominopelvic ascites. Correlate with   fluid volume status.   4. Multifocal central branching ground-glass opacities in the   bilateral lungs with questionable multifocal calcifications and   pulmonary ossification. Findings may represent sequelae of chronic   pulmonary venous congestion which can be secondary to mitral valve   stenosis. Alternatively findings may be sequela of chronic infectious   or inflammatory process.   5. Please see dedicated CT imaging of the abdomen and pelvis for   further evaluation.        I personally reviewed the image(s)/study and resident interpretation.   I agree with the findings as stated by resident Jay Rowe.   Data analyzed and images interpreted at [facility], [place], [place].        MACRO:   Critical Finding:  See findings. Notification was initiated on   10/3/2024 at 4:51 pm by  Jay Rowe.  (**-OCF-**) Instructions:        Signed by: Brynn Amaya 10/3/2024 5:00 PM   Dictation workstation:   [id number]      CT abdomen pelvis w IV contrast   Final Result   1. Cortical irregularity at the greater trochanter of the  right femur   suggestive of an acute, minimally displaced fracture. Adjacent to the   fracture site is an acute hematoma in the planes of the gluteal   muscles.   2. Moderate right pleural effusion with adjacent atelectasis as well   as a small to moderate pericardial effusion. There is also a small to   moderate amount of simple appearing ascites.   3. Incidental findings include cirrhosis and a probable cystic lesion   in the area of the pancreatic head. Further characterization by   contrast MRI on a routine outpatient basis is recommended if not   previously assessed.   4. Other chronic incidental findings as detailed above.        I personally reviewed the images/study and I agree with the findings   as stated by Michael Bundy MD (resident). This study was   interpreted at [place], [place].        MACRO:   Michael Bundy discussed the significance and urgency of this   critical finding via epic secure chat with  Aimee Cavanaugh MD on   10/3/2024 at 4:50 pm.  (**-RCF-**) Findings:  See findings.        Signed by: Alec Carbone 10/4/2024 7:36 AM   Dictation workstation:   [id number]      XR chest 1 view   Final Result   1. Increased consolidative opacities within the right lower lung with   diffuse hazy opacities of bilateral lungs. Bilateral costophrenic   angle blunting representing trace pleural effusions. Correlate with   volume status.   2. Unchanged cardiomediastinal enlargement.        I personally reviewed the images/study and I agree with the findings   as stated by Jeyson York MD. This study was interpreted at   [facility], [place], [place].        MACRO:   None        Signed by: Kirby Canada 10/3/2024 1:45 PM   Dictation workstation:   [id number]      XR hip right with pelvis when performed 2 or 3 views   Final Result   No acute fracture or dislocation of the right hip. Similar bilateral   hip arthrosis.         I personally reviewed the images/study and I agree with the findings   as stated by Jeyson York MD. This study was interpreted at   [facility], [place], [place].        Signed by: Kirby Canada 10/3/2024 11:10 AM   Dictation workstation:   [id number]      CT head wo IV contrast   Final Result   1. Right periorbital and facial soft tissue swelling and hematoma   without evidence for orbital or facial bone fracture. There is also   very subtle fat stranding along the far anterior aspects of the   extraocular muscles. No measurable intraconal hematoma is identified.   2. There is prominent intraorbital and periorbital fat and suspected   proptosis bilaterally which may reflect underlying thyroid   orbitopathy.   3. Loss of the normal cervical lordosis with grade 1-2   anterolisthesis of C4 on C5 is favored to be remote/degenerative in   nature. Traumatic subluxation is thought to be less likely but is   difficult to entirely exclude. There is no definitive evidence for an   acute fracture of the cervical spine.   4. Multilevel degenerative changes of the cervical spine with   associated central canal and neuroforaminal stenosis.   5. No evidence of acute intracranial hemorrhage.   6. Partially imaged large thyroid nodule for which nonemergent   thyroid ultrasound is recommended.                  MACRO:   None        Signed by: Stacy Mishra 10/3/2024 10:54 AM   Dictation workstation:   [id number]      CT maxillofacial bones wo IV contrast   Final Result   1. Right periorbital and facial soft tissue swelling and hematoma   without evidence for orbital or facial bone fracture. There is also   very subtle fat stranding along the far anterior aspects of the   extraocular muscles. No measurable intraconal hematoma is identified.   2. There is prominent intraorbital and periorbital fat and suspected   proptosis bilaterally which may reflect underlying thyroid   orbitopathy.   3. Loss  of the normal cervical lordosis with grade 1-2   anterolisthesis of C4 on C5 is favored to be remote/degenerative in   nature. Traumatic subluxation is thought to be less likely but is   difficult to entirely exclude. There is no definitive evidence for an   acute fracture of the cervical spine.   4. Multilevel degenerative changes of the cervical spine with   associated central canal and neuroforaminal stenosis.   5. No evidence of acute intracranial hemorrhage.   6. Partially imaged large thyroid nodule for which nonemergent   thyroid ultrasound is recommended.                  MACRO:   None        Signed by: Stacy Mishra 10/3/2024 10:54 AM   Dictation workstation:   [id number]      CT cervical spine wo IV contrast   Final Result   1. Right periorbital and facial soft tissue swelling and hematoma   without evidence for orbital or facial bone fracture. There is also   very subtle fat stranding along the far anterior aspects of the   extraocular muscles. No measurable intraconal hematoma is identified.   2. There is prominent intraorbital and periorbital fat and suspected   proptosis bilaterally which may reflect underlying thyroid   orbitopathy.   3. Loss of the normal cervical lordosis with grade 1-2   anterolisthesis of C4 on C5 is favored to be remote/degenerative in   nature. Traumatic subluxation is thought to be less likely but is   difficult to entirely exclude. There is no definitive evidence for an   acute fracture of the cervical spine.   4. Multilevel degenerative changes of the cervical spine with   associated central canal and neuroforaminal stenosis.   5. No evidence of acute intracranial hemorrhage.   6. Partially imaged large thyroid nodule for which nonemergent   thyroid ultrasound is recommended.                  MACRO:   None        Signed by: Stacy Mishra 10/3/2024 10:54 AM   Dictation workstation:   [id number]      MR pelvis wo IV contrast    (Results Pending)   FL fluoro images no  charge    (Results Pending)       Assessment/Plan: 69 y.o. female c/p R CMN on 10/7 with Dr. Park.      Plan:  - Weight bearing: WBAT RLE  - DVT ppx: SCDs, chemoppx per primary  - Diet: Okay for diet from an Orthopaedic perspective   - Pain: multimodal pain control per primary  - Antibiotics: perioperative ancef 1g q8hr x2 doses   - Lines: maintain PIVx2 while inpatient  - PT/OT: consulted; recommend to be seen no later than POD1  - Ho: none   - Drains: none   - Follow-up w/ Dr. Park in 3 weeks  - Mepilex dressings to be removed POD7 (10/15/24)  - Staples to be removed at 3w follow-up appointment    Dispo: Per Primary, Pending PT/OT    We will follow peripherally while patient is in house. Pt should be WBAT RLE until first follow up appointment. Patient will require 6 weeks total of DVT prophylaxis from an orthopedic standpoint, if ok per primary team. Patient currently has mepilex on surgical site. Dressing should be removed POD7. Please send home with Calcium/Vitamin D 500mg-400IU BID for 6 weeks. Patient should follow up w/ Dr. Park 3 weeks after surgery for post-operative appointment (patient may call [phone] to schedule). Please page with questions.      Erick Slater MD, MD  Orthopedic Surgery PGY-2  [facility]  Available by Epic Chat    While inpatient, this patient will be followed by the Orthopaedic Trauma team. Please see contact information below:    Ortho Trauma  Erick Slater MD PGY2  Dayana Phillips MD PGY3    Please page [phone] (ortho on-call) after 6pm and on weekends.

## 2024-10-08 NOTE — PROGRESS NOTES
"Misael Ritchie is a 69 y.o. female on day 5 of admission presenting with Hyperkalemia.    Subjective   Patient is seen and examined today.  Had no major complaints, no significant pain at the site of the surgery.  Denies any nausea or vomiting.  Patient back on low-dose epi 0.04 during the day.  Had transient hypoglycemia down to 44 at 7:30 PM on 10/7.         Objective   General: Awake, alert, in no acute distress  Eyes: scleral icterus or injection  HENT: Normo-cephalic, right periorbital hematoma No stridor  CV: Regular rate, regular rhythm. Radial pulses 2+ bilaterally  Resp: Breathing non-labored, speaking in full sentences.    GI: Soft, non-distended, non-tender.   : deferred   MSK/Extremities: Right lower extremity:  - Closed injury  - Compartments soft and compressible  - Palpable DP pulse   Skin: Warm. Appropriate color  Neuro: Aox4 Face symmetric. Speech is fluent.  Gross strength and sensation intact in b/l UE and LEs  Psych: Appropriate mood and affect           Last Recorded Vitals  Blood pressure 141/54, pulse 75, temperature 35 °C (95 °F), resp. rate 16, height 1.549 m (5' 1\"), weight 56.9 kg (125 lb 7.1 oz), SpO2 100%.  Intake/Output last 3 Shifts:  I/O last 3 completed shifts:  In: 2977.7 (55.2 mL/kg) [I.V.:1077.6 (20 mL/kg); Other:800; IV Piggyback:1100.1]  Out: 2800 (51.9 mL/kg) [Other:2800]  Dosing Weight: 53.9 kg     Relevant Results  Results from last 7 days   Lab Units 10/08/24  1618 10/08/24  1115 10/08/24  0709 10/08/24  0506 10/08/24  0330 10/07/24  2356 10/07/24  0901 10/07/24  0245 10/06/24  0847 10/06/24  0347 10/05/24  0741 10/05/24  0418 10/04/24  1246 10/04/24  1124   POCT GLUCOSE mg/dL 108* 111* 114*  --  115* 96   < >  --    < >  --    < >  --    < >  --    GLUCOSE mg/dL  --   --   --  115*  --   --   --  144*  --  136*  --  152*  --  188*    < > = values in this interval not displayed.     Scheduled medications  acetaminophen, 650 mg, oral, q6h  [Held by provider] " allopurinol, 100 mg, oral, Daily  amiodarone, 200 mg, oral, Daily  atorvastatin, 40 mg, oral, Nightly  hydrocortisone sodium succinate, 50 mg, intravenous, q6h  midodrine, 5 mg, oral, TID  oxygen, , inhalation, Continuous - Inhalation  pantoprazole, 40 mg, oral, Daily before breakfast   Or  pantoprazole, 40 mg, intravenous, Daily before breakfast  polyethylene glycol, 17 g, oral, Daily      Continuous medications  bivalirudin, 0.05-0.49 mg/kg/hr, Last Rate: 0.05 mg/kg/hr (10/08/24 1600)  norepinephrine, 0-0.2 mcg/kg/min (Dosing Weight), Last Rate: 0.05 mcg/kg/min (10/08/24 1728)      PRN medications  PRN medications: albuterol, dextrose, dextrose, glucagon, glucagon, HYDROmorphone, oxyCODONE, oxyCODONE, oxymetazoline      Results for orders placed or performed during the hospital encounter of 10/03/24 (from the past 24 hour(s))   POCT GLUCOSE   Result Value Ref Range    POCT Glucose 44 (L) 74 - 99 mg/dL   POCT GLUCOSE   Result Value Ref Range    POCT Glucose 109 (H) 74 - 99 mg/dL   POCT GLUCOSE   Result Value Ref Range    POCT Glucose 96 74 - 99 mg/dL   POCT GLUCOSE   Result Value Ref Range    POCT Glucose 115 (H) 74 - 99 mg/dL   Magnesium   Result Value Ref Range    Magnesium 2.05 1.60 - 2.40 mg/dL   Hepatic Function Panel   Result Value Ref Range    Albumin 3.0 (L) 3.4 - 5.0 g/dL    Bilirubin, Total 2.2 (H) 0.0 - 1.2 mg/dL    Bilirubin, Direct 1.4 (H) 0.0 - 0.3 mg/dL    Alkaline Phosphatase 131 33 - 136 U/L    ALT 27 7 - 45 U/L    AST 21 9 - 39 U/L    Total Protein 5.6 (L) 6.4 - 8.2 g/dL   CBC and Auto Differential   Result Value Ref Range    WBC 13.9 (H) 4.4 - 11.3 x10*3/uL    nRBC 2.4 (H) 0.0 - 0.0 /100 WBCs    RBC 2.57 (L) 4.00 - 5.20 x10*6/uL    Hemoglobin 8.0 (L) 12.0 - 16.0 g/dL    Hematocrit 22.3 (L) 36.0 - 46.0 %    MCV 87 80 - 100 fL    MCH 31.1 26.0 - 34.0 pg    MCHC 35.9 32.0 - 36.0 g/dL    RDW 17.4 (H) 11.5 - 14.5 %    Platelets 64 (L) 150 - 450 x10*3/uL    Neutrophils % 89.1 40.0 - 80.0 %    Immature  Granulocytes %, Automated 2.2 (H) 0.0 - 0.9 %    Lymphocytes % 1.1 13.0 - 44.0 %    Monocytes % 7.5 2.0 - 10.0 %    Eosinophils % 0.0 0.0 - 6.0 %    Basophils % 0.1 0.0 - 2.0 %    Neutrophils Absolute 12.40 (H) 1.20 - 7.70 x10*3/uL    Immature Granulocytes Absolute, Automated 0.30 0.00 - 0.70 x10*3/uL    Lymphocytes Absolute 0.16 (L) 1.20 - 4.80 x10*3/uL    Monocytes Absolute 1.05 (H) 0.10 - 1.00 x10*3/uL    Eosinophils Absolute 0.00 0.00 - 0.70 x10*3/uL    Basophils Absolute 0.02 0.00 - 0.10 x10*3/uL   Heparin Assay   Result Value Ref Range    Heparin Unfractionated 0.1 See Comment Below for Therapeutic Ranges IU/mL   Phosphorus   Result Value Ref Range    Phosphorus 6.3 (H) 2.5 - 4.9 mg/dL   Basic Metabolic Panel   Result Value Ref Range    Glucose 115 (H) 74 - 99 mg/dL    Sodium 134 (L) 136 - 145 mmol/L    Potassium 4.5 3.5 - 5.3 mmol/L    Chloride 96 (L) 98 - 107 mmol/L    Bicarbonate 19 (L) 21 - 32 mmol/L    Anion Gap 24 (H) 10 - 20 mmol/L    Urea Nitrogen 33 (H) 6 - 23 mg/dL    Creatinine 3.52 (H) 0.50 - 1.05 mg/dL    eGFR 13 (L) >60 mL/min/1.73m*2    Calcium 8.0 (L) 8.6 - 10.6 mg/dL   POCT GLUCOSE   Result Value Ref Range    POCT Glucose 114 (H) 74 - 99 mg/dL   ECG 12 Lead   Result Value Ref Range    Ventricular Rate 90 BPM    Atrial Rate 93 BPM    QRS Duration 150 ms    QT Interval 432 ms    QTC Calculation(Bazett) 528 ms    R Axis 195 degrees    T Axis 89 degrees    QRS Count 15 beats    Q Onset 220 ms    T Offset 436 ms    QTC Fredericia 494 ms   BLOOD GAS MIXED VENOUS FULL PANEL   Result Value Ref Range    POCT pH, Mixed 7.33 7.33 - 7.43 pH    POCT pCO2, Mixed 45 41 - 51 mm Hg    POCT pO2, Mixed 39 35 - 45 mm Hg    POCT SO2, Mixed 56 45 - 75 %    POCT Oxy Hemoglobin, Mixed 54.4 45.0 - 75.0 %    POCT Hematocrit Calculated, Mixed 26.0 (L) 36.0 - 46.0 %    POCT Sodium, Mixed 131 (L) 136 - 145 mmol/L    POCT Potassium, Mixed 5.1 3.5 - 5.3 mmol/L    POCT Chloride, Mixed 97 (L) 98 - 107 mmol/L    POCT Ionized  Calcium, Mixed 1.07 (L) 1.10 - 1.33 mmol/L    POCT Glucose, Mixed 113 (H) 74 - 99 mg/dL    POCT Lactate, Mixed 2.6 (H) 0.4 - 2.0 mmol/L    POCT Base Excess, Mixed -2.2 (L) -2.0 - 3.0 mmol/L    POCT HCO3 Calculated, Mixed 23.7 22.0 - 26.0 mmol/L    POCT Hemoglobin, Mixed 8.5 (L) 12.0 - 16.0 g/dL    POCT Anion Gap, Mixed 15 10 - 25 mmo/L    Patient Temperature 37.0 degrees Celsius    FiO2 28 %   POCT GLUCOSE   Result Value Ref Range    POCT Glucose 111 (H) 74 - 99 mg/dL   Cardiac device check - MRI   Result Value Ref Range    BSA 1.56 m2   POCT GLUCOSE   Result Value Ref Range    POCT Glucose 108 (H) 74 - 99 mg/dL        Assessment/Plan   Assessment & Plan      Misael Ritchie is a 69 y.o. 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  Patient presenting to hospital after a fall. Found to have L trochanteric fracture with hematoma, hypoglycemia requiring dextrose drip -post insulin for hyperkalemia iso acidosis.        Endocrinology service is consulted for management of adrenal insufficiency in setting of recurrent unexplained hypoglycemic episodes.        [Of note:  - patient had similar episodes of hypoglycemia in 2024 for which she was managed at [facility].   Random blood cortisol level of 8.2 on 2/5/2024.   At that time, stim test was done on 2/5/2024 with results as under:  Basal cortisol: 2.6  30-minute cortisol: 2.6  60-minute cortisol: 2.7  -Patient reports that she has been on prednisone 2.5 mg since 2010.  She reports being off prednisone for 6 months in 2011 (patient not sure about the year when she was off).  She reports that she felt miserable when she was off prednisone.  -CT abdo/pelvis (10/3): Bilateral adrenal glands are unremarkable.  -Patient reports  that she recently had steroid injection to her right shoulder.    -Received methylprednisolone acetate 40 mg on 9/20/2024.]        # Adrenal insufficiency in setting of chronic glucocorticoid use and recent steroid injection to right shoulder resulting treatment resistant hypoglycemia  #R ORIF 10/7    BG of 44 on 10/7 at 7:30 pm, no hypoglycemia labs collected  Remains on low dose Epi - off Dextrose - diet started    Recommendations   -taper down injection hydrocortisone to 25 mg IV every 6 hourly  (Repeating workup for adrenal insufficiency is not recommended at this time as patient is on stress dose steroids.    ACTH is also expected to be suppressed in setting of recent steroid injection to shoulder (9/20/2024))  -In case blood sugar drops below 55 off dextrose, checking C-peptide, insulin, proinsulin, POC, VBG's, beta hydroxybutyrate, and RFP, stat prior to administering dextrose. Treat with hypoglycemic protocol accordingly, okay to start D5-D10 if needed  -will require non urgent endocrine follow up as outpatient     Plan communicated to primary team via secure messaging.  Case discussed with Dr. Dez Rivera MD

## 2024-10-08 NOTE — PROGRESS NOTES
Misael Ritchie is a 69 y.o. female on day 5 of admission presenting with Hyperkalemia.      Subjective   No complaints but pain in R hip after surgery. On .04 of Epi.        Objective     Last Recorded Vitals  BP (!) 130/34   Pulse 85   Temp 35.9 °C (96.6 °F) (Temporal)   Resp (!) 9   Wt 56.9 kg (125 lb 7.1 oz)   SpO2 98%   Intake/Output last 3 Shifts:    Intake/Output Summary (Last 24 hours) at 10/8/2024 0807  Last data filed at 10/8/2024 0700  Gross per 24 hour   Intake 2406.86 ml   Output 2800 ml   Net -393.14 ml       Admission Weight  Weight: 53.5 kg (118 lb) (10/03/24 0941)    Daily Weight  10/07/24 : 56.9 kg (125 lb 7.1 oz)    Image Results  FL fluoro images no charge  These images are not reportable by radiology and will not be interpreted   by  Radiologists.      Physical Exam  General: Awake, alert, in no acute distress  Eyes: Gaze conjugate.  No scleral icterus or injection  HENT: Normo-cephalic, right periorbital hematoma No stridor  CV: Regular rate, regular rhythm. Radial pulses 2+ bilaterally  Resp: Breathing non-labored, speaking in full sentences.  Clear to auscultation bilaterally  GI: Soft, non-distended, non-tender. No rebound or guarding.  : deferred   MSK/Extremities: No gross bony deformities. Moving all extremities  Skin: Warm. Appropriate color  Neuro: Aox4 Face symmetric. Speech is fluent.  Gross strength and sensation intact in b/l UE and LEs  Psych: Appropriate mood and affect      Assessment & Plan  Hyperkalemia    ESRD (end stage renal disease) on dialysis (Multi)    Pacemaker    HTN (hypertension)    CAD (coronary artery disease)    CHF (congestive heart failure)    Atrial fibrillation (Multi)    Nondisplaced intertrochanteric fracture of right femur, initial encounter for closed fracture    Difficult intubation    Anemia    Misael Ritchie is a 69 y.o. female PMH aortic stenosis s/p TAVR 11/23, atrial fibrillation (on Eliquis) s/p DCCV, CAD, ESRD on hemodialysis  T/Th/Sat, HFrEF (6/2023 EF 48%), MR, TR, pacemaker CRT-P 5/2023, adrenal insufficiency admitted to the MICU on 10/03/24 for emergent dialysis for hyperkalemia 7.4.      Summary for 10/08/24:  Hold Heparin, obtain HIIT labs  Start Bivalirudin, SCD's  Obtain Mixed Venous Blood gas from central line  Continue Dilaudid for better pain control     Plan:  NEUROLOGY/PSYCH:  #Pain s/p fall   Scheduled Tylenol  PRN Oxy 5/10  Dilaudid 0.2mg q3h PRN     CARDIOVASCULAR:  #PMH A-fib, CAD s/p stent 2003, MR, TR  #pericardial effusion  Management:  Holding Eliquis and Plavix  Continue home amiodarone, atorvastatin  Hold metoprolol for HR iso of shock on admission  HF consult  Wean pressors as tolerated, obtaining mixed venous O2 today      PULMONARY:  #COPD  Management:  Wean O2  Continue home albuterol PRN     RENAL/GENITOURINARY:  #ESRD  Management:  Emergent dialysis, Nephrology following  Hold home torsemide   Hold home allopurinol   Follow up uric acid     GASTROENTEROLOGY:  Continue home Protonix, miralax     ENDOCRINOLOGY:  #Adrenal insufficiency  #Hypoglycemia 2/2 insulin patient received for hyperkalemia  Management:  Hydrocortisone 50 mg q6h  Endocrine consult given patient's repeated hypoglycemia c/f possible insulinoma        HEMATOLOGY:  #hematoma   Management:  - Holding Heparin, started Bivalirudin  Hb is down trending at 8.0 (from 10.8). Will continue to monitor. Could be hemodilution vs. Blood loss iso of surgery.    Follow up HIIT labs     SKIN:  No acute concerns     MUSCULOSKELETAL:  #Right troc fracture with hematoma  Management:  S/p surgery with ortho, pain control with Dilaudid, oxy and tylenol     INFECTIOUS DISEASE:  Rule out sepsis low suspicion  :: BC showed NGTD  Management:  Follow up Bcx, UA  MRSA +  Vancomycin 10/4-10/6  Zosyn 10/4-10/6    ICU Check List            FEN  Fluids: D10W  Electrolytes: PRN  Nutrition: NPO  Prophylaxis:  DVT ppx: Bivalrudin   GI ppx: PPI  Bowel care: Miralax      Hardware:  CVC 10/03/24 Triple lumen Non-tunneled Left Femoral (Active)   Placement Date/Time: 10/03/24 1516   Site Prep: Chlorhexidine   All 5 Sterile Barriers Used (Gloves, Gown, Cap, Mask, Large Sterile Drape): Yes  Lumen Type: Triple lumen  CVC Line Catheter Size (Fr): 7 Fr  CVC Type: Non-tunneled  CVC Line Length (cm):...   Number of days: 1       Arterial Line 10/04/24 Left Brachial (Active)   Placement Date/Time: 10/04/24 0200   Orientation: Left  Location: Brachial  Site Prep: Chlorhexidine   Local Anesthetic: Injectable  Technique: Ultrasound guidance  Securement Method: Sutured  Patient Tolerance: Tolerated well   Number of days: 1         Code: Full Code    HPOA:  Zeke Ritchie () [phone]   Disposition: MICU        Malnutrition Diagnosis Status: New  Malnutrition Diagnosis: Severe malnutrition related to chronic disease or condition  As Evidenced by: pt reports a decrease in appetite and intake for a few weeks along with an overall weight loss of 11% and moderate to severe fat and muscle wasting on physical exam  I agree with the dietitian's malnutrition diagnosis.              Derick Noland MD

## 2024-10-08 NOTE — PROGRESS NOTES
"Occupational Therapy    Evaluation    Patient Name: Misael Ritchie  MRN: [id number]  Department: [facility] MICU  Room: 30/30-A  Today's Date: 10/8/2024  Time Calculation  Start Time: 0921  Stop Time: 0950  Time Calculation (min): 29 min    Assessment  IP OT Assessment  OT Assessment: Patient will benefit from OT services to address deficits in ADLs/functional transfers.  Prognosis: Good  Barriers to Discharge: Inaccessible home environment  End of Session Communication: Bedside nurse  End of Session Patient Position: Bed, 3 rail up, Alarm off, not on at start of session  Plan:  Treatment Interventions: ADL retraining, Functional transfer training, UE strengthening/ROM, Endurance training, Cognitive reorientation, Patient/family training, Equipment evaluation/education, Neuromuscular reeducation, Compensatory technique education  OT Frequency: 3 times per week  OT Discharge Recommendations: Moderate intensity level of continued care  OT - OK to Discharge: Yes    Subjective   General:  General  Reason for Referral: admitted to the MICU on 10/03/24 for emergent dialysis for hyperkalemia 7.4 and fluid overload. Found to have right greater trochanteric fracture from fall s/p CMN 10/7  Past Medical History Relevant to Rehab: aortic stenosis s/p TAVR 11/23, atrial fibrillation (on Eliquis) s/p DCCV, CAD, ESRD on hemodialysis T/Th/Sat, HFrEF, MR, TR, pacemaker CRT-P 5/2023, adrenal insufficiency  Missed Visit: Yes  Missed Visit Reason:  (Patient pending OR for hip fx; will attempt OT next visit as appropriate.)  Family/Caregiver Present: No  Prior to Session Communication: Bedside nurse  Patient Position Received: Bed, 3 rail up, Alarm off, not on at start of session  General Comment: Patient pleasant, reports feeling \"groggy\", agreeable to OT; 2L NC, 0.02 Epi, a-line  Precautions:  Hearing/Visual Limitations: hearing is WFL  LE Weight Bearing Status:  ((R)LE WBAT)  Medical Precautions: Fall precautions, Oxygen therapy " "device and L/min  Precautions Comment: MAP >/= 65 per RN    Vital Signs:  Pre: /45 (64), HR 78, SpO2 96%  During: MAP >65 while sitting EOB  Post: /41 (63) RN in room and increased Epi to 0.04, HR 89, SpO2 93%    Pain:  Pain Assessment  Pain Assessment: 0-10  0-10 (Numeric) Pain Score:  (patient reports 9/10 (R)LE surgical pain, RN provided pain meds this AM)    Objective   Cognition:  Overall Cognitive Status: Impaired (patient reports feeling \"groggy\", patient intermittently lethargic)  Arousal/Alertness: Delayed responses to stimuli  Orientation Level:  (oriented to self with cue to state birthday as patient repeated her name, oriented to place, oriented to month with repeated cue as patient stated her birthday, oriented to year with choices)  Following Commands:  (follows one step commands with repetition and increased time)  Attention:  (decreased sustained attention)        Aviles Agitation Sedation Scale  Aviles Agitation Sedation Scale (RASS): Drowsy  Home Living:  Type of Home: House  Lives With: Spouse  Home Adaptive Equipment:  (walker)  Home Layout: Two level, Bed/bath upstairs  Home Access:  (6 NELDA with HR; chairlift to 2nd floor)  Bathroom Shower/Tub: Tub/shower unit  Bathroom Equipment:  (shower chair)  Home Living Comments: patient with difficulty providing home set-up   Prior Function:  Level of [place]: Needs assistance with ADLs, Needs assistance with homemaking  Receives Help From:  ( and has HHA 3x/week for 3 hours/day)  ADL Assistance:  (assist of HHA for bathing)  Homemaking Assistance:  (HHA assists with cleaning, patient reports she and her  both cook)  Ambulatory Assistance:  (reports using walker as needed, reports also walking without AD around her house, reports having assistance for home entry stairs)  Leisure: enjoys going out to eat and to movies  Hand Dominance: Right  Prior Function Comments: reports she occasionally drives, has transportation to " dialysis,  also drives     ADL:  Eating Assistance: Independent  Eating Deficit: Setup (anticipated)  Grooming Assistance: Stand by  Bathing Assistance: Moderate  Bathing Deficit:  (anticipated)  UE Dressing Assistance: Minimal  LE Dressing Assistance: Total  LE Dressing Deficit: Don/doff R sock, Don/doff L sock  Toileting Assistance with Device: Moderate  Toileting Deficit:  (anticipated)  Activity Tolerance:  Early Mobility/Exercise Safety Screen: Proceed with mobilization - No exclusion criteria met  Bed Mobility/Transfers: Bed Mobility  Bed Mobility: Yes  Bed Mobility 1  Bed Mobility 1: Supine to sitting, Sitting to supine  Level of Assistance 1: Maximum assistance, Moderate verbal cues, Moderate tactile cues  Bed Mobility Comments 1: HOB elevated, use of draw sheet  Bed Mobility 2  Bed Mobility  2: Rolling right  Level of Assistance 2: Minimum assistance, Minimal verbal cues    Transfers  Transfer: No (patient with decreased sitting balance, reporting dizziness while EOB which did not improve (MAP was >65 while in sitting))    Sitting Balance:  Static Sitting Balance  Static Sitting-Level of Assistance: Moderate assistance  Static Sitting-Comment/Number of Minutes: patient with multidrectional instability, increased forward trunk lean  Dynamic Sitting Balance  Dynamic Sitting-Level of Assistance: Moderate assistance, Maximum assistance     Vision: Vision - Basic Assessment  Current Vision: Wears glasses all the time  Sensation:  Light Touch: No apparent deficits  Strength:  Strength Comments: (B)  3+/5, (B) shoulders/elbows >/= 3/5     Extremities: RUE   RUE :  (AROM grossly WFL) and LUE   LUE:  (AROM grossly WFL)    Outcome Measures: [facility] Daily Activity  Putting on and taking off regular lower body clothing: Total  Bathing (including washing, rinsing, drying): A lot  Putting on and taking off regular upper body clothing: A little  Toileting, which includes using toilet, bedpan or urinal: A  lot  Taking care of personal grooming such as brushing teeth: A little  Eating Meals: None  Daily Activity - Total Score: 15    , Confusion Assessment Method-ICU (CAM-ICU)  Feature 1: Acute Onset or Fluctuating Course: Positive  Feature 2: Inattention: Positive  Feature 3: Altered Level of Consciousness: Positive  Feature 4: Disorganized Thinking: Positive  Overall CAM-ICU: Positive  , and E = Exercise and Early Mobility  Early Mobility/Exercise Safety Screen: Proceed with mobilization - No exclusion criteria met  ICU Mobility Scale: Sitting over edge of bed  Education Documentation  Body Mechanics, taught by Amanda Bunn OT at 10/8/2024 10:12 AM.  Learner: Patient  Readiness: Acceptance  Method: Explanation  Response: Needs Reinforcement    Precautions, taught by Amanda Bunn OT at 10/8/2024 10:12 AM.  Learner: Patient  Readiness: Acceptance  Method: Explanation  Response: Needs Reinforcement    ADL Training, taught by Amanda Bunn OT at 10/8/2024 10:12 AM.  Learner: Patient  Readiness: Acceptance  Method: Explanation  Response: Needs Reinforcement    Education Comments  No comments found.    Goals:   Encounter Problems       Encounter Problems (Active)       ADLs       Patient with complete upper body dressing with supervision level of assistance donning and doffing all UE clothes with no adaptive equipment. (Progressing)       Start:  10/08/24    Expected End:  10/22/24            Patient with complete lower body dressing with minimal assist  level of assistance donning and doffing all LE clothes  with PRN adaptive equipment. (Progressing)       Start:  10/08/24    Expected End:  10/22/24            Patient will complete daily grooming tasks with supervision level of assistance and PRN adaptive equipment. (Progressing)       Start:  10/08/24    Expected End:  10/22/24            Patient will complete toileting including hygiene clothing management/hygiene with stand by assist level of assistance.  (Progressing)       Start:  10/08/24    Expected End:  10/22/24               BALANCE       Pt will maintain dynamic sitting balance during ADL task with contact guard assist level of assistance in order to demonstrate decreased risk of falling and improved postural control. (Progressing)       Start:  10/08/24    Expected End:  10/22/24               COGNITION/SAFETY       Patient will demonstrated orientation x 3 and CAM/4AT (-). (Progressing)       Start:  10/08/24    Expected End:  10/22/24       ORIENTATION            EXERCISE/STRENGTHENING       Patient will complete BUE exercises in order to improve strength and activity tolerance for ADL performance.  (Progressing)       Start:  10/08/24    Expected End:  10/22/24            Patient will participate in >15 minutes of activity with <2 rest breaks to increase tolerance for ADLs/functional mobility performance. (Progressing)       Start:  10/08/24    Expected End:  10/22/24               MOBILITY       Patient will perform Functional mobility Household distances/Community Distances with minimal assist  level of assistance and front wheeled walker in order to improve safety and functional mobility. (Progressing)       Start:  10/08/24    Expected End:  10/22/24               TRANSFERS       Patient will perform bed mobility minimal assist  level of assistance in order to improve safety and independence with mobility (Progressing)       Start:  10/08/24    Expected End:  10/22/24            Patient will complete functional transfers with front wheeled walker with minimal assist level of assistance. (Progressing)       Start:  10/08/24    Expected End:  10/22/24               Amanda Bunn OTR/L  Inpatient Occupational Therapist   Rehab Office: [phone]

## 2024-10-08 NOTE — CARE PLAN
Problem: Skin  Goal: Participates in plan/prevention/treatment measures  Outcome: Progressing  Flowsheets (Taken 10/7/2024 1044)  Participates in plan/prevention/treatment measures:   Discuss with provider PT/OT consult   Elevate heels  Goal: Prevent/manage excess moisture  Outcome: Progressing  Flowsheets (Taken 10/6/2024 1940 by Jeff Michele RN)  Prevent/manage excess moisture:   Cleanse incontinence/protect with barrier cream   Moisturize dry skin   Monitor for/manage infection if present  Goal: Prevent/minimize sheer/friction injuries  Outcome: Progressing  Flowsheets (Taken 10/6/2024 1940 by Jeff Michele RN)  Prevent/minimize sheer/friction injuries:   Complete micro-shifts as needed if patient unable. Adjust patient position to relieve pressure points, not a full turn   HOB 30 degrees or less   Turn/reposition every 2 hours/use positioning/transfer devices   Utilize specialty bed per algorithm   Use pull sheet  Goal: Promote/optimize nutrition  Outcome: Progressing  Flowsheets (Taken 10/6/2024 1940 by Jeff Michele RN)  Promote/optimize nutrition: Monitor/record intake including meals  Goal: Promote skin healing  Outcome: Progressing  Flowsheets (Taken 10/6/2024 1940 by Jeff Michele RN)  Promote skin healing:   Assess skin/pad under line(s)/device(s)   Ensure correct size (line/device) and apply per  instructions   Rotate device position/do not position patient on device   Turn/reposition every 2 hours/use positioning/transfer devices  Goal: Decreased wound size/increased tissue granulation at next dressing change  Outcome: Progressing  Flowsheets (Taken 10/6/2024 1940 by Jeff Michele RN)  Decreased wound size/increased tissue granulation at next dressing change:   Promote sleep for wound healing   Protective dressings over bony prominences   Utilize specialty bed per algorithm     Problem: Fall/Injury  Goal: Not fall by end of shift  Outcome: Progressing  Goal: Be free from injury  by end of the shift  Outcome: Progressing  Goal: Verbalize understanding of personal risk factors for fall in the hospital  Outcome: Progressing  Goal: Verbalize understanding of risk factor reduction measures to prevent injury from fall in the home  Outcome: Progressing     Problem: Pain - Adult  Goal: Verbalizes/displays adequate comfort level or baseline comfort level  Outcome: Progressing  Flowsheets (Taken 10/7/2024 1044)  Verbalizes/displays adequate comfort level or baseline comfort level:   Administer analgesics based on type and severity of pain and evaluate response   Encourage patient to monitor pain and request assistance   Implement non-pharmacological measures as appropriate and evaluate response   Assess pain using appropriate pain scale     Problem: Nutrition  Goal: Oral intake greater than 50%  Outcome: Progressing  Goal: Oral intake greater 75%  Outcome: Progressing  Goal: Consume prescribed supplement  Outcome: Progressing  Goal: Adequate PO fluid intake  Outcome: Progressing  Goal: Nutrition support goals are met within 48 hrs  Outcome: Progressing  Goal: Nutrition support is meeting 75% of nutrient needs  Outcome: Progressing  Goal: BG  mg/dL  Outcome: Progressing  Goal: Lab values WNL  Outcome: Progressing  Goal: Electrolytes WNL  Outcome: Progressing  Goal: Promote healing  Outcome: Progressing  Goal: Maintain stable weight  Outcome: Progressing  Goal: Reduce weight from edema/fluid  Outcome: Progressing  Goal: Gradual weight gain  Outcome: Progressing

## 2024-10-08 NOTE — PROGRESS NOTES
Physical Therapy    Physical Therapy Evaluation    Patient Name: Misael Ritchie  MRN: [id number]  Room: 30/30  Today's Date: 10/8/2024   Time Calculation  Start Time: 1140  Stop Time: 1205  Time Calculation (min): 25 min    Assessment/Plan   PT Assessment  PT Assessment Results: Decreased strength, Decreased range of motion, Decreased endurance, Impaired balance, Decreased mobility, Decreased coordination, Decreased cognition, Impaired judgement, Decreased safety awareness, Orthopedic restrictions, Pain  Rehab Prognosis: Good  Barriers to Discharge: medical acuity  End of Session Communication: Bedside nurse  End of Session Patient Position: Bed, 3 rail up, Alarm on  IP OR SWING BED PT PLAN  Inpatient or Swing Bed: Inpatient  PT Plan  Treatment/Interventions: Bed mobility, Transfer training, Gait training, Stair training, Balance training, Strengthening, Endurance training, Range of motion, Therapeutic exercise, Therapeutic activity  PT Plan: Ongoing PT  PT Frequency: 3 times per week  PT Discharge Recommendations: Moderate intensity level of continued care  PT Recommended Transfer Status: Assist x2  PT - OK to Discharge: Yes  Reason for Referral: admitted to the MICU on 10/03/24 for emergent dialysis for hyperkalemia 7.4 and fluid overload. Found to have right greater trochanteric fracture from fall s/p CMN 10/7  Past Medical History Relevant to Rehab: aortic stenosis s/p TAVR 11/23, atrial fibrillation (on Eliquis) s/p DCCV, CAD, ESRD on hemodialysis T/Th/Sat, HFrEF, MR, TR, pacemaker CRT-P 5/2023, adrenal insufficiency  Prior to Session Communication: Bedside nurse  Patient Position Received: Bed, 3 rail up, Alarm off, not on at start of session    Subjective      General Visit Information:  Subjective: Patient is alert, agreeable to PT with encouragement.  Frequently mentioning about previous session with OT and current difficulty/pain.    Reason for Referral: admitted to the MICU on 10/03/24 for emergent  "dialysis for hyperkalemia 7.4 and fluid overload. Found to have right greater trochanteric fracture from fall s/p CMN 10/7  Past Medical History Relevant to Rehab: aortic stenosis s/p TAVR 11/23, atrial fibrillation (on Eliquis) s/p DCCV, CAD, ESRD on hemodialysis T/Th/Sat, HFrEF, MR, TR, pacemaker CRT-P 5/2023, adrenal insufficiency  Prior to Session Communication: Bedside nurse  Patient Position Received: Bed, 3 rail up, Alarm off, not on at start of session  Family/Caregiver Present: No  General Comment: Patient pleasant, reports feeling \"groggy\", agreeable to OT; 2L NC, 0.02 Epi, a-line   Home Living:  Home Living  Type of Home: House  Lives With: Spouse  Home Adaptive Equipment:  (walker)  Home Layout: Two level, Bed/bath upstairs  Home Access:  (6 NELDA with HR; chairlift to 2nd floor)  Bathroom Shower/Tub: Tub/shower unit  Bathroom Equipment:  (shower chair)  Prior Level of Function:  Prior Function Per Pt/Caregiver Report  Level of [place]: Needs assistance with ADLs, Needs assistance with homemaking  Receives Help From:  ( and has HHA 3x/week for 3 hours/day)  ADL Assistance:  (assist of HHA for bathing)  Homemaking Assistance:  (HHA assists with cleaning, patient reports she and her  both cook)  Ambulatory Assistance:  (reports using walker as needed, reports also walking without AD around her house, reports having assistance for home entry stairs)  Leisure: enjoys going out to eat and to movies  Hand Dominance: Right  Prior Function Comments: reports she occasionally drives, has transportation to dialysis,  also drives  Precautions:  Precautions  LE Weight Bearing Status: Weight Bearing as Tolerated  Medical Precautions: Fall precautions  Vital Signs:  Pre Vitals  Vital Signs  Heart Rate: 91  SpO2: 90 %  BP: (!) 130/46   Post Vitals  Vital Signs  Heart Rate: 81  SpO2: 100 %  BP: 141/54   Lines/Tubes/Drains:  CVC 10/03/24 Triple lumen Non-tunneled Left Femoral (Active)   Number of " days: 4       Arterial Line 10/07/24 Left Radial (Active)   Number of days: 0        Continuous Medications/Drips:  bivalirudin, 0.05-0.49 mg/kg/hr, Last Rate: 0.05 mg/kg/hr (10/08/24 1329)  EPINEPHrine, 0-1 mcg/kg/min, Last Rate: 0.04 mcg/kg/min (10/08/24 1200)        Objective   Pain:  Pain Assessment  0-10 (Numeric) Pain Score: 8  Pain Type: Acute pain  Pain Location: Hip  Pain Orientation: Right  Pain Descriptors: Aching  Pain Interventions: Repositioned (RN notified to administer PRN pain meds)    Cognition:  Cognition  Overall Cognitive Status: Impaired  Arousal/Alertness:  (lethargic)  Orientation Level:  (oriented to person, place, disoriented to time and situation)    General Assessments:  Extremity/Trunk Assessments:  Tone: No abnormalities noted  Sensation  Light Touch: No apparent deficits  Coordination  Movements are Fluid and Coordinated: No  Coordination Comment: 2/2 lethargy and confusion, difficulty understanding multistep commands  Upper Extremity  ROM: WFL  Strength:Not WFLRUE: GH Flexion 2/5, Elbow Flexion 3-/5, Elbow Extension 3-/5,  fair  LUE: GH Flexion 2/5, Elbow Flexion 3-/5, Elbow Extension 3-/5,  fair  Lower Extremity  ROM: WFL  Strength: Not WFL RLE: Hip flexion 1+/5, Knee flexion 2-/5, Knee extension 2-/5, PF 3/5, DF 3/5  LLE: Hip flexion 3-/5, Knee flexion 3+/5, Knee extension 3+/5, PF 3+/5, DF 3+/5   Sitting Static Balance Not NormalUE Support Two UE support and Assist Level Contact Guard  Sitting Dynamic Balance Not NormalUE Support Two UE support and Assist Level Mod Assist  Standing Static Balance Not NormalUE Support Two UE support and Assist Level Max Assist  Standing Dynamic Balance Unable to Complete/Dependent    Functional Assessments:  Bed Mobility 1  Bed Mobility 1: Supine to sitting  Level of Assistance 1: Maximum assistance  Bed Mobility Comments 1: HOB 30, TAPS  Bed Mobility 2  Bed Mobility  2: Sitting to supine  Level of Assistance 2: Maximum assistance  Bed  Mobility Comments 2: TAPS    Transfers  Transfer: Yes  Transfer 1  Transfer From 1: Sit to  Transfer to 1: Stand  Transfer Device 1:  (2 HHA)  Transfer Level of Assistance 1: Moderate assistance  Transfers 2  Transfer From 2: Stand to  Transfer to 2: Sit  Transfer Device 2:  (2 HHA)  Transfer Level of Assistance 2: Moderate assistance    Ambulation/Gait Training  Ambulation/Gait Training Performed: No              Outcome Measures:  [facility] Basic Mobility  Turning from your back to your side while in a flat bed without using bedrails: A lot  Moving from lying on your back to sitting on the side of a flat bed without using bedrails: A lot  Moving to and from bed to chair (including a wheelchair): A lot  Standing up from a chair using your arms (e.g. wheelchair or bedside chair): A lot  To walk in hospital room: Total  Climbing 3-5 steps with railing: Total  Basic Mobility - Total Score: 10           FSS-ICU  Ambulation: Unable to attempt due to weakness  Rolling: Maximal assistance (performs 25% - 49% of task)  Sitting: Minimal assistance (performs 75% or more of task)  Transfer Sit-to-Stand: Moderate assistance (performs 50 - 74% of task)  Transfer Supine-to-Sit: Maximal assistance (performs 25% - 49% of task)  Total Score: 11  ICU Mobility Screen  ICU Mobility Scale: Standing             Encounter Problems       Encounter Problems (Active)       PT Problem       Patient will complete supine to sit and sit to supine Supervision  (Progressing)       Start:  10/08/24    Expected End:  10/22/24            Patient will perform sit<>stand transfer with LRAD, and Supervision  (Progressing)       Start:  10/08/24    Expected End:  10/22/24            Patient will ambulate >50' with LRAD and Supervision and WBAT RLE  (Progressing)       Start:  10/08/24    Expected End:  10/22/24            Patient will verbalize and demonstrate Weightbearing Restrictions independently.  (Progressing)       Start:  10/08/24    Expected End:   10/22/24               Pain - Adult            Treatment on evaluation  BLE PF, DF, heel slide, isometric quad set, SAQ, SLR, ab/adduction 5 x 2 AAROM    Assessment: Patient presents s/p CMN R femur c hyperkalemia.  Currently max A for mobility with strength balance, and ambulation deficits noted.  Patient would benefit from further therapy to increase functional independence and safety.  Will continue to follow during acute stay.      Education Documentation  Precautions, taught by Mj Lemon PT at 10/8/2024  2:26 PM.  Learner: Patient  Readiness: Acceptance  Method: Explanation  Response: Needs Reinforcement    Body Mechanics, taught by Mj Lemon PT at 10/8/2024  2:26 PM.  Learner: Patient  Readiness: Acceptance  Method: Explanation  Response: Needs Reinforcement    Mobility Training, taught by Mj Lemon PT at 10/8/2024  2:26 PM.  Learner: Patient  Readiness: Acceptance  Method: Explanation  Response: Needs Reinforcement    Education Comments  No comments found.          10/08/24 at 2:27 PM   Mj Lemon PT   Rehab Office: [phone]             > 4 mets

## 2024-10-09 ENCOUNTER — APPOINTMENT (OUTPATIENT)
Dept: DIALYSIS | Facility: HOSPITAL | Age: 69
End: 2024-10-09
Payer: MEDICARE

## 2024-10-09 ENCOUNTER — APPOINTMENT (OUTPATIENT)
Dept: RADIOLOGY | Facility: HOSPITAL | Age: 69
DRG: 480 | End: 2024-10-09
Payer: MEDICARE

## 2024-10-09 LAB
ABO GROUP (TYPE) IN BLOOD: NORMAL
ALBUMIN SERPL BCP-MCNC: 3 G/DL (ref 3.4–5)
ALBUMIN SERPL BCP-MCNC: 3.2 G/DL (ref 3.4–5)
ALP SERPL-CCNC: 142 U/L (ref 33–136)
ALP SERPL-CCNC: 142 U/L (ref 33–136)
ALT SERPL W P-5'-P-CCNC: 23 U/L (ref 7–45)
ALT SERPL W P-5'-P-CCNC: 35 U/L (ref 7–45)
ANION GAP BLDMV CALCULATED.4IONS-SCNC: 22 MMO/L (ref 10–25)
ANION GAP BLDMV CALCULATED.4IONS-SCNC: 23 MMO/L (ref 10–25)
ANION GAP BLDMV CALCULATED.4IONS-SCNC: 24 MMO/L (ref 10–25)
ANION GAP BLDV CALCULATED.4IONS-SCNC: 12 MMOL/L (ref 10–25)
ANION GAP BLDV CALCULATED.4IONS-SCNC: 14 MMOL/L (ref 10–25)
ANION GAP SERPL CALC-SCNC: 28 MMOL/L (ref 10–20)
ANION GAP SERPL CALC-SCNC: 31 MMOL/L (ref 10–20)
ANTIBODY SCREEN: NORMAL
APTT PPP: 48 SECONDS (ref 27–38)
APTT PPP: 51 SECONDS (ref 27–38)
APTT PPP: 52 SECONDS (ref 27–38)
APTT PPP: 65 SECONDS (ref 27–38)
AST SERPL W P-5'-P-CCNC: 44 U/L (ref 9–39)
AST SERPL W P-5'-P-CCNC: 98 U/L (ref 9–39)
ATRIAL RATE: 93 BPM
BASE EXCESS BLDMV CALC-SCNC: -10.3 MMOL/L (ref -2–3)
BASE EXCESS BLDMV CALC-SCNC: -10.6 MMOL/L (ref -2–3)
BASE EXCESS BLDMV CALC-SCNC: -8.4 MMOL/L (ref -2–3)
BASE EXCESS BLDV CALC-SCNC: 1 MMOL/L (ref -2–3)
BASE EXCESS BLDV CALC-SCNC: 2.5 MMOL/L (ref -2–3)
BASOPHILS # BLD AUTO: 0.04 X10*3/UL (ref 0–0.1)
BASOPHILS NFR BLD AUTO: 0.2 %
BILIRUB DIRECT SERPL-MCNC: 1.4 MG/DL (ref 0–0.3)
BILIRUB SERPL-MCNC: 2.2 MG/DL (ref 0–1.2)
BILIRUB SERPL-MCNC: 2.3 MG/DL (ref 0–1.2)
BNP SERPL-MCNC: 1164 PG/ML (ref 0–99)
BNP SERPL-MCNC: 987 PG/ML (ref 0–99)
BODY TEMPERATURE: 37 DEGREES CELSIUS
BUN SERPL-MCNC: 45 MG/DL (ref 6–23)
BUN SERPL-MCNC: 48 MG/DL (ref 6–23)
CA-I BLDMV-SCNC: 1.06 MMOL/L (ref 1.1–1.33)
CA-I BLDMV-SCNC: 1.06 MMOL/L (ref 1.1–1.33)
CA-I BLDMV-SCNC: 1.12 MMOL/L (ref 1.1–1.33)
CA-I BLDV-SCNC: 1.1 MMOL/L (ref 1.1–1.33)
CA-I BLDV-SCNC: 1.11 MMOL/L (ref 1.1–1.33)
CALCIUM SERPL-MCNC: 8 MG/DL (ref 8.6–10.6)
CALCIUM SERPL-MCNC: 8.5 MG/DL (ref 8.6–10.6)
CARDIAC TROPONIN I PNL SERPL HS: 1351 NG/L (ref 0–34)
CARDIAC TROPONIN I PNL SERPL HS: 1491 NG/L (ref 0–34)
CHLORIDE BLD-SCNC: 93 MMOL/L (ref 98–107)
CHLORIDE BLD-SCNC: 93 MMOL/L (ref 98–107)
CHLORIDE BLD-SCNC: 95 MMOL/L (ref 98–107)
CHLORIDE BLDV-SCNC: 94 MMOL/L (ref 98–107)
CHLORIDE BLDV-SCNC: 96 MMOL/L (ref 98–107)
CHLORIDE SERPL-SCNC: 91 MMOL/L (ref 98–107)
CHLORIDE SERPL-SCNC: 94 MMOL/L (ref 98–107)
CK SERPL-CCNC: 175 U/L (ref 0–215)
CO2 SERPL-SCNC: 12 MMOL/L (ref 21–32)
CO2 SERPL-SCNC: 19 MMOL/L (ref 21–32)
CREAT SERPL-MCNC: 4.53 MG/DL (ref 0.5–1.05)
CREAT SERPL-MCNC: 4.71 MG/DL (ref 0.5–1.05)
EGFRCR SERPLBLD CKD-EPI 2021: 10 ML/MIN/1.73M*2
EGFRCR SERPLBLD CKD-EPI 2021: 10 ML/MIN/1.73M*2
EOSINOPHIL # BLD AUTO: 0 X10*3/UL (ref 0–0.7)
EOSINOPHIL NFR BLD AUTO: 0 %
ERYTHROCYTE [DISTWIDTH] IN BLOOD BY AUTOMATED COUNT: 18.4 % (ref 11.5–14.5)
ERYTHROCYTE [DISTWIDTH] IN BLOOD BY AUTOMATED COUNT: 18.5 % (ref 11.5–14.5)
GLUCOSE BLD MANUAL STRIP-MCNC: 122 MG/DL (ref 74–99)
GLUCOSE BLD MANUAL STRIP-MCNC: 126 MG/DL (ref 74–99)
GLUCOSE BLD MANUAL STRIP-MCNC: 135 MG/DL (ref 74–99)
GLUCOSE BLD MANUAL STRIP-MCNC: 152 MG/DL (ref 74–99)
GLUCOSE BLD MANUAL STRIP-MCNC: 174 MG/DL (ref 74–99)
GLUCOSE BLD MANUAL STRIP-MCNC: 94 MG/DL (ref 74–99)
GLUCOSE BLD MANUAL STRIP-MCNC: 98 MG/DL (ref 74–99)
GLUCOSE BLD-MCNC: 121 MG/DL (ref 74–99)
GLUCOSE BLD-MCNC: 135 MG/DL (ref 74–99)
GLUCOSE BLD-MCNC: 186 MG/DL (ref 74–99)
GLUCOSE BLDV-MCNC: 125 MG/DL (ref 74–99)
GLUCOSE BLDV-MCNC: 169 MG/DL (ref 74–99)
GLUCOSE SERPL-MCNC: 110 MG/DL (ref 74–99)
GLUCOSE SERPL-MCNC: 135 MG/DL (ref 74–99)
HCO3 BLDMV-SCNC: 16.3 MMOL/L (ref 22–26)
HCO3 BLDMV-SCNC: 16.4 MMOL/L (ref 22–26)
HCO3 BLDMV-SCNC: 18.4 MMOL/L (ref 22–26)
HCO3 BLDV-SCNC: 26 MMOL/L (ref 22–26)
HCO3 BLDV-SCNC: 27.2 MMOL/L (ref 22–26)
HCT VFR BLD AUTO: 21.2 % (ref 36–46)
HCT VFR BLD AUTO: 22.2 % (ref 36–46)
HCT VFR BLD EST: 16 % (ref 36–46)
HCT VFR BLD EST: 22 % (ref 36–46)
HCT VFR BLD EST: 24 % (ref 36–46)
HCT VFR BLD EST: 25 % (ref 36–46)
HCT VFR BLD EST: 29 % (ref 36–46)
HGB BLD-MCNC: 7.6 G/DL (ref 12–16)
HGB BLD-MCNC: 7.8 G/DL (ref 12–16)
HGB BLDMV-MCNC: 7.4 G/DL (ref 12–16)
HGB BLDMV-MCNC: 8.2 G/DL (ref 12–16)
HGB BLDMV-MCNC: 9.8 G/DL (ref 12–16)
HGB BLDV-MCNC: 5.3 G/DL (ref 12–16)
HGB BLDV-MCNC: 7.9 G/DL (ref 12–16)
IMM GRANULOCYTES # BLD AUTO: 0.61 X10*3/UL (ref 0–0.7)
IMM GRANULOCYTES NFR BLD AUTO: 3.7 % (ref 0–0.9)
INHALED O2 CONCENTRATION: 21 %
INTERPRETATION FOR ANTI-PLATELET FACTOR 4 ANTIBODY: NORMAL
LACTATE BLDMV-SCNC: 6.8 MMOL/L (ref 0.4–2)
LACTATE BLDMV-SCNC: 8 MMOL/L (ref 0.4–2)
LACTATE BLDMV-SCNC: 9.1 MMOL/L (ref 0.4–2)
LACTATE BLDV-SCNC: 3.3 MMOL/L (ref 0.4–2)
LACTATE BLDV-SCNC: 3.8 MMOL/L (ref 0.4–2)
LACTATE SERPL-SCNC: 9.8 MMOL/L (ref 0.4–2)
LIPASE SERPL-CCNC: 2060 U/L (ref 9–82)
LYMPHOCYTES # BLD AUTO: 0.21 X10*3/UL (ref 1.2–4.8)
LYMPHOCYTES NFR BLD AUTO: 1.3 %
MAGNESIUM SERPL-MCNC: 2.32 MG/DL (ref 1.6–2.4)
MCH RBC QN AUTO: 31 PG (ref 26–34)
MCH RBC QN AUTO: 31.1 PG (ref 26–34)
MCHC RBC AUTO-ENTMCNC: 35.1 G/DL (ref 32–36)
MCHC RBC AUTO-ENTMCNC: 35.8 G/DL (ref 32–36)
MCV RBC AUTO: 87 FL (ref 80–100)
MCV RBC AUTO: 88 FL (ref 80–100)
MONOCYTES # BLD AUTO: 1.23 X10*3/UL (ref 0.1–1)
MONOCYTES NFR BLD AUTO: 7.4 %
NEUTROPHILS # BLD AUTO: 14.48 X10*3/UL (ref 1.2–7.7)
NEUTROPHILS NFR BLD AUTO: 87.4 %
NRBC BLD-RTO: 3.9 /100 WBCS (ref 0–0)
NRBC BLD-RTO: 4.2 /100 WBCS (ref 0–0)
OXYHGB MFR BLDMV: 32.4 % (ref 45–75)
OXYHGB MFR BLDMV: 44.2 % (ref 45–75)
OXYHGB MFR BLDMV: 44.4 % (ref 45–75)
OXYHGB MFR BLDV: 43.4 % (ref 45–75)
OXYHGB MFR BLDV: 45.9 % (ref 45–75)
PCO2 BLDMV: 38 MM HG (ref 41–51)
PCO2 BLDMV: 40 MM HG (ref 41–51)
PCO2 BLDMV: 43 MM HG (ref 41–51)
PCO2 BLDV: 42 MM HG (ref 41–51)
PCO2 BLDV: 43 MM HG (ref 41–51)
PH BLDMV: 7.22 PH (ref 7.33–7.43)
PH BLDMV: 7.24 PH (ref 7.33–7.43)
PH BLDMV: 7.24 PH (ref 7.33–7.43)
PH BLDV: 7.39 PH (ref 7.33–7.43)
PH BLDV: 7.42 PH (ref 7.33–7.43)
PHOSPHATE SERPL-MCNC: 8.4 MG/DL (ref 2.5–4.9)
PLATELET # BLD AUTO: 56 X10*3/UL (ref 150–450)
PLATELET # BLD AUTO: 59 X10*3/UL (ref 150–450)
PO2 BLDMV: 32 MM HG (ref 35–45)
PO2 BLDMV: 34 MM HG (ref 35–45)
PO2 BLDMV: 37 MM HG (ref 35–45)
PO2 BLDV: 31 MM HG (ref 35–45)
PO2 BLDV: 31 MM HG (ref 35–45)
POTASSIUM BLDMV-SCNC: 5 MMOL/L (ref 3.5–5.3)
POTASSIUM BLDMV-SCNC: 5 MMOL/L (ref 3.5–5.3)
POTASSIUM BLDMV-SCNC: 5.1 MMOL/L (ref 3.5–5.3)
POTASSIUM BLDV-SCNC: 3.6 MMOL/L (ref 3.5–5.3)
POTASSIUM BLDV-SCNC: 4 MMOL/L (ref 3.5–5.3)
POTASSIUM SERPL-SCNC: 4.8 MMOL/L (ref 3.5–5.3)
POTASSIUM SERPL-SCNC: 4.9 MMOL/L (ref 3.5–5.3)
PROT SERPL-MCNC: 5.8 G/DL (ref 6.4–8.2)
PROT SERPL-MCNC: 6 G/DL (ref 6.4–8.2)
Q ONSET: 220 MS
QRS COUNT: 15 BEATS
QRS DURATION: 150 MS
QT INTERVAL: 432 MS
QTC CALCULATION(BAZETT): 528 MS
QTC FREDERICIA: 494 MS
R AXIS: 195 DEGREES
RBC # BLD AUTO: 2.45 X10*6/UL (ref 4–5.2)
RBC # BLD AUTO: 2.51 X10*6/UL (ref 4–5.2)
RH FACTOR (ANTIGEN D): NORMAL
SAO2 % BLDMV: 33 % (ref 45–75)
SAO2 % BLDMV: 45 % (ref 45–75)
SAO2 % BLDMV: 45 % (ref 45–75)
SAO2 % BLDV: 44 % (ref 45–75)
SAO2 % BLDV: 47 % (ref 45–75)
SERUM AND PLATELET FACTOR 4: NORMAL
SODIUM BLDMV-SCNC: 126 MMOL/L (ref 136–145)
SODIUM BLDMV-SCNC: 129 MMOL/L (ref 136–145)
SODIUM BLDMV-SCNC: 130 MMOL/L (ref 136–145)
SODIUM BLDV-SCNC: 130 MMOL/L (ref 136–145)
SODIUM BLDV-SCNC: 132 MMOL/L (ref 136–145)
SODIUM SERPL-SCNC: 132 MMOL/L (ref 136–145)
SODIUM SERPL-SCNC: 133 MMOL/L (ref 136–145)
T AXIS: 89 DEGREES
T OFFSET: 436 MS
VENTRICULAR RATE: 90 BPM
WBC # BLD AUTO: 13.9 X10*3/UL (ref 4.4–11.3)
WBC # BLD AUTO: 16.6 X10*3/UL (ref 4.4–11.3)

## 2024-10-09 PROCEDURE — 2020000001 HC ICU ROOM DAILY

## 2024-10-09 PROCEDURE — 84075 ASSAY ALKALINE PHOSPHATASE: CPT

## 2024-10-09 PROCEDURE — 84132 ASSAY OF SERUM POTASSIUM: CPT

## 2024-10-09 PROCEDURE — 87040 BLOOD CULTURE FOR BACTERIA: CPT

## 2024-10-09 PROCEDURE — 83735 ASSAY OF MAGNESIUM: CPT

## 2024-10-09 PROCEDURE — 2500000004 HC RX 250 GENERAL PHARMACY W/ HCPCS (ALT 636 FOR OP/ED)

## 2024-10-09 PROCEDURE — 2500000001 HC RX 250 WO HCPCS SELF ADMINISTERED DRUGS (ALT 637 FOR MEDICARE OP)

## 2024-10-09 PROCEDURE — 2500000002 HC RX 250 W HCPCS SELF ADMINISTERED DRUGS (ALT 637 FOR MEDICARE OP, ALT 636 FOR OP/ED)

## 2024-10-09 PROCEDURE — 85730 THROMBOPLASTIN TIME PARTIAL: CPT

## 2024-10-09 PROCEDURE — 37799 UNLISTED PX VASCULAR SURGERY: CPT

## 2024-10-09 PROCEDURE — 36415 COLL VENOUS BLD VENIPUNCTURE: CPT

## 2024-10-09 PROCEDURE — 99291 CRITICAL CARE FIRST HOUR: CPT

## 2024-10-09 PROCEDURE — 74018 RADEX ABDOMEN 1 VIEW: CPT

## 2024-10-09 PROCEDURE — 83880 ASSAY OF NATRIURETIC PEPTIDE: CPT | Performed by: STUDENT IN AN ORGANIZED HEALTH CARE EDUCATION/TRAINING PROGRAM

## 2024-10-09 PROCEDURE — 85025 COMPLETE CBC W/AUTO DIFF WBC: CPT

## 2024-10-09 PROCEDURE — 85027 COMPLETE CBC AUTOMATED: CPT

## 2024-10-09 PROCEDURE — 74018 RADEX ABDOMEN 1 VIEW: CPT | Performed by: RADIOLOGY

## 2024-10-09 PROCEDURE — 84100 ASSAY OF PHOSPHORUS: CPT

## 2024-10-09 PROCEDURE — 99233 SBSQ HOSP IP/OBS HIGH 50: CPT | Performed by: STUDENT IN AN ORGANIZED HEALTH CARE EDUCATION/TRAINING PROGRAM

## 2024-10-09 PROCEDURE — 82947 ASSAY GLUCOSE BLOOD QUANT: CPT

## 2024-10-09 PROCEDURE — 2500000005 HC RX 250 GENERAL PHARMACY W/O HCPCS

## 2024-10-09 PROCEDURE — 99233 SBSQ HOSP IP/OBS HIGH 50: CPT | Performed by: INTERNAL MEDICINE

## 2024-10-09 PROCEDURE — 84484 ASSAY OF TROPONIN QUANT: CPT

## 2024-10-09 RX ORDER — DIPHENHYDRAMINE HYDROCHLORIDE 50 MG/ML
12.5 INJECTION INTRAMUSCULAR; INTRAVENOUS 2 TIMES DAILY PRN
Status: DISCONTINUED | OUTPATIENT
Start: 2024-10-09 | End: 2024-10-09

## 2024-10-09 RX ORDER — POLYETHYLENE GLYCOL 3350 17 G/17G
17 POWDER, FOR SOLUTION ORAL EVERY 12 HOURS
Status: DISCONTINUED | OUTPATIENT
Start: 2024-10-09 | End: 2024-10-16

## 2024-10-09 RX ORDER — BISACODYL 10 MG/1
10 SUPPOSITORY RECTAL DAILY PRN
Status: DISCONTINUED | OUTPATIENT
Start: 2024-10-09 | End: 2024-10-18 | Stop reason: HOSPADM

## 2024-10-09 RX ORDER — DIPHENHYDRAMINE HYDROCHLORIDE 50 MG/ML
12.5 INJECTION INTRAMUSCULAR; INTRAVENOUS
Status: DISCONTINUED | OUTPATIENT
Start: 2024-10-09 | End: 2024-10-13

## 2024-10-09 RX ORDER — BISACODYL 5 MG
10 TABLET, DELAYED RELEASE (ENTERIC COATED) ORAL DAILY PRN
Status: DISCONTINUED | OUTPATIENT
Start: 2024-10-09 | End: 2024-10-18 | Stop reason: HOSPADM

## 2024-10-09 RX ORDER — SENNOSIDES 8.6 MG/1
2 TABLET ORAL 2 TIMES DAILY
Status: DISCONTINUED | OUTPATIENT
Start: 2024-10-09 | End: 2024-10-16

## 2024-10-09 RX ORDER — VANCOMYCIN HYDROCHLORIDE 1 G/20ML
INJECTION, POWDER, LYOPHILIZED, FOR SOLUTION INTRAVENOUS DAILY PRN
Status: DISCONTINUED | OUTPATIENT
Start: 2024-10-09 | End: 2024-10-11

## 2024-10-09 RX ORDER — POLYETHYLENE GLYCOL 3350 17 G/17G
17 POWDER, FOR SOLUTION ORAL DAILY
Status: DISCONTINUED | OUTPATIENT
Start: 2024-10-09 | End: 2024-10-09

## 2024-10-09 RX ORDER — VANCOMYCIN HYDROCHLORIDE 500 MG/100ML
500 INJECTION, SOLUTION INTRAVENOUS
Status: DISCONTINUED | OUTPATIENT
Start: 2024-10-09 | End: 2024-10-11

## 2024-10-09 RX ADMIN — POLYETHYLENE GLYCOL 3350 17 G: 17 POWDER, FOR SOLUTION ORAL at 20:35

## 2024-10-09 RX ADMIN — ACETAMINOPHEN 650 MG: 325 TABLET, FILM COATED ORAL at 08:23

## 2024-10-09 RX ADMIN — HYDROCORTISONE SODIUM SUCCINATE 50 MG: 100 INJECTION, POWDER, FOR SOLUTION INTRAMUSCULAR; INTRAVENOUS at 12:23

## 2024-10-09 RX ADMIN — ACETAMINOPHEN 650 MG: 325 TABLET, FILM COATED ORAL at 14:26

## 2024-10-09 RX ADMIN — SENNOSIDES 17.2 MG: 8.6 TABLET, FILM COATED ORAL at 20:35

## 2024-10-09 RX ADMIN — BIVALIRUDIN 0.12 MG/KG/HR: 250 INJECTION, POWDER, LYOPHILIZED, FOR SOLUTION INTRAVENOUS at 16:09

## 2024-10-09 RX ADMIN — ATORVASTATIN CALCIUM 40 MG: 40 TABLET, FILM COATED ORAL at 20:35

## 2024-10-09 RX ADMIN — HYDROCORTISONE SODIUM SUCCINATE 50 MG: 100 INJECTION, POWDER, FOR SOLUTION INTRAMUSCULAR; INTRAVENOUS at 04:12

## 2024-10-09 RX ADMIN — PANTOPRAZOLE SODIUM 40 MG: 40 INJECTION, POWDER, FOR SOLUTION INTRAVENOUS at 08:21

## 2024-10-09 RX ADMIN — Medication 21 PERCENT: at 08:26

## 2024-10-09 RX ADMIN — ACETAMINOPHEN 650 MG: 325 TABLET, FILM COATED ORAL at 20:03

## 2024-10-09 RX ADMIN — CALCIUM GLUCONATE 2 G: 20 INJECTION, SOLUTION INTRAVENOUS at 00:02

## 2024-10-09 RX ADMIN — BISACODYL 10 MG: 10 SUPPOSITORY RECTAL at 16:09

## 2024-10-09 RX ADMIN — PIPERACILLIN SODIUM AND TAZOBACTAM SODIUM 2.25 G: 2; .25 INJECTION, SOLUTION INTRAVENOUS at 01:31

## 2024-10-09 RX ADMIN — HYDROMORPHONE HYDROCHLORIDE 0.2 MG: 1 INJECTION, SOLUTION INTRAMUSCULAR; INTRAVENOUS; SUBCUTANEOUS at 00:42

## 2024-10-09 RX ADMIN — AMIODARONE HYDROCHLORIDE 200 MG: 200 TABLET ORAL at 08:24

## 2024-10-09 RX ADMIN — MIDODRINE HYDROCHLORIDE 5 MG: 5 TABLET ORAL at 12:27

## 2024-10-09 RX ADMIN — MIDODRINE HYDROCHLORIDE 5 MG: 5 TABLET ORAL at 08:23

## 2024-10-09 RX ADMIN — MIDODRINE HYDROCHLORIDE 5 MG: 5 TABLET ORAL at 16:07

## 2024-10-09 RX ADMIN — SENNOSIDES 17.2 MG: 8.6 TABLET, FILM COATED ORAL at 08:23

## 2024-10-09 RX ADMIN — DIPHENHYDRAMINE HYDROCHLORIDE 12.5 MG: 50 INJECTION INTRAMUSCULAR; INTRAVENOUS at 12:25

## 2024-10-09 RX ADMIN — DEXTROSE MONOHYDRATE 50 ML/HR: 100 INJECTION, SOLUTION INTRAVENOUS at 02:59

## 2024-10-09 RX ADMIN — PIPERACILLIN SODIUM AND TAZOBACTAM SODIUM 2.25 G: 2; .25 INJECTION, SOLUTION INTRAVENOUS at 16:06

## 2024-10-09 RX ADMIN — HYDROCORTISONE SODIUM SUCCINATE 50 MG: 100 INJECTION, POWDER, FOR SOLUTION INTRAMUSCULAR; INTRAVENOUS at 17:58

## 2024-10-09 RX ADMIN — PIPERACILLIN SODIUM AND TAZOBACTAM SODIUM 2.25 G: 2; .25 INJECTION, SOLUTION INTRAVENOUS at 08:21

## 2024-10-09 RX ADMIN — VANCOMYCIN HYDROCHLORIDE 500 MG: 500 INJECTION, SOLUTION INTRAVENOUS at 12:27

## 2024-10-09 RX ADMIN — HYDROCORTISONE SODIUM SUCCINATE 50 MG: 100 INJECTION, POWDER, FOR SOLUTION INTRAMUSCULAR; INTRAVENOUS at 22:55

## 2024-10-09 RX ADMIN — DEXTROSE MONOHYDRATE 50 ML/HR: 100 INJECTION, SOLUTION INTRAVENOUS at 14:26

## 2024-10-09 RX ADMIN — POLYETHYLENE GLYCOL 3350 17 G: 17 POWDER, FOR SOLUTION ORAL at 08:24

## 2024-10-09 ASSESSMENT — PAIN SCALES - GENERAL
PAINLEVEL_OUTOF10: 0 - NO PAIN

## 2024-10-09 ASSESSMENT — COGNITIVE AND FUNCTIONAL STATUS - GENERAL
DAILY ACTIVITIY SCORE: 9
WALKING IN HOSPITAL ROOM: TOTAL
CLIMB 3 TO 5 STEPS WITH RAILING: TOTAL
TURNING FROM BACK TO SIDE WHILE IN FLAT BAD: A LOT
MOVING TO AND FROM BED TO CHAIR: TOTAL
DRESSING REGULAR UPPER BODY CLOTHING: TOTAL
TOILETING: TOTAL
MOBILITY SCORE: 8
HELP NEEDED FOR BATHING: TOTAL
MOVING FROM LYING ON BACK TO SITTING ON SIDE OF FLAT BED WITH BEDRAILS: A LOT
DRESSING REGULAR LOWER BODY CLOTHING: TOTAL
STANDING UP FROM CHAIR USING ARMS: TOTAL
PERSONAL GROOMING: TOTAL

## 2024-10-09 ASSESSMENT — PAIN - FUNCTIONAL ASSESSMENT
PAIN_FUNCTIONAL_ASSESSMENT: 0-10

## 2024-10-09 NOTE — PROGRESS NOTES
Misael Ritchie is a 69 y.o. female on day 6 of admission presenting with Hyperkalemia.      Subjective   Ms Ritchie required epi overnight, was hypotensive with repeat VBG lactate elevated to 10.5. Denies SOB, chest pain, hemodynamics improved this morning on assessment on .01 epi.        Objective     Last Recorded Vitals  /54   Pulse 82   Temp 35.9 °C (96.6 °F) (Temporal)   Resp 11   Wt 59.3 kg (130 lb 11.7 oz)   SpO2 100%   Intake/Output last 3 Shifts:    Intake/Output Summary (Last 24 hours) at 10/9/2024 1116  Last data filed at 10/9/2024 0950  Gross per 24 hour   Intake 1942.51 ml   Output 400 ml   Net 1542.51 ml       Admission Weight  Weight: 53.5 kg (118 lb) (10/03/24 0941)    Daily Weight  10/08/24 : 59.3 kg (130 lb 11.7 oz)    Image Results  XR abdomen 1 view  Narrative: Interpreted By:  Que Baker and [place]yersdorf Conner   STUDY:  XR ABDOMEN 1 VIEW;  10/9/2024 8:16 am      INDICATION:  Signs/Symptoms:ileus.      COMPARISON:  Single-view abdomen 10/08/2024      ACCESSION NUMBER(S):  [id number]      ORDERING CLINICIAN:  KAREN MANTILLA      FINDINGS:  Left femoral central venous catheter is unchanged in position. ORIF  of the right femur with intramedullary nail and screw without  hardware complication though only partially visualized. Postsurgical  changes throughout the abdomen.      Mild gaseous distention of multiple bowel loops in the abdomen, not  significantly changed from the prior exam. Moderate colonic stool  burden.      Limited evaluation of pneumoperitoneum on supine imaging, however no  gross evidence of free air is noted.      Osseous structures demonstrate no acute bony changes.      Impression: Mild gaseous distention of multiple bowel loops in the abdomen in a  nonobstructive, nonspecific pattern. Findings are unchanged from the  prior exam.      I personally reviewed the image(s)/study and resident interpretation.  I agree with the findings as stated by resident  Jay Rowe.  Data analyzed and images interpreted at [facility], [place], [place].      MACRO:  None      Signed by: Que Baker 10/9/2024 10:11 AM  Dictation workstation:   [id number]  XR abdomen 1 view, XR chest 1 view  Narrative: Interpreted By:  Linda, Radha,  and Fan Willow   STUDY:  XR CHEST 1 VIEW; XR ABDOMEN 1 VIEW;  10/8/2024 11:29 pm      INDICATION:  Signs/Symptoms:vomiting,r/o aspiratoin; Signs/Symptoms:vomiting, r/o  dilated bowel.      COMPARISON:  Chest radiograph 10/03/2024      ACCESSION NUMBER(S):  [id number]; [id number]      ORDERING CLINICIAN:  KAREN MANTILLA      FINDINGS:  AP radiograph of the chest and AP radiographs of the abdomen were  provided.      Stable positioning of a left chest wall 3 lead pacer. Status post  TAVR. Left femoral vascular catheter in place. Partially visualized  surgical changes of the right hip.          CARDIOMEDIASTINAL SILHOUETTE:  The cardiomediastinal silhouette is enlarged and stable compared with  the prior exam. Extensive vascular calcifications of the aortic arch  and coronary arteries.      LUNGS:  Moderate loculated right pleural effusion. Diffuse nodular airspace  opacities throughout the right lower lung are increased in prominence  compared with the prior exam. No left effusion. No pneumothorax.      ABDOMEN:  Extensive vascular calcifications and postsurgical changes throughout  the abdomen. There is a single focally dilated loop of bowel within  the right lower abdominal quadrant. Otherwise overall nonobstructive  bowel-gas pattern with a moderate colonic stool burden.      BONES:  No acute osseous abnormality.      Impression: 1. Moderate loculated right pleural effusion with increased  micronodular airspace opacities throughout the right lower lung,  suggestive of worsening infiltrative airspace disease with aspiration  and infection not excluded.  2. Overall nonobstructive bowel-gas pattern  within the abdomen with a  moderate colonic stool burden. Single focally dilated loop of bowel  in the right lower abdominal quadrant may represent normal cecum.  3. Extensive vascular calcifications throughout the chest and abdomen.      I personally reviewed the image(s)/study and resident interpretation  as stated by Dr. Willow Chavira MD. I agree with the findings as  stated. This study was interpreted at [facility], [place], [place].      MACRO:  None      Signed by: Radha Mckeon 10/9/2024 8:54 AM  Dictation workstation:   [id number]      Physical Exam    General: Awake, alert, in no acute distress  Eyes: Gaze conjugate.  No scleral icterus or injection  HENT: Normo-cephalic, right periorbital hematoma No stridor  CV: Regular rate, regular rhythm. Radial pulses 2+ bilaterally  Resp: Breathing non-labored, speaking in full sentences.  Clear to auscultation bilaterally  GI: Soft, non-distended, non-tender. No rebound or guarding.  : deferred   MSK/Extremities: No gross bony deformities. Moving all extremities  Skin: Warm. Appropriate color  Neuro: Aox4 Face symmetric. Speech is fluent.  Gross strength and sensation intact in b/l UE and LEs  Psych: Appropriate mood and affect        Assessment & Plan  Hyperkalemia    ESRD (end stage renal disease) on dialysis (Multi)    Pacemaker    HTN (hypertension)    CAD (coronary artery disease)    CHF (congestive heart failure)    Atrial fibrillation (Multi)    Nondisplaced intertrochanteric fracture of right femur, initial encounter for closed fracture    Difficult intubation    Anemia    Misael Ritchie is a 69 y.o. female PMH aortic stenosis s/p TAVR 11/23, atrial fibrillation (on Eliquis) s/p DCCV, CAD, ESRD on hemodialysis T/Th/Sat, HFrEF (6/2023 EF 48%), MR, TR, pacemaker CRT-P 5/2023, adrenal insufficiency admitted to the MICU on 10/03/24 for emergent dialysis for hyperkalemia 7.4.      Summary for 10/09/24:  Continue  Bivcal  Continue on Epi for vasopressor support  Measure CVP and place [place] for Hemodynamics  Escalate bowel regimen  Plan:  NEUROLOGY/PSYCH:  #Pain s/p fall   Scheduled Tylenol  PRN Oxy 5/10  Dilaudid 0.2mg q3h PRN     CARDIOVASCULAR:  #PMH A-fib, CAD s/p stent 2003, MR, TR  #pericardial effusion  Management:  Holding Eliquis and Plavix  Continue home amiodarone, atorvastatin  Hold metoprolol for HR iso of shock on admission  Wean pressors as tolerated, obtaining mixed venous O2 today      PULMONARY:  #COPD  Management:  Wean O2  Continue home albuterol PRN     RENAL/GENITOURINARY:  #ESRD  Management:  Emergent dialysis, Nephrology following  Hold home torsemide   Hold home allopurinol   Follow up uric acid     GASTROENTEROLOGY:  Continue home Protonix, miralax     ENDOCRINOLOGY:  #Adrenal insufficiency  #Hypoglycemia 2/2 insulin patient received for hyperkalemia  Management:  Hydrocortisone 50 mg q6h  Endocrine consult given patient's repeated hypoglycemia c/f possible insulinoma         HEMATOLOGY:  #hematoma   Management:  - Holding Heparin, started Bivalirudin  Hb is down trending at 8.0 (from 10.8). Will continue to monitor. Could be hemodilution vs. Blood loss iso of surgery.    Follow up HIIT labs     SKIN:  No acute concerns     MUSCULOSKELETAL:  #Right troc fracture with hematoma  Management:  S/p surgery with ortho, pain control with Dilaudid, oxy and tylenol     INFECTIOUS DISEASE:  Rule out sepsis low suspicion  :: BC showed NGTD  Management:  Follow up Bcx, UA  MRSA +  Vancomycin 10/4-10/6  Zosyn 10/4-10/6     ICU Check List            FEN  Fluids: D10W  Electrolytes: PRN  Nutrition: NPO  Prophylaxis:  DVT ppx: Bivalrudin   GI ppx: PPI  Bowel care: Miralax     Hardware:  CVC 10/03/24 Triple lumen Non-tunneled Left Femoral (Active)   Placement Date/Time: 10/03/24 1516   Site Prep: Chlorhexidine   All 5 Sterile Barriers Used (Gloves, Gown, Cap, Mask, Large Sterile Drape): Yes  Lumen Type: Triple lumen   CVC Line Catheter Size (Fr): 7 Fr  CVC Type: Non-tunneled  CVC Line Length (cm):...   Number of days: 1       Arterial Line 10/04/24 Left Brachial (Active)   Placement Date/Time: 10/04/24 0200   Orientation: Left  Location: Brachial  Site Prep: Chlorhexidine   Local Anesthetic: Injectable  Technique: Ultrasound guidance  Securement Method: Sutured  Patient Tolerance: Tolerated well   Number of days: 1         Code: Full Code    HPOA:  Zeke Ritchie () [phone]   Disposition: MICU        Malnutrition Diagnosis Status: New  Malnutrition Diagnosis: Severe malnutrition related to chronic disease or condition  As Evidenced by: pt reports a decrease in appetite and intake for a few weeks along with an overall weight loss of 11% and moderate to severe fat and muscle wasting on physical exam  I agree with the dietitian's malnutrition diagnosis.              Derick Noland MD

## 2024-10-09 NOTE — SIGNIFICANT EVENT
Notified by RN that patient's glucose was low to 43 mg/dl.  I asked to draw some blood from the arterial line and to confirm the true hypoglycemia.  Patient was mildly symptomatic with a little bit of nausea.  She was mentating fine.  Repeat glucose was 43 from the arterial line.  Labs were drawn as per endocrinology recommendations (blood gas, insulin, proinsulin, BHB, c-peptide, RFP).  After those labs were drawn patient was given multiple juices which she drank.  She continued to feel nauseous.  Her repeat glucose was 25mg/dl.  D50 ampule was given.  D10 was started at 50ml/hr but then dropped down to 25ml after improvement.    Part of the endocrinology labs were a blood gas.  Blood gas showed an elevated lactate to 8.7.      Patient's mentation and symptoms improved post dextrose administration.  Extremities were cold however knees were warm.  Given concern for HIT, Doppler of the lower extremity was performed and the pulses were dopplerable. (Patient without numbness or pain in the extremities or the belly)     Repeat lactate on ABG around 1 hour later was 10.3.  Maps on norepinephrine of 0.04 or 75-80.  Norepinephrine was weaned.  Repeat showed a plateauing lactate of 10.5.  Off norepinephrine the patient's MAP was 60.  Epinephrine started. Plan to continue to trend lactates and watch patient clinically.

## 2024-10-09 NOTE — ASSESSMENT & PLAN NOTE
Misael Ritchie is a 69 y.o. 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  Patient presenting to hospital after a fall.  Patient reports feeling lightheaded and feeling extremely hot.  While she was trying to turn on the fan, she had a syncope resulting in fall and hitting right side of her head.    On presentation to ER patient was noted to be hypotensive.  Initial workup was suggestive of hyperkalemia with K7.4 which was treated with 5 units insulin, D50, bicarb and calcium gluconate. CT maxillofacial bones wo IV contrast: Right periorbital and facial soft tissue swelling and hematoma without evidence for orbital or facial bone fracture. Patient has right trochanteric fracture with hematoma.     On arrival to ICU, patient was noted to have episodes of hypoglycemia with blood glucose of 40 following which she was started on IV dextrose and was given hydrocortisone 100 mg IV push followed by hydrocortisone 50 mg Q6 hourly.  She was continued on D10 at 75 cc/h with improvement in blood glucose.     Endocrinology service is consulted for management of adrenal insufficiency in setting of recurrent unexplained hypoglycemic episodes.     Of note, patient had similar episodes of hypoglycemia in 2024 for which she was managed at [facility].  At that time, stim test was done on 2/5/2024 with results as under:  Basal cortisol: 2.6  30-minute cortisol: 2.6  60-minute cortisol: 2.7     Note is made of of random blood cortisol level of 8.2 on 2/5/2024.  Patient does have a history of adrenal insufficiency based on the above results.     Patient reports that she has been on prednisone 2.5 mg since 2010.  She reports being off prednisone for 6 months in 2011 (patient not sure about  the year when she was off).  She reports that she felt miserable when she was off prednisone.     CT abdo/pelvis (10/3): Bilateral adrenal glands are unremarkable.     Patient reports that she recently had steroid injection to her right shoulder.  On chart review it was noted that patient received methylprednisolone acetate 40 mg on 9/20/2024.     # Adrenal insufficiency in setting of chronic glucocorticoid use and recent steroid injection to right shoulder resulting treatment resistant hypoglycemia  #Hyperkalemia, multifactorial likely 2/2 CKD on HD,high anion gap metabolic acidosis (lactic acid/uremia), adrenal insufficiency, baseline BUN and creatinine 39/5.45 on 10/2/24 per [facility] labs     -Patient was on tablet prednisone 5 mg daily which is subtherapeutic replacement dose for adrenal insufficiency - Tentative plan for outpatient may include 2.5 mg orally daily.    Endocrine Recommendations -10/5/24:     Dextrose 10% tapering down - currently at 10 ml.hr    Continue injection hydrocortisone 50 mg IV every 6 hourly     Will taper current stress dose of steroid when patient is off pressors to therapeutic replacement dose for adrenal insufficiency.    In case blood sugar drops below 55 off dextrose, checking C-peptide, insulin, proinsulin, POC, VBG's, beta hydroxybutyrate, and RFP, stat prior to administering dextrose. Treat with hypoglycemic protocol accordingly, okay to start D5-D10 if needed    Repeating workup for adrenal insufficiency is not recommended at this time as patient is on stress dose steroids.  ACTH is also expected to be suppressed in setting of recent steroid injection to shoulder (9/20/2024)     Recommendations conveyed to primary team  Case seen and discussed with Dr. Garces  Endocrinology service will continue to follow.

## 2024-10-09 NOTE — PROGRESS NOTES
"Misael Ritchie is a 69 y.o. female on day 6 of admission presenting with Hyperkalemia.    Subjective     Patient had hypoglycemia down to 43 last evening, labs collected, D50 given, followed by D10 50 mL/h, tapered down to 25 mL/h.  ABGs showed lactic acid of 10.  Patient was switched from norepinephrine to epinephrine, with with improvement in her blood pressure.  Seen and examined today.  Feels a bit more lethargic, decreased appetite.  Patient denies any symptoms of hypoglycemia associated with last night's episode of blood sugar down to 40s       Objective   General: Awake, alert, in no acute distress  Eyes: scleral icterus or injection  HENT: Normo-cephalic, right periorbital hematoma No stridor  CV: Regular rate, regular rhythm. Radial pulses 2+ bilaterally  Resp: Breathing non-labored, speaking in full sentences.    GI: Soft, non-distended, non-tender.   : deferred   MSK/Extremities: Right lower extremity:  - Closed injury  - Compartments soft and compressible  - Palpable DP pulse   Skin: Warm. Appropriate color  Neuro: Aox4 Face symmetric. Speech is fluent.  Gross strength and sensation intact in b/l UE and LEs  Psych: Appropriate mood and affect      Last Recorded Vitals  Blood pressure 141/54, pulse 75, temperature 35.9 °C (96.6 °F), temperature source Temporal, resp. rate 16, height 1.549 m (5' 1\"), weight 59.3 kg (130 lb 11.7 oz), SpO2 100%.  Intake/Output last 3 Shifts:  I/O last 3 completed shifts:  In: 1219.3 (22.6 mL/kg) [P.O.:820; I.V.:249.3 (4.6 mL/kg); IV Piggyback:150]  Out: 400 (7.4 mL/kg) [Emesis/NG output:400]  Dosing Weight: 53.9 kg     Relevant Results  Results from last 7 days   Lab Units 10/09/24  0807 10/09/24  0422 10/09/24  0416 10/09/24  0220 10/09/24  0001 10/08/24  2341 10/08/24  2234 10/08/24  2225 10/08/24  2057 10/08/24  2007 10/08/24  0709 10/08/24  0506 10/07/24  0901 10/07/24  0245   POCT GLUCOSE mg/dL 122* 152*  --  135* 94 55*   < >  --    < >  --    < >  --    < >  " --    GLUCOSE mg/dL  --   --  135*  --   --   --   --  110*  --  36*  --  115*  --  144*    < > = values in this interval not displayed.     Scheduled medications  acetaminophen, 650 mg, oral, q6h  [Held by provider] allopurinol, 100 mg, oral, Daily  amiodarone, 200 mg, oral, Daily  atorvastatin, 40 mg, oral, Nightly  diphenhydrAMINE, 12.5 mg, intravenous, Once per day on Monday Wednesday Friday  hydrocortisone sodium succinate, 50 mg, intravenous, q6h  midodrine, 5 mg, oral, TID  oxygen, , inhalation, Continuous - Inhalation  pantoprazole, 40 mg, oral, Daily before breakfast   Or  pantoprazole, 40 mg, intravenous, Daily before breakfast  piperacillin-tazobactam, 2.25 g, intravenous, q8h  polyethylene glycol, 17 g, oral, q12h  sennosides, 2 tablet, oral, BID  vancomycin, 500 mg, intravenous, Once per day on Monday Wednesday Friday      Continuous medications  bivalirudin, 0.05-0.49 mg/kg/hr, Last Rate: 0.12 mg/kg/hr (10/09/24 1800)  dextrose 10 % in water (D10W), 50 mL/hr, Last Rate: 50 mL/hr (10/09/24 1800)  EPINEPHrine, 0-1 mcg/kg/min, Last Rate: 0.03 mcg/kg/min (10/09/24 1800)      PRN medications  PRN medications: albuterol, bisacodyl, bisacodyl, dextrose, dextrose, glucagon, glucagon, HYDROmorphone, oxyCODONE, oxyCODONE, vancomycin       Results for orders placed or performed during the hospital encounter of 10/03/24 (from the past 24 hour(s))   POCT GLUCOSE   Result Value Ref Range    POCT Glucose 43 (L) 74 - 99 mg/dL   aPTT   Result Value Ref Range    aPTT 35 27 - 38 seconds   C-Peptide   Result Value Ref Range    C-Peptide 5.2 (H) 0.7 - 3.9 ng/mL   Beta Hydroxybutyrate   Result Value Ref Range    Beta-Hydroxybutyrate 0.22 0.02 - 0.27 mmol/L   Renal function panel   Result Value Ref Range    Glucose 36 (LL) 74 - 99 mg/dL    Sodium 138 136 - 145 mmol/L    Potassium 4.5 3.5 - 5.3 mmol/L    Chloride 104 98 - 107 mmol/L    Bicarbonate 11 (L) 21 - 32 mmol/L    Anion Gap 28 mmol/L    Urea Nitrogen 36 (H) 6 - 23  mg/dL    Creatinine 3.75 (H) 0.50 - 1.05 mg/dL    eGFR 13 (L) >60 mL/min/1.73m*2    Calcium 6.7 (L) 8.6 - 10.6 mg/dL    Phosphorus 6.6 (H) 2.5 - 4.9 mg/dL    Albumin 2.6 (L) 3.4 - 5.0 g/dL   Blood Gas Arterial Full Panel   Result Value Ref Range    POCT pH, Arterial 7.27 (L) 7.38 - 7.42 pH    POCT pCO2, Arterial 30 (L) 38 - 42 mm Hg    POCT pO2, Arterial 88 85 - 95 mm Hg    POCT SO2, Arterial 96 94 - 100 %    POCT Oxy Hemoglobin, Arterial 93.8 (L) 94.0 - 98.0 %    POCT Hematocrit Calculated, Arterial 32.0 (L) 36.0 - 46.0 %    POCT Sodium, Arterial 128 (L) 136 - 145 mmol/L    POCT Potassium, Arterial 5.5 (H) 3.5 - 5.3 mmol/L    POCT Chloride, Arterial 94 (L) 98 - 107 mmol/L    POCT Ionized Calcium, Arterial 1.06 (L) 1.10 - 1.33 mmol/L    POCT Glucose, Arterial 42 (LL) 74 - 99 mg/dL    POCT Lactate, Arterial 8.7 (HH) 0.4 - 2.0 mmol/L    POCT Base Excess, Arterial -11.9 (L) -2.0 - 3.0 mmol/L    POCT HCO3 Calculated, Arterial 13.8 (L) 22.0 - 26.0 mmol/L    POCT Hemoglobin, Arterial 10.6 (L) 12.0 - 16.0 g/dL    POCT Anion Gap, Arterial 26 (H) 10 - 25 mmo/L    Patient Temperature 37.0 degrees Celsius    FiO2 21 %   POCT GLUCOSE   Result Value Ref Range    POCT Glucose 25 (L) 74 - 99 mg/dL   Blood Gas Arterial Full Panel   Result Value Ref Range    POCT pH, Arterial 7.24 (LL) 7.38 - 7.42 pH    POCT pCO2, Arterial 28 (L) 38 - 42 mm Hg    POCT pO2, Arterial 78 (L) 85 - 95 mm Hg    POCT SO2, Arterial 95 94 - 100 %    POCT Oxy Hemoglobin, Arterial 92.7 (L) 94.0 - 98.0 %    POCT Hematocrit Calculated, Arterial 26.0 (L) 36.0 - 46.0 %    POCT Sodium, Arterial 129 (L) 136 - 145 mmol/L    POCT Potassium, Arterial 5.3 3.5 - 5.3 mmol/L    POCT Chloride, Arterial 96 (L) 98 - 107 mmol/L    POCT Ionized Calcium, Arterial 1.04 (L) 1.10 - 1.33 mmol/L    POCT Glucose, Arterial 175 (H) 74 - 99 mg/dL    POCT Lactate, Arterial 10.3 (HH) 0.4 - 2.0 mmol/L    POCT Base Excess, Arterial -14.1 (L) -2.0 - 3.0 mmol/L    POCT HCO3 Calculated,  Arterial 12.0 (L) 22.0 - 26.0 mmol/L    POCT Hemoglobin, Arterial 8.8 (L) 12.0 - 16.0 g/dL    POCT Anion Gap, Arterial 26 (H) 10 - 25 mmo/L    Patient Temperature 37.0 degrees Celsius    FiO2 21 %   POCT GLUCOSE   Result Value Ref Range    POCT Glucose 189 (H) 74 - 99 mg/dL   aPTT   Result Value Ref Range    aPTT 41 (H) 27 - 38 seconds   Type and screen   Result Value Ref Range    ABO TYPE O     Rh TYPE POS     ANTIBODY SCREEN NEG    CBC and Auto Differential   Result Value Ref Range    WBC 17.4 (H) 4.4 - 11.3 x10*3/uL    nRBC 4.1 (H) 0.0 - 0.0 /100 WBCs    RBC 2.75 (L) 4.00 - 5.20 x10*6/uL    Hemoglobin 8.6 (L) 12.0 - 16.0 g/dL    Hematocrit 24.4 (L) 36.0 - 46.0 %    MCV 89 80 - 100 fL    MCH 31.3 26.0 - 34.0 pg    MCHC 35.2 32.0 - 36.0 g/dL    RDW 18.5 (H) 11.5 - 14.5 %    Platelets 60 (L) 150 - 450 x10*3/uL    Neutrophils % 86.6 40.0 - 80.0 %    Immature Granulocytes %, Automated 4.4 (H) 0.0 - 0.9 %    Lymphocytes % 1.4 13.0 - 44.0 %    Monocytes % 7.3 2.0 - 10.0 %    Eosinophils % 0.0 0.0 - 6.0 %    Basophils % 0.3 0.0 - 2.0 %    Neutrophils Absolute 15.09 (H) 1.20 - 7.70 x10*3/uL    Immature Granulocytes Absolute, Automated 0.77 (H) 0.00 - 0.70 x10*3/uL    Lymphocytes Absolute 0.25 (L) 1.20 - 4.80 x10*3/uL    Monocytes Absolute 1.28 (H) 0.10 - 1.00 x10*3/uL    Eosinophils Absolute 0.00 0.00 - 0.70 x10*3/uL    Basophils Absolute 0.05 0.00 - 0.10 x10*3/uL   Troponin I, High Sensitivity   Result Value Ref Range    Troponin I, High Sensitivity (CMC) 1,491 (HH) 0 - 34 ng/L   Lactate   Result Value Ref Range    Lactate 9.8 (HH) 0.4 - 2.0 mmol/L   Creatine Kinase   Result Value Ref Range    Creatine Kinase 175 0 - 215 U/L   B-type natriuretic peptide   Result Value Ref Range    BNP 1,164 (H) 0 - 99 pg/mL   Comprehensive metabolic panel   Result Value Ref Range    Glucose 110 (H) 74 - 99 mg/dL    Sodium 132 (L) 136 - 145 mmol/L    Potassium 4.9 3.5 - 5.3 mmol/L    Chloride 94 (L) 98 - 107 mmol/L    Bicarbonate 12 (L)  21 - 32 mmol/L    Anion Gap 31 (H) 10 - 20 mmol/L    Urea Nitrogen 45 (H) 6 - 23 mg/dL    Creatinine 4.53 (H) 0.50 - 1.05 mg/dL    eGFR 10 (L) >60 mL/min/1.73m*2    Calcium 8.0 (L) 8.6 - 10.6 mg/dL    Albumin 3.2 (L) 3.4 - 5.0 g/dL    Alkaline Phosphatase 142 (H) 33 - 136 U/L    Total Protein 6.0 (L) 6.4 - 8.2 g/dL    AST 44 (H) 9 - 39 U/L    Bilirubin, Total 2.2 (H) 0.0 - 1.2 mg/dL    ALT 23 7 - 45 U/L   Lipase   Result Value Ref Range    Lipase 2,060 (H) 9 - 82 U/L   Blood Gas Arterial Full Panel   Result Value Ref Range    POCT pH, Arterial 7.25 (LL) 7.38 - 7.42 pH    POCT pCO2, Arterial 27 (L) 38 - 42 mm Hg    POCT pO2, Arterial 79 (L) 85 - 95 mm Hg    POCT SO2, Arterial 95 94 - 100 %    POCT Oxy Hemoglobin, Arterial 93.1 (L) 94.0 - 98.0 %    POCT Hematocrit Calculated, Arterial 34.0 (L) 36.0 - 46.0 %    POCT Sodium, Arterial 128 (L) 136 - 145 mmol/L    POCT Potassium, Arterial 4.8 3.5 - 5.3 mmol/L    POCT Chloride, Arterial 96 (L) 98 - 107 mmol/L    POCT Ionized Calcium, Arterial 1.07 (L) 1.10 - 1.33 mmol/L    POCT Glucose, Arterial 111 (H) 74 - 99 mg/dL    POCT Lactate, Arterial 10.5 (HH) 0.4 - 2.0 mmol/L    POCT Base Excess, Arterial -14.0 (L) -2.0 - 3.0 mmol/L    POCT HCO3 Calculated, Arterial 11.8 (L) 22.0 - 26.0 mmol/L    POCT Hemoglobin, Arterial 11.2 (L) 12.0 - 16.0 g/dL    POCT Anion Gap, Arterial 25 10 - 25 mmo/L    Patient Temperature 37.0 degrees Celsius    FiO2 21 %   POCT GLUCOSE   Result Value Ref Range    POCT Glucose 119 (H) 74 - 99 mg/dL   POCT GLUCOSE   Result Value Ref Range    POCT Glucose 55 (L) 74 - 99 mg/dL   POCT GLUCOSE   Result Value Ref Range    POCT Glucose 94 74 - 99 mg/dL   aPTT   Result Value Ref Range    aPTT 51 (H) 27 - 38 seconds   Blood Gas Mixed Venous Full Panel   Result Value Ref Range    POCT pH, Mixed 7.22 (LL) 7.33 - 7.43 pH    POCT pCO2, Mixed 40 (L) 41 - 51 mm Hg    POCT pO2, Mixed 32 (L) 35 - 45 mm Hg    POCT SO2, Mixed 33 (L) 45 - 75 %    POCT Oxy Hemoglobin, Mixed  32.4 (L) 45.0 - 75.0 %    POCT Hematocrit Calculated, Mixed 29.0 (L) 36.0 - 46.0 %    POCT Sodium, Mixed 126 (L) 136 - 145 mmol/L    POCT Potassium, Mixed 5.0 3.5 - 5.3 mmol/L    POCT Chloride, Mixed 93 (L) 98 - 107 mmol/L    POCT Ionized Calcium, Mixed 1.06 (L) 1.10 - 1.33 mmol/L    POCT Glucose, Mixed 186 (H) 74 - 99 mg/dL    POCT Lactate, Mixed 9.1 (HH) 0.4 - 2.0 mmol/L    POCT Base Excess, Mixed -10.6 (L) -2.0 - 3.0 mmol/L    POCT HCO3 Calculated, Mixed 16.4 (L) 22.0 - 26.0 mmol/L    POCT Hemoglobin, Mixed 9.8 (L) 12.0 - 16.0 g/dL    POCT Anion Gap, Mixed 22 10 - 25 mmo/L    Patient Temperature 37.0 degrees Celsius    FiO2 21 %   Blood Culture    Specimen: Peripheral Venipuncture; Blood culture   Result Value Ref Range    Blood Culture Loaded on Instrument - Culture in progress    Blood Gas Mixed Venous Full Panel   Result Value Ref Range    POCT pH, Mixed 7.24 (LL) 7.33 - 7.43 pH    POCT pCO2, Mixed 38 (L) 41 - 51 mm Hg    POCT pO2, Mixed 37 35 - 45 mm Hg    POCT SO2, Mixed 45 45 - 75 %    POCT Oxy Hemoglobin, Mixed 44.4 (L) 45.0 - 75.0 %    POCT Hematocrit Calculated, Mixed 25.0 (L) 36.0 - 46.0 %    POCT Sodium, Mixed 130 (L) 136 - 145 mmol/L    POCT Potassium, Mixed 5.1 3.5 - 5.3 mmol/L    POCT Chloride, Mixed 95 (L) 98 - 107 mmol/L    POCT Ionized Calcium, Mixed 1.12 1.10 - 1.33 mmol/L    POCT Glucose, Mixed 121 (H) 74 - 99 mg/dL    POCT Lactate, Mixed 8.0 (HH) 0.4 - 2.0 mmol/L    POCT Base Excess, Mixed -10.3 (L) -2.0 - 3.0 mmol/L    POCT HCO3 Calculated, Mixed 16.3 (L) 22.0 - 26.0 mmol/L    POCT Hemoglobin, Mixed 8.2 (L) 12.0 - 16.0 g/dL    POCT Anion Gap, Mixed 24 10 - 25 mmo/L    Patient Temperature 37.0 degrees Celsius    FiO2 21 %   Troponin I, High Sensitivity   Result Value Ref Range    Troponin I, High Sensitivity (CMC) 1,351 (HH) 0 - 34 ng/L   aPTT   Result Value Ref Range    aPTT 48 (H) 27 - 38 seconds   POCT GLUCOSE   Result Value Ref Range    POCT Glucose 135 (H) 74 - 99 mg/dL   Magnesium    Result Value Ref Range    Magnesium 2.32 1.60 - 2.40 mg/dL   Hepatic Function Panel   Result Value Ref Range    Albumin 3.0 (L) 3.4 - 5.0 g/dL    Bilirubin, Total 2.3 (H) 0.0 - 1.2 mg/dL    Bilirubin, Direct 1.4 (H) 0.0 - 0.3 mg/dL    Alkaline Phosphatase 142 (H) 33 - 136 U/L    ALT 35 7 - 45 U/L    AST 98 (H) 9 - 39 U/L    Total Protein 5.8 (L) 6.4 - 8.2 g/dL   CBC and Auto Differential   Result Value Ref Range    WBC 16.6 (H) 4.4 - 11.3 x10*3/uL    nRBC 4.2 (H) 0.0 - 0.0 /100 WBCs    RBC 2.51 (L) 4.00 - 5.20 x10*6/uL    Hemoglobin 7.8 (L) 12.0 - 16.0 g/dL    Hematocrit 22.2 (L) 36.0 - 46.0 %    MCV 88 80 - 100 fL    MCH 31.1 26.0 - 34.0 pg    MCHC 35.1 32.0 - 36.0 g/dL    RDW 18.4 (H) 11.5 - 14.5 %    Platelets 56 (L) 150 - 450 x10*3/uL    Neutrophils % 87.4 40.0 - 80.0 %    Immature Granulocytes %, Automated 3.7 (H) 0.0 - 0.9 %    Lymphocytes % 1.3 13.0 - 44.0 %    Monocytes % 7.4 2.0 - 10.0 %    Eosinophils % 0.0 0.0 - 6.0 %    Basophils % 0.2 0.0 - 2.0 %    Neutrophils Absolute 14.48 (H) 1.20 - 7.70 x10*3/uL    Immature Granulocytes Absolute, Automated 0.61 0.00 - 0.70 x10*3/uL    Lymphocytes Absolute 0.21 (L) 1.20 - 4.80 x10*3/uL    Monocytes Absolute 1.23 (H) 0.10 - 1.00 x10*3/uL    Eosinophils Absolute 0.00 0.00 - 0.70 x10*3/uL    Basophils Absolute 0.04 0.00 - 0.10 x10*3/uL   Phosphorus   Result Value Ref Range    Phosphorus 8.4 (H) 2.5 - 4.9 mg/dL   Basic Metabolic Panel   Result Value Ref Range    Glucose 135 (H) 74 - 99 mg/dL    Sodium 133 (L) 136 - 145 mmol/L    Potassium 4.8 3.5 - 5.3 mmol/L    Chloride 91 (L) 98 - 107 mmol/L    Bicarbonate 19 (L) 21 - 32 mmol/L    Anion Gap 28 (H) 10 - 20 mmol/L    Urea Nitrogen 48 (H) 6 - 23 mg/dL    Creatinine 4.71 (H) 0.50 - 1.05 mg/dL    eGFR 10 (L) >60 mL/min/1.73m*2    Calcium 8.5 (L) 8.6 - 10.6 mg/dL   Blood Gas Mixed Venous Full Panel   Result Value Ref Range    POCT pH, Mixed 7.24 (LL) 7.33 - 7.43 pH    POCT pCO2, Mixed 43 41 - 51 mm Hg    POCT pO2, Mixed  34 (L) 35 - 45 mm Hg    POCT SO2, Mixed 45 45 - 75 %    POCT Oxy Hemoglobin, Mixed 44.2 (L) 45.0 - 75.0 %    POCT Hematocrit Calculated, Mixed 22.0 (L) 36.0 - 46.0 %    POCT Sodium, Mixed 129 (L) 136 - 145 mmol/L    POCT Potassium, Mixed 5.0 3.5 - 5.3 mmol/L    POCT Chloride, Mixed 93 (L) 98 - 107 mmol/L    POCT Ionized Calcium, Mixed 1.06 (L) 1.10 - 1.33 mmol/L    POCT Glucose, Mixed 135 (H) 74 - 99 mg/dL    POCT Lactate, Mixed 6.8 (HH) 0.4 - 2.0 mmol/L    POCT Base Excess, Mixed -8.4 (L) -2.0 - 3.0 mmol/L    POCT HCO3 Calculated, Mixed 18.4 (L) 22.0 - 26.0 mmol/L    POCT Hemoglobin, Mixed 7.4 (L) 12.0 - 16.0 g/dL    POCT Anion Gap, Mixed 23 10 - 25 mmo/L    Patient Temperature 37.0 degrees Celsius    FiO2 21 %   B-type natriuretic peptide   Result Value Ref Range     (H) 0 - 99 pg/mL   POCT GLUCOSE   Result Value Ref Range    POCT Glucose 152 (H) 74 - 99 mg/dL   aPTT   Result Value Ref Range    aPTT 52 (H) 27 - 38 seconds   POCT GLUCOSE   Result Value Ref Range    POCT Glucose 122 (H) 74 - 99 mg/dL   aPTT   Result Value Ref Range    aPTT 65 (H) 27 - 38 seconds   Blood Gas Venous Full Panel   Result Value Ref Range    POCT pH, Venous 7.39 7.33 - 7.43 pH    POCT pCO2, Venous 43 41 - 51 mm Hg    POCT pO2, Venous 31 (L) 35 - 45 mm Hg    POCT SO2, Venous 47 45 - 75 %    POCT Oxy Hemoglobin, Venous 45.9 45.0 - 75.0 %    POCT Hematocrit Calculated, Venous 16.0 (L) 36.0 - 46.0 %    POCT Sodium, Venous 132 (L) 136 - 145 mmol/L    POCT Potassium, Venous 4.0 3.5 - 5.3 mmol/L    POCT Chloride, Venous 96 (L) 98 - 107 mmol/L    POCT Ionized Calicum, Venous 1.11 1.10 - 1.33 mmol/L    POCT Glucose, Venous 125 (H) 74 - 99 mg/dL    POCT Lactate, Venous 3.3 (H) 0.4 - 2.0 mmol/L    POCT Base Excess, Venous 1.0 -2.0 - 3.0 mmol/L    POCT HCO3 Calculated, Venous 26.0 22.0 - 26.0 mmol/L    POCT Hemoglobin, Venous 5.3 (LL) 12.0 - 16.0 g/dL    POCT Anion Gap, Venous 14.0 10.0 - 25.0 mmol/L    Patient Temperature 37.0 degrees Celsius     FiO2 21 %   CBC   Result Value Ref Range    WBC 13.9 (H) 4.4 - 11.3 x10*3/uL    nRBC 3.9 (H) 0.0 - 0.0 /100 WBCs    RBC 2.45 (L) 4.00 - 5.20 x10*6/uL    Hemoglobin 7.6 (L) 12.0 - 16.0 g/dL    Hematocrit 21.2 (L) 36.0 - 46.0 %    MCV 87 80 - 100 fL    MCH 31.0 26.0 - 34.0 pg    MCHC 35.8 32.0 - 36.0 g/dL    RDW 18.5 (H) 11.5 - 14.5 %    Platelets 59 (L) 150 - 450 x10*3/uL   POCT GLUCOSE   Result Value Ref Range    POCT Glucose 98 74 - 99 mg/dL   POCT GLUCOSE   Result Value Ref Range    POCT Glucose 126 (H) 74 - 99 mg/dL      Assessment/Plan   Assessment & Plan    Misael Ritchie is a 69 y.o. 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  Patient presenting to hospital after a fall. Found to have L trochanteric fracture with hematoma, hypoglycemia requiring dextrose drip -post insulin for hyperkalemia iso acidosis.        Endocrinology service is consulted for management of adrenal insufficiency in setting of recurrent unexplained hypoglycemic episodes.        [Of note:  - patient had similar episodes of hypoglycemia in 2024 for which she was managed at [facility].   Random blood cortisol level of 8.2 on 2/5/2024.   At that time, stim test was done on 2/5/2024 with results as under:  Basal cortisol: 2.6  30-minute cortisol: 2.6  60-minute cortisol: 2.7  -Patient reports that she has been on prednisone 2.5 mg since 2010.  She reports being off prednisone for 6 months in 2011 (patient not sure about the year when she was off).  She reports that she felt miserable when she was off prednisone.  -CT abdo/pelvis (10/3): Bilateral adrenal glands are unremarkable.  -Patient reports that she recently had steroid injection to her right shoulder.    -Received methylprednisolone  acetate 40 mg on 9/20/2024.]        # Adrenal insufficiency in setting of chronic glucocorticoid use and recent steroid injection to right shoulder resulting treatment resistant hypoglycemia  #R ORIF 10/7  Remains on low dose Epi -back on D10 50 mL/h after an episode of asymptomatic hypoglycemia down to 40s on 10/8 -C-peptide 5.2, blood glucose 36, but hydroxybutyrate normal, elevated lactic acid  - diet started     Recommendations   -Hold off on tapering down hydrocortisone, continue with 50 mg IV every 6 hours for now as patient remains on pressors.  (Repeating workup for adrenal insufficiency is not recommended at this time as patient is on stress dose steroids.    ACTH is also expected to be suppressed in setting of recent steroid injection to shoulder (9/20/2024))  -Follow-up insulin, proinsulin levels on 10/8, elevated C-peptide can be partially explained by end-stage renal disease and delayed renal excretion clearance.  -will require non urgent endocrine follow up as outpatient     Plan communicated to primary team via secure messaging.  Case discussed with Dr. Dez Rivera MD

## 2024-10-09 NOTE — PROGRESS NOTES
Occupational Therapy                 Therapy Communication Note    Patient Name: Misael Ritchie  MRN: [id number]  Department: [facility]  Room: 30/30-A  Today's Date: 10/9/2024     Discipline: Occupational Therapy    Missed Visit Reason: Missed Visit Reason:  (Pt getting HD, will reattempt as able.)    Missed Time: Attempt    Kenia Spaulding, OT  10/9/2024

## 2024-10-09 NOTE — PROGRESS NOTES
Misael Ritchie   69 y.o. female   MRN/Room: [id number]/30/30-A  Chief Concern: Hyperkalemia  Nephrology following for: emergent HD MICU     Subjective    Updates 10/9/2024:   - Patient receiving hemodialysis with goal of 1 L off today   - Epinephrine weaned to 0.01 prior to iHD then increased to 0.03 for dialysis; weaning again   - Overnight the patient had episode of hypoglycemia, nausea  - Patient got D10 and juices with improvement of sx  - Blood gas showed lactate to 10.3   - Started on norepinephrine, weaned, then started on epinephrine.     Interview:   Patient was seen at the bedside after completion of dialysis. She noted no symptoms during dialysis but complains of severe tailbone pain at this time. Patient denies CP, SOB. No other complaints at this time.        Objective    Vitals:  Temp:  [35.5 °C (95.9 °F)-36.1 °C (97 °F)] 35.6 °C (96.1 °F)  Heart Rate:  [63-88] 85  Resp:  [10-16] 12  Arterial Line BP 1: ()/(43-64) 130/45  FiO2 (%):  [21 %-80 %] 21 %   FiO2 (%):  [21 %-80 %] 21 %      Medications   Scheduled Medications  acetaminophen, 650 mg, oral, q6h  [Held by provider] allopurinol, 100 mg, oral, Daily  amiodarone, 200 mg, oral, Daily  atorvastatin, 40 mg, oral, Nightly  diphenhydrAMINE, 12.5 mg, intravenous, Once per day on Monday Wednesday Friday  hydrocortisone sodium succinate, 50 mg, intravenous, q6h  midodrine, 5 mg, oral, TID  oxygen, , inhalation, Continuous - Inhalation  pantoprazole, 40 mg, oral, Daily before breakfast   Or  pantoprazole, 40 mg, intravenous, Daily before breakfast  piperacillin-tazobactam, 2.25 g, intravenous, q8h  polyethylene glycol, 17 g, oral, q12h  sennosides, 2 tablet, oral, BID  vancomycin, 500 mg, intravenous, Once per day on Monday Wednesday Friday     Continuous Medications  bivalirudin, 0.05-0.49 mg/kg/hr, Last Rate: 0.12 mg/kg/hr (10/09/24 1200)  dextrose 10 % in water (D10W), 50 mL/hr, Last Rate: 50 mL/hr (10/09/24 7666)  EPINEPHrine, 0-1 mcg/kg/min,  Last Rate: 0.02 mcg/kg/min (10/09/24 1445)     PRN Medications  PRN medications: albuterol, bisacodyl, bisacodyl, dextrose, dextrose, glucagon, glucagon, HYDROmorphone, oxyCODONE, oxyCODONE, oxymetazoline, vancomycin     I/O:   Intake/Output Summary (Last 24 hours) at 10/9/2024 1537  Last data filed at 10/9/2024 1445  Gross per 24 hour   Intake 3002.34 ml   Output 1800 ml   Net 1202.34 ml     Wt Readings from Last 3 Encounters:   10/08/24 59.3 kg (130 lb 11.7 oz)     Physical Exam:  General appearance: no distress  Eyes: non-icteric, left subconjunctival hemorrhage, left periorbital ecchymosis.   Skin: no apparent rash  Heart: rate and rhythm are regular, no pericardial friction rub.  Lungs: CTA bilaterally, no wheezing/crackles auscultated.   Abdomen: soft, nt/nd.  Extremities: 2+ pitting edema bilat.   Ho: none  Neuro: No FND, no asterixis. A&O x 3.   Access: Peripheral IVs in left arm, triple lumen non-tunneled left femoral, HD AV fistula Left and Right arm. Bandages on right arm fistula.     Labs:  CBC:  Results from last 7 days   Lab Units 10/09/24  1035 10/09/24  0416 10/08/24  2225   WBC AUTO x10*3/uL 13.9* 16.6* 17.4*   HEMOGLOBIN g/dL 7.6* 7.8* 8.6*   HEMATOCRIT % 21.2* 22.2* 24.4*   MCV fL 87 88 89   PLATELETS AUTO x10*3/uL 59* 56* 60*     RFP:  Results from last 7 days   Lab Units 10/09/24  0416 10/08/24  2225 10/08/24  2007 10/08/24  0506 10/07/24  0245   SODIUM mmol/L 133* 132* 138 134* 129*   POTASSIUM mmol/L 4.8 4.9 4.5 4.5 5.0   CHLORIDE mmol/L 91* 94* 104 96* 87*   CO2 mmol/L 19* 12* 11* 19* 22   BUN mg/dL 48* 45* 36* 33* 53*   CREATININE mg/dL 4.71* 4.53* 3.75* 3.52* 5.74*   CALCIUM mg/dL 8.5* 8.0* 6.7* 8.0* 8.0*   MAGNESIUM mg/dL 2.32  --   --  2.05 2.11   PHOSPHORUS mg/dL 8.4*  --  6.6* 6.3* 7.8*     HFP:  Results from last 7 days   Lab Units 10/09/24  0416 10/08/24  2225 10/08/24  2007 10/08/24  0506 10/07/24  0245   AST U/L 98* 44*  --  21 21   ALT U/L 35 23  --  27 42   ALK PHOS U/L 142*  142*  --  131 169*   BILIRUBIN TOTAL mg/dL 2.3* 2.2*  --  2.2* 2.1*   BILIRUBIN DIRECT mg/dL 1.4*  --   --  1.4* 1.4*   ALBUMIN g/dL 3.0* 3.2* 2.6* 3.0* 3.1*     Cardiac:  Results from last 7 days   Lab Units 10/09/24  0416 10/09/24  0212 10/08/24  2225   TROPHSCMC ng/L  --  1,351* 1,491*   BNP pg/mL 987*  --  1,164*     Coag:  Results from last 7 days   Lab Units 10/09/24  0812 10/09/24  0623 10/09/24  0212 10/08/24  2007 10/04/24  0138 10/03/24  1735 10/03/24  1009   PROTIME seconds  --   --   --   --  22.3* 22.6* 21.5*   APTT seconds 65* 52* 48*   < > 31 30 33   INR   --   --   --   --  2.0* 2.0* 1.9*    < > = values in this interval not displayed.     ABG/VBG:   Results from last 7 days   Lab Units 10/08/24  2227 10/08/24  2131 10/08/24  2017   POCT PH, ARTERIAL pH 7.25* 7.24* 7.27*   POCT PCO2, ARTERIAL mm Hg 27* 28* 30*   POCT PO2, ARTERIAL mm Hg 79* 78* 88   POCT HCO3 CALCULATED, ARTERIAL mmol/L 11.8* 12.0* 13.8*   POCT BASE EXCESS, ARTERIAL mmol/L -14.0* -14.1* -11.9*     Results from last 7 days   Lab Units 10/09/24  0936 10/03/24  2251 10/03/24  2114   POCT PH, VENOUS pH 7.39 7.33 7.39   POCT PCO2, VENOUS mm Hg 43 49 49   POCT PO2, VENOUS mm Hg 31* 38 34*   POCT HCO3 CALCULATED, VENOUS mmol/L 26.0 25.8 29.7*       Assessment/Plan    ASSESSMENT:  Misael Ritchie is a 69 y.o. 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 Nephrology is consulted for emergent dialysis in the MICU.    Etiology of hyperkalemia may be multifactorial. Patient did not miss dialysis on 10/1 but may have accumulated potassium prior to scheduled dialysis on 10/3 (day of admission). Patient may have consumed a potassium-rich diet in the interval between HD. May also be a  component of dehydration/hypotension in days prior to fall, as patient endorsed lightheadedness prior to the fall. Could be an element of cellular injury from the fall. Hyperkalemia may also be attributed to ineffective dialysis. This is made likely in the setting of patient not missing her dialysis sessions before admission as well as her 10/8 labs not showing significant improvement of potassium and bicarb following dialysis session. If laboratory values are not satisfactory after dialysis session on 10/9, will consider recommending investigation into AV fistula access.    Patient has had improvement in hyperkalemia over the course of admission, with interval increase on 10/7 to 5.0, with 3.5 hours of dialysis performed to optimize potassium ahead of ortho OR (achieved 4.5 on 10/8, then 4.8 on 10/9). Nephrology will continue to follow for RRT while admitted.     Home unit: [facility]  Access: RUE AVF. Previous failed LUE AV graft  Etilogy: PKD (2 previous transplant 2004 and 2010)  Schedule: [facility]  EDW: 56kg    Updates 10/9/2024:   - Bedside dialysis today   - Will check renal labs tomorrow to assess efficacy of iHD    #Fluid status  :: Hypervolemic  :: Lungs clear to auscultation, no respiratory distress   :: Pitting edema BLE  :: Saturating well on room air   Plan:  - Continue iHD while inpatient   - Fluid removal as tolerated     #Hemodynamics  :: Weaned off pressors on 10/7, added back on after OR   :: Pressors temporarily increased on 10/6 PM after hypotensive episode at CT scan  Plan:  - Continue to wean off pressors as tolerated  - Monitor BP during dialysis   - Continue to remove fluid during iHD as tolerated    #Electrolytes  :: Hyperkalemia  :: K 7.6 on admission, downtrended  :: K 10/8 post-dialysis 4.5 (5.0, 4.1, 4.0)  :: Concern for ineffective dialysis possibly dt fistula stenosis  Plan:  - iHD while inpatient for correction of electrolytes  - Follow K on post-dialysis labs after 10/9 dialysis for  satisfactory improvement   - If K on post-dialysis labs is not satisfactory, may investigate access    #Metabolic Bone Disease   :: ESRD on HD   :: Phos 7.8 (6.3, 4.5)  Plan:  - Obtain PTH prior to dc  - Follow Ca, phos on daily RFP    #Acid-Base Status   :: Bicarb low after 10/7 dialysis  :: Concern for ineffective dialysis possibly dt fistula stenosis  Plan:  - Continue to follow bicarb on BMP  - If bicarb on post-dialysis labs is not satisfactory, may investigate access    RECOMMENDATIONS:  - HD at bedside 10/9 with fluid removal goal 1 L as tolerated   - Dialysate at 36.5 C   - 400 blood flow    - 800 dialysate flow   - 2 K   - Consider investigation of access if 10/9 dialysis appears unsatisfactory on follow-up labs 10/10  - Follow RFP daily   - Renally dose medications   - Renal diet  - Renal multivitamin   - Nephrology will continue to follow     Assessment and plan discussed with Dr. Patel and Dr. Garza. Recommendations not final until signed by attending physician.     ---  Patricia Earl, MS4  Nephrology Consult Service   10/9/2024

## 2024-10-09 NOTE — CONSULTS
Vancomycin Dosing by Pharmacy- INITIAL    Misael Ritchie is a 69 y.o. year old female who Pharmacy has been consulted for vancomycin dosing for shock. Based on the patient's indication and renal status this patient will be dosed based on a goal pre-HD level of 20-25.     Renal function: ESRD on HD MWF    Visit Vitals  /54   Pulse 75   Temp 35.8 °C (96.4 °F) (Temporal)   Resp 11        Lab Results   Component Value Date    CREATININE 4.71 (H) 10/09/2024    CREATININE 4.53 (H) 10/08/2024    CREATININE 3.75 (H) 10/08/2024    CREATININE 3.52 (H) 10/08/2024        Patient weight is as follows:   Vitals:    10/08/24 2100   Weight: 59.3 kg (130 lb 11.7 oz)       Cultures:  No results found for the encounter in last 14 days.        I/O last 3 completed shifts:  In: 2659.7 (49.3 mL/kg) [P.O.:100; I.V.:1059.7 (19.7 mL/kg); Other:800; IV Piggyback:700]  Out: 2800 (51.9 mL/kg) [Other:2800]  Dosing Weight: 53.9 kg   I/O during current shift:  I/O this shift:  In: 911.2 [P.O.:720; I.V.:41.2; IV Piggyback:150]  Out: 400 [Emesis/NG output:400]    Temp (24hrs), Av.4 °C (95.8 °F), Min:35 °C (95 °F), Max:35.8 °C (96.4 °F)         Assessment/Plan     Patient was loaded Friday with 1.5 gm. Level the following day was 20 mcg/mL. Will start 500 mg post HD.  Follow-up level this Friday with AM labs.  Will continue to monitor renal function daily while on vancomycin and order serum creatinine at least every 48 hours if not already ordered.  Follow for continued vancomycin needs, clinical response, and signs/symptoms of toxicity.       Willi Vo, PharmD

## 2024-10-10 ENCOUNTER — TELEPHONE (OUTPATIENT)
Dept: OTOLARYNGOLOGY | Facility: HOSPITAL | Age: 69
End: 2024-10-10
Payer: MEDICARE

## 2024-10-10 ENCOUNTER — APPOINTMENT (OUTPATIENT)
Dept: CARDIOLOGY | Facility: HOSPITAL | Age: 69
DRG: 480 | End: 2024-10-10
Payer: MEDICARE

## 2024-10-10 LAB
ALBUMIN SERPL BCP-MCNC: 2.9 G/DL (ref 3.4–5)
ALP SERPL-CCNC: 146 U/L (ref 33–136)
ALT SERPL W P-5'-P-CCNC: 21 U/L (ref 7–45)
ANION GAP SERPL CALC-SCNC: 19 MMOL/L (ref 10–20)
APTT PPP: 54 SECONDS (ref 27–38)
AST SERPL W P-5'-P-CCNC: 78 U/L (ref 9–39)
BASO STIPL BLD QL SMEAR: PRESENT
BASOPHILS # BLD MANUAL: 0 X10*3/UL (ref 0–0.1)
BASOPHILS NFR BLD MANUAL: 0 %
BILIRUB DIRECT SERPL-MCNC: 1.5 MG/DL (ref 0–0.3)
BILIRUB SERPL-MCNC: 2.5 MG/DL (ref 0–1.2)
BUN SERPL-MCNC: 21 MG/DL (ref 6–23)
CALCIUM SERPL-MCNC: 8.1 MG/DL (ref 8.6–10.6)
CHLORIDE SERPL-SCNC: 91 MMOL/L (ref 98–107)
CO2 SERPL-SCNC: 24 MMOL/L (ref 21–32)
CREAT SERPL-MCNC: 3 MG/DL (ref 0.5–1.05)
EGFRCR SERPLBLD CKD-EPI 2021: 16 ML/MIN/1.73M*2
EOSINOPHIL # BLD MANUAL: 0 X10*3/UL (ref 0–0.7)
EOSINOPHIL NFR BLD MANUAL: 0 %
ERYTHROCYTE [DISTWIDTH] IN BLOOD BY AUTOMATED COUNT: 18.4 % (ref 11.5–14.5)
GLUCOSE BLD MANUAL STRIP-MCNC: 141 MG/DL (ref 74–99)
GLUCOSE BLD MANUAL STRIP-MCNC: 156 MG/DL (ref 74–99)
GLUCOSE BLD MANUAL STRIP-MCNC: 166 MG/DL (ref 74–99)
GLUCOSE BLD MANUAL STRIP-MCNC: 174 MG/DL (ref 74–99)
GLUCOSE BLD MANUAL STRIP-MCNC: 178 MG/DL (ref 74–99)
GLUCOSE BLD MANUAL STRIP-MCNC: 188 MG/DL (ref 74–99)
GLUCOSE BLD MANUAL STRIP-MCNC: 190 MG/DL (ref 74–99)
GLUCOSE SERPL-MCNC: 181 MG/DL (ref 74–99)
HCT VFR BLD AUTO: 20.2 % (ref 36–46)
HGB BLD-MCNC: 7.3 G/DL (ref 12–16)
IMM GRANULOCYTES # BLD AUTO: 1.62 X10*3/UL (ref 0–0.7)
IMM GRANULOCYTES NFR BLD AUTO: 9.7 % (ref 0–0.9)
INSULIN FREE SERPL-ACNC: 2 UIU/ML (ref 3–25)
INSULIN SERPL-ACNC: 3 UIU/ML (ref 3–25)
LACTATE SERPL-SCNC: 3 MMOL/L (ref 0.4–2)
LACTATE SERPL-SCNC: 3 MMOL/L (ref 0.4–2)
LACTATE SERPL-SCNC: 3.5 MMOL/L (ref 0.4–2)
LYMPHOCYTES # BLD MANUAL: 0.42 X10*3/UL (ref 1.2–4.8)
LYMPHOCYTES NFR BLD MANUAL: 2.5 %
MAGNESIUM SERPL-MCNC: 2.1 MG/DL (ref 1.6–2.4)
MCH RBC QN AUTO: 31.1 PG (ref 26–34)
MCHC RBC AUTO-ENTMCNC: 36.1 G/DL (ref 32–36)
MCV RBC AUTO: 86 FL (ref 80–100)
METAMYELOCYTES # BLD MANUAL: 0.15 X10*3/UL
METAMYELOCYTES NFR BLD MANUAL: 0.9 %
MONOCYTES # BLD MANUAL: 0.86 X10*3/UL (ref 0.1–1)
MONOCYTES NFR BLD MANUAL: 5.1 %
MYELOCYTES # BLD MANUAL: 0.29 X10*3/UL
MYELOCYTES NFR BLD MANUAL: 1.7 %
NEUTS SEG # BLD MANUAL: 15.09 X10*3/UL (ref 1.2–7)
NEUTS SEG NFR BLD MANUAL: 89.8 %
NRBC BLD-RTO: 6.5 /100 WBCS (ref 0–0)
PHOSPHATE SERPL-MCNC: 3.8 MG/DL (ref 2.5–4.9)
PLATELET # BLD AUTO: 65 X10*3/UL (ref 150–450)
POLYCHROMASIA BLD QL SMEAR: ABNORMAL
POTASSIUM SERPL-SCNC: 3.8 MMOL/L (ref 3.5–5.3)
PROT SERPL-MCNC: 5.5 G/DL (ref 6.4–8.2)
RBC # BLD AUTO: 2.35 X10*6/UL (ref 4–5.2)
RBC MORPH BLD: ABNORMAL
SODIUM SERPL-SCNC: 130 MMOL/L (ref 136–145)
TARGETS BLD QL SMEAR: ABNORMAL
TOTAL CELLS COUNTED BLD: 118
UFH PPP CHRO-ACNC: 0.3 IU/ML
UFH PPP CHRO-ACNC: 0.3 IU/ML
WBC # BLD AUTO: 16.8 X10*3/UL (ref 4.4–11.3)

## 2024-10-10 PROCEDURE — 99233 SBSQ HOSP IP/OBS HIGH 50: CPT | Performed by: INTERNAL MEDICINE

## 2024-10-10 PROCEDURE — 2500000001 HC RX 250 WO HCPCS SELF ADMINISTERED DRUGS (ALT 637 FOR MEDICARE OP)

## 2024-10-10 PROCEDURE — 99291 CRITICAL CARE FIRST HOUR: CPT

## 2024-10-10 PROCEDURE — 2500000004 HC RX 250 GENERAL PHARMACY W/ HCPCS (ALT 636 FOR OP/ED)

## 2024-10-10 PROCEDURE — 37799 UNLISTED PX VASCULAR SURGERY: CPT

## 2024-10-10 PROCEDURE — 2500000002 HC RX 250 W HCPCS SELF ADMINISTERED DRUGS (ALT 637 FOR MEDICARE OP, ALT 636 FOR OP/ED)

## 2024-10-10 PROCEDURE — 93010 ELECTROCARDIOGRAM REPORT: CPT | Performed by: INTERNAL MEDICINE

## 2024-10-10 PROCEDURE — 97110 THERAPEUTIC EXERCISES: CPT | Mod: GP | Performed by: STUDENT IN AN ORGANIZED HEALTH CARE EDUCATION/TRAINING PROGRAM

## 2024-10-10 PROCEDURE — 85520 HEPARIN ASSAY: CPT

## 2024-10-10 PROCEDURE — 84100 ASSAY OF PHOSPHORUS: CPT

## 2024-10-10 PROCEDURE — 93005 ELECTROCARDIOGRAM TRACING: CPT

## 2024-10-10 PROCEDURE — 83605 ASSAY OF LACTIC ACID: CPT

## 2024-10-10 PROCEDURE — 99232 SBSQ HOSP IP/OBS MODERATE 35: CPT | Performed by: STUDENT IN AN ORGANIZED HEALTH CARE EDUCATION/TRAINING PROGRAM

## 2024-10-10 PROCEDURE — 85007 BL SMEAR W/DIFF WBC COUNT: CPT

## 2024-10-10 PROCEDURE — 85730 THROMBOPLASTIN TIME PARTIAL: CPT

## 2024-10-10 PROCEDURE — 36415 COLL VENOUS BLD VENIPUNCTURE: CPT

## 2024-10-10 PROCEDURE — 97116 GAIT TRAINING THERAPY: CPT | Mod: GP | Performed by: STUDENT IN AN ORGANIZED HEALTH CARE EDUCATION/TRAINING PROGRAM

## 2024-10-10 PROCEDURE — 83735 ASSAY OF MAGNESIUM: CPT

## 2024-10-10 PROCEDURE — 85027 COMPLETE CBC AUTOMATED: CPT

## 2024-10-10 PROCEDURE — 82947 ASSAY GLUCOSE BLOOD QUANT: CPT

## 2024-10-10 PROCEDURE — 2020000001 HC ICU ROOM DAILY

## 2024-10-10 PROCEDURE — 80048 BASIC METABOLIC PNL TOTAL CA: CPT

## 2024-10-10 PROCEDURE — 82248 BILIRUBIN DIRECT: CPT

## 2024-10-10 RX ORDER — ONDANSETRON HYDROCHLORIDE 2 MG/ML
4 INJECTION, SOLUTION INTRAVENOUS ONCE
Status: COMPLETED | OUTPATIENT
Start: 2024-10-10 | End: 2024-10-10

## 2024-10-10 RX ORDER — CLOPIDOGREL BISULFATE 75 MG/1
75 TABLET ORAL DAILY
Status: DISCONTINUED | OUTPATIENT
Start: 2024-10-10 | End: 2024-10-15

## 2024-10-10 RX ORDER — ONDANSETRON HYDROCHLORIDE 2 MG/ML
INJECTION, SOLUTION INTRAVENOUS
Status: COMPLETED
Start: 2024-10-10 | End: 2024-10-10

## 2024-10-10 RX ORDER — HEPARIN SODIUM 10000 [USP'U]/100ML
0-4000 INJECTION, SOLUTION INTRAVENOUS CONTINUOUS
Status: DISCONTINUED | OUTPATIENT
Start: 2024-10-10 | End: 2024-10-17

## 2024-10-10 RX ORDER — ONDANSETRON 4 MG/1
4 TABLET, FILM COATED ORAL ONCE
Status: COMPLETED | OUTPATIENT
Start: 2024-10-10 | End: 2024-10-10

## 2024-10-10 RX ORDER — LIDOCAINE HYDROCHLORIDE 10 MG/ML
INJECTION, SOLUTION INFILTRATION; PERINEURAL
Status: DISPENSED
Start: 2024-10-10 | End: 2024-10-11

## 2024-10-10 RX ORDER — PROCHLORPERAZINE MALEATE 10 MG
10 TABLET ORAL ONCE
Status: DISCONTINUED | OUTPATIENT
Start: 2024-10-10 | End: 2024-10-13

## 2024-10-10 RX ADMIN — ACETAMINOPHEN 650 MG: 325 TABLET, FILM COATED ORAL at 13:21

## 2024-10-10 RX ADMIN — MIDODRINE HYDROCHLORIDE 5 MG: 5 TABLET ORAL at 08:26

## 2024-10-10 RX ADMIN — ATORVASTATIN CALCIUM 40 MG: 40 TABLET, FILM COATED ORAL at 20:43

## 2024-10-10 RX ADMIN — HYDROCORTISONE SODIUM SUCCINATE 50 MG: 100 INJECTION, POWDER, FOR SOLUTION INTRAMUSCULAR; INTRAVENOUS at 11:08

## 2024-10-10 RX ADMIN — PIPERACILLIN SODIUM AND TAZOBACTAM SODIUM 2.25 G: 2; .25 INJECTION, SOLUTION INTRAVENOUS at 00:32

## 2024-10-10 RX ADMIN — DEXTROSE MONOHYDRATE 50 ML/HR: 100 INJECTION, SOLUTION INTRAVENOUS at 00:32

## 2024-10-10 RX ADMIN — CLOPIDOGREL BISULFATE 75 MG: 75 TABLET ORAL at 13:21

## 2024-10-10 RX ADMIN — ONDANSETRON HYDROCHLORIDE 4 MG: 2 INJECTION, SOLUTION INTRAVENOUS at 09:40

## 2024-10-10 RX ADMIN — EPINEPHRINE 0.03 MCG/KG/MIN: 1 INJECTION INTRAMUSCULAR; INTRAVENOUS; SUBCUTANEOUS at 20:44

## 2024-10-10 RX ADMIN — SENNOSIDES 17.2 MG: 8.6 TABLET, FILM COATED ORAL at 08:25

## 2024-10-10 RX ADMIN — PIPERACILLIN SODIUM AND TAZOBACTAM SODIUM 2.25 G: 2; .25 INJECTION, SOLUTION INTRAVENOUS at 08:26

## 2024-10-10 RX ADMIN — DEXTROSE MONOHYDRATE 50 ML/HR: 100 INJECTION, SOLUTION INTRAVENOUS at 11:39

## 2024-10-10 RX ADMIN — HEPARIN SODIUM 700 UNITS/HR: 10000 INJECTION, SOLUTION INTRAVENOUS at 11:08

## 2024-10-10 RX ADMIN — ONDANSETRON 4 MG: 2 INJECTION INTRAMUSCULAR; INTRAVENOUS at 09:40

## 2024-10-10 RX ADMIN — HYDROCORTISONE SODIUM SUCCINATE 50 MG: 100 INJECTION, POWDER, FOR SOLUTION INTRAMUSCULAR; INTRAVENOUS at 23:56

## 2024-10-10 RX ADMIN — AMIODARONE HYDROCHLORIDE 200 MG: 200 TABLET ORAL at 08:26

## 2024-10-10 RX ADMIN — ACETAMINOPHEN 650 MG: 325 TABLET, FILM COATED ORAL at 20:43

## 2024-10-10 RX ADMIN — MIDODRINE HYDROCHLORIDE 5 MG: 5 TABLET ORAL at 11:08

## 2024-10-10 RX ADMIN — PIPERACILLIN SODIUM AND TAZOBACTAM SODIUM 2.25 G: 2; .25 INJECTION, SOLUTION INTRAVENOUS at 15:48

## 2024-10-10 RX ADMIN — PANTOPRAZOLE SODIUM 40 MG: 20 TABLET, DELAYED RELEASE ORAL at 08:25

## 2024-10-10 RX ADMIN — ACETAMINOPHEN 650 MG: 325 TABLET, FILM COATED ORAL at 08:25

## 2024-10-10 RX ADMIN — MIDODRINE HYDROCHLORIDE 5 MG: 5 TABLET ORAL at 18:12

## 2024-10-10 RX ADMIN — HYDROCORTISONE SODIUM SUCCINATE 50 MG: 100 INJECTION, POWDER, FOR SOLUTION INTRAMUSCULAR; INTRAVENOUS at 05:07

## 2024-10-10 RX ADMIN — HYDROCORTISONE SODIUM SUCCINATE 50 MG: 100 INJECTION, POWDER, FOR SOLUTION INTRAMUSCULAR; INTRAVENOUS at 18:12

## 2024-10-10 RX ADMIN — DEXTROSE MONOHYDRATE 50 ML/HR: 100 INJECTION, SOLUTION INTRAVENOUS at 20:43

## 2024-10-10 RX ADMIN — PIPERACILLIN SODIUM AND TAZOBACTAM SODIUM 2.25 G: 2; .25 INJECTION, SOLUTION INTRAVENOUS at 23:56

## 2024-10-10 ASSESSMENT — PAIN SCALES - GENERAL
PAINLEVEL_OUTOF10: 0 - NO PAIN

## 2024-10-10 ASSESSMENT — COGNITIVE AND FUNCTIONAL STATUS - GENERAL
CLIMB 3 TO 5 STEPS WITH RAILING: TOTAL
MOBILITY SCORE: 10
TURNING FROM BACK TO SIDE WHILE IN FLAT BAD: A LOT
STANDING UP FROM CHAIR USING ARMS: A LOT
MOVING FROM LYING ON BACK TO SITTING ON SIDE OF FLAT BED WITH BEDRAILS: A LOT
WALKING IN HOSPITAL ROOM: TOTAL
MOVING TO AND FROM BED TO CHAIR: A LOT

## 2024-10-10 ASSESSMENT — PAIN - FUNCTIONAL ASSESSMENT
PAIN_FUNCTIONAL_ASSESSMENT: 0-10
PAIN_FUNCTIONAL_ASSESSMENT: 0-10

## 2024-10-10 NOTE — PROGRESS NOTES
Physical Therapy    Physical Therapy Treatment    Patient Name: Misael Ritchie  MRN: [id number]  Today's Date: 10/10/2024  Room: 30/30-A          Assessment/Plan   PT Plan  Treatment/Interventions: Bed mobility, Transfer training, Gait training, Stair training, Balance training, Strengthening, Endurance training, Range of motion, Therapeutic exercise, Therapeutic activity  PT Plan: Ongoing PT  PT Frequency: 3 times per week  PT Discharge Recommendations: Moderate intensity level of continued care  PT Recommended Transfer Status: Assist x2  PT - OK to Discharge: Yes    General Visit Information:   Reason for Referral: admitted to the MICU on 10/03/24 for emergent dialysis for hyperkalemia 7.4 and fluid overload. Found to have right greater trochanteric fracture from fall s/p CMN 10/7  Past Medical History Relevant to Rehab: aortic stenosis s/p TAVR 11/23, atrial fibrillation (on Eliquis) s/p DCCV, CAD, ESRD on hemodialysis T/Th/Sat, HFrEF, MR, TR, pacemaker CRT-P 5/2023, adrenal insufficiency  Prior to Session Communication: Bedside nurse  Patient Position Received: Bed, 3 rail up, Alarm off, caregiver present   Subjective: Patient is alert, agreeable to PT.  Sister in room and supportivfe throughout session.  Intermittent confusion with multistep commands.  Precautions:  Precautions  LE Weight Bearing Status: Weight Bearing as Tolerated  Medical Precautions: Fall precautions  Vital Signs:  Vital Signs (Past 2hrs)        Date/Time Vitals Session Patient Position Pulse Resp SpO2 BP MAP (mmHg)    10/10/24 1200 --  --  86  16  --  --  --     10/10/24 1205 --  --  75  1  100 %  135/60  --     10/10/24 1300 --  --  75  12  --  --  --                     Objective   Pain:  Pain Assessment  0-10 (Numeric) Pain Score: 0 - No pain (post 0)  Cognition:  Cognition  Orientation Level: Oriented X4  Lines/Tubes/Drains:  CVC 10/03/24 Triple lumen Non-tunneled Left Femoral (Active)   Number of days: 6       Arterial Line  10/07/24 Left Radial (Active)   Number of days: 2        Continuous Medications/Drips:  dextrose 10 % in water (D10W), 50 mL/hr, Last Rate: 50 mL/hr (10/10/24 1139)  EPINEPHrine, 0-1 mcg/kg/min, Last Rate: Stopped (10/10/24 0823)  heparin, 0-4,000 Units/hr, Last Rate: 700 Units/hr (10/10/24 1108)        PT Treatments:  Therapeutic Exercise  Therapeutic Exercise Performed: Yes  Therapeutic Exercise Activity 1: BLE PF, DF, heel slide, isometric quad set, SAQ, SLR, ab/adduction 10 x 2 AAROM  Therapeutic Exercise Activity 2: BUE targeting 5 x 1  Therapeutic Activity  Therapeutic Activity Performed: Yes  Therapeutic Activity 1: Static stand 2 HHA, mod a 90s x 2     Bed Mobility 1  Bed Mobility 1: Supine to sitting  Level of Assistance 1: Maximum assistance  Bed Mobility Comments 1: HOB 30, TAPS     Transfers  Transfer: Yes  Transfer 1  Transfer From 1: Sit to  Transfer to 1: Stand  Transfer Device 1:  (2 HHA)  Transfer Level of Assistance 1: Moderate assistance  Trials/Comments 1: x3  Transfers 2  Transfer From 2: Stand to  Transfer to 2: Sit  Transfer Device 2:  (2 HHA)  Transfer Level of Assistance 2: Moderate assistance  Trials/Comments 2: x3  Transfers 3  Transfer From 3: Bed to  Transfer to 3: Chair with arms  Technique 3:  (stand step)  Transfer Device 3:  (2 HHA)  Transfer Level of Assistance 3: Maximum assistance  Trials/Comments 3: x1             Outcome Measures:  [facility] Basic Mobility  Turning from your back to your side while in a flat bed without using bedrails: A lot  Moving from lying on your back to sitting on the side of a flat bed without using bedrails: A lot  Moving to and from bed to chair (including a wheelchair): A lot  Standing up from a chair using your arms (e.g. wheelchair or bedside chair): A lot  To walk in hospital room: Total  Climbing 3-5 steps with railing: Total  Basic Mobility - Total Score: 10           FSS-ICU  Ambulation: Unable to attempt due to weakness  Rolling: Moderate assistance  (performs 50 - 74% of task)  Sitting: Minimal assistance (performs 75% or more of task)  Transfer Sit-to-Stand: Moderate assistance (performs 50 - 74% of task)  Transfer Supine-to-Sit: Maximal assistance (performs 25% - 49% of task)  Total Score: 12  ICU Mobility Screen  ICU Mobility Scale: Transferring bed to chair             Education Documentation  Precautions, taught by Mj Lemon PT at 10/10/2024  1:47 PM.  Learner: Patient  Readiness: Acceptance  Method: Explanation  Response: Needs Reinforcement    Body Mechanics, taught by Mj Lemon PT at 10/10/2024  1:47 PM.  Learner: Patient  Readiness: Acceptance  Method: Explanation  Response: Needs Reinforcement    Mobility Training, taught by Mj Lemon PT at 10/10/2024  1:47 PM.  Learner: Patient  Readiness: Acceptance  Method: Explanation  Response: Needs Reinforcement    Education Comments  No comments found.          OP EDUCATION:       Encounter Problems       Encounter Problems (Active)       PT Problem       Patient will complete supine to sit and sit to supine Supervision  (Progressing)       Start:  10/08/24    Expected End:  10/22/24            Patient will perform sit<>stand transfer with LRAD, and Supervision  (Progressing)       Start:  10/08/24    Expected End:  10/22/24            Patient will ambulate >50' with LRAD and Supervision and WBAT RLE  (Progressing)       Start:  10/08/24    Expected End:  10/22/24            Patient will verbalize and demonstrate Weightbearing Restrictions independently.  (Progressing)       Start:  10/08/24    Expected End:  10/22/24               Pain - Adult              Assessment: Patient is progressing Well with therapy this date.  Patinet able to complete bed level there-ex, EOB and standing trials.  Limited with command following and difficulty with multi step commands.  Incontinent of bowels with activity.  Able to maintain static stand c intermittent buckling, improved with cues for upright  posture and able to achieve full erect stand.  Patient remains appropriate for MOD intensity therapy when medically appropriate for discharge from acute stay.  Will continue to follow.     10/10/24 at 1:47 PM   Mj Lemon, PT   Rehab Office: [phone]

## 2024-10-10 NOTE — CARE PLAN
Problem: Skin  Goal: Participates in plan/prevention/treatment measures  Outcome: Progressing  Flowsheets (Taken 10/10/2024 1136)  Participates in plan/prevention/treatment measures:   Discuss with provider PT/OT consult   Elevate heels  Goal: Prevent/manage excess moisture  Outcome: Progressing  Flowsheets (Taken 10/10/2024 1136)  Prevent/manage excess moisture: Monitor for/manage infection if present  Goal: Prevent/minimize sheer/friction injuries  Outcome: Progressing  Flowsheets (Taken 10/10/2024 1136)  Prevent/minimize sheer/friction injuries:   Complete micro-shifts as needed if patient unable. Adjust patient position to relieve pressure points, not a full turn   Turn/reposition every 2 hours/use positioning/transfer devices  Goal: Promote/optimize nutrition  Outcome: Progressing  Flowsheets (Taken 10/10/2024 1136)  Promote/optimize nutrition:   Monitor/record intake including meals   Offer water/supplements/favorite foods  Goal: Promote skin healing  Outcome: Progressing  Flowsheets (Taken 10/10/2024 1136)  Promote skin healing:   Assess skin/pad under line(s)/device(s)   Protective dressings over bony prominences   Turn/reposition every 2 hours/use positioning/transfer devices  Goal: Decreased wound size/increased tissue granulation at next dressing change  Outcome: Progressing  Flowsheets (Taken 10/6/2024 1940 by Jeff Michele RN)  Decreased wound size/increased tissue granulation at next dressing change:   Promote sleep for wound healing   Protective dressings over bony prominences   Utilize specialty bed per algorithm     Problem: Fall/Injury  Goal: Not fall by end of shift  Outcome: Progressing  Goal: Be free from injury by end of the shift  Outcome: Progressing  Goal: Verbalize understanding of personal risk factors for fall in the hospital  Outcome: Progressing  Goal: Verbalize understanding of risk factor reduction measures to prevent injury from fall in the home  Outcome: Progressing     Problem:  Pain - Adult  Goal: Verbalizes/displays adequate comfort level or baseline comfort level  Outcome: Progressing     Problem: Safety - Adult  Goal: Free from fall injury  Outcome: Progressing     Problem: Pain  Goal: Turns in bed with improved pain control throughout the shift  Outcome: Progressing  Goal: Participates in PT with improved pain control throughout the shift  Outcome: Progressing  Goal: Free from opioid side effects throughout the shift  Outcome: Progressing  Goal: Free from acute confusion related to pain meds throughout the shift  Outcome: Progressing     Problem: Nutrition  Goal: Oral intake greater than 50%  Outcome: Progressing  Goal: Oral intake greater 75%  Outcome: Progressing  Goal: Consume prescribed supplement  Outcome: Progressing  Goal: Adequate PO fluid intake  Outcome: Progressing  Goal: Nutrition support goals are met within 48 hrs  Outcome: Progressing  Goal: Nutrition support is meeting 75% of nutrient needs  Outcome: Progressing  Goal: BG  mg/dL  Outcome: Progressing  Goal: Lab values WNL  Outcome: Progressing  Goal: Electrolytes WNL  Outcome: Progressing  Goal: Promote healing  Outcome: Progressing  Goal: Maintain stable weight  Outcome: Progressing  Goal: Reduce weight from edema/fluid  Outcome: Progressing  Goal: Gradual weight gain  Outcome: Progressing

## 2024-10-10 NOTE — PROGRESS NOTES
"Misael Ritchie is a 69 y.o. female on day 7 of admission presenting with Hyperkalemia.    Subjective   Seen and examined today, looking more awake, complaining of pain at the site of the surgery.  Family member at her bedside.    Off epi at 8:30 am -resume in the afternoon, on HC 50q6h- on D10% 50mml/h   No hypoglycemia in the past 24h     Objective   General: Awake, alert, in no acute distress  HENT: Normo-cephalic, right periorbital hematoma No stridor  CV: Regular rate, regular rhythm. Radial pulses 2+ bilaterally  Resp: Breathing non-labored, speaking in full sentences.    GI: Soft, non-distended, non-tender.   : deferred   MSK/Extremities: Right lower extremity:  - Closed injury  - Compartments soft and compressible  - Palpable DP pulse   Skin: Warm. Appropriate color  Neuro: Aox4 Face symmetric. Speech is fluent.  Gross strength and sensation intact in b/l UE and LEs  Psych: Appropriate mood and affect         Last Recorded Vitals  Blood pressure 126/61, pulse 76, temperature 36.5 °C (97.7 °F), temperature source Temporal, resp. rate 10, height 1.549 m (5' 1\"), weight 58.2 kg (128 lb 4.9 oz), SpO2 100%.  Intake/Output last 3 Shifts:  I/O last 3 completed shifts:  In: 4022.4 (74.6 mL/kg) [P.O.:720; I.V.:2502.4 (46.4 mL/kg); Other:400; IV Piggyback:400]  Out: 1800 (33.4 mL/kg) [Emesis/NG output:400; Other:1400]  Dosing Weight: 53.9 kg     Relevant Results  Results from last 7 days   Lab Units 10/10/24  0828 10/10/24  0403 10/10/24  0400 10/10/24  0029 10/09/24  2001 10/09/24  1517 10/09/24  0422 10/09/24  0416 10/08/24  2234 10/08/24  2225 10/08/24  2057 10/08/24  2007 10/08/24  0709 10/08/24  0506   POCT GLUCOSE mg/dL 190* 178*  --  174* 174* 126*   < >  --    < >  --    < >  --    < >  --    GLUCOSE mg/dL  --   --  181*  --   --   --   --  135*  --  110*  --  36*  --  115*    < > = values in this interval not displayed.     Scheduled medications  acetaminophen, 650 mg, oral, q6h  [Held by provider] " allopurinol, 100 mg, oral, Daily  amiodarone, 200 mg, oral, Daily  atorvastatin, 40 mg, oral, Nightly  diphenhydrAMINE, 12.5 mg, intravenous, Once per day on Monday Wednesday Friday  hydrocortisone sodium succinate, 50 mg, intravenous, q6h  midodrine, 5 mg, oral, TID  oxygen, , inhalation, Continuous - Inhalation  pantoprazole, 40 mg, oral, Daily before breakfast  piperacillin-tazobactam, 2.25 g, intravenous, q8h  polyethylene glycol, 17 g, oral, q12h  prochlorperazine, 10 mg, oral, Once  sennosides, 2 tablet, oral, BID  vancomycin, 500 mg, intravenous, Once per day on Monday Wednesday Friday      Continuous medications  dextrose 10 % in water (D10W), 50 mL/hr, Last Rate: 50 mL/hr (10/10/24 0723)  EPINEPHrine, 0-1 mcg/kg/min, Last Rate: Stopped (10/10/24 0823)  heparin, 0-4,000 Units/hr      PRN medications  PRN medications: albuterol, bisacodyl, bisacodyl, dextrose, dextrose, glucagon, glucagon, heparin, HYDROmorphone, oxyCODONE, oxyCODONE, vancomycin          Assessment/Plan   Assessment & Plan      Misael Ritchie is a 69 y.o. 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  Patient presenting to hospital after a fall. Found to have L trochanteric fracture with hematoma, hypoglycemia requiring dextrose drip -post insulin for hyperkalemia iso acidosis- sp R ORIF 10/7, currently on pressors.        Endocrinology service is consulted for management of adrenal insufficiency in setting of recurrent unexplained hypoglycemic episodes.        [Of note:  - patient had similar episodes of hypoglycemia in 2024 for which she was managed at [facility].   Random blood cortisol level of 8.2 on 2/5/2024.   At that time, stim test was done on 2/5/2024 with results as under:  Basal  cortisol: 2.6  30-minute cortisol: 2.6  60-minute cortisol: 2.7  -Patient reports that she has been on prednisone 2.5 mg since 2010.  She reports being off prednisone for 6 months in 2011 (patient not sure about the year when she was off).  She reports that she felt miserable when she was off prednisone.  -CT abdo/pelvis (10/3): Bilateral adrenal glands are unremarkable.  -Patient reports that she recently had steroid injection to her right shoulder.    -Received methylprednisolone acetate 40 mg on 9/20/2024.]        # Adrenal insufficiency in setting of chronic glucocorticoid use and recent steroid injection to right shoulder resulting treatment resistant hypoglycemia  #R ORIF 10/7  Remains on low dose Epi -back on D10 50 mL/h after an episode of asymptomatic hypoglycemia down to 40s on 10/8 -C-peptide 5.2, blood glucose 36, but hydroxybutyrate normal, elevated lactic acid  - diet started- poor PO intake     Recommendations   -Hold off on tapering down hydrocortisone, continue with 50 mg IV every 6 hours for now as patient remains on pressors.  (Repeating workup for adrenal insufficiency is not recommended at this time as patient is on stress dose steroids.    ACTH is also expected to be suppressed in setting of recent steroid injection to shoulder (9/20/2024))  -Follow-up insulin, proinsulin levels on 10/8, elevated C-peptide can be partially explained by end-stage renal disease and delayed renal excretion clearance.  -will require non urgent endocrine follow up as outpatient     Plan communicated to primary team via secure messaging.  Case discussed with Dr. Dez Rivera MD

## 2024-10-10 NOTE — CARE PLAN
The patient's goals for the shift include  sleep    The clinical goals for the shift include wean pressor as george to maintain map >65    Over the shift, the patient did not make progress toward the following goals. Barriers to progression include . Recommendations to address these barriers include .

## 2024-10-10 NOTE — PROGRESS NOTES
Misael Ritchie is a 69 y.o. female on day 7 of admission presenting with Hyperkalemia.      Subjective   Had a bowel movement overnight, weaning off Epi.       Objective     Last Recorded Vitals  /54   Pulse 76   Temp 36.5 °C (97.7 °F) (Temporal)   Resp 12   Wt 58.2 kg (128 lb 4.9 oz)   SpO2 99%   Intake/Output last 3 Shifts:    Intake/Output Summary (Last 24 hours) at 10/10/2024 0829  Last data filed at 10/10/2024 0723  Gross per 24 hour   Intake 2599.95 ml   Output 1400 ml   Net 1199.95 ml       Admission Weight  Weight: 53.5 kg (118 lb) (10/03/24 0941)    Daily Weight  10/10/24 : 58.2 kg (128 lb 4.9 oz)    Image Results  ECG 12 Lead  Electronic ventricular pacemaker  When compared with ECG of 05-OCT-2024 12:28,  No significant change was found  Confirmed by Aakash Cross ([id number]) on 10/9/2024 3:36:12 PM  XR abdomen 1 view  Narrative: Interpreted By:  Que Baker and [place] Conner   STUDY:  XR ABDOMEN 1 VIEW;  10/9/2024 8:16 am      INDICATION:  Signs/Symptoms:ileus.      COMPARISON:  Single-view abdomen 10/08/2024      ACCESSION NUMBER(S):  [id number]      ORDERING CLINICIAN:  KAREN MANTILLA      FINDINGS:  Left femoral central venous catheter is unchanged in position. ORIF  of the right femur with intramedullary nail and screw without  hardware complication though only partially visualized. Postsurgical  changes throughout the abdomen.      Mild gaseous distention of multiple bowel loops in the abdomen, not  significantly changed from the prior exam. Moderate colonic stool  burden.      Limited evaluation of pneumoperitoneum on supine imaging, however no  gross evidence of free air is noted.      Osseous structures demonstrate no acute bony changes.      Impression: Mild gaseous distention of multiple bowel loops in the abdomen in a  nonobstructive, nonspecific pattern. Findings are unchanged from the  prior exam.      I personally reviewed the image(s)/study and resident  interpretation.  I agree with the findings as stated by resident Jay Rowe.  Data analyzed and images interpreted at [facility], [place], [place].      MACRO:  None      Signed by: Que Baker 10/9/2024 10:11 AM  Dictation workstation:   [id number]  XR abdomen 1 view, XR chest 1 view  Narrative: Interpreted By:  Linda, Radha,  and [place] Willow   STUDY:  XR CHEST 1 VIEW; XR ABDOMEN 1 VIEW;  10/8/2024 11:29 pm      INDICATION:  Signs/Symptoms:vomiting,r/o aspiratoin; Signs/Symptoms:vomiting, r/o  dilated bowel.      COMPARISON:  Chest radiograph 10/03/2024      ACCESSION NUMBER(S):  [id number]; [id number]      ORDERING CLINICIAN:  KAREN MANTILLA      FINDINGS:  AP radiograph of the chest and AP radiographs of the abdomen were  provided.      Stable positioning of a left chest wall 3 lead pacer. Status post  TAVR. Left femoral vascular catheter in place. Partially visualized  surgical changes of the right hip.          CARDIOMEDIASTINAL SILHOUETTE:  The cardiomediastinal silhouette is enlarged and stable compared with  the prior exam. Extensive vascular calcifications of the aortic arch  and coronary arteries.      LUNGS:  Moderate loculated right pleural effusion. Diffuse nodular airspace  opacities throughout the right lower lung are increased in prominence  compared with the prior exam. No left effusion. No pneumothorax.      ABDOMEN:  Extensive vascular calcifications and postsurgical changes throughout  the abdomen. There is a single focally dilated loop of bowel within  the right lower abdominal quadrant. Otherwise overall nonobstructive  bowel-gas pattern with a moderate colonic stool burden.      BONES:  No acute osseous abnormality.      Impression: 1. Moderate loculated right pleural effusion with increased  micronodular airspace opacities throughout the right lower lung,  suggestive of worsening infiltrative airspace disease with aspiration  and  infection not excluded.  2. Overall nonobstructive bowel-gas pattern within the abdomen with a  moderate colonic stool burden. Single focally dilated loop of bowel  in the right lower abdominal quadrant may represent normal cecum.  3. Extensive vascular calcifications throughout the chest and abdomen.      I personally reviewed the image(s)/study and resident interpretation  as stated by Dr. Willow Chavira MD. I agree with the findings as  stated. This study was interpreted at [facility], [place], [place].      MACRO:  None      Signed by: Radha Mckeon 10/9/2024 8:54 AM  Dictation workstation:   [id number]      Physical Exam    General: Awake, alert, in no acute distress  Eyes: Gaze conjugate.  No scleral icterus or injection  HENT: Normo-cephalic, right periorbital hematoma No stridor  CV: Regular rate, regular rhythm. Radial pulses 2+ bilaterally  Resp: Breathing non-labored, speaking in full sentences.  Clear to auscultation bilaterally  GI: Soft, non-distended, non-tender. No rebound or guarding.  : deferred   MSK/Extremities: No gross bony deformities. Moving all extremities  Skin: Warm. Appropriate color  Neuro: Aox4 Face symmetric. Speech is fluent.  Gross strength and sensation intact in b/l UE and LEs  Psych: Appropriate mood and affect      Assessment & Plan  Hyperkalemia    ESRD (end stage renal disease) on dialysis (Multi)    Pacemaker    HTN (hypertension)    CAD (coronary artery disease)    CHF (congestive heart failure)    Atrial fibrillation (Multi)    Nondisplaced intertrochanteric fracture of right femur, initial encounter for closed fracture    Difficult intubation    Anemia    Misael Ritchie is a 69 y.o. female PMH aortic stenosis s/p TAVR 11/23, atrial fibrillation (on Eliquis) s/p DCCV, CAD, ESRD on hemodialysis T/Th/Sat, HFrEF (6/2023 EF 48%), MR, TR, pacemaker CRT-P 5/2023, adrenal insufficiency admitted to the MICU on 10/03/24 for emergent  dialysis for hyperkalemia 7.4.      Summary for 10/10/24:  Restart on Heparin given negative serotonin release assay, thus no HIIT  Weaning Epi, monitor BP and Lactate   Patient had bowel movement, continue current bowel reg  Remove Dilaudid and monitor pain levels  Add back home Plavix 75mg daily    Plan:  NEUROLOGY/PSYCH:  #Pain s/p fall   Scheduled Tylenol  PRN Oxy 5/10  Trial off Dilaudid today, monitor pain level     CARDIOVASCULAR:  #PMH A-fib, CAD s/p stent 2003, MR, TR  #pericardial effusion  Management:  Restart home Plavix 75mg daily  Continue home amiodarone, atorvastatin  Hold metoprolol for HR iso of shock on admission  Wean pressors as tolerated, last lactate 3.0     PULMONARY:  #COPD  Management:  Wean O2  Continue home albuterol PRN     RENAL/GENITOURINARY:  #ESRD  Management:  Emergent dialysis, Nephrology following  Hold home torsemide   Hold home allopurinol   Follow up uric acid     GASTROENTEROLOGY:  Continue home Protonix, miralax     ENDOCRINOLOGY:  #Adrenal insufficiency  #Hypoglycemia 2/2 insulin patient received for hyperkalemia  Management:  Hydrocortisone 50 mg q6h  Endocrine consult given patient's repeated hypoglycemia c/f possible insulinoma         HEMATOLOGY:  #hematoma   Management:  - Holding Heparin, started Bivalirudin  Hb is down trending at 8.0 (from 10.8). Will continue to monitor. Could be hemodilution vs. Blood loss iso of surgery.    Follow up HIIT labs     SKIN:  No acute concerns     MUSCULOSKELETAL:  #Right troc fracture with hematoma  Management:  S/p surgery with ortho, pain control with Dilaudid, oxy and tylenol     INFECTIOUS DISEASE:  Rule out sepsis low suspicion  :: BC showed NGTD  Management:  Follow up Bcx, UA  MRSA +  Vancomycin 10/4-10/6  Zosyn 10/4-10/6     ICU Check List            FEN  Fluids: D10W  Electrolytes: PRN  Nutrition: NPO  Prophylaxis:  DVT ppx: Bivalrudin   GI ppx: PPI  Bowel care: Miralax     Hardware:  CVC 10/03/24 Triple lumen Non-tunneled  Left Femoral (Active)   Placement Date/Time: 10/03/24 1516   Site Prep: Chlorhexidine   All 5 Sterile Barriers Used (Gloves, Gown, Cap, Mask, Large Sterile Drape): Yes  Lumen Type: Triple lumen  CVC Line Catheter Size (Fr): 7 Fr  CVC Type: Non-tunneled  CVC Line Length (cm):...   Number of days: 1       Arterial Line 10/04/24 Left Brachial (Active)   Placement Date/Time: 10/04/24 0200   Orientation: Left  Location: Brachial  Site Prep: Chlorhexidine   Local Anesthetic: Injectable  Technique: Ultrasound guidance  Securement Method: Sutured  Patient Tolerance: Tolerated well   Number of days: 1         Code: Full Code    HPOA:  Zeke Ritchie () [phone]   Disposition: MICU        Malnutrition Diagnosis Status: New  Malnutrition Diagnosis: Severe malnutrition related to chronic disease or condition  As Evidenced by: pt reports a decrease in appetite and intake for a few weeks along with an overall weight loss of 11% and moderate to severe fat and muscle wasting on physical exam  I agree with the dietitian's malnutrition diagnosis.              Derick Noland MD

## 2024-10-10 NOTE — PROGRESS NOTES
"Misael Ritchie   69 y.o. female   MRN/Room: [id number]/30/30-A  Chief Concern: Hyperkalemia  Nephrology following for: emergent HD MICU     Subjective    Interview 10/10/2024:   Patient was seen at the bedside after receiving morning medications. She says she feels like she is \"dying\" and asks whether the surgery went wrong. She complains of severe right hip and decubitus pain. Patient denies dizziness, chest pain, shortness of breath. Patient denies problems with dialysis yesterday and says she feels like her legs are at baseline. No other complaints at this time.        Objective    Vitals:  Temp:  [35.6 °C (96.1 °F)-36.7 °C (98.1 °F)] 36.7 °C (98.1 °F)  Heart Rate:  [65-88] 75  Resp:  [1-17] 16  BP: (121-135)/(60-61) 135/60  Arterial Line BP 1: (108-163)/(39-62) 129/47          Medications   Scheduled Medications  acetaminophen, 650 mg, oral, q6h  [Held by provider] allopurinol, 100 mg, oral, Daily  amiodarone, 200 mg, oral, Daily  atorvastatin, 40 mg, oral, Nightly  clopidogrel, 75 mg, oral, Daily  diphenhydrAMINE, 12.5 mg, intravenous, Once per day on Monday Wednesday Friday  hydrocortisone sodium succinate, 50 mg, intravenous, q6h  midodrine, 5 mg, oral, TID  oxygen, , inhalation, Continuous - Inhalation  pantoprazole, 40 mg, oral, Daily before breakfast  piperacillin-tazobactam, 2.25 g, intravenous, q8h  polyethylene glycol, 17 g, oral, q12h  prochlorperazine, 10 mg, oral, Once  sennosides, 2 tablet, oral, BID  vancomycin, 500 mg, intravenous, Once per day on Monday Wednesday Friday     Continuous Medications  dextrose 10 % in water (D10W), 50 mL/hr, Last Rate: 50 mL/hr (10/10/24 0824)  EPINEPHrine, 0-1 mcg/kg/min, Last Rate: 0.02 mcg/kg/min (10/10/24 4217)  heparin, 0-4,000 Units/hr, Last Rate: 700 Units/hr (10/10/24 1108)     PRN Medications  PRN medications: albuterol, bisacodyl, bisacodyl, dextrose, dextrose, glucagon, glucagon, heparin, oxyCODONE, vancomycin     I/O:   Intake/Output Summary (Last 24 " hours) at 10/10/2024 1433  Last data filed at 10/10/2024 0909  Gross per 24 hour   Intake 1184.91 ml   Output --   Net 1184.91 ml     Wt Readings from Last 3 Encounters:   10/10/24 58.2 kg (128 lb 4.9 oz)     Physical Exam:  General appearance: no distress  Eyes: non-icteric, resolving left subconjunctival hemorrhage, left periorbital ecchymosis that is resolving.   Skin: no apparent rash  Heart: rate and rhythm are regular, no pericardial friction rub.  Lungs: CTA bilaterally, no wheezing/crackles auscultated.   Abdomen: soft, nt/nd.  Extremities: minimal trace pitting edema bilat.   Ho: none  Neuro: No FND, no asterixis. A&O x 3.   Access: Peripheral IVs in left arm, triple lumen non-tunneled left femoral, HD AV fistula Left and Right arm. Bandages on right arm fistula.     Labs:  CBC:  Results from last 7 days   Lab Units 10/10/24  0400 10/09/24  1035 10/09/24  0416   WBC AUTO x10*3/uL 16.8* 13.9* 16.6*   HEMOGLOBIN g/dL 7.3* 7.6* 7.8*   HEMATOCRIT % 20.2* 21.2* 22.2*   MCV fL 86 87 88   PLATELETS AUTO x10*3/uL 65* 59* 56*     RFP:  Results from last 7 days   Lab Units 10/10/24  0400 10/09/24  0416 10/08/24  2225 10/08/24  2007 10/08/24  0506   SODIUM mmol/L 130* 133* 132* 138 134*   POTASSIUM mmol/L 3.8 4.8 4.9 4.5 4.5   CHLORIDE mmol/L 91* 91* 94* 104 96*   CO2 mmol/L 24 19* 12* 11* 19*   BUN mg/dL 21 48* 45* 36* 33*   CREATININE mg/dL 3.00* 4.71* 4.53* 3.75* 3.52*   CALCIUM mg/dL 8.1* 8.5* 8.0* 6.7* 8.0*   MAGNESIUM mg/dL 2.10 2.32  --   --  2.05   PHOSPHORUS mg/dL 3.8 8.4*  --  6.6* 6.3*     HFP:  Results from last 7 days   Lab Units 10/10/24  0400 10/09/24  0416 10/08/24  2225 10/08/24  2007 10/08/24  0506   AST U/L 78* 98* 44*  --  21   ALT U/L 21 35 23  --  27   ALK PHOS U/L 146* 142* 142*  --  131   BILIRUBIN TOTAL mg/dL 2.5* 2.3* 2.2*  --  2.2*   BILIRUBIN DIRECT mg/dL 1.5* 1.4*  --   --  1.4*   ALBUMIN g/dL 2.9* 3.0* 3.2*   < > 3.0*    < > = values in this interval not displayed.      Cardiac:  Results from last 7 days   Lab Units 10/09/24  0416 10/09/24  0212 10/08/24  2225   TROPHSCMC ng/L  --  1,351* 1,491*   BNP pg/mL 987*  --  1,164*     Coag:  Results from last 7 days   Lab Units 10/10/24  0400 10/09/24  0812 10/09/24  0623 10/08/24  2007 10/04/24  0138 10/03/24  1735   PROTIME seconds  --   --   --   --  22.3* 22.6*   APTT seconds 54* 65* 52*   < > 31 30   INR   --   --   --   --  2.0* 2.0*    < > = values in this interval not displayed.     ABG/VBG:   Results from last 7 days   Lab Units 10/08/24  2227 10/08/24  2131 10/08/24  2017   POCT PH, ARTERIAL pH 7.25* 7.24* 7.27*   POCT PCO2, ARTERIAL mm Hg 27* 28* 30*   POCT PO2, ARTERIAL mm Hg 79* 78* 88   POCT HCO3 CALCULATED, ARTERIAL mmol/L 11.8* 12.0* 13.8*   POCT BASE EXCESS, ARTERIAL mmol/L -14.0* -14.1* -11.9*     Results from last 7 days   Lab Units 10/09/24  1955/24  0936 10/03/24  2251   POCT PH, VENOUS pH 7.42 7.39 7.33   POCT PCO2, VENOUS mm Hg 42 43 49   POCT PO2, VENOUS mm Hg 31* 31* 38   POCT HCO3 CALCULATED, VENOUS mmol/L 27.2* 26.0 25.8       Assessment/Plan    ASSESSMENT:  Misael Ritchie is a 69 y.o. 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 Nephrology is consulted for emergent dialysis in the MICU.    Etiology of hyperkalemia may be multifactorial. Patient did not miss dialysis on 10/1 but may have accumulated potassium prior to scheduled dialysis on 10/3 (day of admission). Patient may have consumed a potassium-rich diet in the interval between HD. May also be a component of dehydration/hypotension in days prior to fall, as patient endorsed lightheadedness prior to the fall. Could be an element of cellular injury from the fall. Hyperkalemia was  thought to potentially be attributed to ineffective dialysis. Patient did not miss her dialysis sessions before admission; her 10/8 labs did not show significant improvement of potassium and bicarb following dialysis session. However, laboratory values were satisfactory after dialysis session on 10/9, so fistula access is likely adequate and dialysis is considered to be effective.    Patient has had improvement in hyperkalemia over the course of admission, with interval increase on 10/7 to 5.0, with 3.5 hours of dialysis performed to optimize potassium ahead of ortho OR (achieved 4.5 on 10/8, then 4.8 on 10/9). Nephrology will continue to follow for RRT while admitted.     Home unit: [facility]  Access: RUE AVF. Previous failed LUE AV graft  Etilogy: PKD (2 previous transplant 2004 and 2010)  Schedule: [facility]  EDW: 56kg    Updates 10/10/2024:   - RFP today shows improvement from pre-dialysis labs  - Satisfactory values after dialysis suggest that access is adequate  - Plan for iHD 10/11 with fluid removal TBD    #Fluid status  :: Hypervolemic  :: Lungs clear to auscultation, no respiratory distress   :: Pitting edema BLE  :: Saturating well on room air   Plan:  - Continue iHD while inpatient   - Fluid removal as tolerated     #Hemodynamics  :: Weaned off pressors on 10/7, added back on after OR   :: Pressors temporarily increased on 10/6 PM after hypotensive episode at CT scan  Plan:  - Continue to wean off pressors as tolerated  - Monitor BP during dialysis   - Continue to remove fluid during iHD as tolerated    #Electrolytes  :: Hyperkalemia  :: K 7.6 on admission, downtrended  :: K 10/10 post-dialysis is 3.8, down from 4.8 pre-dialysis  :: Dialysis appears to be effective judging by satisfactory improvement in laboratory values on 10/10  Plan:  - iHD while inpatient for correction of electrolytes  - Follow on daily RFP     #Metabolic Bone Disease   :: ESRD on HD   :: Phos 7.8 (6.3, 4.5)  Plan:  - Obtain PTH prior to  dc  - Follow Ca, phos on daily RFP    #Acid-Base Status   :: Bicarb appropriately improved to 24 after 10/9 dialysis  Plan:  - Continue to follow bicarb on BMP      RECOMMENDATIONS:  - HD at bedside 10/11 with fluid removal goal TBD, will be ordered by nephrology    - Dialysate at 36.5 C   - 400 blood flow    - 800 dialysate flow   - 2 K   - Follow RFP daily   - Renally dose medications   - Renal diet  - Renal multivitamin   - Nephrology will continue to follow     Assessment and plan discussed with Dr. Patel and Dr. Garza. Recommendations not final until signed by attending physician.     ---  Patricia Earl, MS4  Nephrology Consult Service   10/10/2024

## 2024-10-10 NOTE — PROGRESS NOTES
SOCIAL WORK NOTE   SW attempted to meet with patient at bedside to discuss discharge. Patient asleep. SW contacted , no answer and no ability to leave VM.  HD at [facility]. Social work to follow.  MADHU Chavarria, LISW-S ([id number])

## 2024-10-10 NOTE — TELEPHONE ENCOUNTER
2nd attempt to contact pt and schedule clinical visit with ENT. Unable to leave voice message due to inbox being full.

## 2024-10-10 NOTE — PROGRESS NOTES
"Nutrition Follow Up Assessment:   Nutrition Assessment         Patient is a 69 y.o. female presenting with hyperkalemia and need for emergent HD.  She is on D10@ 50mls/hr d/t insulin she received for hyperkalemia and epi drip for pressure support.     Pt is on a Renal diet.  Has been on/off PO diet since admission on 10/3/24.  She was sleeping at visit and unable to be awoken by RDN.  RN with patient reports that she is not eating well-- had a salad and soup for lunch but she only picked at those items.  Nothing recorded in flowsheets for intake the last few days.       Anthropometrics:  Height: 154.9 cm (5' 1\")   Weight: 58.2 kg (128 lb 4.9 oz)   BMI (Calculated): 24.26  IBW/kg (Dietitian Calculated): 47.7 kg  Percent of IBW: 118 %       Weight History:     10/10/24: 58.2kg  10/4/24: 56.2kg  10/3/24: 53.5kg (admit)    No weight history in EMR. Pt says she has been 52.3kg lately but at one time had been 60kg.  Based on admit weight, she is down 11%.    Weight Change %:       Nutrition Focused Physical Exam Findings:  Defer-- see initial assessment on 10/4/24    Subcutaneous Fat Loss:      Muscle Wasting:     Edema:  Edema: none  Physical Findings:  Skin: Positive (face bruising, PI sacrum, R leg wound)    Nutrition Significant Labs:  BMP Trend:   Results from last 7 days   Lab Units 10/10/24  0400 10/09/24  0416 10/08/24  2225 10/08/24  2007   GLUCOSE mg/dL 181* 135* 110* 36*   CALCIUM mg/dL 8.1* 8.5* 8.0* 6.7*   SODIUM mmol/L 130* 133* 132* 138   POTASSIUM mmol/L 3.8 4.8 4.9 4.5   CO2 mmol/L 24 19* 12* 11*   CHLORIDE mmol/L 91* 91* 94* 104   BUN mg/dL 21 48* 45* 36*   CREATININE mg/dL 3.00* 4.71* 4.53* 3.75*    , A1C:  Lab Results   Component Value Date    HGBA1C 4.5 01/04/2024   , BG POCT trend:   Results from last 7 days   Lab Units 10/10/24  1206 10/10/24  0828 10/10/24  0403 10/10/24  0029 10/09/24  2001   POCT GLUCOSE mg/dL 141* 190* 178* 174* 174*    , Renal Lab Trend:   Results from last 7 days   Lab Units " "10/10/24  0400 10/09/24  0416 10/08/24  2225 10/08/24  2007   POTASSIUM mmol/L 3.8 4.8 4.9 4.5   PHOSPHORUS mg/dL 3.8 8.4*  --  6.6*   SODIUM mmol/L 130* 133* 132* 138   MAGNESIUM mg/dL 2.10 2.32  --   --    EGFR mL/min/1.73m*2 16* 10* 10* 13*   BUN mg/dL 21 48* 45* 36*   CREATININE mg/dL 3.00* 4.71* 4.53* 3.75*    , Vit D: No results found for: \"VITD25\"     Nutrition Specific Medications:  Scheduled medications  acetaminophen, 650 mg, oral, q6h  [Held by provider] allopurinol, 100 mg, oral, Daily  amiodarone, 200 mg, oral, Daily  atorvastatin, 40 mg, oral, Nightly  clopidogrel, 75 mg, oral, Daily  diphenhydrAMINE, 12.5 mg, intravenous, Once per day on Monday Wednesday Friday  hydrocortisone sodium succinate, 50 mg, intravenous, q6h  midodrine, 5 mg, oral, TID  oxygen, , inhalation, Continuous - Inhalation  pantoprazole, 40 mg, oral, Daily before breakfast  piperacillin-tazobactam, 2.25 g, intravenous, q8h  polyethylene glycol, 17 g, oral, q12h  prochlorperazine, 10 mg, oral, Once  sennosides, 2 tablet, oral, BID  vancomycin, 500 mg, intravenous, Once per day on Monday Wednesday Friday      Continuous medications  dextrose 10 % in water (D10W), 50 mL/hr, Last Rate: 50 mL/hr (10/10/24 1139)  EPINEPHrine, 0-1 mcg/kg/min, Last Rate: 0.03 mcg/kg/min (10/10/24 1427)  heparin, 0-4,000 Units/hr, Last Rate: 700 Units/hr (10/10/24 1108)      PRN medications  PRN medications: albuterol, bisacodyl, bisacodyl, dextrose, dextrose, glucagon, glucagon, heparin, oxyCODONE, vancomycin     I/O:   Last BM Date: 10/09/24; Stool Appearance: Soft (10/09/24 2000)        Dietary Orders (From admission, onward)       Start     Ordered    10/07/24 1809  Adult diet Regular, Renal; Potassium Restricted 2 gm (50mEq); 2 - 3 grams Sodium  Diet effective now        Question Answer Comment   Diet type Regular    Diet type Renal    Potassium restriction: Potassium Restricted 2 gm (50mEq)    Sodium restriction: 2 - 3 grams Sodium        10/07/24 1808 "                     Estimated Needs:   Total Energy Estimated Needs (kCal):  (5639-3458)  Method for Estimating Needs: MSJ= 1002 using 53.5kg  Total Protein Estimated Needs (g):  (70+)  Method for Estimating Needs: 1.5 x 47.7kg            Nutrition Diagnosis   Malnutrition Diagnosis  Patient has Malnutrition Diagnosis: Yes  Diagnosis Status: New  Malnutrition Diagnosis: Severe malnutrition related to chronic disease or condition  As Evidenced by: pt reports a decrease in appetite and intake for a few weeks along with an overall weight loss of 11% and moderate to severe fat and muscle wasting on physical exam        Concerned that pt is not eating well during hospital admission and she is malnourished at baseline.  She may be needing dextrose drip in part d/t poor PO intake on top of insulin she has received for hyperkalemia.     Nutrition Interventions/Recommendations         Nutrition Prescription:   RDN will change her diet to low potassium only.   Consider an appetite stimulant (Remeron) to see if this would boost her oral intake.  May need to consider dobhoff for supplemental nutrition if intake does not improve        Nutrition Interventions:    RDN will order Ensure Clear (240kcals, 8grams protein each) and Nepro (425kcals, 19grams protein each)       Nutrition Education:   Not appropriate       Nutrition Monitoring and Evaluation   Food/Nutrient Related History Monitoring  Monitoring and Evaluation Plan: Energy intake  Criteria: PO diet to meet >50% estimated nutrition needs           Time Spent (min): 45 minutes

## 2024-10-11 ENCOUNTER — APPOINTMENT (OUTPATIENT)
Dept: DIALYSIS | Facility: HOSPITAL | Age: 69
End: 2024-10-11
Payer: MEDICARE

## 2024-10-11 PROBLEM — I27.20 PULMONARY HYPERTENSION (MULTI): Status: ACTIVE | Noted: 2024-10-03

## 2024-10-11 LAB
ALBUMIN SERPL BCP-MCNC: 2.5 G/DL (ref 3.4–5)
ALP SERPL-CCNC: 133 U/L (ref 33–136)
ALT SERPL W P-5'-P-CCNC: 11 U/L (ref 7–45)
ANION GAP BLDA CALCULATED.4IONS-SCNC: 11 MMO/L (ref 10–25)
ANION GAP BLDA CALCULATED.4IONS-SCNC: 7 MMO/L (ref 10–25)
ANION GAP BLDV CALCULATED.4IONS-SCNC: 12 MMOL/L (ref 10–25)
ANION GAP BLDV CALCULATED.4IONS-SCNC: 9 MMOL/L (ref 10–25)
ANION GAP SERPL CALC-SCNC: 18 MMOL/L (ref 10–20)
AST SERPL W P-5'-P-CCNC: 50 U/L (ref 9–39)
ATRIAL RATE: 0 BPM
ATRIAL RATE: 75 BPM
ATRIAL RATE: 88 BPM
BASE EXCESS BLDA CALC-SCNC: 3 MMOL/L (ref -2–3)
BASE EXCESS BLDA CALC-SCNC: 4.1 MMOL/L (ref -2–3)
BASE EXCESS BLDV CALC-SCNC: 1.1 MMOL/L (ref -2–3)
BASE EXCESS BLDV CALC-SCNC: 3.8 MMOL/L (ref -2–3)
BASO STIPL BLD QL SMEAR: PRESENT
BASOPHILS # BLD MANUAL: 0 X10*3/UL (ref 0–0.1)
BASOPHILS NFR BLD MANUAL: 0 %
BILIRUB DIRECT SERPL-MCNC: 1.5 MG/DL (ref 0–0.3)
BILIRUB SERPL-MCNC: 2.3 MG/DL (ref 0–1.2)
BNP SERPL-MCNC: 1876 PG/ML (ref 0–99)
BODY TEMPERATURE: 37 DEGREES CELSIUS
BUN SERPL-MCNC: 31 MG/DL (ref 6–23)
CA-I BLDA-SCNC: 1.07 MMOL/L (ref 1.1–1.33)
CA-I BLDA-SCNC: 1.12 MMOL/L (ref 1.1–1.33)
CA-I BLDV-SCNC: 1.08 MMOL/L (ref 1.1–1.33)
CA-I BLDV-SCNC: 1.12 MMOL/L (ref 1.1–1.33)
CALCIUM SERPL-MCNC: 7.9 MG/DL (ref 8.6–10.6)
CHLORIDE BLDA-SCNC: 97 MMOL/L (ref 98–107)
CHLORIDE BLDA-SCNC: 97 MMOL/L (ref 98–107)
CHLORIDE BLDV-SCNC: 88 MMOL/L (ref 98–107)
CHLORIDE BLDV-SCNC: 94 MMOL/L (ref 98–107)
CHLORIDE SERPL-SCNC: 88 MMOL/L (ref 98–107)
CO2 SERPL-SCNC: 24 MMOL/L (ref 21–32)
CREAT SERPL-MCNC: 3.59 MG/DL (ref 0.5–1.05)
EGFRCR SERPLBLD CKD-EPI 2021: 13 ML/MIN/1.73M*2
EOSINOPHIL # BLD MANUAL: 0 X10*3/UL (ref 0–0.7)
EOSINOPHIL NFR BLD MANUAL: 0 %
ERYTHROCYTE [DISTWIDTH] IN BLOOD BY AUTOMATED COUNT: 18.9 % (ref 11.5–14.5)
GLUCOSE BLD MANUAL STRIP-MCNC: 115 MG/DL (ref 74–99)
GLUCOSE BLD MANUAL STRIP-MCNC: 152 MG/DL (ref 74–99)
GLUCOSE BLD MANUAL STRIP-MCNC: 155 MG/DL (ref 74–99)
GLUCOSE BLD MANUAL STRIP-MCNC: 156 MG/DL (ref 74–99)
GLUCOSE BLD MANUAL STRIP-MCNC: 87 MG/DL (ref 74–99)
GLUCOSE BLD MANUAL STRIP-MCNC: 90 MG/DL (ref 74–99)
GLUCOSE BLDA-MCNC: 107 MG/DL (ref 74–99)
GLUCOSE BLDA-MCNC: 118 MG/DL (ref 74–99)
GLUCOSE BLDV-MCNC: 110 MG/DL (ref 74–99)
GLUCOSE BLDV-MCNC: 167 MG/DL (ref 74–99)
GLUCOSE SERPL-MCNC: 149 MG/DL (ref 74–99)
HCO3 BLDA-SCNC: 26.9 MMOL/L (ref 22–26)
HCO3 BLDA-SCNC: 28.5 MMOL/L (ref 22–26)
HCO3 BLDV-SCNC: 26.6 MMOL/L (ref 22–26)
HCO3 BLDV-SCNC: 28.5 MMOL/L (ref 22–26)
HCT VFR BLD AUTO: 19.2 % (ref 36–46)
HCT VFR BLD EST: 20 % (ref 36–46)
HCT VFR BLD EST: 21 % (ref 36–46)
HCT VFR BLD EST: 22 % (ref 36–46)
HCT VFR BLD EST: 23 % (ref 36–46)
HGB BLD-MCNC: 6.9 G/DL (ref 12–16)
HGB BLDA-MCNC: 6.9 G/DL (ref 12–16)
HGB BLDA-MCNC: 7.7 G/DL (ref 12–16)
HGB BLDV-MCNC: 6.7 G/DL (ref 12–16)
HGB BLDV-MCNC: 7.4 G/DL (ref 12–16)
HYPOCHROMIA BLD QL SMEAR: ABNORMAL
IMM GRANULOCYTES # BLD AUTO: 1.14 X10*3/UL (ref 0–0.7)
IMM GRANULOCYTES NFR BLD AUTO: 8.2 % (ref 0–0.9)
INHALED O2 CONCENTRATION: 21 %
INHALED O2 CONCENTRATION: 28 %
INHALED O2 CONCENTRATION: 28 %
LACTATE BLDA-SCNC: 1.1 MMOL/L (ref 0.4–2)
LACTATE BLDA-SCNC: 1.4 MMOL/L (ref 0.4–2)
LACTATE BLDV-SCNC: 1.3 MMOL/L (ref 0.4–2)
LACTATE BLDV-SCNC: 2.1 MMOL/L (ref 0.4–2)
LACTATE SERPL-SCNC: 2.6 MMOL/L (ref 0.4–2)
LIPASE SERPL-CCNC: 555 U/L (ref 9–82)
LYMPHOCYTES # BLD MANUAL: 0.47 X10*3/UL (ref 1.2–4.8)
LYMPHOCYTES NFR BLD MANUAL: 3.4 %
MAGNESIUM SERPL-MCNC: 1.94 MG/DL (ref 1.6–2.4)
MCH RBC QN AUTO: 31.1 PG (ref 26–34)
MCHC RBC AUTO-ENTMCNC: 35.9 G/DL (ref 32–36)
MCV RBC AUTO: 87 FL (ref 80–100)
MONOCYTES # BLD MANUAL: 0.95 X10*3/UL (ref 0.1–1)
MONOCYTES NFR BLD MANUAL: 6.8 %
NEUTS SEG # BLD MANUAL: 12.37 X10*3/UL (ref 1.2–7)
NEUTS SEG NFR BLD MANUAL: 89 %
NRBC BLD-RTO: 2.6 /100 WBCS (ref 0–0)
OXYHGB MFR BLDA: 57.8 % (ref 94–98)
OXYHGB MFR BLDA: 90.9 % (ref 94–98)
OXYHGB MFR BLDV: 42.1 % (ref 45–75)
OXYHGB MFR BLDV: 66.3 % (ref 45–75)
P AXIS: 84 DEGREES
P AXIS: 94 DEGREES
P OFFSET: 126 MS
P OFFSET: 158 MS
P OFFSET: 183 MS
P ONSET: 106 MS
P ONSET: 119 MS
P ONSET: 127 MS
PCO2 BLDA: 37 MM HG (ref 38–42)
PCO2 BLDA: 41 MM HG (ref 38–42)
PCO2 BLDV: 43 MM HG (ref 41–51)
PCO2 BLDV: 46 MM HG (ref 41–51)
PH BLDA: 7.45 PH (ref 7.38–7.42)
PH BLDA: 7.47 PH (ref 7.38–7.42)
PH BLDV: 7.37 PH (ref 7.33–7.43)
PH BLDV: 7.43 PH (ref 7.33–7.43)
PHOSPHATE SERPL-MCNC: 3.8 MG/DL (ref 2.5–4.9)
PLATELET # BLD AUTO: 57 X10*3/UL (ref 150–450)
PO2 BLDA: 39 MM HG (ref 85–95)
PO2 BLDA: 66 MM HG (ref 85–95)
PO2 BLDV: 32 MM HG (ref 35–45)
PO2 BLDV: 41 MM HG (ref 35–45)
POLYCHROMASIA BLD QL SMEAR: ABNORMAL
POTASSIUM BLDA-SCNC: 3.4 MMOL/L (ref 3.5–5.3)
POTASSIUM BLDA-SCNC: 3.7 MMOL/L (ref 3.5–5.3)
POTASSIUM BLDV-SCNC: 3.5 MMOL/L (ref 3.5–5.3)
POTASSIUM BLDV-SCNC: 3.7 MMOL/L (ref 3.5–5.3)
POTASSIUM SERPL-SCNC: 3.6 MMOL/L (ref 3.5–5.3)
PR INTERVAL: 114 MS
PR INTERVAL: 176 MS
PROT SERPL-MCNC: 5 G/DL (ref 6.4–8.2)
Q ONSET: 159 MS
Q ONSET: 176 MS
Q ONSET: 215 MS
QRS COUNT: 12 BEATS
QRS COUNT: 13 BEATS
QRS COUNT: 15 BEATS
QRS DURATION: 156 MS
QRS DURATION: 170 MS
QRS DURATION: 180 MS
QT INTERVAL: 452 MS
QT INTERVAL: 456 MS
QT INTERVAL: 488 MS
QTC CALCULATION(BAZETT): 509 MS
QTC CALCULATION(BAZETT): 544 MS
QTC CALCULATION(BAZETT): 546 MS
QTC FREDERICIA: 490 MS
QTC FREDERICIA: 513 MS
QTC FREDERICIA: 525 MS
R AXIS: 144 DEGREES
R AXIS: 228 DEGREES
R AXIS: 258 DEGREES
RBC # BLD AUTO: 2.22 X10*6/UL (ref 4–5.2)
RBC MORPH BLD: ABNORMAL
SAO2 % BLDA: 59 % (ref 94–100)
SAO2 % BLDA: 94 % (ref 94–100)
SAO2 % BLDV: 43 % (ref 45–75)
SAO2 % BLDV: 68 % (ref 45–75)
SODIUM BLDA-SCNC: 129 MMOL/L (ref 136–145)
SODIUM BLDA-SCNC: 131 MMOL/L (ref 136–145)
SODIUM BLDV-SCNC: 123 MMOL/L (ref 136–145)
SODIUM BLDV-SCNC: 128 MMOL/L (ref 136–145)
SODIUM SERPL-SCNC: 126 MMOL/L (ref 136–145)
T AXIS: -47 DEGREES
T AXIS: 112 DEGREES
T AXIS: 73 DEGREES
T OFFSET: 387 MS
T OFFSET: 420 MS
T OFFSET: 441 MS
TARGETS BLD QL SMEAR: ABNORMAL
TOTAL CELLS COUNTED BLD: 118
UFH PPP CHRO-ACNC: 0.4 IU/ML
VANCOMYCIN SERPL-MCNC: 14.4 UG/ML (ref 5–20)
VARIANT LYMPHS # BLD MANUAL: 0.11 X10*3/UL (ref 0–0.5)
VARIANT LYMPHS NFR BLD: 0.8 %
VENTRICULAR RATE: 75 BPM
VENTRICULAR RATE: 75 BPM
VENTRICULAR RATE: 88 BPM
WBC # BLD AUTO: 13.9 X10*3/UL (ref 4.4–11.3)

## 2024-10-11 PROCEDURE — 2500000001 HC RX 250 WO HCPCS SELF ADMINISTERED DRUGS (ALT 637 FOR MEDICARE OP)

## 2024-10-11 PROCEDURE — 83880 ASSAY OF NATRIURETIC PEPTIDE: CPT

## 2024-10-11 PROCEDURE — 97110 THERAPEUTIC EXERCISES: CPT | Mod: GP | Performed by: STUDENT IN AN ORGANIZED HEALTH CARE EDUCATION/TRAINING PROGRAM

## 2024-10-11 PROCEDURE — 84132 ASSAY OF SERUM POTASSIUM: CPT

## 2024-10-11 PROCEDURE — 82947 ASSAY GLUCOSE BLOOD QUANT: CPT

## 2024-10-11 PROCEDURE — 2500000004 HC RX 250 GENERAL PHARMACY W/ HCPCS (ALT 636 FOR OP/ED)

## 2024-10-11 PROCEDURE — 85007 BL SMEAR W/DIFF WBC COUNT: CPT

## 2024-10-11 PROCEDURE — 85027 COMPLETE CBC AUTOMATED: CPT

## 2024-10-11 PROCEDURE — 93503 INSERT/PLACE HEART CATHETER: CPT

## 2024-10-11 PROCEDURE — 93451 RIGHT HEART CATH: CPT

## 2024-10-11 PROCEDURE — 36415 COLL VENOUS BLD VENIPUNCTURE: CPT

## 2024-10-11 PROCEDURE — 4A023N6 MEASUREMENT OF CARDIAC SAMPLING AND PRESSURE, RIGHT HEART, PERCUTANEOUS APPROACH: ICD-10-PCS

## 2024-10-11 PROCEDURE — 99232 SBSQ HOSP IP/OBS MODERATE 35: CPT

## 2024-10-11 PROCEDURE — 99233 SBSQ HOSP IP/OBS HIGH 50: CPT | Performed by: INTERNAL MEDICINE

## 2024-10-11 PROCEDURE — C1751 CATH, INF, PER/CENT/MIDLINE: HCPCS

## 2024-10-11 PROCEDURE — 80202 ASSAY OF VANCOMYCIN: CPT

## 2024-10-11 PROCEDURE — 76937 US GUIDE VASCULAR ACCESS: CPT

## 2024-10-11 PROCEDURE — 83735 ASSAY OF MAGNESIUM: CPT

## 2024-10-11 PROCEDURE — 82248 BILIRUBIN DIRECT: CPT

## 2024-10-11 PROCEDURE — 2720000007 HC OR 272 NO HCPCS

## 2024-10-11 PROCEDURE — 84100 ASSAY OF PHOSPHORUS: CPT

## 2024-10-11 PROCEDURE — 83690 ASSAY OF LIPASE: CPT

## 2024-10-11 PROCEDURE — C1894 INTRO/SHEATH, NON-LASER: HCPCS

## 2024-10-11 PROCEDURE — 99291 CRITICAL CARE FIRST HOUR: CPT

## 2024-10-11 PROCEDURE — 2020000001 HC ICU ROOM DAILY

## 2024-10-11 PROCEDURE — 37799 UNLISTED PX VASCULAR SURGERY: CPT

## 2024-10-11 PROCEDURE — 82435 ASSAY OF BLOOD CHLORIDE: CPT

## 2024-10-11 PROCEDURE — 2500000002 HC RX 250 W HCPCS SELF ADMINISTERED DRUGS (ALT 637 FOR MEDICARE OP, ALT 636 FOR OP/ED)

## 2024-10-11 PROCEDURE — 8010000001 HC DIALYSIS - HEMODIALYSIS PER DAY

## 2024-10-11 PROCEDURE — 85520 HEPARIN ASSAY: CPT

## 2024-10-11 PROCEDURE — 97530 THERAPEUTIC ACTIVITIES: CPT | Mod: GP | Performed by: STUDENT IN AN ORGANIZED HEALTH CARE EDUCATION/TRAINING PROGRAM

## 2024-10-11 RX ORDER — VANCOMYCIN HYDROCHLORIDE 1 G/200ML
1000 INJECTION, SOLUTION INTRAVENOUS
Status: DISCONTINUED | OUTPATIENT
Start: 2024-10-11 | End: 2024-10-11

## 2024-10-11 RX ORDER — LIDOCAINE HYDROCHLORIDE 20 MG/ML
INJECTION, SOLUTION INFILTRATION; PERINEURAL AS NEEDED
Status: DISCONTINUED | OUTPATIENT
Start: 2024-10-11 | End: 2024-10-11 | Stop reason: HOSPADM

## 2024-10-11 RX ADMIN — HEPARIN SODIUM 700 UNITS/HR: 10000 INJECTION, SOLUTION INTRAVENOUS at 19:05

## 2024-10-11 RX ADMIN — HYDROCORTISONE SODIUM SUCCINATE 25 MG: 100 INJECTION, POWDER, FOR SOLUTION INTRAMUSCULAR; INTRAVENOUS at 18:30

## 2024-10-11 RX ADMIN — PANTOPRAZOLE SODIUM 40 MG: 20 TABLET, DELAYED RELEASE ORAL at 06:03

## 2024-10-11 RX ADMIN — ACETAMINOPHEN 650 MG: 325 TABLET, FILM COATED ORAL at 08:21

## 2024-10-11 RX ADMIN — POLYETHYLENE GLYCOL 3350 17 G: 17 POWDER, FOR SOLUTION ORAL at 20:12

## 2024-10-11 RX ADMIN — ATORVASTATIN CALCIUM 40 MG: 40 TABLET, FILM COATED ORAL at 20:12

## 2024-10-11 RX ADMIN — ACETAMINOPHEN 650 MG: 325 TABLET, FILM COATED ORAL at 14:46

## 2024-10-11 RX ADMIN — EPINEPHRINE 0.01 MCG/KG/MIN: 1 INJECTION INTRAMUSCULAR; INTRAVENOUS; SUBCUTANEOUS at 23:38

## 2024-10-11 RX ADMIN — HYDROCORTISONE SODIUM SUCCINATE 50 MG: 100 INJECTION, POWDER, FOR SOLUTION INTRAMUSCULAR; INTRAVENOUS at 06:03

## 2024-10-11 RX ADMIN — MIDODRINE HYDROCHLORIDE 5 MG: 5 TABLET ORAL at 18:16

## 2024-10-11 RX ADMIN — CLOPIDOGREL BISULFATE 75 MG: 75 TABLET ORAL at 08:21

## 2024-10-11 RX ADMIN — ACETAMINOPHEN 650 MG: 325 TABLET, FILM COATED ORAL at 20:12

## 2024-10-11 RX ADMIN — HYDROCORTISONE SODIUM SUCCINATE 50 MG: 100 INJECTION, POWDER, FOR SOLUTION INTRAMUSCULAR; INTRAVENOUS at 12:01

## 2024-10-11 RX ADMIN — PIPERACILLIN SODIUM AND TAZOBACTAM SODIUM 2.25 G: 2; .25 INJECTION, SOLUTION INTRAVENOUS at 08:21

## 2024-10-11 RX ADMIN — MIDODRINE HYDROCHLORIDE 5 MG: 5 TABLET ORAL at 12:01

## 2024-10-11 RX ADMIN — AMIODARONE HYDROCHLORIDE 200 MG: 200 TABLET ORAL at 08:21

## 2024-10-11 RX ADMIN — SENNOSIDES 17.2 MG: 8.6 TABLET, FILM COATED ORAL at 20:12

## 2024-10-11 RX ADMIN — MIDODRINE HYDROCHLORIDE 5 MG: 5 TABLET ORAL at 08:21

## 2024-10-11 RX ADMIN — DEXTROSE MONOHYDRATE 50 ML/HR: 100 INJECTION, SOLUTION INTRAVENOUS at 09:00

## 2024-10-11 RX ADMIN — ACETAMINOPHEN 650 MG: 325 TABLET, FILM COATED ORAL at 01:40

## 2024-10-11 ASSESSMENT — PAIN SCALES - GENERAL
PAINLEVEL_OUTOF10: 0 - NO PAIN

## 2024-10-11 ASSESSMENT — COGNITIVE AND FUNCTIONAL STATUS - GENERAL
CLIMB 3 TO 5 STEPS WITH RAILING: TOTAL
TURNING FROM BACK TO SIDE WHILE IN FLAT BAD: A LOT
MOBILITY SCORE: 10
MOVING FROM LYING ON BACK TO SITTING ON SIDE OF FLAT BED WITH BEDRAILS: A LOT
WALKING IN HOSPITAL ROOM: TOTAL
STANDING UP FROM CHAIR USING ARMS: A LOT
MOVING TO AND FROM BED TO CHAIR: A LOT

## 2024-10-11 ASSESSMENT — PAIN - FUNCTIONAL ASSESSMENT
PAIN_FUNCTIONAL_ASSESSMENT: CPOT (CRITICAL CARE PAIN OBSERVATION TOOL)
PAIN_FUNCTIONAL_ASSESSMENT: CPOT (CRITICAL CARE PAIN OBSERVATION TOOL)
PAIN_FUNCTIONAL_ASSESSMENT: 0-10
PAIN_FUNCTIONAL_ASSESSMENT: 0-10

## 2024-10-11 NOTE — HOSPITAL COURSE
Misael Ritchie 69 y.o. female PMH aortic stenosis, atrial fibrillation, CAD, CKD, ESRD on hemodialysis, CHF (8/2024 EF ~48%) presented via EMS after a fall patient states she was feeling dizzy this morning and got up to turn a fan on because she was feeling warm and fell on her right side. Patient states she hit her head. She also reports she did not take any of her medications this morning because she fell prior to the time she normally takes her medications. Patient states she did get her dialysis on Tuesday and completed the session without issue. In the ED the patient was found to have a potassium of 7.4 and was given 5 units of regular insulin and D50. She also received calcium gluconate 2g and sodium bicarbonate.  Shortly after she was found to be obtunded and blood sugar was found to be 52. The decision was made to transfer the patient to the MICU for emergent dialysis.     On arrival to the MICU, patient is HDS she is mentating appropriately. She reports she last took her Elliquis yesterday. She reports feeling cold and somewhat short of breath which improved with albuterol inhaler in the ED and she's currently on 6L NC. During ICU stay she was started on emergent bedside hemodialysis. Endocrinology consulted for hypoglycemia and known adrenal insufficiency, for which they recommended 50mg Hydrocortisone q6h.     She went to the OR on 10/07 for repair of R trochanteric fracture. Post operatively managed with dilaudid and oxycodone for pain control.     Post operatively, on and off epi with widened pulse pressures. On 10/09 mixed venous O2 was normal and we switched from Epi to Levo. Overnight she had round of hypoglycemia with a lactate rise to 10.5 She was switched back to Epi and placed on dextrose fluids after amps of D50 with normlaization of blood glucose and MAPs. By morning her lactate was downtrending with plateau to 3.     Due to inability to completely wean her off Epi, we attempted to place  a bedside [place] Rasheed Catheter with the HF ICU team. Unfortunately we could not advance wire past the pacemaker,and so aborted procedure. She was taken to cath lab on 10/11 for successful Mesa Rasheed placement.     [facility] hemodynamics:  RA 3  RVSP 53, RVEDP 9   PCWP 10  PA 57/9, mean PAP 29  CO/CI 4.61/2.95  PA sat 55%  PVR 4.3     RV systolic dysfunction seemed known given numerous echos and notes from [facility]. Used systolic goal of > , mixed venous shows normal SO2 and lactate stable. Off epi since yesterday. Transferred out of the MICU to floors on 10/13.    Overnight on 10/15, pt was noted to be lethargic following dialysis. VBG lactate uptrending 6.8 > 8.7. AMS noted to worsen w/ pt appearing more stuporous and decision, escalated care to MICU. Sedated, intubated 2/2 acute hypercapnic respiratory failure, started on multiple pressors, lactate peaked to 15. CTA CAP c/f liver and colon pathologies (ischemia vs abscess), ACS c/s, no surgical indication. Hydro increased to 50q6.     Lactate increasing and on 3 pressors on 10/17. Patient lost a pulse, status post ACLS ROSC was obtained. Was able to reach .  Explained dire situation, and worsening status leading up to the arrest.  Explained that she is very sick and that she is at risk of arresting again.  Explained that chances of meaningful recovery are poor.   agreeable to DNR. She went again into cardiac arrest and passed away.

## 2024-10-11 NOTE — PROGRESS NOTES
Vancomycin Dosing by Pharmacy  FOLLOW UP    Misael Ritchie is a 69 y.o. female who Pharmacy is consulted to dose vancomycin for  shock .     Based on the patient's indication and renal status, this patient is being dosed based on a goal vancomycin pre-HD level of 20-25.     Current vancomycin dose: 500 mg after every HD session    Pre-HD level = 14.4    Renal function is currently dependent on Intermittent Hemodialysis (IHD).    Estimated Creatinine Clearance: 12.1 mL/min (A) (by C-G formula based on SCr of 3.59 mg/dL (H)).    Results from last 7 days   Lab Units 10/11/24  0611 10/10/24  0400 10/09/24  1035 10/09/24  0416 10/08/24  2225   CREATININE mg/dL 3.59* 3.00*  --  4.71* 4.53*   BUN mg/dL 31* 21  --  48* 45*   WBC AUTO x10*3/uL 13.9* 16.8* 13.9* 16.6* 17.4*        Visit Vitals  /60   Pulse 75   Temp 36.4 °C (97.5 °F)   Resp 10       Blood Culture   Date/Time Value Ref Range Status   10/09/2024 01:03 AM No growth at 2 days  Preliminary        No results found for the last 90 days.      Assessment/Plan    Vancomycin pre-HD level is below goal range of 20-25. Will increase post-HD vancomycin dose to 1000 mg.    The next vancomycin level will be ordered for 10/14 at AM labs, unless clinically indicated sooner.  Will continue to monitor renal function daily while on vancomycin and order serum creatinine at least every 48 hours if not already ordered.  Will follow for continued vancomycin needs, clinical response, and signs/symptoms of toxicity.       Chet Randall, PharmD

## 2024-10-11 NOTE — SIGNIFICANT EVENT
Late Entry:  1700 on 10/10:  SWAN Rasheed catheter attempt with HF ICU NP. Skin was cleaned and prepped in the usual sterile fashion. 10ml of lidocaine 1% was used to anesthetize the R neck. Unable to pass the guidewire beyond approx 12cm. 2 attempts with the second attempt using micropuncture with the  same outcome. Aborted due to inability to pass guidewire  despite re-positioning. Pressure held.  Discussed above with patient.

## 2024-10-11 NOTE — PROGRESS NOTES
"Misael Ritchie is a 69 y.o. female on day 8 of admission presenting with Hyperkalemia.    Subjective     Off Epi at 9 AM, no hypoglycemia in the past 48 h.  Was NPO, in a.m., remains on D10       Objective   General: Awake, alert, in no acute distress  HENT: Normo-cephalic, right periorbital hematoma No stridor  CV: Regular rate, regular rhythm. Radial pulses 2+ bilaterally  Resp: Breathing non-labored, speaking in full sentences.    GI: Soft, non-distended, non-tender.   : deferred   MSK/Extremities: Right lower extremity  - Palpable DP pulse   Skin: Warm. Appropriate color  Neuro: Aox4 Face symmetric. Speech is fluent.  Gross strength and sensation intact in b/l UE and LEs  Psych: Appropriate mood and affect    Last Recorded Vitals  Blood pressure 147/60, pulse 75, temperature 36.4 °C (97.5 °F), resp. rate (!) 9, height 1.549 m (5' 1\"), weight 58.2 kg (128 lb 4.9 oz), SpO2 94%.  Intake/Output last 3 Shifts:  I/O last 3 completed shifts:  In: 2321.1 (43.1 mL/kg) [I.V.:2121.1 (39.4 mL/kg); IV Piggyback:200]  Out: - (0 mL/kg)   Dosing Weight: 53.9 kg     Relevant Results  Results from last 7 days   Lab Units 10/11/24  0747 10/11/24  0612 10/11/24  0611 10/10/24  2228 10/10/24  2030 10/10/24  1559 10/10/24  0403 10/10/24  0400 10/09/24  0422 10/09/24  0416 10/08/24  2234 10/08/24  2225 10/08/24  2057 10/08/24  2007   POCT GLUCOSE mg/dL 156* 152*  --  156* 166* 188*   < >  --    < >  --    < >  --    < >  --    GLUCOSE mg/dL  --   --  149*  --   --   --   --  181*  --  135*  --  110*  --  36*    < > = values in this interval not displayed.     Scheduled medications  acetaminophen, 650 mg, oral, q6h  [Held by provider] allopurinol, 100 mg, oral, Daily  amiodarone, 200 mg, oral, Daily  atorvastatin, 40 mg, oral, Nightly  clopidogrel, 75 mg, oral, Daily  diphenhydrAMINE, 12.5 mg, intravenous, Once per day on Monday Wednesday Friday  hydrocortisone sodium succinate, 50 mg, intravenous, q6h  midodrine, 5 mg, oral, " TID  oxygen, , inhalation, Continuous - Inhalation  pantoprazole, 40 mg, oral, Daily before breakfast  piperacillin-tazobactam, 2.25 g, intravenous, q8h  polyethylene glycol, 17 g, oral, q12h  prochlorperazine, 10 mg, oral, Once  sennosides, 2 tablet, oral, BID  vancomycin, 1,000 mg, intravenous, Once per day on Monday Wednesday Friday      Continuous medications  dextrose 10 % in water (D10W), 50 mL/hr, Last Rate: 50 mL/hr (10/11/24 0900)  EPINEPHrine, 0-1 mcg/kg/min, Last Rate: Stopped (10/11/24 0913)  heparin, 0-4,000 Units/hr, Last Rate: 700 Units/hr (10/11/24 0400)      PRN medications  PRN medications: albuterol, bisacodyl, bisacodyl, dextrose, dextrose, glucagon, glucagon, heparin, oxyCODONE, vancomycin       Results for orders placed or performed during the hospital encounter of 10/03/24 (from the past 24 hour(s))   Lactate   Result Value Ref Range    Lactate 3.0 (H) 0.4 - 2.0 mmol/L   POCT GLUCOSE   Result Value Ref Range    POCT Glucose 141 (H) 74 - 99 mg/dL   Lactate   Result Value Ref Range    Lactate 3.5 (H) 0.4 - 2.0 mmol/L   Heparin Assay, UFH   Result Value Ref Range    Heparin Unfractionated 0.3 See Comment Below for Therapeutic Ranges IU/mL   POCT GLUCOSE   Result Value Ref Range    POCT Glucose 188 (H) 74 - 99 mg/dL   Heparin Assay, UFH   Result Value Ref Range    Heparin Unfractionated 0.3 See Comment Below for Therapeutic Ranges IU/mL   POCT GLUCOSE   Result Value Ref Range    POCT Glucose 166 (H) 74 - 99 mg/dL   Lactate   Result Value Ref Range    Lactate 2.6 (H) 0.4 - 2.0 mmol/L   POCT GLUCOSE   Result Value Ref Range    POCT Glucose 156 (H) 74 - 99 mg/dL   BLOOD GAS VENOUS FULL PANEL   Result Value Ref Range    POCT pH, Venous 7.37 7.33 - 7.43 pH    POCT pCO2, Venous 46 41 - 51 mm Hg    POCT pO2, Venous 32 (L) 35 - 45 mm Hg    POCT SO2, Venous 43 (L) 45 - 75 %    POCT Oxy Hemoglobin, Venous 42.1 (L) 45.0 - 75.0 %    POCT Hematocrit Calculated, Venous 22.0 (L) 36.0 - 46.0 %    POCT Sodium,  Venous 123 (L) 136 - 145 mmol/L    POCT Potassium, Venous 3.7 3.5 - 5.3 mmol/L    POCT Chloride, Venous 88 (L) 98 - 107 mmol/L    POCT Ionized Calicum, Venous 1.08 (L) 1.10 - 1.33 mmol/L    POCT Glucose, Venous 167 (H) 74 - 99 mg/dL    POCT Lactate, Venous 2.1 (H) 0.4 - 2.0 mmol/L    POCT Base Excess, Venous 1.1 -2.0 - 3.0 mmol/L    POCT HCO3 Calculated, Venous 26.6 (H) 22.0 - 26.0 mmol/L    POCT Hemoglobin, Venous 7.4 (L) 12.0 - 16.0 g/dL    POCT Anion Gap, Venous 12.0 10.0 - 25.0 mmol/L    Patient Temperature 37.0 degrees Celsius    FiO2 21 %   Magnesium   Result Value Ref Range    Magnesium 1.94 1.60 - 2.40 mg/dL   Hepatic Function Panel   Result Value Ref Range    Albumin 2.5 (L) 3.4 - 5.0 g/dL    Bilirubin, Total 2.3 (H) 0.0 - 1.2 mg/dL    Bilirubin, Direct 1.5 (H) 0.0 - 0.3 mg/dL    Alkaline Phosphatase 133 33 - 136 U/L    ALT 11 7 - 45 U/L    AST 50 (H) 9 - 39 U/L    Total Protein 5.0 (L) 6.4 - 8.2 g/dL   CBC and Auto Differential   Result Value Ref Range    WBC 13.9 (H) 4.4 - 11.3 x10*3/uL    nRBC 2.6 (H) 0.0 - 0.0 /100 WBCs    RBC 2.22 (L) 4.00 - 5.20 x10*6/uL    Hemoglobin 6.9 (L) 12.0 - 16.0 g/dL    Hematocrit 19.2 (L) 36.0 - 46.0 %    MCV 87 80 - 100 fL    MCH 31.1 26.0 - 34.0 pg    MCHC 35.9 32.0 - 36.0 g/dL    RDW 18.9 (H) 11.5 - 14.5 %    Platelets 57 (L) 150 - 450 x10*3/uL    Immature Granulocytes %, Automated 8.2 (H) 0.0 - 0.9 %    Immature Granulocytes Absolute, Automated 1.14 (H) 0.00 - 0.70 x10*3/uL   Heparin Assay   Result Value Ref Range    Heparin Unfractionated 0.4 See Comment Below for Therapeutic Ranges IU/mL   Vancomycin   Result Value Ref Range    Vancomycin 14.4 5.0 - 20.0 ug/mL   B-type natriuretic peptide   Result Value Ref Range    BNP 1,876 (H) 0 - 99 pg/mL   Phosphorus   Result Value Ref Range    Phosphorus 3.8 2.5 - 4.9 mg/dL   Basic Metabolic Panel   Result Value Ref Range    Glucose 149 (H) 74 - 99 mg/dL    Sodium 126 (L) 136 - 145 mmol/L    Potassium 3.6 3.5 - 5.3 mmol/L     Chloride 88 (L) 98 - 107 mmol/L    Bicarbonate 24 21 - 32 mmol/L    Anion Gap 18 10 - 20 mmol/L    Urea Nitrogen 31 (H) 6 - 23 mg/dL    Creatinine 3.59 (H) 0.50 - 1.05 mg/dL    eGFR 13 (L) >60 mL/min/1.73m*2    Calcium 7.9 (L) 8.6 - 10.6 mg/dL   Manual Differential   Result Value Ref Range    Neutrophils %, Manual 89.0 40.0 - 80.0 %    Lymphocytes %, Manual 3.4 13.0 - 44.0 %    Monocytes %, Manual 6.8 2.0 - 10.0 %    Eosinophils %, Manual 0.0 0.0 - 6.0 %    Basophils %, Manual 0.0 0.0 - 2.0 %    Atypical Lymphocytes %, Manual 0.8 0.0 - 2.0 %    Seg Neutrophils Absolute, Manual 12.37 (H) 1.20 - 7.00 x10*3/uL    Lymphocytes Absolute, Manual 0.47 (L) 1.20 - 4.80 x10*3/uL    Monocytes Absolute, Manual 0.95 0.10 - 1.00 x10*3/uL    Eosinophils Absolute, Manual 0.00 0.00 - 0.70 x10*3/uL    Basophils Absolute, Manual 0.00 0.00 - 0.10 x10*3/uL    Atypical Lymphs Absolute, Manual 0.11 0.00 - 0.50 x10*3/uL    Total Cells Counted 118     RBC Morphology See Below     Polychromasia Mild     Hypochromia Mild     Target Cells Few     Basophilic Stippling Present    POCT GLUCOSE   Result Value Ref Range    POCT Glucose 152 (H) 74 - 99 mg/dL   POCT GLUCOSE   Result Value Ref Range    POCT Glucose 156 (H) 74 - 99 mg/dL        Assessment/Plan   Assessment & Plan      Misael Ritchie is a 69 y.o. 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  Patient presenting to hospital after a fall. Found to have L trochanteric fracture with hematoma, hypoglycemia requiring dextrose drip -post insulin for hyperkalemia iso acidosis- sp R ORIF 10/7, currently on pressors.        Endocrinology service is consulted for management of adrenal insufficiency in setting of recurrent unexplained  hypoglycemic episodes.        [Of note:  - patient had similar episodes of hypoglycemia in 2024 for which she was managed at [facility].   Random blood cortisol level of 8.2 on 2/5/2024.   At that time, stim test was done on 2/5/2024 with results as under:  Basal cortisol: 2.6  30-minute cortisol: 2.6  60-minute cortisol: 2.7  -Patient reports that she has been on prednisone 2.5 mg since 2010.  She reports being off prednisone for 6 months in 2011 (patient not sure about the year when she was off).  She reports that she felt miserable when she was off prednisone.  -CT abdo/pelvis (10/3): Bilateral adrenal glands are unremarkable.  -Patient reports that she recently had steroid injection to her right shoulder.    -Received methylprednisolone acetate 40 mg on 9/20/2024.]        # Adrenal insufficiency in setting of chronic glucocorticoid use and recent steroid injection to right shoulder resulting treatment resistant hypoglycemia  #R ORIF 10/7  Remains on - off low dose Epi -back on D10 50 mL/h after an episode of asymptomatic hypoglycemia down to 40s on 10/8 -C-peptide 5.2 [likely unreliable in the setting of end-stage renal disease on hemodialysis], blood glucose 36, but hydroxybutyrate normal, elevated lactic acid, low insulin and proinsulin  - diet started-  -10/11 patient planned for leave and [place]as[place] with cardiology, high-output heart failure     Recommendations   -Okay to taper down hydrocortisone 25 mg IV every 6 hours as long as patient is off pressors, noted not to worsen heart failure.  (Repeating workup for adrenal insufficiency is not recommended at this time as patient is on stress dose steroids.    ACTH is also expected to be suppressed in setting of recent steroid injection to shoulder (9/20/2024))  -Hold off on further workup for insulinoma as inpatient as hypoglycemia likely present only in the setting of critical illness.  Would reconsider as outpatient only if hypoglycemia recurs in normal state of  health.  -will require non urgent endocrine follow up as outpatient     Plan communicated to primary team via secure messaging.  Case discussed with Dr. Libby Rivera MD

## 2024-10-11 NOTE — PROGRESS NOTES
Vancomycin Dosing by Pharmacy- Cessation of Therapy    Consult to pharmacy for vancomycin dosing has been discontinued by the prescriber, pharmacy will sign off at this time.    Please call pharmacy if there are further questions or re-enter a consult if vancomycin is resumed.     JUDITH WOLFE

## 2024-10-11 NOTE — CARE PLAN
Problem: Skin  Goal: Participates in plan/prevention/treatment measures  Outcome: Progressing  Goal: Prevent/manage excess moisture  Outcome: Progressing  Goal: Prevent/minimize sheer/friction injuries  Outcome: Progressing  Flowsheets (Taken 10/11/2024 1016)  Prevent/minimize sheer/friction injuries:   Turn/reposition every 2 hours/use positioning/transfer devices   Utilize specialty bed per algorithm   Use pull sheet   Increase activity/out of bed for meals  Goal: Promote/optimize nutrition  Outcome: Progressing  Goal: Promote skin healing  Outcome: Progressing  Goal: Decreased wound size/increased tissue granulation at next dressing change  Outcome: Progressing     Problem: Fall/Injury  Goal: Not fall by end of shift  Outcome: Progressing  Goal: Be free from injury by end of the shift  Outcome: Progressing  Goal: Verbalize understanding of personal risk factors for fall in the hospital  Outcome: Progressing  Goal: Verbalize understanding of risk factor reduction measures to prevent injury from fall in the home  Outcome: Progressing     Problem: Pain - Adult  Goal: Verbalizes/displays adequate comfort level or baseline comfort level  Outcome: Progressing  Flowsheets (Taken 10/11/2024 1016)  Verbalizes/displays adequate comfort level or baseline comfort level: Assess pain using appropriate pain scale     Problem: Safety - Adult  Goal: Free from fall injury  Outcome: Progressing  Flowsheets (Taken 10/11/2024 1016)  Free from fall injury: Instruct family/caregiver on patient safety     Problem: Pain  Goal: Turns in bed with improved pain control throughout the shift  Outcome: Progressing  Goal: Participates in PT with improved pain control throughout the shift  Outcome: Progressing  Goal: Free from opioid side effects throughout the shift  Outcome: Progressing  Goal: Free from acute confusion related to pain meds throughout the shift  Outcome: Progressing     Problem: Nutrition  Goal: Oral intake greater than  50%  Outcome: Progressing  Goal: Oral intake greater 75%  Outcome: Progressing  Goal: Consume prescribed supplement  Outcome: Progressing  Goal: Adequate PO fluid intake  Outcome: Progressing  Goal: Nutrition support goals are met within 48 hrs  Outcome: Progressing  Goal: Nutrition support is meeting 75% of nutrient needs  Outcome: Progressing  Goal: BG  mg/dL  Outcome: Progressing  Goal: Lab values WNL  Outcome: Progressing  Goal: Electrolytes WNL  Outcome: Progressing  Goal: Promote healing  Outcome: Progressing  Goal: Maintain stable weight  Outcome: Progressing  Goal: Reduce weight from edema/fluid  Outcome: Progressing  Goal: Gradual weight gain  Outcome: Progressing   The patient's goals for the shift include      The clinical goals for the shift include pt. will have a MAP > 65 during shift

## 2024-10-11 NOTE — PROGRESS NOTES
"Misael Ritchie   69 y.o. female   MRN/Room: [id number]/30/30-A  Chief Concern: Hyperkalemia  Nephrology following for: emergent HD MICU     Subjective    Interview 10/11/2024:   Patient was seen at the bedside prior to dialysis. She says she is feeling \"okay\" today and doing better than yesterday. She says her hip pain is improved. Patient denies any shortness of breath, chest pain, or abdominal pain. She is breathing comfortably on room air. Patient is aware of dialysis scheduled for today. No other complaints at this time.      Objective    Vitals:  /60   Pulse 81   Temp 35.3 °C (95.5 °F)   Resp 12   Ht 1.549 m (5' 1\")   Wt 58.2 kg (128 lb 4.9 oz)   SpO2 97%   BMI 24.24 kg/m²       Medications   Scheduled Medications  acetaminophen, 650 mg, oral, q6h  [Held by provider] allopurinol, 100 mg, oral, Daily  amiodarone, 200 mg, oral, Daily  atorvastatin, 40 mg, oral, Nightly  clopidogrel, 75 mg, oral, Daily  diphenhydrAMINE, 12.5 mg, intravenous, Once per day on Monday Wednesday Friday  hydrocortisone sodium succinate, 50 mg, intravenous, q6h  midodrine, 5 mg, oral, TID  oxygen, , inhalation, Continuous - Inhalation  pantoprazole, 40 mg, oral, Daily before breakfast  piperacillin-tazobactam, 2.25 g, intravenous, q8h  polyethylene glycol, 17 g, oral, q12h  prochlorperazine, 10 mg, oral, Once  sennosides, 2 tablet, oral, BID  vancomycin, 1,000 mg, intravenous, Once per day on Monday Wednesday Friday     Continuous Medications  EPINEPHrine, 0-1 mcg/kg/min, Last Rate: Stopped (10/11/24 0913)  heparin, 0-4,000 Units/hr, Last Rate: 700 Units/hr (10/11/24 0700)     PRN Medications  PRN medications: albuterol, bisacodyl, bisacodyl, dextrose, dextrose, glucagon, glucagon, heparin, oxyCODONE, vancomycin     I/O:   Intake/Output Summary (Last 24 hours) at 10/11/2024 1313  Last data filed at 10/11/2024 1305  Gross per 24 hour   Intake 2220.29 ml   Output 0 ml   Net 2220.29 ml     Wt Readings from Last 3 " Encounters:   10/10/24 58.2 kg (128 lb 4.9 oz)     Physical Exam:  General appearance: no distress  Eyes: non-icteric, resolving left subconjunctival hemorrhage, left periorbital ecchymosis that is resolving.   Skin: no apparent rash  Heart: rate and rhythm are regular, no pericardial friction rub.  Lungs: CTA bilaterally, no wheezing/crackles auscultated.   Abdomen: soft, nt/nd.  Extremities: minimal trace pitting edema bilat.   Ho: none  Neuro: No FND, no asterixis. A&O x 3.   Access: Peripheral IVs in left arm, triple lumen non-tunneled left femoral, HD AV fistula Left and Right arm. Bandages on right arm fistula.     Labs:  CBC:  Results from last 7 days   Lab Units 10/11/24  0611 10/10/24  0400 10/09/24  1035   WBC AUTO x10*3/uL 13.9* 16.8* 13.9*   HEMOGLOBIN g/dL 6.9* 7.3* 7.6*   HEMATOCRIT % 19.2* 20.2* 21.2*   MCV fL 87 86 87   PLATELETS AUTO x10*3/uL 57* 65* 59*     RFP:  Results from last 7 days   Lab Units 10/11/24  0611 10/10/24  0400 10/09/24  0416   SODIUM mmol/L 126* 130* 133*   POTASSIUM mmol/L 3.6 3.8 4.8   CHLORIDE mmol/L 88* 91* 91*   CO2 mmol/L 24 24 19*   BUN mg/dL 31* 21 48*   CREATININE mg/dL 3.59* 3.00* 4.71*   CALCIUM mg/dL 7.9* 8.1* 8.5*   MAGNESIUM mg/dL 1.94 2.10 2.32   PHOSPHORUS mg/dL 3.8 3.8 8.4*     HFP:  Results from last 7 days   Lab Units 10/11/24  0611 10/10/24  0400 10/09/24  0416   AST U/L 50* 78* 98*   ALT U/L 11 21 35   ALK PHOS U/L 133 146* 142*   BILIRUBIN TOTAL mg/dL 2.3* 2.5* 2.3*   BILIRUBIN DIRECT mg/dL 1.5* 1.5* 1.4*   ALBUMIN g/dL 2.5* 2.9* 3.0*     Cardiac:  Results from last 7 days   Lab Units 10/11/24  0611 10/09/24  0416 10/09/24  0212 10/08/24  2225   TROPHSCMC ng/L  --   --  1,351* 1,491*   BNP pg/mL 1,876* 987*  --  1,164*     Coag:  Results from last 7 days   Lab Units 10/10/24  0400 10/09/24  0812 10/09/24  0623   APTT seconds 54* 65* 52*     ABG/VBG:   Results from last 7 days   Lab Units 10/08/24  2227 10/08/24  2131 10/08/24  2017   POCT PH, ARTERIAL pH  7.25* 7.24* 7.27*   POCT PCO2, ARTERIAL mm Hg 27* 28* 30*   POCT PO2, ARTERIAL mm Hg 79* 78* 88   POCT HCO3 CALCULATED, ARTERIAL mmol/L 11.8* 12.0* 13.8*   POCT BASE EXCESS, ARTERIAL mmol/L -14.0* -14.1* -11.9*     Results from last 7 days   Lab Units 10/11/24  0145 10/09/24  1955/24  0936   POCT PH, VENOUS pH 7.37 7.42 7.39   POCT PCO2, VENOUS mm Hg 46 42 43   POCT PO2, VENOUS mm Hg 32* 31* 31*   POCT HCO3 CALCULATED, VENOUS mmol/L 26.6* 27.2* 26.0       Assessment/Plan    ASSESSMENT:  Misael Ritchie is a 69 y.o. 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 Nephrology is consulted for emergent dialysis in the MICU.    Etiology of hyperkalemia may be multifactorial. Patient did not miss dialysis on 10/1 but may have accumulated potassium prior to scheduled dialysis on 10/3 (day of admission). Patient may have consumed a potassium-rich diet in the interval between HD. May also be a component of dehydration/hypotension in days prior to fall, as patient endorsed lightheadedness prior to the fall. Could be an element of cellular injury from the fall. Hyperkalemia was thought to potentially be attributed to ineffective dialysis. Patient did not miss her dialysis sessions before admission; her 10/8 labs did not show significant improvement of potassium and bicarb following dialysis session. However, laboratory values were satisfactory after dialysis session on 10/9, so fistula access is likely adequate and dialysis is considered to be effective.    Patient has had improvement in hyperkalemia over the course of admission, with interval increase on 10/7 to 5.0, with 3.5 hours of dialysis performed to optimize potassium ahead of ortho OR (achieved 4.5  on 10/8, then 4.8 on 10/9). Nephrology will continue to follow for RRT while admitted.     Home unit: [facility]  Access: RUE AVF. Previous failed LUE AV graft  Etilogy: PKD (2 previous transplant 2004 and 2010)  Schedule: MWF  EDW: 56kg    Updates 10/11/2024:   - RFP today shows slight hyponatremia suggestive of hypervolemic state  - Patient has been on 50 ml/hr D5W drip for hypoglycemic episodes    #Fluid status  :: Hypervolemic  :: Lungs clear to auscultation, no respiratory distress   :: Pitting edema BLE  :: Saturating well on room air   Plan:  - Continue iHD while inpatient   - Fluid removal as tolerated     #Hemodynamics  :: Weaned off pressors on 10/7, added back on after OR   :: Pressors temporarily increased on 10/6 PM after hypotensive episode at CT scan  Plan:  - Continue to wean off pressors as tolerated  - Monitor BP during dialysis   - Continue to remove fluid during iHD as tolerated; instead of increasing pressors for low BP, assess for symptoms and attempt to slow the fluid removal rate instead     #Electrolytes  :: Hyperkalemia  :: K 7.6 on admission, downtrended  :: K 10/10 post-dialysis is 3.8, down from 4.8 pre-dialysis  :: Dialysis appears to be effective judging by satisfactory improvement in laboratory values on 10/10  Plan:  - iHD while inpatient for correction of electrolytes  - Follow on daily RFP     #Metabolic Bone Disease   :: ESRD on HD   :: Phos 7.8 (6.3, 4.5)  Plan:  - Obtain PTH prior to dc  - Follow Ca, phos on daily RFP    #Acid-Base Status   :: Bicarb appropriately improved to 24 after 10/9 dialysis  Plan:  - Continue to follow bicarb on BMP      RECOMMENDATIONS:  - HD at bedside 10/11 with fluid removal goal of 1 L, ordered by nephrology    - Dialysate at 36.5 C   - 400 blood flow    - 800 dialysate flow   - 2 K   - Follow RFP daily   - Renally dose medications   - Renal diet  - Renal multivitamin   - Nephrology will continue to follow     Assessment and plan discussed with   Amanda and Dr. Garza. Recommendations not final until signed by attending physician.     ---    Patricia Earl, MS4  Nephrology Consult Service   10/11/2024

## 2024-10-11 NOTE — ASSESSMENT & PLAN NOTE
- limited TTE 10/3: LV EF 50-55%, reduced RV systolic function, severely enlarged RV, LA/RA severely dilated, small pericardial effusion, mod mitral annular calcification, severe TR, presence of TAVR, severely elevated PA pressure  - concern for cardiogenic shock-> difficulty weaning from epinephrine, elevated lactate   - lactate 2.6 (prev. 2.5, 3.0X2, 9.8)  - BNP 1876   - plan for RHC with leave- in swan-alex catheter in cath lab

## 2024-10-11 NOTE — H&P
History Of Present Illness  Misael Ritchie is a 69 y.o. year old female patient admitted on 10/3/2024 with following ICU needs: dialysis     HPI:  Misael Ritchie 69 y.o. female PMH aortic stenosis, atrial fibrillation, CAD, CKD, ESRD on hemodialysis, CHF (8/2024 EF ~48%) presented via EMS after a fall patient states she was feeling dizzy this morning and got up to turn a fan on because she was feeling warm and fell on her right side. Patient states she hit her head. She also reports she did not take any of her medications this morning because she fell prior to the time she normally takes her medications. Patient states she did get her dialysis on Tuesday and completed the session without issue. In the ED the patient was found to have a potassium of 7.4 and was given 5 units of regular insulin and D50. She also received calcium gluconate 2g and sodium bicarbonate.  Shortly after she was found to be obtunded and blood sugar was found to be 52. The decision was made to transfer the patient to the MICU for emergent dialysis. Concern for cardiogenic shock given MVO2 of 42 and inability to wean off Epi, lactate remains high and MAPS difficult to stabilize with wide pulse pressure.      Seen in MICU on 10/11, patient currently undergoing bedside HD. Planning for leave- in swan- alex catheter today in cath lab.     Patient currently denies shortness of breath or chest pain.     Past Medical History  Past Medical History:   Diagnosis Date    Difficult intubation 10/7/2024     Surgical History  No past surgical history on file.     Social History  She reports that she has never smoked. She has never used smokeless tobacco. No history on file for alcohol use and drug use.    Family History  No family history on file.     Allergies  Codeine, Azithromycin, Nifedipine, Nifedipine (bulk), Nsaids (non-steroidal anti-inflammatory drug), Prochlorperazine, Vancomycin, Erythromycin, and Erythromycin base       Physical  "Exam  Constitutional:       General: She is sleeping.      Appearance: She is ill-appearing.   HENT:      Mouth/Throat:      Mouth: Mucous membranes are moist.   Cardiovascular:      Rate and Rhythm: Normal rate and regular rhythm.   Pulmonary:      Effort: Pulmonary effort is normal. No respiratory distress.   Skin:     General: Skin is warm.   Neurological:      General: No focal deficit present.      Mental Status: She is easily aroused.   Psychiatric:         Behavior: Behavior normal. Behavior is cooperative.          Last Recorded Vitals  Blood pressure 151/60, pulse 78, temperature 35.3 °C (95.5 °F), resp. rate 11, height 1.549 m (5' 1\"), weight 58.2 kg (128 lb 4.9 oz), SpO2 97%.    Relevant Results  Scheduled medications  acetaminophen, 650 mg, oral, q6h  [Held by provider] allopurinol, 100 mg, oral, Daily  amiodarone, 200 mg, oral, Daily  atorvastatin, 40 mg, oral, Nightly  clopidogrel, 75 mg, oral, Daily  diphenhydrAMINE, 12.5 mg, intravenous, Once per day on Monday Wednesday Friday  hydrocortisone sodium succinate, 50 mg, intravenous, q6h  midodrine, 5 mg, oral, TID  oxygen, , inhalation, Continuous - Inhalation  pantoprazole, 40 mg, oral, Daily before breakfast  piperacillin-tazobactam, 2.25 g, intravenous, q8h  polyethylene glycol, 17 g, oral, q12h  prochlorperazine, 10 mg, oral, Once  sennosides, 2 tablet, oral, BID  vancomycin, 1,000 mg, intravenous, Once per day on Monday Wednesday Friday      Continuous medications  EPINEPHrine, 0-1 mcg/kg/min, Last Rate: Stopped (10/11/24 0913)  heparin, 0-4,000 Units/hr, Last Rate: 700 Units/hr (10/11/24 0700)      PRN medications  PRN medications: albuterol, bisacodyl, bisacodyl, dextrose, dextrose, glucagon, glucagon, heparin, oxyCODONE, vancomycin    Results for orders placed or performed during the hospital encounter of 10/03/24 (from the past 24 hour(s))   Lactate   Result Value Ref Range    Lactate 3.5 (H) 0.4 - 2.0 mmol/L   Heparin Assay, UFH   Result Value " Ref Range    Heparin Unfractionated 0.3 See Comment Below for Therapeutic Ranges IU/mL   POCT GLUCOSE   Result Value Ref Range    POCT Glucose 188 (H) 74 - 99 mg/dL   Heparin Assay, UFH   Result Value Ref Range    Heparin Unfractionated 0.3 See Comment Below for Therapeutic Ranges IU/mL   POCT GLUCOSE   Result Value Ref Range    POCT Glucose 166 (H) 74 - 99 mg/dL   Lactate   Result Value Ref Range    Lactate 2.6 (H) 0.4 - 2.0 mmol/L   POCT GLUCOSE   Result Value Ref Range    POCT Glucose 156 (H) 74 - 99 mg/dL   BLOOD GAS VENOUS FULL PANEL   Result Value Ref Range    POCT pH, Venous 7.37 7.33 - 7.43 pH    POCT pCO2, Venous 46 41 - 51 mm Hg    POCT pO2, Venous 32 (L) 35 - 45 mm Hg    POCT SO2, Venous 43 (L) 45 - 75 %    POCT Oxy Hemoglobin, Venous 42.1 (L) 45.0 - 75.0 %    POCT Hematocrit Calculated, Venous 22.0 (L) 36.0 - 46.0 %    POCT Sodium, Venous 123 (L) 136 - 145 mmol/L    POCT Potassium, Venous 3.7 3.5 - 5.3 mmol/L    POCT Chloride, Venous 88 (L) 98 - 107 mmol/L    POCT Ionized Calicum, Venous 1.08 (L) 1.10 - 1.33 mmol/L    POCT Glucose, Venous 167 (H) 74 - 99 mg/dL    POCT Lactate, Venous 2.1 (H) 0.4 - 2.0 mmol/L    POCT Base Excess, Venous 1.1 -2.0 - 3.0 mmol/L    POCT HCO3 Calculated, Venous 26.6 (H) 22.0 - 26.0 mmol/L    POCT Hemoglobin, Venous 7.4 (L) 12.0 - 16.0 g/dL    POCT Anion Gap, Venous 12.0 10.0 - 25.0 mmol/L    Patient Temperature 37.0 degrees Celsius    FiO2 21 %   Magnesium   Result Value Ref Range    Magnesium 1.94 1.60 - 2.40 mg/dL   Hepatic Function Panel   Result Value Ref Range    Albumin 2.5 (L) 3.4 - 5.0 g/dL    Bilirubin, Total 2.3 (H) 0.0 - 1.2 mg/dL    Bilirubin, Direct 1.5 (H) 0.0 - 0.3 mg/dL    Alkaline Phosphatase 133 33 - 136 U/L    ALT 11 7 - 45 U/L    AST 50 (H) 9 - 39 U/L    Total Protein 5.0 (L) 6.4 - 8.2 g/dL   CBC and Auto Differential   Result Value Ref Range    WBC 13.9 (H) 4.4 - 11.3 x10*3/uL    nRBC 2.6 (H) 0.0 - 0.0 /100 WBCs    RBC 2.22 (L) 4.00 - 5.20 x10*6/uL     Hemoglobin 6.9 (L) 12.0 - 16.0 g/dL    Hematocrit 19.2 (L) 36.0 - 46.0 %    MCV 87 80 - 100 fL    MCH 31.1 26.0 - 34.0 pg    MCHC 35.9 32.0 - 36.0 g/dL    RDW 18.9 (H) 11.5 - 14.5 %    Platelets 57 (L) 150 - 450 x10*3/uL    Immature Granulocytes %, Automated 8.2 (H) 0.0 - 0.9 %    Immature Granulocytes Absolute, Automated 1.14 (H) 0.00 - 0.70 x10*3/uL   Heparin Assay   Result Value Ref Range    Heparin Unfractionated 0.4 See Comment Below for Therapeutic Ranges IU/mL   Vancomycin   Result Value Ref Range    Vancomycin 14.4 5.0 - 20.0 ug/mL   B-type natriuretic peptide   Result Value Ref Range    BNP 1,876 (H) 0 - 99 pg/mL   Phosphorus   Result Value Ref Range    Phosphorus 3.8 2.5 - 4.9 mg/dL   Basic Metabolic Panel   Result Value Ref Range    Glucose 149 (H) 74 - 99 mg/dL    Sodium 126 (L) 136 - 145 mmol/L    Potassium 3.6 3.5 - 5.3 mmol/L    Chloride 88 (L) 98 - 107 mmol/L    Bicarbonate 24 21 - 32 mmol/L    Anion Gap 18 10 - 20 mmol/L    Urea Nitrogen 31 (H) 6 - 23 mg/dL    Creatinine 3.59 (H) 0.50 - 1.05 mg/dL    eGFR 13 (L) >60 mL/min/1.73m*2    Calcium 7.9 (L) 8.6 - 10.6 mg/dL   Manual Differential   Result Value Ref Range    Neutrophils %, Manual 89.0 40.0 - 80.0 %    Lymphocytes %, Manual 3.4 13.0 - 44.0 %    Monocytes %, Manual 6.8 2.0 - 10.0 %    Eosinophils %, Manual 0.0 0.0 - 6.0 %    Basophils %, Manual 0.0 0.0 - 2.0 %    Atypical Lymphocytes %, Manual 0.8 0.0 - 2.0 %    Seg Neutrophils Absolute, Manual 12.37 (H) 1.20 - 7.00 x10*3/uL    Lymphocytes Absolute, Manual 0.47 (L) 1.20 - 4.80 x10*3/uL    Monocytes Absolute, Manual 0.95 0.10 - 1.00 x10*3/uL    Eosinophils Absolute, Manual 0.00 0.00 - 0.70 x10*3/uL    Basophils Absolute, Manual 0.00 0.00 - 0.10 x10*3/uL    Atypical Lymphs Absolute, Manual 0.11 0.00 - 0.50 x10*3/uL    Total Cells Counted 118     RBC Morphology See Below     Polychromasia Mild     Hypochromia Mild     Target Cells Few     Basophilic Stippling Present    POCT GLUCOSE   Result Value  Ref Range    POCT Glucose 152 (H) 74 - 99 mg/dL   POCT GLUCOSE   Result Value Ref Range    POCT Glucose 156 (H) 74 - 99 mg/dL   POCT GLUCOSE   Result Value Ref Range    POCT Glucose 155 (H) 74 - 99 mg/dL            Assessment/Plan   Assessment & Plan  CHF (congestive heart failure)  - limited TTE 10/3: LV EF 50-55%, reduced RV systolic function, severely enlarged RV, LA/RA severely dilated, small pericardial effusion, mod mitral annular calcification, severe TR, presence of TAVR, severely elevated PA pressure  - concern for cardiogenic shock-> difficulty weaning from epinephrine, elevated lactate   - lactate 2.6 (prev. 2.5, 3.0X2, 9.8)  - BNP 1876   - plan for RHC with leave- in swan-alex catheter in cath lab     PANFILO Giordano-CNP

## 2024-10-11 NOTE — PROGRESS NOTES
Misael Ritchie is a 69 y.o. female on day 8 of admission presenting with Hyperkalemia.      Subjective     No complaints this AM, receiving dialysis.        Objective     Last Recorded Vitals  /60   Pulse 75   Temp 36.4 °C (97.5 °F)   Resp 11   Wt 58.2 kg (128 lb 4.9 oz)   SpO2 95%   Intake/Output last 3 Shifts:    Intake/Output Summary (Last 24 hours) at 10/11/2024 0809  Last data filed at 10/11/2024 0600  Gross per 24 hour   Intake 1494.48 ml   Output --   Net 1494.48 ml       Admission Weight  Weight: 53.5 kg (118 lb) (10/03/24 0941)    Daily Weight  10/10/24 : 58.2 kg (128 lb 4.9 oz)    Image Results  ECG 12 Lead  Electronic ventricular pacemaker  When compared with ECG of 05-OCT-2024 12:28,  No significant change was found  Confirmed by Aakash Cross ([id number]) on 10/9/2024 3:36:12 PM  XR abdomen 1 view  Narrative: Interpreted By:  Que Baker and [place] Conner   STUDY:  XR ABDOMEN 1 VIEW;  10/9/2024 8:16 am      INDICATION:  Signs/Symptoms:ileus.      COMPARISON:  Single-view abdomen 10/08/2024      ACCESSION NUMBER(S):  [id number]      ORDERING CLINICIAN:  KAREN MANTILLA      FINDINGS:  Left femoral central venous catheter is unchanged in position. ORIF  of the right femur with intramedullary nail and screw without  hardware complication though only partially visualized. Postsurgical  changes throughout the abdomen.      Mild gaseous distention of multiple bowel loops in the abdomen, not  significantly changed from the prior exam. Moderate colonic stool  burden.      Limited evaluation of pneumoperitoneum on supine imaging, however no  gross evidence of free air is noted.      Osseous structures demonstrate no acute bony changes.      Impression: Mild gaseous distention of multiple bowel loops in the abdomen in a  nonobstructive, nonspecific pattern. Findings are unchanged from the  prior exam.      I personally reviewed the image(s)/study and resident interpretation.  I agree with  the findings as stated by resident Jay Rowe.  Data analyzed and images interpreted at [facility], [place], [place].      MACRO:  None      Signed by: Que Baker 10/9/2024 10:11 AM  Dictation workstation:   [id number]  XR abdomen 1 view, XR chest 1 view  Narrative: Interpreted By:  Linda, Radha,  and Fan Willow   STUDY:  XR CHEST 1 VIEW; XR ABDOMEN 1 VIEW;  10/8/2024 11:29 pm      INDICATION:  Signs/Symptoms:vomiting,r/o aspiratoin; Signs/Symptoms:vomiting, r/o  dilated bowel.      COMPARISON:  Chest radiograph 10/03/2024      ACCESSION NUMBER(S):  [id number]; [id number]      ORDERING CLINICIAN:  KAREN MANTILLA      FINDINGS:  AP radiograph of the chest and AP radiographs of the abdomen were  provided.      Stable positioning of a left chest wall 3 lead pacer. Status post  TAVR. Left femoral vascular catheter in place. Partially visualized  surgical changes of the right hip.          CARDIOMEDIASTINAL SILHOUETTE:  The cardiomediastinal silhouette is enlarged and stable compared with  the prior exam. Extensive vascular calcifications of the aortic arch  and coronary arteries.      LUNGS:  Moderate loculated right pleural effusion. Diffuse nodular airspace  opacities throughout the right lower lung are increased in prominence  compared with the prior exam. No left effusion. No pneumothorax.      ABDOMEN:  Extensive vascular calcifications and postsurgical changes throughout  the abdomen. There is a single focally dilated loop of bowel within  the right lower abdominal quadrant. Otherwise overall nonobstructive  bowel-gas pattern with a moderate colonic stool burden.      BONES:  No acute osseous abnormality.      Impression: 1. Moderate loculated right pleural effusion with increased  micronodular airspace opacities throughout the right lower lung,  suggestive of worsening infiltrative airspace disease with aspiration  and infection not excluded.  2. Overall  nonobstructive bowel-gas pattern within the abdomen with a  moderate colonic stool burden. Single focally dilated loop of bowel  in the right lower abdominal quadrant may represent normal cecum.  3. Extensive vascular calcifications throughout the chest and abdomen.      I personally reviewed the image(s)/study and resident interpretation  as stated by Dr. Willow Chavira MD. I agree with the findings as  stated. This study was interpreted at [facility], [place], [place].      MACRO:  None      Signed by: Radha Mckeon 10/9/2024 8:54 AM  Dictation workstation:   [id number]      Physical Exam  Vitals reviewed.     General: Awake, alert, in no acute distress  Eyes: Gaze conjugate.  No scleral icterus or injection  HENT: Normo-cephalic, right periorbital hematoma No stridor  CV: Regular rate, regular rhythm. Radial pulses 2+ bilaterally  Resp: Breathing non-labored, speaking in full sentences.  Clear to auscultation bilaterally  GI: Soft, non-distended, non-tender. No rebound or guarding.  : deferred   MSK/Extremities: No gross bony deformities. Moving all extremities  Skin: Warm. Appropriate color  Neuro: Aox2 Face symmetric. Speech is fluent.  Gross strength and sensation intact in b/l UE and LEs  Psych: Appropriate mood and affect            Assessment & Plan  Hyperkalemia    ESRD (end stage renal disease) on dialysis (Multi)    Pacemaker    HTN (hypertension)    CAD (coronary artery disease)    CHF (congestive heart failure)    Atrial fibrillation (Multi)    Nondisplaced intertrochanteric fracture of right femur, initial encounter for closed fracture    Difficult intubation    Anemia    Misael Ritchie is a 69 y.o. female PMH aortic stenosis s/p TAVR 11/23, atrial fibrillation (on Eliquis) s/p DCCV, CAD, ESRD on hemodialysis T/Th/Sat, HFrEF (6/2023 EF 48%), MR, TR, pacemaker CRT-P 5/2023, adrenal insufficiency admitted to the MICU on 10/03/24 for emergent dialysis for  hyperkalemia 7.4. Concern for cardiogenic shock given MVO2 of 42 and inability to wean off Epi, lactate remains high and MAPS difficult to stabilize with wide pulse pressure.      Summary for 10/11/24:  Weaning Epi, monitoring BP and Lactate   [place] placed with Cath Lab     Plan:  NEUROLOGY/PSYCH:  #Pain s/p fall   Scheduled Tylenol  PRN Oxy 5/10  Trial off Dilaudid today, monitor pain level     CARDIOVASCULAR:  #PMH A-fib, CAD s/p stent 2003, MR, TR  #pericardial effusion  Management:  Continue home Plavix 75mg daily  Continue home amiodarone, atorvastatin  Hold metoprolol for HR iso of shock on admission  Wean pressors as tolerated, last lactate 2.1     PULMONARY:  #COPD  Management:  Continue home albuterol PRN     RENAL/GENITOURINARY:  #ESRD  Management:  Continue with HD, Nephrology following  Hold home torsemide   Hold home allopurinol   Follow up uric acid     GASTROENTEROLOGY:  Continue home Protonix, miralax     ENDOCRINOLOGY:  #Adrenal insufficiency  #Hypoglycemia 2/2 insulin patient received for hyperkalemia  Management:  Hydrocortisone 50 mg q6h  Endocrine consult given patient's repeated hypoglycemia c/f possible insulinoma      HEMATOLOGY:  #hematoma   Management:  Continue Heparin  Hgb 6.9 this AM, down from 7.3. Will continue to monitor, particularly with restarting plavix yesterday. No signs of acute bleeding, and Hgb has been steadily downtrending since admission, likely iatrogenic iso of blood draws and ESRD .     SKIN:  No acute concerns     MUSCULOSKELETAL:  #Right troc fracture with hematoma  Management:  S/p surgery with ortho, pain control with Dilaudid, oxy and tylenol     INFECTIOUS DISEASE:  Rule out sepsis low suspicion  :: BC showed NGTD  Management:  Follow up Bcx, UA  MRSA +  Vancomycin 10/4-10/6  Zosyn 10/4-10/6     ICU Check List      FEN  Fluids: NA  Electrolytes: PRN  Nutrition: NPO  Prophylaxis:  DVT ppx: Heparin  GI ppx: PPI  Bowel care: Miralax     Hardware:  CVC 10/03/24  Triple lumen Non-tunneled Left Femoral (Active)   Placement Date/Time: 10/03/24 1516   Site Prep: Chlorhexidine   All 5 Sterile Barriers Used (Gloves, Gown, Cap, Mask, Large Sterile Drape): Yes  Lumen Type: Triple lumen  CVC Line Catheter Size (Fr): 7 Fr  CVC Type: Non-tunneled  CVC Line Length (cm):...   Number of days: 1       Arterial Line 10/04/24 Left Brachial (Active)   Placement Date/Time: 10/04/24 0200   Orientation: Left  Location: Brachial  Site Prep: Chlorhexidine   Local Anesthetic: Injectable  Technique: Ultrasound guidance  Securement Method: Sutured  Patient Tolerance: Tolerated well   Number of days: 1         Code: Full Code    HPOA:  Zeke Ritchie () [phone]   Disposition: MICU        Malnutrition Diagnosis Status: New  Malnutrition Diagnosis: Severe malnutrition related to chronic disease or condition  As Evidenced by: pt reports a decrease in appetite and intake for a few weeks along with an overall weight loss of 11% and moderate to severe fat and muscle wasting on physical exam  I agree with the dietitian's malnutrition diagnosis.              Derick Noland MD

## 2024-10-11 NOTE — POST-PROCEDURE NOTE
Physician Transition of Care Summary  Invasive Cardiovascular Lab    Procedure Date: 10/11/2024  Attending:    * Alex Stevenson - Primary  Resident/Fellow/Other Assistant: Surgeons and Role:     * Binu Maurer MD - Fellow    Indications:   Pre-op Diagnosis      * High output congestive heart failure [I50.83]     * ESRD (end stage renal disease) on dialysis (Multi) [N18.6, Z99.2]     * Pulmonary hypertension (Multi) [I27.20]    Post-procedure diagnosis:   Post-op Diagnosis     * High output congestive heart failure [I50.83]     * ESRD (end stage renal disease) on dialysis (Multi) [N18.6, Z99.2]     * Pulmonary hypertension (Multi) [I27.20]    Procedure(s):   Right Heart Cath  00284 - MT RIGHT HEART CATH O2 SATURATION & CARDIAC OUTPUT        Procedure Findings:   Right heart cath with leave in swan via right internal jugular vein.   RHC hemodynamics:  RA 3  RVSP 53, RVEDP 9   PCWP 10  PA 57/9, mean PAP 29  CO/CI 4.61/2.95    PA sat 55%  PVR 4.3    Description of the Procedure:   Access: Right internal jugular vein, 9 [place]ic, swan locked at 45 cm  Sheath sutured in place    Complications:   None    Stents/Implants:   Implants       No implant documentation for this case.            Anticoagulation/Antiplatelet Plan:   Per primary team     Estimated Blood Loss:   * No values recorded between 10/11/2024  4:00 PM and 10/11/2024  4:49 PM *    Anesthesia: Moderate Sedation Anesthesia Staff: No anesthesia staff entered.    Any Specimen(s) Removed:   No specimens collected during this procedure.    Disposition:   MICU bed 30      Electronically signed by: Binu Maruer MD, 10/11/2024 4:49 PM

## 2024-10-11 NOTE — CARE PLAN
Problem: Skin  Goal: Prevent/minimize sheer/friction injuries  Outcome: Progressing  Flowsheets (Taken 10/10/2024 2340)  Prevent/minimize sheer/friction injuries:   Complete micro-shifts as needed if patient unable. Adjust patient position to relieve pressure points, not a full turn   Use pull sheet   Turn/reposition every 2 hours/use positioning/transfer devices  Goal: Promote/optimize nutrition  Outcome: Progressing  Flowsheets (Taken 10/10/2024 2340)  Promote/optimize nutrition: Monitor/record intake including meals  Goal: Decreased wound size/increased tissue granulation at next dressing change  Outcome: Progressing  Flowsheets (Taken 10/10/2024 2340)  Decreased wound size/increased tissue granulation at next dressing change: Promote sleep for wound healing

## 2024-10-11 NOTE — PROGRESS NOTES
Physical Therapy    Physical Therapy Treatment    Patient Name: Misael Ritchie  MRN: [id number]  Today's Date: 10/11/2024  Room: 30/-A  Time Calculation  Start Time: 0920  Stop Time: 0958  Time Calculation (min): 38 min       Assessment/Plan   PT Plan  Treatment/Interventions: Bed mobility, Transfer training, Gait training, Stair training, Balance training, Strengthening, Endurance training, Range of motion, Therapeutic exercise, Therapeutic activity  PT Plan: Ongoing PT  PT Frequency: 3 times per week  PT Discharge Recommendations: Moderate intensity level of continued care  PT Recommended Transfer Status: Assist x2  PT - OK to Discharge: Yes    General Visit Information:   Reason for Referral: admitted to the MICU on 10/03/24 for emergent dialysis for hyperkalemia 7.4 and fluid overload. Found to have right greater trochanteric fracture from fall s/p CMN 10/7  Past Medical History Relevant to Rehab: aortic stenosis s/p TAVR 11/23, atrial fibrillation (on Eliquis) s/p DCCV, CAD, ESRD on hemodialysis T/Th/Sat, HFrEF, MR, TR, pacemaker CRT-P 5/2023, adrenal insufficiency  Prior to Session Communication: Bedside nurse  Patient Position Received: Bed, 3 rail up, Alarm off, not on at start of session   Subjective: Patient is alert, agreeable to PT.  Required increased time for answering orientation questions this date with noted increased confusion.  Able to recall visitors yesterday.  Precautions:  Precautions  LE Weight Bearing Status: Weight Bearing as Tolerated  Medical Precautions: Fall precautions  Vital Signs:  Vital Signs (Past 2hrs)        Date/Time Vitals Session Patient Position Pulse Resp SpO2 BP MAP (mmHg)    10/11/24 0900 --  --  75  9  94 %  --  --     10/11/24 0920 --  --  75  --  95 %  131/62  --     10/11/24 0958 --  --  75  --  94 %  151/60  --     10/11/24 1000 --  --  75  10  --  --  --                     Objective   Pain:  Pain Assessment  0-10 (Numeric) Pain Score: 0 - No  pain  Cognition:     Lines/Tubes/Drains:  CVC 10/03/24 Triple lumen Non-tunneled Left Femoral (Active)   Number of days: 7       Arterial Line 10/07/24 Left Radial (Active)   Number of days: 3     Medical Gas Therapy: None (Room air)  Medical Gas Delivery Method: Other (Comment) (room air)  Oxygen Dose: *0 L/min (not given - been on RA)  Continuous Medications/Drips:  dextrose 10 % in water (D10W), 50 mL/hr, Last Rate: 50 mL/hr (10/11/24 0900)  EPINEPHrine, 0-1 mcg/kg/min, Last Rate: Stopped (10/11/24 0913)  heparin, 0-4,000 Units/hr, Last Rate: 700 Units/hr (10/11/24 0400)        PT Treatments:  Therapeutic Exercise  Therapeutic Exercise Performed: Yes  Therapeutic Exercise Activity 1: BLE PF, DF, heel slide, isometric quad set, SAQ, SLR, ab/adduction 10 x 2 AAROM (Increased time and verbal/tactile cues this date)  Therapeutic Exercise Activity 2: BUE targeting 5 x 1  Therapeutic Activity  Therapeutic Activity Performed: Yes  Therapeutic Activity 1: static stand max A 20s x 3 c cues for hip and knee extension  Therapeutic Activity 2: EOB static total 20 minutes mod A c no self correction to LOB,     Bed Mobility 1  Bed Mobility 1: Supine to sitting  Level of Assistance 1: Maximum assistance  Bed Mobility Comments 1: HOB 30, TAPS  Bed Mobility 2  Bed Mobility  2: Sitting to supine  Level of Assistance 2: Maximum assistance  Bed Mobility Comments 2: TAPS     Transfer 1  Transfer From 1: Sit to  Transfer to 1: Stand  Transfer Device 1:  (2 HHA)  Transfer Level of Assistance 1: Maximum assistance  Trials/Comments 1: x3  Transfers 2  Transfer From 2: Stand to  Transfer to 2: Sit  Transfer Device 2:  (2 HHA)  Transfer Level of Assistance 2: Maximum assistance  Trials/Comments 2: x3             Outcome Measures:  [facility] Basic Mobility  Turning from your back to your side while in a flat bed without using bedrails: A lot  Moving from lying on your back to sitting on the side of a flat bed without using bedrails: A  lot  Moving to and from bed to chair (including a wheelchair): A lot  Standing up from a chair using your arms (e.g. wheelchair or bedside chair): A lot  To walk in hospital room: Total  Climbing 3-5 steps with railing: Total  Basic Mobility - Total Score: 10           FSS-ICU  Ambulation: Unable to attempt due to weakness  Rolling: Maximal assistance (performs 25% - 49% of task)  Sitting: Maximal assistance (performs 25% - 49% of task)  Transfer Sit-to-Stand: Maximal assistance (performs 25% - 49% of task)  Transfer Supine-to-Sit: Maximal assistance (performs 25% - 49% of task)  Total Score: 8  ICU Mobility Screen  ICU Mobility Scale: Standing             Education Documentation  Precautions, taught by Mj Lemon PT at 10/11/2024 10:05 AM.  Learner: Patient  Readiness: Acceptance  Method: Explanation  Response: Needs Reinforcement    Body Mechanics, taught by Mj Lemon PT at 10/11/2024 10:05 AM.  Learner: Patient  Readiness: Acceptance  Method: Explanation  Response: Needs Reinforcement    Mobility Training, taught by Mj Lemon PT at 10/11/2024 10:05 AM.  Learner: Patient  Readiness: Acceptance  Method: Explanation  Response: Needs Reinforcement    Education Comments  No comments found.          OP EDUCATION:       Encounter Problems       Encounter Problems (Active)       PT Problem       Patient will complete supine to sit and sit to supine Supervision  (Progressing)       Start:  10/08/24    Expected End:  10/22/24            Patient will perform sit<>stand transfer with LRAD, and Supervision  (Progressing)       Start:  10/08/24    Expected End:  10/22/24            Patient will ambulate >50' with LRAD and Supervision and WBAT RLE  (Progressing)       Start:  10/08/24    Expected End:  10/22/24            Patient will verbalize and demonstrate Weightbearing Restrictions independently.  (Progressing)       Start:  10/08/24    Expected End:  10/22/24               Pain - Adult               Assessment: Patient is progressing fairly with therapy this date.  Patient able to complete bed level there-ex and upright activities this date.  No Pain response with ROM activities and WB on RLE hip.  Continues to have difficulty with multistep commands and requires frequent tactile and verbal orientation and cues to maintain tasks.  Buckling and frequently LOB in sitting and standing this date without noted self corrections requiring max/total to prevent fall.  RN notified, noted pending HD this morning.  Patient remains appropriate for MOD intensity therapy when medically appropriate for discharge from acute stay.  Will continue to follow.      10/11/24 at 10:06 AM   Mj Lemon, PT   Rehab Office: [phone]

## 2024-10-11 NOTE — PROGRESS NOTES
SOCIAL WORK NOTE  Attempted to reach patient and  via phone without success. VM left for [facility] regarding preferences for facilities linked with [facility]. Social work to follow.    UPDATE: SW spoke with [facility]. Patient was last seen 10/1. No preference for facility location if willing to transport.   MADHU Chavarria, LISW-S ([id number])

## 2024-10-12 ENCOUNTER — APPOINTMENT (OUTPATIENT)
Dept: DIALYSIS | Facility: HOSPITAL | Age: 69
End: 2024-10-12
Payer: MEDICARE

## 2024-10-12 ENCOUNTER — APPOINTMENT (OUTPATIENT)
Dept: RADIOLOGY | Facility: HOSPITAL | Age: 69
DRG: 480 | End: 2024-10-12
Payer: MEDICARE

## 2024-10-12 LAB
ABO GROUP (TYPE) IN BLOOD: NORMAL
ALBUMIN SERPL BCP-MCNC: 2.6 G/DL (ref 3.4–5)
ALP SERPL-CCNC: 154 U/L (ref 33–136)
ALT SERPL W P-5'-P-CCNC: 11 U/L (ref 7–45)
AMMONIA PLAS-SCNC: 29 UMOL/L (ref 16–53)
ANION GAP BLDMV CALCULATED.4IONS-SCNC: 10 MMO/L (ref 10–25)
ANION GAP BLDMV CALCULATED.4IONS-SCNC: 10 MMO/L (ref 10–25)
ANION GAP BLDMV CALCULATED.4IONS-SCNC: 13 MMO/L (ref 10–25)
ANION GAP BLDMV CALCULATED.4IONS-SCNC: 9 MMO/L (ref 10–25)
ANION GAP BLDV CALCULATED.4IONS-SCNC: 9 MMOL/L (ref 10–25)
ANION GAP SERPL CALC-SCNC: 16 MMOL/L (ref 10–20)
ANTIBODY SCREEN: NORMAL
AST SERPL W P-5'-P-CCNC: 50 U/L (ref 9–39)
BASE EXCESS BLDMV CALC-SCNC: -3.2 MMOL/L (ref -2–3)
BASE EXCESS BLDMV CALC-SCNC: 0 MMOL/L (ref -2–3)
BASE EXCESS BLDMV CALC-SCNC: 2 MMOL/L (ref -2–3)
BASE EXCESS BLDMV CALC-SCNC: 2.6 MMOL/L (ref -2–3)
BASE EXCESS BLDV CALC-SCNC: 2.8 MMOL/L (ref -2–3)
BASOPHILS # BLD AUTO: 0.01 X10*3/UL (ref 0–0.1)
BASOPHILS # BLD AUTO: ABNORMAL 10*3/UL
BASOPHILS # BLD MANUAL: 0 X10*3/UL (ref 0–0.1)
BASOPHILS NFR BLD AUTO: 0.1 %
BASOPHILS NFR BLD AUTO: ABNORMAL %
BASOPHILS NFR BLD MANUAL: 0 %
BILIRUB DIRECT SERPL-MCNC: 2.1 MG/DL (ref 0–0.3)
BILIRUB SERPL-MCNC: 3.1 MG/DL (ref 0–1.2)
BLOOD EXPIRATION DATE: NORMAL
BNP SERPL-MCNC: 1237 PG/ML (ref 0–99)
BODY TEMPERATURE: 37 DEGREES CELSIUS
BUN SERPL-MCNC: 21 MG/DL (ref 6–23)
BURR CELLS BLD QL SMEAR: ABNORMAL
CA-I BLDMV-SCNC: 1.02 MMOL/L (ref 1.1–1.33)
CA-I BLDMV-SCNC: 1.08 MMOL/L (ref 1.1–1.33)
CA-I BLDMV-SCNC: 1.1 MMOL/L (ref 1.1–1.33)
CA-I BLDMV-SCNC: 1.13 MMOL/L (ref 1.1–1.33)
CA-I BLDV-SCNC: 1.08 MMOL/L (ref 1.1–1.33)
CALCIUM SERPL-MCNC: 8 MG/DL (ref 8.6–10.6)
CHLORIDE BLD-SCNC: 95 MMOL/L (ref 98–107)
CHLORIDE BLD-SCNC: 95 MMOL/L (ref 98–107)
CHLORIDE BLD-SCNC: 96 MMOL/L (ref 98–107)
CHLORIDE BLD-SCNC: 97 MMOL/L (ref 98–107)
CHLORIDE BLDV-SCNC: 97 MMOL/L (ref 98–107)
CHLORIDE SERPL-SCNC: 94 MMOL/L (ref 98–107)
CO2 SERPL-SCNC: 27 MMOL/L (ref 21–32)
CREAT SERPL-MCNC: 2.4 MG/DL (ref 0.5–1.05)
DACRYOCYTES BLD QL SMEAR: ABNORMAL
DISPENSE STATUS: NORMAL
EGFRCR SERPLBLD CKD-EPI 2021: 21 ML/MIN/1.73M*2
EOSINOPHIL # BLD AUTO: 0 X10*3/UL (ref 0–0.7)
EOSINOPHIL # BLD AUTO: ABNORMAL 10*3/UL
EOSINOPHIL # BLD MANUAL: 0 X10*3/UL (ref 0–0.7)
EOSINOPHIL NFR BLD AUTO: 0 %
EOSINOPHIL NFR BLD AUTO: ABNORMAL %
EOSINOPHIL NFR BLD MANUAL: 0 %
ERYTHROCYTE [DISTWIDTH] IN BLOOD BY AUTOMATED COUNT: 18.5 % (ref 11.5–14.5)
ERYTHROCYTE [DISTWIDTH] IN BLOOD BY AUTOMATED COUNT: 19.6 % (ref 11.5–14.5)
ERYTHROCYTE [DISTWIDTH] IN BLOOD BY AUTOMATED COUNT: 20.4 % (ref 11.5–14.5)
ERYTHROCYTE [DISTWIDTH] IN BLOOD BY AUTOMATED COUNT: 21.1 % (ref 11.5–14.5)
GLUCOSE BLD MANUAL STRIP-MCNC: 116 MG/DL (ref 74–99)
GLUCOSE BLD MANUAL STRIP-MCNC: 120 MG/DL (ref 74–99)
GLUCOSE BLD MANUAL STRIP-MCNC: 124 MG/DL (ref 74–99)
GLUCOSE BLD MANUAL STRIP-MCNC: 133 MG/DL (ref 74–99)
GLUCOSE BLD MANUAL STRIP-MCNC: 151 MG/DL (ref 74–99)
GLUCOSE BLD-MCNC: 120 MG/DL (ref 74–99)
GLUCOSE BLD-MCNC: 133 MG/DL (ref 74–99)
GLUCOSE BLD-MCNC: 188 MG/DL (ref 74–99)
GLUCOSE BLD-MCNC: 189 MG/DL (ref 74–99)
GLUCOSE BLDV-MCNC: 134 MG/DL (ref 74–99)
GLUCOSE SERPL-MCNC: 116 MG/DL (ref 74–99)
HCO3 BLDMV-SCNC: 22 MMOL/L (ref 22–26)
HCO3 BLDMV-SCNC: 25.4 MMOL/L (ref 22–26)
HCO3 BLDMV-SCNC: 26.5 MMOL/L (ref 22–26)
HCO3 BLDMV-SCNC: 27.2 MMOL/L (ref 22–26)
HCO3 BLDV-SCNC: 27.2 MMOL/L (ref 22–26)
HCT VFR BLD AUTO: 18 % (ref 36–46)
HCT VFR BLD AUTO: 18.9 % (ref 36–46)
HCT VFR BLD AUTO: 18.9 % (ref 36–46)
HCT VFR BLD AUTO: 23 % (ref 36–46)
HCT VFR BLD EST: 14 % (ref 36–46)
HCT VFR BLD EST: 20 % (ref 36–46)
HCT VFR BLD EST: 25 % (ref 36–46)
HCT VFR BLD EST: 27 % (ref 36–46)
HCT VFR BLD EST: 28 % (ref 36–46)
HGB BLD-MCNC: 6.6 G/DL (ref 12–16)
HGB BLD-MCNC: 6.6 G/DL (ref 12–16)
HGB BLD-MCNC: 6.9 G/DL (ref 12–16)
HGB BLD-MCNC: 8.4 G/DL (ref 12–16)
HGB BLDMV-MCNC: 6.8 G/DL (ref 12–16)
HGB BLDMV-MCNC: 8.3 G/DL (ref 12–16)
HGB BLDMV-MCNC: 8.9 G/DL (ref 12–16)
HGB BLDMV-MCNC: 9.3 G/DL (ref 12–16)
HGB BLDV-MCNC: 4.8 G/DL (ref 12–16)
HYPOCHROMIA BLD QL SMEAR: NORMAL
IMM GRANULOCYTES # BLD AUTO: 0.23 X10*3/UL (ref 0–0.7)
IMM GRANULOCYTES # BLD AUTO: 0.23 X10*3/UL (ref 0–0.7)
IMM GRANULOCYTES # BLD AUTO: 0.3 X10*3/UL (ref 0–0.7)
IMM GRANULOCYTES NFR BLD AUTO: 1.5 % (ref 0–0.9)
IMM GRANULOCYTES NFR BLD AUTO: 1.6 % (ref 0–0.9)
IMM GRANULOCYTES NFR BLD AUTO: 2 % (ref 0–0.9)
INHALED O2 CONCENTRATION: 21 %
INHALED O2 CONCENTRATION: 21 %
INHALED O2 CONCENTRATION: 28 %
INHALED O2 CONCENTRATION: 30 %
INHALED O2 CONCENTRATION: 30 %
LACTATE BLDMV-SCNC: 0.8 MMOL/L (ref 0.4–2)
LACTATE BLDMV-SCNC: 0.9 MMOL/L (ref 0.4–2)
LACTATE BLDMV-SCNC: 2 MMOL/L (ref 0.4–2)
LACTATE BLDMV-SCNC: 2.4 MMOL/L (ref 0.4–2)
LACTATE BLDV-SCNC: 0.9 MMOL/L (ref 0.4–2)
LACTATE SERPL-SCNC: 2.4 MMOL/L (ref 0.4–2)
LACTATE SERPL-SCNC: 2.5 MMOL/L (ref 0.4–2)
LYMPHOCYTES # BLD AUTO: 0.21 X10*3/UL (ref 1.2–4.8)
LYMPHOCYTES # BLD AUTO: ABNORMAL 10*3/UL
LYMPHOCYTES # BLD MANUAL: 0.24 X10*3/UL (ref 1.2–4.8)
LYMPHOCYTES NFR BLD AUTO: 1.5 %
LYMPHOCYTES NFR BLD AUTO: ABNORMAL %
LYMPHOCYTES NFR BLD MANUAL: 1.6 %
MAGNESIUM SERPL-MCNC: 1.91 MG/DL (ref 1.6–2.4)
MCH RBC QN AUTO: 30.6 PG (ref 26–34)
MCH RBC QN AUTO: 31.4 PG (ref 26–34)
MCH RBC QN AUTO: 31.7 PG (ref 26–34)
MCH RBC QN AUTO: 31.8 PG (ref 26–34)
MCHC RBC AUTO-ENTMCNC: 34.9 G/DL (ref 32–36)
MCHC RBC AUTO-ENTMCNC: 36.5 G/DL (ref 32–36)
MCHC RBC AUTO-ENTMCNC: 36.5 G/DL (ref 32–36)
MCHC RBC AUTO-ENTMCNC: 36.7 G/DL (ref 32–36)
MCV RBC AUTO: 86 FL (ref 80–100)
MCV RBC AUTO: 87 FL (ref 80–100)
MCV RBC AUTO: 87 FL (ref 80–100)
MCV RBC AUTO: 88 FL (ref 80–100)
METAMYELOCYTES # BLD MANUAL: 0.24 X10*3/UL
METAMYELOCYTES NFR BLD MANUAL: 1.6 %
MONOCYTES # BLD AUTO: 0.62 X10*3/UL (ref 0.1–1)
MONOCYTES # BLD AUTO: ABNORMAL 10*3/UL
MONOCYTES # BLD MANUAL: 0.74 X10*3/UL (ref 0.1–1)
MONOCYTES NFR BLD AUTO: 4.3 %
MONOCYTES NFR BLD AUTO: ABNORMAL %
MONOCYTES NFR BLD MANUAL: 4.9 %
NEUTROPHILS # BLD AUTO: 13.23 X10*3/UL (ref 1.2–7.7)
NEUTROPHILS # BLD AUTO: ABNORMAL 10*3/UL
NEUTROPHILS # BLD MANUAL: 13.97 X10*3/UL (ref 1.2–7.7)
NEUTROPHILS NFR BLD AUTO: 92.5 %
NEUTROPHILS NFR BLD AUTO: ABNORMAL %
NEUTS BAND # BLD MANUAL: 0.24 X10*3/UL (ref 0–0.7)
NEUTS BAND NFR BLD MANUAL: 1.6 %
NEUTS SEG # BLD MANUAL: 13.73 X10*3/UL (ref 1.2–7)
NEUTS SEG NFR BLD MANUAL: 90.3 %
NRBC BLD-RTO: 0.8 /100 WBCS (ref 0–0)
NRBC BLD-RTO: 1.7 /100 WBCS (ref 0–0)
NRBC BLD-RTO: 2 /100 WBCS (ref 0–0)
NRBC BLD-RTO: 2.2 /100 WBCS (ref 0–0)
OVALOCYTES BLD QL SMEAR: ABNORMAL
OXYHGB MFR BLDMV: 64.4 % (ref 45–75)
OXYHGB MFR BLDMV: 66.9 % (ref 45–75)
OXYHGB MFR BLDMV: 67.8 % (ref 45–75)
OXYHGB MFR BLDMV: 69.2 % (ref 45–75)
OXYHGB MFR BLDV: 70.4 % (ref 45–75)
PCO2 BLDMV: 39 MM HG (ref 41–51)
PCO2 BLDMV: 40 MM HG (ref 41–51)
PCO2 BLDMV: 41 MM HG (ref 41–51)
PCO2 BLDMV: 44 MM HG (ref 41–51)
PCO2 BLDV: 40 MM HG (ref 41–51)
PH BLDMV: 7.36 PH (ref 7.33–7.43)
PH BLDMV: 7.37 PH (ref 7.33–7.43)
PH BLDMV: 7.43 PH (ref 7.33–7.43)
PH BLDMV: 7.43 PH (ref 7.33–7.43)
PH BLDV: 7.44 PH (ref 7.33–7.43)
PHOSPHATE SERPL-MCNC: 3.6 MG/DL (ref 2.5–4.9)
PLATELET # BLD AUTO: 48 X10*3/UL (ref 150–450)
PLATELET # BLD AUTO: 50 X10*3/UL (ref 150–450)
PLATELET # BLD AUTO: 57 X10*3/UL (ref 150–450)
PLATELET # BLD AUTO: 60 X10*3/UL (ref 150–450)
PO2 BLDMV: 42 MM HG (ref 35–45)
PO2 BLDMV: 42 MM HG (ref 35–45)
PO2 BLDMV: 44 MM HG (ref 35–45)
PO2 BLDMV: 45 MM HG (ref 35–45)
PO2 BLDV: 42 MM HG (ref 35–45)
POLYCHROMASIA BLD QL SMEAR: ABNORMAL
POLYCHROMASIA BLD QL SMEAR: NORMAL
POTASSIUM BLDMV-SCNC: 3.3 MMOL/L (ref 3.5–5.3)
POTASSIUM BLDMV-SCNC: 3.7 MMOL/L (ref 3.5–5.3)
POTASSIUM BLDMV-SCNC: 3.8 MMOL/L (ref 3.5–5.3)
POTASSIUM BLDMV-SCNC: 3.8 MMOL/L (ref 3.5–5.3)
POTASSIUM BLDV-SCNC: 3.9 MMOL/L (ref 3.5–5.3)
POTASSIUM SERPL-SCNC: 3.7 MMOL/L (ref 3.5–5.3)
PRODUCT BLOOD TYPE: 5100
PRODUCT CODE: NORMAL
PROINSULIN P 12H FAST SERPL-SCNC: 5.6 PMOL/L
PROT SERPL-MCNC: 5.1 G/DL (ref 6.4–8.2)
RBC # BLD AUTO: 2.1 X10*6/UL (ref 4–5.2)
RBC # BLD AUTO: 2.16 X10*6/UL (ref 4–5.2)
RBC # BLD AUTO: 2.18 X10*6/UL (ref 4–5.2)
RBC # BLD AUTO: 2.64 X10*6/UL (ref 4–5.2)
RBC MORPH BLD: ABNORMAL
RBC MORPH BLD: NORMAL
RH FACTOR (ANTIGEN D): NORMAL
SAO2 % BLDMV: 66 % (ref 45–75)
SAO2 % BLDMV: 69 % (ref 45–75)
SAO2 % BLDMV: 69 % (ref 45–75)
SAO2 % BLDMV: 71 % (ref 45–75)
SAO2 % BLDV: 72 % (ref 45–75)
SCHISTOCYTES BLD QL SMEAR: NORMAL
SODIUM BLDMV-SCNC: 127 MMOL/L (ref 136–145)
SODIUM BLDMV-SCNC: 128 MMOL/L (ref 136–145)
SODIUM BLDMV-SCNC: 128 MMOL/L (ref 136–145)
SODIUM BLDMV-SCNC: 129 MMOL/L (ref 136–145)
SODIUM BLDV-SCNC: 129 MMOL/L (ref 136–145)
SODIUM SERPL-SCNC: 133 MMOL/L (ref 136–145)
TARGETS BLD QL SMEAR: ABNORMAL
TARGETS BLD QL SMEAR: NORMAL
TOTAL CELLS COUNTED BLD: 124
UFH PPP CHRO-ACNC: 0.6 IU/ML
UNIT ABO: NORMAL
UNIT NUMBER: NORMAL
UNIT RH: NORMAL
UNIT VOLUME: 283
WBC # BLD AUTO: 10.3 X10*3/UL (ref 4.4–11.3)
WBC # BLD AUTO: 14.3 X10*3/UL (ref 4.4–11.3)
WBC # BLD AUTO: 15.2 X10*3/UL (ref 4.4–11.3)
WBC # BLD AUTO: 15.2 X10*3/UL (ref 4.4–11.3)
XM INTEP: NORMAL

## 2024-10-12 PROCEDURE — 85025 COMPLETE CBC W/AUTO DIFF WBC: CPT

## 2024-10-12 PROCEDURE — 85520 HEPARIN ASSAY: CPT

## 2024-10-12 PROCEDURE — 99232 SBSQ HOSP IP/OBS MODERATE 35: CPT

## 2024-10-12 PROCEDURE — 37799 UNLISTED PX VASCULAR SURGERY: CPT

## 2024-10-12 PROCEDURE — 99233 SBSQ HOSP IP/OBS HIGH 50: CPT

## 2024-10-12 PROCEDURE — 2500000001 HC RX 250 WO HCPCS SELF ADMINISTERED DRUGS (ALT 637 FOR MEDICARE OP)

## 2024-10-12 PROCEDURE — 2500000005 HC RX 250 GENERAL PHARMACY W/O HCPCS

## 2024-10-12 PROCEDURE — 2500000002 HC RX 250 W HCPCS SELF ADMINISTERED DRUGS (ALT 637 FOR MEDICARE OP, ALT 636 FOR OP/ED)

## 2024-10-12 PROCEDURE — 99291 CRITICAL CARE FIRST HOUR: CPT

## 2024-10-12 PROCEDURE — 36430 TRANSFUSION BLD/BLD COMPNT: CPT

## 2024-10-12 PROCEDURE — 82248 BILIRUBIN DIRECT: CPT

## 2024-10-12 PROCEDURE — 83880 ASSAY OF NATRIURETIC PEPTIDE: CPT

## 2024-10-12 PROCEDURE — 2020000001 HC ICU ROOM DAILY

## 2024-10-12 PROCEDURE — 84132 ASSAY OF SERUM POTASSIUM: CPT

## 2024-10-12 PROCEDURE — 71045 X-RAY EXAM CHEST 1 VIEW: CPT

## 2024-10-12 PROCEDURE — 82140 ASSAY OF AMMONIA: CPT

## 2024-10-12 PROCEDURE — 84100 ASSAY OF PHOSPHORUS: CPT

## 2024-10-12 PROCEDURE — 99233 SBSQ HOSP IP/OBS HIGH 50: CPT | Performed by: INTERNAL MEDICINE

## 2024-10-12 PROCEDURE — 86923 COMPATIBILITY TEST ELECTRIC: CPT

## 2024-10-12 PROCEDURE — 80048 BASIC METABOLIC PNL TOTAL CA: CPT

## 2024-10-12 PROCEDURE — P9016 RBC LEUKOCYTES REDUCED: HCPCS

## 2024-10-12 PROCEDURE — 83605 ASSAY OF LACTIC ACID: CPT

## 2024-10-12 PROCEDURE — 2500000004 HC RX 250 GENERAL PHARMACY W/ HCPCS (ALT 636 FOR OP/ED)

## 2024-10-12 PROCEDURE — 8010000001 HC DIALYSIS - HEMODIALYSIS PER DAY

## 2024-10-12 PROCEDURE — 71045 X-RAY EXAM CHEST 1 VIEW: CPT | Performed by: STUDENT IN AN ORGANIZED HEALTH CARE EDUCATION/TRAINING PROGRAM

## 2024-10-12 PROCEDURE — 85027 COMPLETE CBC AUTOMATED: CPT

## 2024-10-12 PROCEDURE — 86901 BLOOD TYPING SEROLOGIC RH(D): CPT

## 2024-10-12 PROCEDURE — 82947 ASSAY GLUCOSE BLOOD QUANT: CPT

## 2024-10-12 PROCEDURE — 85007 BL SMEAR W/DIFF WBC COUNT: CPT

## 2024-10-12 PROCEDURE — 83735 ASSAY OF MAGNESIUM: CPT

## 2024-10-12 RX ORDER — MIDODRINE HYDROCHLORIDE 10 MG/1
10 TABLET ORAL
Status: DISCONTINUED | OUTPATIENT
Start: 2024-10-12 | End: 2024-10-17

## 2024-10-12 RX ORDER — ACETAMINOPHEN 325 MG/1
975 TABLET ORAL 3 TIMES DAILY
Status: DISCONTINUED | OUTPATIENT
Start: 2024-10-12 | End: 2024-10-16

## 2024-10-12 RX ADMIN — ACETAMINOPHEN 975 MG: 325 TABLET ORAL at 21:04

## 2024-10-12 RX ADMIN — PANTOPRAZOLE SODIUM 40 MG: 20 TABLET, DELAYED RELEASE ORAL at 06:57

## 2024-10-12 RX ADMIN — AMIODARONE HYDROCHLORIDE 200 MG: 200 TABLET ORAL at 11:50

## 2024-10-12 RX ADMIN — HYDROCORTISONE SODIUM SUCCINATE 25 MG: 100 INJECTION, POWDER, FOR SOLUTION INTRAMUSCULAR; INTRAVENOUS at 00:25

## 2024-10-12 RX ADMIN — HYDROCORTISONE SODIUM SUCCINATE 25 MG: 100 INJECTION, POWDER, FOR SOLUTION INTRAMUSCULAR; INTRAVENOUS at 05:18

## 2024-10-12 RX ADMIN — CLOPIDOGREL BISULFATE 75 MG: 75 TABLET ORAL at 11:51

## 2024-10-12 RX ADMIN — HYDROCORTISONE SODIUM SUCCINATE 50 MG: 100 INJECTION, POWDER, FOR SOLUTION INTRAMUSCULAR; INTRAVENOUS at 11:50

## 2024-10-12 RX ADMIN — MIDODRINE HYDROCHLORIDE 10 MG: 10 TABLET ORAL at 19:12

## 2024-10-12 RX ADMIN — Medication 2 L/MIN: at 20:00

## 2024-10-12 RX ADMIN — ACETAMINOPHEN 650 MG: 325 TABLET, FILM COATED ORAL at 03:38

## 2024-10-12 RX ADMIN — ATORVASTATIN CALCIUM 40 MG: 40 TABLET, FILM COATED ORAL at 21:04

## 2024-10-12 RX ADMIN — MIDODRINE HYDROCHLORIDE 5 MG: 5 TABLET ORAL at 11:50

## 2024-10-12 RX ADMIN — RENO CAPS 1 CAPSULE: 100; 1.5; 1.7; 20; 10; 1; 150; 5; 6 CAPSULE ORAL at 21:04

## 2024-10-12 RX ADMIN — ACETAMINOPHEN 975 MG: 325 TABLET ORAL at 15:23

## 2024-10-12 RX ADMIN — HYDROCORTISONE SODIUM SUCCINATE 50 MG: 100 INJECTION, POWDER, FOR SOLUTION INTRAMUSCULAR; INTRAVENOUS at 17:25

## 2024-10-12 RX ADMIN — SENNOSIDES 17.2 MG: 8.6 TABLET, FILM COATED ORAL at 11:51

## 2024-10-12 ASSESSMENT — PAIN SCALES - GENERAL
PAINLEVEL_OUTOF10: 4
PAINLEVEL_OUTOF10: 0 - NO PAIN

## 2024-10-12 ASSESSMENT — PAIN - FUNCTIONAL ASSESSMENT
PAIN_FUNCTIONAL_ASSESSMENT: 0-10
PAIN_FUNCTIONAL_ASSESSMENT: 0-10
PAIN_FUNCTIONAL_ASSESSMENT: CPOT (CRITICAL CARE PAIN OBSERVATION TOOL)
PAIN_FUNCTIONAL_ASSESSMENT: 0-10
PAIN_FUNCTIONAL_ASSESSMENT: 0-10
PAIN_FUNCTIONAL_ASSESSMENT: CPOT (CRITICAL CARE PAIN OBSERVATION TOOL)

## 2024-10-12 NOTE — CONSULTS
Advanced Heart Failure Initial Consultation Note   [facility]  Primary Cardiologist: Dr. Pricila Zayas ([facility])  Reason for Consult: re consulted for RV failure, inability to wean of epi 0.02    Mechelle Ritchie is a 69 y.o. female with a PMHx of HFrecEF/ICM( EF 33->48% on 8/10/2024) s/p CRT-P, CAD s/p PCI SHASHI to LAD 5/2023, MR/TR, aortic stenosis s/p TAVR (11/2023), atrial fibrillation, CAD, CKD, ESRD s/p failed KT x 2 on hemodialysis, who was admitted on 10/3/2024 for hyperkalemia and need for emergent HD. Transferred to MICU and developed pressor requiring hypotension, started on  empiric IV abx for c/f septic shock (Bcx negative), also obatined TTE that showed preserved EF 50-55%, diastolic dysfunction, severely enlarged RV and reduced RV function. Severe RA dilatation, severe TR and RVSP of 73 mmHg. She follows with [facility] and severe TR has been noted on previous echos as well, most recently in August 2024. Since the cause of RV failure and severe TR is pulmonary hypertension (group 2, 5), vasodilatory medications were not indicated and recommended to remove fluid with HD. Pt showed signs of clinical improvement with HD and HF service signed off 10/05/2024.     Today, HF service was re consulted as pt was having soft diastolic pressures (in 40s), MAP ~ 60-65 and unable to wean of epi 0.02. Pt had RHC yesterday and hemodynamic numbers as follows:     RA 3  RVSP 53, RVEDP 9   PCWP 10  PA 57/9, mean PAP 29  CO/CI 4.61/2.95  PA sat 55%  PVR 4.3     Her numbers today are as follows:     Most Recent Range Past 24hrs   BP (Art) 108/39 Arterial Line BP 1  Min: 84/32  Max: 134/42   MAP(Art) 60 mmHg Arterial Line MAP 1 (mmHg)   Min: 49 mmHg  Max: 71 mmHg   RA/CVP  4  mmHg No data recorded   PA 51/19 PAP  Min: 42/11  Max: 57/18   PA(mean) 30 mmHg PAP (Mean)  Min: 22 mmHg  Max: 33 mmHg   PCWP   No data recorded   CO 7 L/min CO (L/min)  Min: 7 L/min  Max: 7 L/min   CI   No data recorded   Mixed  Venous 66 % SVO2 (%)  Min: 66 %  Max: 66 %     Pt was seen at bedside with Dr. Covarrubias. At the time of HF service evaluation, pt is now OFF epinephrine and MAP ~ 65. Pt is lethargic and denies CP, SOB. Reports chronic issues with low BP worsens during HD. No other complaints voiced by patient.      Review of Systems  Otherwise, limited cardiovascular review of systems is negative.    Past Medical History:   Diagnosis Date    Difficult intubation 10/7/2024     No past surgical history on file.  Social History     Socioeconomic History    Marital status:      Spouse name: Not on file    Number of children: Not on file    Years of education: Not on file    Highest education level: Not on file   Occupational History    Not on file   Tobacco Use    Smoking status: Never    Smokeless tobacco: Never   Substance and Sexual Activity    Alcohol use: Not on file    Drug use: Not on file    Sexual activity: Not on file   Other Topics Concern    Not on file   Social History Narrative    Not on file     Social Determinants of Health     Financial Resource Strain: Low Risk  (10/4/2024)    Overall Financial Resource Strain (CARDIA)     Difficulty of Paying Living Expenses: Not hard at all   Food Insecurity: No Food Insecurity (10/4/2024)    Hunger Vital Sign     Worried About Running Out of Food in the Last Year: Never true     Ran Out of Food in the Last Year: Never true   Transportation Needs: No Transportation Needs (10/4/2024)    PRAPARE - Transportation     Lack of Transportation (Medical): No     Lack of Transportation (Non-Medical): No   Physical Activity: Unknown (10/4/2024)    Exercise Vital Sign     Days of Exercise per Week: 0 days     Minutes of Exercise per Session: Not on file   Stress: No Stress Concern Present (10/4/2024)    [employer] [place] [employer] - Occupational Stress Questionnaire     Feeling of Stress : Only a little   Social Connections: Moderately Isolated (10/4/2024)    Social Connection  and Isolation Panel [NHANES]     Frequency of Communication with Friends and Family: Three times a week     Frequency of Social Gatherings with Friends and Family: Three times a week     Attends [employer] Services: Never     Active Member of Clubs or Organizations: No     Attends Club or Organization Meetings: Not on file     Marital Status:    Intimate Partner Violence: Not At Risk (10/4/2024)    Humiliation, Afraid, Rape, and Kick questionnaire     Fear of Current or Ex-Partner: No     Emotionally Abused: No     Physically Abused: No     Sexually Abused: No   Housing Stability: Low Risk  (10/4/2024)    Housing Stability Vital Sign     Unable to Pay for Housing in the Last Year: No     Number of Times Moved in the Last Year: 0     Homeless in the Last Year: No     Objective   Physical Exam  Vitals:    10/12/24 1300   BP:    Pulse: 75   Resp: 19   Temp:    SpO2:        GEN: Ill-appearing, frail, in NAD.  CV: RRR, III/VI holosystolic murmur  LUNGS: Diminished breath sounds bilaterally.  ABD: Soft, NT/ND, NBS, no masses or organomegaly.  SKIN: Warm, well perfused. No skin rashes or abnormal lesions. LE edema absent  MSK: Normal gait. No deformities.  EXT: No clubbing, cyanosis, or edema.  NEURO: Fatigued, moves all extremities spontaneously. No focal deficits.    I have personally reviewed the following images and laboratory findings:    ECG: AV-paced rhythm    TTE 10/04/2014:  CONCLUSIONS:   1. The left ventricular systolic function is low normal, with a visually estimated ejection fraction of 50-55%.   2. Spectral Doppler shows a pseudonormal pattern of left ventricular diastolic filling.   3. Abnormal septal motion consistent with RV pacemaker.   4. There is reduced right ventricular systolic function.   5. Severely enlarged right ventricle.   6. The left atrium is severely dilated.   7. The right atrium is severely dilated.   8. Small pericardial effusion.   9. There is moderate mitral annular  calcification.  10. Severe tricuspid regurgitation visualized.  11. There is a transcatheter aortic valve replacement.  12. Severely elevated pulmonary artery pressure.  13. The inferior vena cava appears mildly dilated, with IVC inspiratory collapse less than 50%.  14. There is reversed systolic hepatic vein flow.    RHC 10/11/24:  RA 3  RVSP 53, RVEDP 9   PCWP 10  PA 57/9, mean PAP 29  CO/CI 4.61/2.95  PA sat 55%  PVR 4.3       Lab Review     Troponin I, High Sensitivity ([facility])   Date/Time Value Ref Range Status   10/09/2024 02:12 AM 1,351 (HH) 0 - 34 ng/L Final     Comment:     Previous result verified on 10/9/2024 0118 on specimen/case [id number] called with component [facility] for procedure Troponin I, High Sensitivity with value 1,491 ng/L.   10/08/2024 10:25 PM 1,491 (HH) 0 - 34 ng/L Final     BNP   Date/Time Value Ref Range Status   10/12/2024 05:00 AM 1,237 (H) 0 - 99 pg/mL Final   10/11/2024 06:11 AM 1,876 (H) 0 - 99 pg/mL Final   10/09/2024 04:16  (H) 0 - 99 pg/mL Final        Assessment and Plan     Misael Ritchie is a 69 y.o. female with a PMHx of HFrecEF/ICM( EF 33->48% on 8/10/2024) s/p CRT-P, CAD s/p PCI SHASHI to LAD 5/2023, MR/TR, aortic stenosis s/p TAVR (11/2023), atrial fibrillation, CAD, CKD, ESRD s/p failed KT x 2 on hemodialysis, who was admitted on 10/3/2024 for hyperkalemia and need for emergent HD. Transferred to MICU and developed pressor requiring hypotension, started on empiric IV abx for c/f septic shock (Bcx negative), also obatined TTE that showed preserved EF 50-55%, severely enlarged RV and reduced RV function, Severe RA dilatation, severe TR and RVSP of 73 mmHg. She follows with [facility] and severe TR has been noted on previous echos as well, most recently in August 2024. Since the cause of RV failure and severe TR is pulmonary hypertension (group 2, 5), vasodilatory medications were not indicated and HF service recommended to remove fluid with HD. Pt showed signs of clinical  improvement with HD and HF service signed off 10/05/2024. S/p RHC on 10/11/24 and RA 3, PA RVSP 53, RVEDP 9, PCWP 10, PA 57/9, mean PAP 29, CO/CI 4.61/2.95, PA sat 55%, PVR 4.3.     Today, HF service was re consulted as pt was having soft diastolic pressures (in 40s), MAP ~ 60-65 and unable to wean of epi 0.02.    #HFrecEF   #Shock 2/2 RV failure  #Severe TR  #Pulmonary HTN, Group 2 + 5, due to CHF and ESRD  #s/p TAVR with wide pulse pressures   -Given low RA pressure (3 mmHg) and normal PCWP 10 mmHg- she is most likely intravascularly dry inspite of high RV and PA pressures ( RVSP 53 mmHg and PAP 57/9 (29 mmHg) that are further reducing LV preload and that can explain the intermittent hypotension   -high PA pressures and PVR of 4.3 represent pulmonary HTN. The etiology of this pHTN is multifactorial (could have started WHO group 2 iso low EF,elevated PCWP/LAP, severe aortic stenosis as well as a component of group 5 due to ESRD on HD) but now since her PCWP is normal and EF has recovered, could have remodeling of small vessel of pulmonary vasculature that might be responsive to some pHTN drugs (a cautious possibility)  Recs:  -At this time, recommend considering small volume boluses (~250 ml) as needed to increase RV output and LV preload.   - Also recommend to keep net even during HD and avoid fluid removal  - Wide PP after TAVR is noted in patients. Keep MAP ~ 65 as much as possible  - If small volume boluses fail to resolve hypotension, can consider engaging pulmonary HTN service for further recs regarding pHTN drugs.   - GDMT   - SGLT-2i: None due to ESRD   - Beta-blocker: Hold given RV dysfunction.   - ACE-I/ARB/ARNI: None due to ESRD    - MRA: None due to ESRD  - AICD/CRT-D/Lifevest: Has CRT-P    Recs discussed with Primary team. HF service will follow peripherally.     For any questions or concerns, feel free to reach out via [employer] chat or page at [phone].    Case discussed with Dr. Chaparro kingston, HF  attending.    Barbara Zamudio MD, MS, FACP   Heart Failure Fellow,  [facility]

## 2024-10-12 NOTE — PROGRESS NOTES
Misael Ruvalcabaston   69 y.o.     MRN/Room: [id number]/30/30-A    Subjective:   Doing ok this morning, on supplemental oxygen via nasal cannula  Planned for blood transfusion today    Objective:     Meds:   acetaminophen, 650 mg, q6h  [Held by provider] allopurinol, 100 mg, Daily  amiodarone, 200 mg, Daily  atorvastatin, 40 mg, Nightly  clopidogrel, 75 mg, Daily  diphenhydrAMINE, 12.5 mg, Once per day on Monday Wednesday Friday  [START ON 10/14/2024] epoetin bridgett or biosimilar, 8,000 Units, Once per day on Monday Wednesday Friday  hydrocortisone sodium succinate, 50 mg, q6h  midodrine, 5 mg, TID  oxygen, , Continuous - Inhalation  pantoprazole, 40 mg, Daily before breakfast  polyethylene glycol, 17 g, q12h  prochlorperazine, 10 mg, Once  sennosides, 2 tablet, BID      EPINEPHrine, Last Rate: Stopped (10/12/24 0915)  heparin, Last Rate: 700 Units/hr (10/12/24 1234)      albuterol, 2 puff, q6h PRN  bisacodyl, 10 mg, Daily PRN  bisacodyl, 10 mg, Daily PRN  dextrose, 12.5 g, q15 min PRN  dextrose, 25 g, q15 min PRN  glucagon, 1 mg, q15 min PRN  glucagon, 1 mg, q15 min PRN  heparin, 1,500-3,000 Units, q4h PRN  oxyCODONE, 10 mg, q4h PRN        Vitals:    10/12/24 1300   BP:    Pulse: 75   Resp: 19   Temp:    SpO2:           Intake/Output Summary (Last 24 hours) at 10/12/2024 1338  Last data filed at 10/12/2024 1100  Gross per 24 hour   Intake 817.09 ml   Output 1929 ml   Net -1111.91 ml     General appearance: no distress  Eyes: non-icteric  Skin: no apparent rash  Heart: S1 S2 regular  Lungs: Minimal crackles   Abdomen: soft, nt/nd  Extremities: Trace edema  Neuro: No FND   Access: RUE AVF     Blood Labs:  Results for orders placed or performed during the hospital encounter of 10/03/24 (from the past 24 hour(s))   POCT GLUCOSE   Result Value Ref Range    POCT Glucose 90 74 - 99 mg/dL   Blood Gas Arterial Full Panel Unsolicited   Result Value Ref Range    POCT pH, Arterial 7.47 (H) 7.38 - 7.42 pH    POCT pCO2, Arterial 37  (L) 38 - 42 mm Hg    POCT pO2, Arterial 66 (L) 85 - 95 mm Hg    POCT SO2, Arterial 94 94 - 100 %    POCT Oxy Hemoglobin, Arterial 90.9 (L) 94.0 - 98.0 %    POCT Hematocrit Calculated, Arterial 23.0 (L) 36.0 - 46.0 %    POCT Sodium, Arterial 131 (L) 136 - 145 mmol/L    POCT Potassium, Arterial 3.4 (L) 3.5 - 5.3 mmol/L    POCT Chloride, Arterial 97 (L) 98 - 107 mmol/L    POCT Ionized Calcium, Arterial 1.07 (L) 1.10 - 1.33 mmol/L    POCT Glucose, Arterial 107 (H) 74 - 99 mg/dL    POCT Lactate, Arterial 1.4 0.4 - 2.0 mmol/L    POCT Base Excess, Arterial 3.0 -2.0 - 3.0 mmol/L    POCT HCO3 Calculated, Arterial 26.9 (H) 22.0 - 26.0 mmol/L    POCT Hemoglobin, Arterial 7.7 (L) 12.0 - 16.0 g/dL    POCT Anion Gap, Arterial 11 10 - 25 mmo/L    Patient Temperature 37.0 degrees Celsius   POCT GLUCOSE   Result Value Ref Range    POCT Glucose 87 74 - 99 mg/dL   BLOOD GAS VENOUS FULL PANEL   Result Value Ref Range    POCT pH, Venous 7.43 7.33 - 7.43 pH    POCT pCO2, Venous 43 41 - 51 mm Hg    POCT pO2, Venous 41 35 - 45 mm Hg    POCT SO2, Venous 68 45 - 75 %    POCT Oxy Hemoglobin, Venous 66.3 45.0 - 75.0 %    POCT Hematocrit Calculated, Venous 20.0 (L) 36.0 - 46.0 %    POCT Sodium, Venous 128 (L) 136 - 145 mmol/L    POCT Potassium, Venous 3.5 3.5 - 5.3 mmol/L    POCT Chloride, Venous 94 (L) 98 - 107 mmol/L    POCT Ionized Calicum, Venous 1.12 1.10 - 1.33 mmol/L    POCT Glucose, Venous 110 (H) 74 - 99 mg/dL    POCT Lactate, Venous 1.3 0.4 - 2.0 mmol/L    POCT Base Excess, Venous 3.8 (H) -2.0 - 3.0 mmol/L    POCT HCO3 Calculated, Venous 28.5 (H) 22.0 - 26.0 mmol/L    POCT Hemoglobin, Venous 6.7 (L) 12.0 - 16.0 g/dL    POCT Anion Gap, Venous 9.0 (L) 10.0 - 25.0 mmol/L    Patient Temperature 37.0 degrees Celsius    FiO2 28 %   BLOOD GAS ARTERIAL FULL PANEL   Result Value Ref Range    POCT pH, Arterial 7.45 (H) 7.38 - 7.42 pH    POCT pCO2, Arterial 41 38 - 42 mm Hg    POCT pO2, Arterial 39 (LL) 85 - 95 mm Hg    POCT SO2, Arterial 59 (L)  94 - 100 %    POCT Oxy Hemoglobin, Arterial 57.8 (L) 94.0 - 98.0 %    POCT Hematocrit Calculated, Arterial 21.0 (L) 36.0 - 46.0 %    POCT Sodium, Arterial 129 (L) 136 - 145 mmol/L    POCT Potassium, Arterial 3.7 3.5 - 5.3 mmol/L    POCT Chloride, Arterial 97 (L) 98 - 107 mmol/L    POCT Ionized Calcium, Arterial 1.12 1.10 - 1.33 mmol/L    POCT Glucose, Arterial 118 (H) 74 - 99 mg/dL    POCT Lactate, Arterial 1.1 0.4 - 2.0 mmol/L    POCT Base Excess, Arterial 4.1 (H) -2.0 - 3.0 mmol/L    POCT HCO3 Calculated, Arterial 28.5 (H) 22.0 - 26.0 mmol/L    POCT Hemoglobin, Arterial 6.9 (L) 12.0 - 16.0 g/dL    POCT Anion Gap, Arterial 7 (L) 10 - 25 mmo/L    Patient Temperature 37.0 degrees Celsius    FiO2 28 %   POCT GLUCOSE   Result Value Ref Range    POCT Glucose 115 (H) 74 - 99 mg/dL   CBC   Result Value Ref Range    WBC 10.3 4.4 - 11.3 x10*3/uL    nRBC 2.0 (H) 0.0 - 0.0 /100 WBCs    RBC 2.10 (L) 4.00 - 5.20 x10*6/uL    Hemoglobin 6.6 (L) 12.0 - 16.0 g/dL    Hematocrit 18.0 (L) 36.0 - 46.0 %    MCV 86 80 - 100 fL    MCH 31.4 26.0 - 34.0 pg    MCHC 36.7 (H) 32.0 - 36.0 g/dL    RDW 19.6 (H) 11.5 - 14.5 %    Platelets 50 (L) 150 - 450 x10*3/uL   POCT GLUCOSE   Result Value Ref Range    POCT Glucose 124 (H) 74 - 99 mg/dL   Prepare RBC: 1 Units   Result Value Ref Range    PRODUCT CODE W8775L29     Unit Number [id number]     Unit ABO O     Unit RH NEG     XM INTEP COMP     Dispense Status XM     Blood Expiration Date 10/15/2024 11:59:00 PM EDT     PRODUCT BLOOD TYPE 9500     UNIT VOLUME 281    Heparin Assay   Result Value Ref Range    Heparin Unfractionated 0.6 See Comment Below for Therapeutic Ranges IU/mL   Magnesium   Result Value Ref Range    Magnesium 1.91 1.60 - 2.40 mg/dL   Hepatic Function Panel   Result Value Ref Range    Albumin 2.6 (L) 3.4 - 5.0 g/dL    Bilirubin, Total 3.1 (H) 0.0 - 1.2 mg/dL    Bilirubin, Direct 2.1 (H) 0.0 - 0.3 mg/dL    Alkaline Phosphatase 154 (H) 33 - 136 U/L    ALT 11 7 - 45 U/L    AST 50  (H) 9 - 39 U/L    Total Protein 5.1 (L) 6.4 - 8.2 g/dL   CBC and Auto Differential   Result Value Ref Range    WBC 15.2 (H) 4.4 - 11.3 x10*3/uL    nRBC 2.2 (H) 0.0 - 0.0 /100 WBCs    RBC 2.18 (L) 4.00 - 5.20 x10*6/uL    Hemoglobin 6.9 (L) 12.0 - 16.0 g/dL    Hematocrit 18.9 (L) 36.0 - 46.0 %    MCV 87 80 - 100 fL    MCH 31.7 26.0 - 34.0 pg    MCHC 36.5 (H) 32.0 - 36.0 g/dL    RDW 21.1 (H) 11.5 - 14.5 %    Platelets 60 (L) 150 - 450 x10*3/uL    Immature Granulocytes %, Automated 1.5 (H) 0.0 - 0.9 %    Immature Granulocytes Absolute, Automated 0.23 0.00 - 0.70 x10*3/uL   Phosphorus   Result Value Ref Range    Phosphorus 3.6 2.5 - 4.9 mg/dL   Basic Metabolic Panel   Result Value Ref Range    Glucose 116 (H) 74 - 99 mg/dL    Sodium 133 (L) 136 - 145 mmol/L    Potassium 3.7 3.5 - 5.3 mmol/L    Chloride 94 (L) 98 - 107 mmol/L    Bicarbonate 27 21 - 32 mmol/L    Anion Gap 16 10 - 20 mmol/L    Urea Nitrogen 21 6 - 23 mg/dL    Creatinine 2.40 (H) 0.50 - 1.05 mg/dL    eGFR 21 (L) >60 mL/min/1.73m*2    Calcium 8.0 (L) 8.6 - 10.6 mg/dL   Type and screen   Result Value Ref Range    ABO TYPE O     Rh TYPE POS     ANTIBODY SCREEN NEG    Manual Differential   Result Value Ref Range    Neutrophils %, Manual 90.3 40.0 - 80.0 %    Bands %, Manual 1.6 0.0 - 5.0 %    Lymphocytes %, Manual 1.6 13.0 - 44.0 %    Monocytes %, Manual 4.9 2.0 - 10.0 %    Eosinophils %, Manual 0.0 0.0 - 6.0 %    Basophils %, Manual 0.0 0.0 - 2.0 %    Metamyelocytes %, Manual 1.6 0.0 - 0.0 %    Seg Neutrophils Absolute, Manual 13.73 (H) 1.20 - 7.00 x10*3/uL    Bands Absolute, Manual 0.24 0.00 - 0.70 x10*3/uL    Lymphocytes Absolute, Manual 0.24 (L) 1.20 - 4.80 x10*3/uL    Monocytes Absolute, Manual 0.74 0.10 - 1.00 x10*3/uL    Eosinophils Absolute, Manual 0.00 0.00 - 0.70 x10*3/uL    Basophils Absolute, Manual 0.00 0.00 - 0.10 x10*3/uL    Metamyelocytes Absolute, Manual 0.24 0.00 - 0.00 x10*3/uL    Total Cells Counted 124     Neutrophils Absolute, Manual 13.97  (H) 1.20 - 7.70 x10*3/uL    RBC Morphology See Below     Polychromasia Mild     Target Cells Few     Ovalocytes Few     Teardrop Cells Few     [place] Cells Few    B-type natriuretic peptide   Result Value Ref Range    BNP 1,237 (H) 0 - 99 pg/mL   BLOOD GAS MIXED VENOUS FULL PANEL   Result Value Ref Range    POCT pH, Mixed 7.43 7.33 - 7.43 pH    POCT pCO2, Mixed 40 (L) 41 - 51 mm Hg    POCT pO2, Mixed 44 35 - 45 mm Hg    POCT SO2, Mixed 69 45 - 75 %    POCT Oxy Hemoglobin, Mixed 66.9 45.0 - 75.0 %    POCT Hematocrit Calculated, Mixed 27.0 (L) 36.0 - 46.0 %    POCT Sodium, Mixed 128 (L) 136 - 145 mmol/L    POCT Potassium, Mixed 3.8 3.5 - 5.3 mmol/L    POCT Chloride, Mixed 95 (L) 98 - 107 mmol/L    POCT Ionized Calcium, Mixed 1.08 (L) 1.10 - 1.33 mmol/L    POCT Glucose, Mixed 133 (H) 74 - 99 mg/dL    POCT Lactate, Mixed 0.9 0.4 - 2.0 mmol/L    POCT Base Excess, Mixed 2.0 -2.0 - 3.0 mmol/L    POCT HCO3 Calculated, Mixed 26.5 (H) 22.0 - 26.0 mmol/L    POCT Hemoglobin, Mixed 8.9 (L) 12.0 - 16.0 g/dL    POCT Anion Gap, Mixed 10 10 - 25 mmo/L    Patient Temperature 37.0 degrees Celsius    FiO2 28 %   POCT GLUCOSE   Result Value Ref Range    POCT Glucose 133 (H) 74 - 99 mg/dL   POCT GLUCOSE   Result Value Ref Range    POCT Glucose 120 (H) 74 - 99 mg/dL   POCT GLUCOSE   Result Value Ref Range    POCT Glucose 151 (H) 74 - 99 mg/dL   Blood Gas Venous Full Panel   Result Value Ref Range    POCT pH, Venous 7.44 (H) 7.33 - 7.43 pH    POCT pCO2, Venous 40 (L) 41 - 51 mm Hg    POCT pO2, Venous 42 35 - 45 mm Hg    POCT SO2, Venous 72 45 - 75 %    POCT Oxy Hemoglobin, Venous 70.4 45.0 - 75.0 %    POCT Hematocrit Calculated, Venous 14.0 (L) 36.0 - 46.0 %    POCT Sodium, Venous 129 (L) 136 - 145 mmol/L    POCT Potassium, Venous 3.9 3.5 - 5.3 mmol/L    POCT Chloride, Venous 97 (L) 98 - 107 mmol/L    POCT Ionized Calicum, Venous 1.08 (L) 1.10 - 1.33 mmol/L    POCT Glucose, Venous 134 (H) 74 - 99 mg/dL    POCT Lactate, Venous 0.9 0.4 - 2.0  mmol/L    POCT Base Excess, Venous 2.8 -2.0 - 3.0 mmol/L    POCT HCO3 Calculated, Venous 27.2 (H) 22.0 - 26.0 mmol/L    POCT Hemoglobin, Venous 4.8 (LL) 12.0 - 16.0 g/dL    POCT Anion Gap, Venous 9.0 (L) 10.0 - 25.0 mmol/L    Patient Temperature 37.0 degrees Celsius    FiO2 21 %   CBC and Auto Differential   Result Value Ref Range    WBC 14.3 (H) 4.4 - 11.3 x10*3/uL    nRBC 1.7 (H) 0.0 - 0.0 /100 WBCs    RBC 2.16 (L) 4.00 - 5.20 x10*6/uL    Hemoglobin 6.6 (L) 12.0 - 16.0 g/dL    Hematocrit 18.9 (L) 36.0 - 46.0 %    MCV 88 80 - 100 fL    MCH 30.6 26.0 - 34.0 pg    MCHC 34.9 32.0 - 36.0 g/dL    RDW 20.4 (H) 11.5 - 14.5 %    Platelets 57 (L) 150 - 450 x10*3/uL    Neutrophils % 92.5 40.0 - 80.0 %    Immature Granulocytes %, Automated 1.6 (H) 0.0 - 0.9 %    Lymphocytes % 1.5 13.0 - 44.0 %    Monocytes % 4.3 2.0 - 10.0 %    Eosinophils % 0.0 0.0 - 6.0 %    Basophils % 0.1 0.0 - 2.0 %    Neutrophils Absolute 13.23 (H) 1.20 - 7.70 x10*3/uL    Immature Granulocytes Absolute, Automated 0.23 0.00 - 0.70 x10*3/uL    Lymphocytes Absolute 0.21 (L) 1.20 - 4.80 x10*3/uL    Monocytes Absolute 0.62 0.10 - 1.00 x10*3/uL    Eosinophils Absolute 0.00 0.00 - 0.70 x10*3/uL    Basophils Absolute 0.01 0.00 - 0.10 x10*3/uL   Morphology   Result Value Ref Range    RBC Morphology See Below     Polychromasia Mild     Hypochromia Mild     RBC Fragments Few     Target Cells Few    Prepare RBC: 1 Units   Result Value Ref Range    PRODUCT CODE [id number]     Unit Number [id number]     Unit ABO O     Unit RH POS     XM INTEP COMP     Dispense Status XM     Blood Expiration Date 10/25/2024 11:59:00 PM EDT     PRODUCT BLOOD TYPE 5100     UNIT VOLUME 283    Ammonia   Result Value Ref Range    Ammonia 29 16 - 53 umol/L   BLOOD GAS MIXED VENOUS FULL PANEL   Result Value Ref Range    POCT pH, Mixed 7.43 7.33 - 7.43 pH    POCT pCO2, Mixed 41 41 - 51 mm Hg    POCT pO2, Mixed 42 35 - 45 mm Hg    POCT SO2, Mixed 66 45 - 75 %    POCT Oxy Hemoglobin, Mixed  64.4 45.0 - 75.0 %    POCT Hematocrit Calculated, Mixed 20.0 (L) 36.0 - 46.0 %    POCT Sodium, Mixed 128 (L) 136 - 145 mmol/L    POCT Potassium, Mixed 3.8 3.5 - 5.3 mmol/L    POCT Chloride, Mixed 96 (L) 98 - 107 mmol/L    POCT Ionized Calcium, Mixed 1.10 1.10 - 1.33 mmol/L    POCT Glucose, Mixed 120 (H) 74 - 99 mg/dL    POCT Lactate, Mixed 0.8 0.4 - 2.0 mmol/L    POCT Base Excess, Mixed 2.6 -2.0 - 3.0 mmol/L    POCT HCO3 Calculated, Mixed 27.2 (H) 22.0 - 26.0 mmol/L    POCT Hemoglobin, Mixed 6.8 (L) 12.0 - 16.0 g/dL    POCT Anion Gap, Mixed 9 (L) 10 - 25 mmo/L    Patient Temperature 37.0 degrees Celsius    FiO2 30 %   POCT GLUCOSE   Result Value Ref Range    POCT Glucose 116 (H) 74 - 99 mg/dL      ASSESSMENT:  Misael Ritchie is a  69 y.o. F with PMH ESRD on HD TTS, HFrEF, aortic stenosis, atrial fibrillation, CAD who is currently admitted to the MICU on account of suspected cardiogenic shock, RV failure. Nephrology following to facilitate inpatient RRT.     #ESRD on HD TTS   -HD unit: [facility]   -Access: RUE AVF   -EDW 56kg      #CKD Anemia  -HGB 6.9  -Planned for PRBC transfusion today  -Added Epo    #CKD-MBD  -Liset Ca WNL, Phos 3.6, stable   -No phos binder ordered     RECOMMENDATIONS:  -Plan for iUF today, goal 1.5L volume removal over 2 hours  -Added Epo 8000 units subcutaneously thrice weekly  -Add renal multivitamin  -Will follow and assess daily for RRT needs.     Dr Amanda Marti MD  Nephrology Fellow   Renal Pager [phone]

## 2024-10-12 NOTE — PROGRESS NOTES
Misael Ritchie is a 69 y.o. female on day 9 of admission presenting with Hyperkalemia.      Subjective   Patient seen at bedside. She alert and oriented and awake. She is concerned about her prognosis. She doesn't have any pain, she doesn't have any diarrhea, N/V, shest pain, shortness of breath. No new complaints.   No acute events overnight.     Objective     Last Recorded Vitals  /63   Pulse 75   Temp 36.4 °C (97.5 °F) (Temporal)   Resp 10   Wt 58.2 kg (128 lb 4.9 oz)   SpO2 100%   Intake/Output last 3 Shifts:    Intake/Output Summary (Last 24 hours) at 10/12/2024 1145  Last data filed at 10/12/2024 0915  Gross per 24 hour   Intake 831.09 ml   Output 1929 ml   Net -1097.91 ml       Admission Weight  Weight: 53.5 kg (118 lb) (10/03/24 0941)    Daily Weight  10/10/24 : 58.2 kg (128 lb 4.9 oz)    Image Results  XR chest 1 view  Narrative: Interpreted By:  Avni York,   STUDY:  XR CHEST 1 VIEW;  10/12/2024 9:36 am      INDICATION:  Signs/Symptoms:hernandez.      COMPARISON:  Chest radiograph 10/08/2024 and chest CT 10/03/2024      ACCESSION NUMBER(S):  [id number]      ORDERING CLINICIAN:  KAREN MANTILLA      FINDINGS:  AP radiograph of the chest. Patient is now significantly rotated  towards right, limiting evaluation and comparison. Interval placement  of right IJ approach Ringoes-Rasheed catheter, the tip of which overlies  main pulmonary artery. Stable positioning of left subclavian approach  biventricular AICD. Status post prior TAVR.      CARDIOMEDIASTINAL SILHOUETTE:  The cardiomediastinal silhouette persistently enlarged, grossly  unchanged from prior. Aortic knob calcifications. Poorly visualized  coronary artery stents.      LUNGS:  There has been interval worsening of hazy opacification of right  hemithorax, which is likely due to layering of moderate right pleural  effusion on present supine radiograph. A component of background  pulmonary edema/superimposed infectious process is not excluded.  Mild  left basilar atelectasis. There is no pneumothorax.      ABDOMEN:  No remarkable upper abdominal findings.      BONES:  No acute osseous abnormality.      Impression: 1. Interval worsening of hazy opacification of right hemithorax,  which is likely due to layering of moderate right pleural effusion on  present supine radiograph. A component of background pulmonary  edema/superimposed infectious process is not excluded.  2. Medical devices as above. No pneumothorax.      Signed by: Avni York 10/12/2024 10:48 AM  Dictation workstation:   [id number]      Physical Exam  General: Awake, alert, in no acute distress  Eyes: Gaze conjugate.  No scleral icterus or injection  HENT: Normo-cephalic, right periorbital hematoma No stridor, left EJ, right swan in place.   CV: Regular rate, regular rhythm. Radial pulses 2+ bilaterally  Resp: Breathing non-labored, speaking in full sentences.  Clear to auscultation bilaterally  GI: Soft, non-distended, non-tender. No rebound or guarding.  : deferred   MSK/Extremities: No gross bony deformities. Moving all extremities  Skin: Warm. Appropriate color  Neuro: Aox2 Face symmetric. Speech is fluent.  Gross strength and sensation intact in b/l UE and LEs  Psych: Appropriate mood and affect    Relevant Results  Results for orders placed or performed during the hospital encounter of 10/03/24 (from the past 24 hour(s))   POCT GLUCOSE   Result Value Ref Range    POCT Glucose 155 (H) 74 - 99 mg/dL   POCT GLUCOSE   Result Value Ref Range    POCT Glucose 90 74 - 99 mg/dL   Blood Gas Arterial Full Panel Unsolicited   Result Value Ref Range    POCT pH, Arterial 7.47 (H) 7.38 - 7.42 pH    POCT pCO2, Arterial 37 (L) 38 - 42 mm Hg    POCT pO2, Arterial 66 (L) 85 - 95 mm Hg    POCT SO2, Arterial 94 94 - 100 %    POCT Oxy Hemoglobin, Arterial 90.9 (L) 94.0 - 98.0 %    POCT Hematocrit Calculated, Arterial 23.0 (L) 36.0 - 46.0 %    POCT Sodium, Arterial 131 (L) 136 - 145 mmol/L    POCT  Potassium, Arterial 3.4 (L) 3.5 - 5.3 mmol/L    POCT Chloride, Arterial 97 (L) 98 - 107 mmol/L    POCT Ionized Calcium, Arterial 1.07 (L) 1.10 - 1.33 mmol/L    POCT Glucose, Arterial 107 (H) 74 - 99 mg/dL    POCT Lactate, Arterial 1.4 0.4 - 2.0 mmol/L    POCT Base Excess, Arterial 3.0 -2.0 - 3.0 mmol/L    POCT HCO3 Calculated, Arterial 26.9 (H) 22.0 - 26.0 mmol/L    POCT Hemoglobin, Arterial 7.7 (L) 12.0 - 16.0 g/dL    POCT Anion Gap, Arterial 11 10 - 25 mmo/L    Patient Temperature 37.0 degrees Celsius   POCT GLUCOSE   Result Value Ref Range    POCT Glucose 87 74 - 99 mg/dL   BLOOD GAS VENOUS FULL PANEL   Result Value Ref Range    POCT pH, Venous 7.43 7.33 - 7.43 pH    POCT pCO2, Venous 43 41 - 51 mm Hg    POCT pO2, Venous 41 35 - 45 mm Hg    POCT SO2, Venous 68 45 - 75 %    POCT Oxy Hemoglobin, Venous 66.3 45.0 - 75.0 %    POCT Hematocrit Calculated, Venous 20.0 (L) 36.0 - 46.0 %    POCT Sodium, Venous 128 (L) 136 - 145 mmol/L    POCT Potassium, Venous 3.5 3.5 - 5.3 mmol/L    POCT Chloride, Venous 94 (L) 98 - 107 mmol/L    POCT Ionized Calicum, Venous 1.12 1.10 - 1.33 mmol/L    POCT Glucose, Venous 110 (H) 74 - 99 mg/dL    POCT Lactate, Venous 1.3 0.4 - 2.0 mmol/L    POCT Base Excess, Venous 3.8 (H) -2.0 - 3.0 mmol/L    POCT HCO3 Calculated, Venous 28.5 (H) 22.0 - 26.0 mmol/L    POCT Hemoglobin, Venous 6.7 (L) 12.0 - 16.0 g/dL    POCT Anion Gap, Venous 9.0 (L) 10.0 - 25.0 mmol/L    Patient Temperature 37.0 degrees Celsius    FiO2 28 %   BLOOD GAS ARTERIAL FULL PANEL   Result Value Ref Range    POCT pH, Arterial 7.45 (H) 7.38 - 7.42 pH    POCT pCO2, Arterial 41 38 - 42 mm Hg    POCT pO2, Arterial 39 (LL) 85 - 95 mm Hg    POCT SO2, Arterial 59 (L) 94 - 100 %    POCT Oxy Hemoglobin, Arterial 57.8 (L) 94.0 - 98.0 %    POCT Hematocrit Calculated, Arterial 21.0 (L) 36.0 - 46.0 %    POCT Sodium, Arterial 129 (L) 136 - 145 mmol/L    POCT Potassium, Arterial 3.7 3.5 - 5.3 mmol/L    POCT Chloride, Arterial 97 (L) 98 - 107  mmol/L    POCT Ionized Calcium, Arterial 1.12 1.10 - 1.33 mmol/L    POCT Glucose, Arterial 118 (H) 74 - 99 mg/dL    POCT Lactate, Arterial 1.1 0.4 - 2.0 mmol/L    POCT Base Excess, Arterial 4.1 (H) -2.0 - 3.0 mmol/L    POCT HCO3 Calculated, Arterial 28.5 (H) 22.0 - 26.0 mmol/L    POCT Hemoglobin, Arterial 6.9 (L) 12.0 - 16.0 g/dL    POCT Anion Gap, Arterial 7 (L) 10 - 25 mmo/L    Patient Temperature 37.0 degrees Celsius    FiO2 28 %   POCT GLUCOSE   Result Value Ref Range    POCT Glucose 115 (H) 74 - 99 mg/dL   CBC   Result Value Ref Range    WBC 10.3 4.4 - 11.3 x10*3/uL    nRBC 2.0 (H) 0.0 - 0.0 /100 WBCs    RBC 2.10 (L) 4.00 - 5.20 x10*6/uL    Hemoglobin 6.6 (L) 12.0 - 16.0 g/dL    Hematocrit 18.0 (L) 36.0 - 46.0 %    MCV 86 80 - 100 fL    MCH 31.4 26.0 - 34.0 pg    MCHC 36.7 (H) 32.0 - 36.0 g/dL    RDW 19.6 (H) 11.5 - 14.5 %    Platelets 50 (L) 150 - 450 x10*3/uL   POCT GLUCOSE   Result Value Ref Range    POCT Glucose 124 (H) 74 - 99 mg/dL   Prepare RBC: 1 Units   Result Value Ref Range    PRODUCT CODE [id number]     Unit Number [id number]     Unit ABO O     Unit RH NEG     XM INTEP COMP     Dispense Status XM     Blood Expiration Date 10/15/2024 11:59:00 PM EDT     PRODUCT BLOOD TYPE 9500     UNIT VOLUME 281    Heparin Assay   Result Value Ref Range    Heparin Unfractionated 0.6 See Comment Below for Therapeutic Ranges IU/mL   Magnesium   Result Value Ref Range    Magnesium 1.91 1.60 - 2.40 mg/dL   Hepatic Function Panel   Result Value Ref Range    Albumin 2.6 (L) 3.4 - 5.0 g/dL    Bilirubin, Total 3.1 (H) 0.0 - 1.2 mg/dL    Bilirubin, Direct 2.1 (H) 0.0 - 0.3 mg/dL    Alkaline Phosphatase 154 (H) 33 - 136 U/L    ALT 11 7 - 45 U/L    AST 50 (H) 9 - 39 U/L    Total Protein 5.1 (L) 6.4 - 8.2 g/dL   CBC and Auto Differential   Result Value Ref Range    WBC 15.2 (H) 4.4 - 11.3 x10*3/uL    nRBC 2.2 (H) 0.0 - 0.0 /100 WBCs    RBC 2.18 (L) 4.00 - 5.20 x10*6/uL    Hemoglobin 6.9 (L) 12.0 - 16.0 g/dL    Hematocrit  18.9 (L) 36.0 - 46.0 %    MCV 87 80 - 100 fL    MCH 31.7 26.0 - 34.0 pg    MCHC 36.5 (H) 32.0 - 36.0 g/dL    RDW 21.1 (H) 11.5 - 14.5 %    Platelets 60 (L) 150 - 450 x10*3/uL    Immature Granulocytes %, Automated 1.5 (H) 0.0 - 0.9 %    Immature Granulocytes Absolute, Automated 0.23 0.00 - 0.70 x10*3/uL   Phosphorus   Result Value Ref Range    Phosphorus 3.6 2.5 - 4.9 mg/dL   Basic Metabolic Panel   Result Value Ref Range    Glucose 116 (H) 74 - 99 mg/dL    Sodium 133 (L) 136 - 145 mmol/L    Potassium 3.7 3.5 - 5.3 mmol/L    Chloride 94 (L) 98 - 107 mmol/L    Bicarbonate 27 21 - 32 mmol/L    Anion Gap 16 10 - 20 mmol/L    Urea Nitrogen 21 6 - 23 mg/dL    Creatinine 2.40 (H) 0.50 - 1.05 mg/dL    eGFR 21 (L) >60 mL/min/1.73m*2    Calcium 8.0 (L) 8.6 - 10.6 mg/dL   Type and screen   Result Value Ref Range    ABO TYPE O     Rh TYPE POS     ANTIBODY SCREEN NEG    Manual Differential   Result Value Ref Range    Neutrophils %, Manual 90.3 40.0 - 80.0 %    Bands %, Manual 1.6 0.0 - 5.0 %    Lymphocytes %, Manual 1.6 13.0 - 44.0 %    Monocytes %, Manual 4.9 2.0 - 10.0 %    Eosinophils %, Manual 0.0 0.0 - 6.0 %    Basophils %, Manual 0.0 0.0 - 2.0 %    Metamyelocytes %, Manual 1.6 0.0 - 0.0 %    Seg Neutrophils Absolute, Manual 13.73 (H) 1.20 - 7.00 x10*3/uL    Bands Absolute, Manual 0.24 0.00 - 0.70 x10*3/uL    Lymphocytes Absolute, Manual 0.24 (L) 1.20 - 4.80 x10*3/uL    Monocytes Absolute, Manual 0.74 0.10 - 1.00 x10*3/uL    Eosinophils Absolute, Manual 0.00 0.00 - 0.70 x10*3/uL    Basophils Absolute, Manual 0.00 0.00 - 0.10 x10*3/uL    Metamyelocytes Absolute, Manual 0.24 0.00 - 0.00 x10*3/uL    Total Cells Counted 124     Neutrophils Absolute, Manual 13.97 (H) 1.20 - 7.70 x10*3/uL    RBC Morphology See Below     Polychromasia Mild     Target Cells Few     Ovalocytes Few     [place]rop Cells Few     [place] Cells Few    BLOOD GAS MIXED VENOUS FULL PANEL   Result Value Ref Range    POCT pH, Mixed 7.43 7.33 - 7.43 pH    POCT  pCO2, Mixed 40 (L) 41 - 51 mm Hg    POCT pO2, Mixed 44 35 - 45 mm Hg    POCT SO2, Mixed 69 45 - 75 %    POCT Oxy Hemoglobin, Mixed 66.9 45.0 - 75.0 %    POCT Hematocrit Calculated, Mixed 27.0 (L) 36.0 - 46.0 %    POCT Sodium, Mixed 128 (L) 136 - 145 mmol/L    POCT Potassium, Mixed 3.8 3.5 - 5.3 mmol/L    POCT Chloride, Mixed 95 (L) 98 - 107 mmol/L    POCT Ionized Calcium, Mixed 1.08 (L) 1.10 - 1.33 mmol/L    POCT Glucose, Mixed 133 (H) 74 - 99 mg/dL    POCT Lactate, Mixed 0.9 0.4 - 2.0 mmol/L    POCT Base Excess, Mixed 2.0 -2.0 - 3.0 mmol/L    POCT HCO3 Calculated, Mixed 26.5 (H) 22.0 - 26.0 mmol/L    POCT Hemoglobin, Mixed 8.9 (L) 12.0 - 16.0 g/dL    POCT Anion Gap, Mixed 10 10 - 25 mmo/L    Patient Temperature 37.0 degrees Celsius    FiO2 28 %   POCT GLUCOSE   Result Value Ref Range    POCT Glucose 133 (H) 74 - 99 mg/dL   POCT GLUCOSE   Result Value Ref Range    POCT Glucose 120 (H) 74 - 99 mg/dL   POCT GLUCOSE   Result Value Ref Range    POCT Glucose 151 (H) 74 - 99 mg/dL   Blood Gas Venous Full Panel   Result Value Ref Range    POCT pH, Venous 7.44 (H) 7.33 - 7.43 pH    POCT pCO2, Venous 40 (L) 41 - 51 mm Hg    POCT pO2, Venous 42 35 - 45 mm Hg    POCT SO2, Venous 72 45 - 75 %    POCT Oxy Hemoglobin, Venous 70.4 45.0 - 75.0 %    POCT Hematocrit Calculated, Venous 14.0 (L) 36.0 - 46.0 %    POCT Sodium, Venous 129 (L) 136 - 145 mmol/L    POCT Potassium, Venous 3.9 3.5 - 5.3 mmol/L    POCT Chloride, Venous 97 (L) 98 - 107 mmol/L    POCT Ionized Calicum, Venous 1.08 (L) 1.10 - 1.33 mmol/L    POCT Glucose, Venous 134 (H) 74 - 99 mg/dL    POCT Lactate, Venous 0.9 0.4 - 2.0 mmol/L    POCT Base Excess, Venous 2.8 -2.0 - 3.0 mmol/L    POCT HCO3 Calculated, Venous 27.2 (H) 22.0 - 26.0 mmol/L    POCT Hemoglobin, Venous 4.8 (LL) 12.0 - 16.0 g/dL    POCT Anion Gap, Venous 9.0 (L) 10.0 - 25.0 mmol/L    Patient Temperature 37.0 degrees Celsius    FiO2 21 %   CBC and Auto Differential   Result Value Ref Range    WBC 14.3 (H)  4.4 - 11.3 x10*3/uL    nRBC 1.7 (H) 0.0 - 0.0 /100 WBCs    RBC 2.16 (L) 4.00 - 5.20 x10*6/uL    Hemoglobin 6.6 (L) 12.0 - 16.0 g/dL    Hematocrit 18.9 (L) 36.0 - 46.0 %    MCV 88 80 - 100 fL    MCH 30.6 26.0 - 34.0 pg    MCHC 34.9 32.0 - 36.0 g/dL    RDW 20.4 (H) 11.5 - 14.5 %    Platelets 57 (L) 150 - 450 x10*3/uL    Neutrophils % 92.5 40.0 - 80.0 %    Immature Granulocytes %, Automated 1.6 (H) 0.0 - 0.9 %    Lymphocytes % 1.5 13.0 - 44.0 %    Monocytes % 4.3 2.0 - 10.0 %    Eosinophils % 0.0 0.0 - 6.0 %    Basophils % 0.1 0.0 - 2.0 %    Neutrophils Absolute 13.23 (H) 1.20 - 7.70 x10*3/uL    Immature Granulocytes Absolute, Automated 0.23 0.00 - 0.70 x10*3/uL    Lymphocytes Absolute 0.21 (L) 1.20 - 4.80 x10*3/uL    Monocytes Absolute 0.62 0.10 - 1.00 x10*3/uL    Eosinophils Absolute 0.00 0.00 - 0.70 x10*3/uL    Basophils Absolute 0.01 0.00 - 0.10 x10*3/uL   Morphology   Result Value Ref Range    RBC Morphology See Below     Polychromasia Mild     Hypochromia Mild     RBC Fragments Few     Target Cells Few    Prepare RBC: 1 Units   Result Value Ref Range    PRODUCT CODE K1833B01     Unit Number [id number]     Unit ABO O     Unit RH POS     XM INTEP COMP     Dispense Status XM     Blood Expiration Date 10/25/2024 11:59:00 PM EDT     PRODUCT BLOOD TYPE 5100     UNIT VOLUME 283    Ammonia   Result Value Ref Range    Ammonia 29 16 - 53 umol/L   BLOOD GAS MIXED VENOUS FULL PANEL   Result Value Ref Range    POCT pH, Mixed 7.43 7.33 - 7.43 pH    POCT pCO2, Mixed 41 41 - 51 mm Hg    POCT pO2, Mixed 42 35 - 45 mm Hg    POCT SO2, Mixed 66 45 - 75 %    POCT Oxy Hemoglobin, Mixed 64.4 45.0 - 75.0 %    POCT Hematocrit Calculated, Mixed 20.0 (L) 36.0 - 46.0 %    POCT Sodium, Mixed 128 (L) 136 - 145 mmol/L    POCT Potassium, Mixed 3.8 3.5 - 5.3 mmol/L    POCT Chloride, Mixed 96 (L) 98 - 107 mmol/L    POCT Ionized Calcium, Mixed 1.10 1.10 - 1.33 mmol/L    POCT Glucose, Mixed 120 (H) 74 - 99 mg/dL    POCT Lactate, Mixed 0.8 0.4  - 2.0 mmol/L    POCT Base Excess, Mixed 2.6 -2.0 - 3.0 mmol/L    POCT HCO3 Calculated, Mixed 27.2 (H) 22.0 - 26.0 mmol/L    POCT Hemoglobin, Mixed 6.8 (L) 12.0 - 16.0 g/dL    POCT Anion Gap, Mixed 9 (L) 10 - 25 mmo/L    Patient Temperature 37.0 degrees Celsius    FiO2 30 %   POCT GLUCOSE   Result Value Ref Range    POCT Glucose 116 (H) 74 - 99 mg/dL           Assessment/Plan   Misael Ritchie is a 69 y.o. female PMH aortic stenosis s/p TAVR 11/23, atrial fibrillation (on Eliquis) s/p DCCV, CAD, ESRD on hemodialysis T/Th/Sat, HFrEF (6/2023 EF 48%), MR, TR, pacemaker CRT-P 5/2023, adrenal insufficiency admitted to the MICU on 10/03/24 for emergent dialysis for hyperkalemia 7.4. Concern for cardiogenic shock given MVO2 of 42 and inability to wean off Epi, lactate remains high and MAPS difficult to stabilize with wide pulse pressure. Has RV dilation in TTE and isolated PAH in swan numbers.     Updates 10/12/24:  Plan to wean off epi and follow up with SBP goal more than 90   Consult HF team and recs appreciated  Repeat CXR for reassess swan placement   Transfuse one unit PRBC    Plan to dialysis today with the goal remover 1-1.5 lit fluid   Trend lactate level      Plan:  NEUROLOGY/PSYCH:  #Pain s/p fall   Scheduled Tylenol  PRN Oxy 5/10       CARDIOVASCULAR:  #PMH A-fib on eliquis , CAD s/p stent 2003, Pace maker CRT-P in 5/23   #pericardial effusion  - RV enlargement in TTE 10/4  -s/p swan alex placement on 10/11  -s/p Right heart cath on 10/11    Management:  Continue home Plavix 75mg daily  Continue home amiodarone, atorvastatin  Hold metoprolol for HR iso of shock on admission  Wean epi as tolerated, last lactate 0.8  Consult HF team and recs appreciated      PULMONARY:  #COPD  Management:  Continue home albuterol PRN     RENAL/GENITOURINARY:  #ESRD  Management:  Continue with HD, Nephrology following  Hold home torsemide   Hold home allopurinol   Plan for HD today      GASTROENTEROLOGY:  Continue home  Protonix, miralax     ENDOCRINOLOGY:  #Adrenal insufficiency  #Hypoglycemia 2/2 insulin patient received for hyperkalemia  Management:  Hydrocortisone 50 mg q6h  Endocrine consult given patient's repeated hypoglycemia c/f possible insulinoma      HEMATOLOGY:  #hematoma   Management:  Continue Heparin  Hgb 6.9 this AM, down from 7.3. Will continue to monitor, particularly with restarting plavix yesterday. No signs of acute bleeding, and Hgb has been steadily downtrending since admission, likely iatrogenic iso of blood draws and ESRD .     SKIN:  No acute concerns     MUSCULOSKELETAL:  #Right troc fracture with hematoma  Management:  S/p surgery with ortho, pain control with Dilaudid, oxy and tylenol     INFECTIOUS DISEASE:  Rule out sepsis low suspicion  :: BC showed NGTD  Management:  Follow up Bcx, UA  MRSA +  S/p vanc, zosyn      ICU Check List      FEN  Fluids:on HD, be cautious with fluid   Electrolytes: PRN  Nutrition: renal diet   Prophylaxis:  DVT ppx: Heparin gtt  GI ppx: PPI  Bowel care: Miralax     Hardware:  Left EJ   Right side  swan   Left radial [place]   CVC non tunneled left femoral         Code: Full Code    HPOA:  Zeke Ritchie () [phone]   Disposition: ORA Garcia MD

## 2024-10-12 NOTE — PROGRESS NOTES
"Misael Ritchie is a 69 y.o. female on day 9 of admission presenting with Hyperkalemia.    Subjective     Chart was reviewed.  Patient underwent [place] placement on 10/11, no hypoglycemia in the past 72 hours, lowest blood sugar 87 on 10/11 at 8 PM.  Patient transiently dropped down to hydrocortisone 25 mg every 6 hours and went back up to 50 every 6 after she required epinephrine again.         Objective       Last Recorded Vitals  Blood pressure 163/63, pulse 75, temperature 36.4 °C (97.5 °F), temperature source Temporal, resp. rate 19, height 1.549 m (5' 1\"), weight 58.2 kg (128 lb 4.9 oz), SpO2 100%.  Intake/Output last 3 Shifts:  I/O last 3 completed shifts:  In: 2252.9 (41.8 mL/kg) [P.O.:40; I.V.:1662.9 (30.9 mL/kg); Other:400; IV Piggyback:150]  Out: 1929 (35.8 mL/kg) [Other:1927; Blood:2]  Dosing Weight: 53.9 kg     Relevant Results  Results from last 7 days   Lab Units 10/12/24  1120 10/12/24  0852 10/12/24  0756 10/12/24  0737 10/12/24  0500 10/12/24  0341 10/11/24  0612 10/11/24  0611 10/10/24  0403 10/10/24  0400 10/09/24  0422 10/09/24  0416 10/08/24  2234 10/08/24  2225   POCT GLUCOSE mg/dL 116* 151* 120* 133*  --  124*   < >  --    < >  --    < >  --    < >  --    GLUCOSE mg/dL  --   --   --   --  116*  --   --  149*  --  181*  --  135*  --  110*    < > = values in this interval not displayed.     Scheduled medications  acetaminophen, 650 mg, oral, q6h  [Held by provider] allopurinol, 100 mg, oral, Daily  amiodarone, 200 mg, oral, Daily  atorvastatin, 40 mg, oral, Nightly  clopidogrel, 75 mg, oral, Daily  diphenhydrAMINE, 12.5 mg, intravenous, Once per day on Monday Wednesday Friday  [START ON 10/14/2024] epoetin bridgett or biosimilar, 8,000 Units, subcutaneous, Once per day on Monday Wednesday Friday  hydrocortisone sodium succinate, 50 mg, intravenous, q6h  midodrine, 5 mg, oral, TID  oxygen, , inhalation, Continuous - Inhalation  pantoprazole, 40 mg, oral, Daily before breakfast  polyethylene " glycol, 17 g, oral, q12h  prochlorperazine, 10 mg, oral, Once  sennosides, 2 tablet, oral, BID      Continuous medications  EPINEPHrine, 0-1 mcg/kg/min, Last Rate: Stopped (10/12/24 0915)  heparin, 0-4,000 Units/hr, Last Rate: 700 Units/hr (10/12/24 1234)      PRN medications  PRN medications: albuterol, bisacodyl, bisacodyl, dextrose, dextrose, glucagon, glucagon, heparin, oxyCODONE      Results for orders placed or performed during the hospital encounter of 10/03/24 (from the past 24 hour(s))   POCT GLUCOSE   Result Value Ref Range    POCT Glucose 90 74 - 99 mg/dL   Blood Gas Arterial Full Panel Unsolicited   Result Value Ref Range    POCT pH, Arterial 7.47 (H) 7.38 - 7.42 pH    POCT pCO2, Arterial 37 (L) 38 - 42 mm Hg    POCT pO2, Arterial 66 (L) 85 - 95 mm Hg    POCT SO2, Arterial 94 94 - 100 %    POCT Oxy Hemoglobin, Arterial 90.9 (L) 94.0 - 98.0 %    POCT Hematocrit Calculated, Arterial 23.0 (L) 36.0 - 46.0 %    POCT Sodium, Arterial 131 (L) 136 - 145 mmol/L    POCT Potassium, Arterial 3.4 (L) 3.5 - 5.3 mmol/L    POCT Chloride, Arterial 97 (L) 98 - 107 mmol/L    POCT Ionized Calcium, Arterial 1.07 (L) 1.10 - 1.33 mmol/L    POCT Glucose, Arterial 107 (H) 74 - 99 mg/dL    POCT Lactate, Arterial 1.4 0.4 - 2.0 mmol/L    POCT Base Excess, Arterial 3.0 -2.0 - 3.0 mmol/L    POCT HCO3 Calculated, Arterial 26.9 (H) 22.0 - 26.0 mmol/L    POCT Hemoglobin, Arterial 7.7 (L) 12.0 - 16.0 g/dL    POCT Anion Gap, Arterial 11 10 - 25 mmo/L    Patient Temperature 37.0 degrees Celsius   POCT GLUCOSE   Result Value Ref Range    POCT Glucose 87 74 - 99 mg/dL   BLOOD GAS VENOUS FULL PANEL   Result Value Ref Range    POCT pH, Venous 7.43 7.33 - 7.43 pH    POCT pCO2, Venous 43 41 - 51 mm Hg    POCT pO2, Venous 41 35 - 45 mm Hg    POCT SO2, Venous 68 45 - 75 %    POCT Oxy Hemoglobin, Venous 66.3 45.0 - 75.0 %    POCT Hematocrit Calculated, Venous 20.0 (L) 36.0 - 46.0 %    POCT Sodium, Venous 128 (L) 136 - 145 mmol/L    POCT Potassium,  Venous 3.5 3.5 - 5.3 mmol/L    POCT Chloride, Venous 94 (L) 98 - 107 mmol/L    POCT Ionized Calicum, Venous 1.12 1.10 - 1.33 mmol/L    POCT Glucose, Venous 110 (H) 74 - 99 mg/dL    POCT Lactate, Venous 1.3 0.4 - 2.0 mmol/L    POCT Base Excess, Venous 3.8 (H) -2.0 - 3.0 mmol/L    POCT HCO3 Calculated, Venous 28.5 (H) 22.0 - 26.0 mmol/L    POCT Hemoglobin, Venous 6.7 (L) 12.0 - 16.0 g/dL    POCT Anion Gap, Venous 9.0 (L) 10.0 - 25.0 mmol/L    Patient Temperature 37.0 degrees Celsius    FiO2 28 %   BLOOD GAS ARTERIAL FULL PANEL   Result Value Ref Range    POCT pH, Arterial 7.45 (H) 7.38 - 7.42 pH    POCT pCO2, Arterial 41 38 - 42 mm Hg    POCT pO2, Arterial 39 (LL) 85 - 95 mm Hg    POCT SO2, Arterial 59 (L) 94 - 100 %    POCT Oxy Hemoglobin, Arterial 57.8 (L) 94.0 - 98.0 %    POCT Hematocrit Calculated, Arterial 21.0 (L) 36.0 - 46.0 %    POCT Sodium, Arterial 129 (L) 136 - 145 mmol/L    POCT Potassium, Arterial 3.7 3.5 - 5.3 mmol/L    POCT Chloride, Arterial 97 (L) 98 - 107 mmol/L    POCT Ionized Calcium, Arterial 1.12 1.10 - 1.33 mmol/L    POCT Glucose, Arterial 118 (H) 74 - 99 mg/dL    POCT Lactate, Arterial 1.1 0.4 - 2.0 mmol/L    POCT Base Excess, Arterial 4.1 (H) -2.0 - 3.0 mmol/L    POCT HCO3 Calculated, Arterial 28.5 (H) 22.0 - 26.0 mmol/L    POCT Hemoglobin, Arterial 6.9 (L) 12.0 - 16.0 g/dL    POCT Anion Gap, Arterial 7 (L) 10 - 25 mmo/L    Patient Temperature 37.0 degrees Celsius    FiO2 28 %   POCT GLUCOSE   Result Value Ref Range    POCT Glucose 115 (H) 74 - 99 mg/dL   CBC   Result Value Ref Range    WBC 10.3 4.4 - 11.3 x10*3/uL    nRBC 2.0 (H) 0.0 - 0.0 /100 WBCs    RBC 2.10 (L) 4.00 - 5.20 x10*6/uL    Hemoglobin 6.6 (L) 12.0 - 16.0 g/dL    Hematocrit 18.0 (L) 36.0 - 46.0 %    MCV 86 80 - 100 fL    MCH 31.4 26.0 - 34.0 pg    MCHC 36.7 (H) 32.0 - 36.0 g/dL    RDW 19.6 (H) 11.5 - 14.5 %    Platelets 50 (L) 150 - 450 x10*3/uL   POCT GLUCOSE   Result Value Ref Range    POCT Glucose 124 (H) 74 - 99 mg/dL    Prepare RBC: 1 Units   Result Value Ref Range    PRODUCT CODE [id number]     Unit Number [id number]     Unit ABO O     Unit RH NEG     XM INTEP COMP     Dispense Status XM     Blood Expiration Date 10/15/2024 11:59:00 PM EDT     PRODUCT BLOOD TYPE 9500     UNIT VOLUME 281    Heparin Assay   Result Value Ref Range    Heparin Unfractionated 0.6 See Comment Below for Therapeutic Ranges IU/mL   Magnesium   Result Value Ref Range    Magnesium 1.91 1.60 - 2.40 mg/dL   Hepatic Function Panel   Result Value Ref Range    Albumin 2.6 (L) 3.4 - 5.0 g/dL    Bilirubin, Total 3.1 (H) 0.0 - 1.2 mg/dL    Bilirubin, Direct 2.1 (H) 0.0 - 0.3 mg/dL    Alkaline Phosphatase 154 (H) 33 - 136 U/L    ALT 11 7 - 45 U/L    AST 50 (H) 9 - 39 U/L    Total Protein 5.1 (L) 6.4 - 8.2 g/dL   CBC and Auto Differential   Result Value Ref Range    WBC 15.2 (H) 4.4 - 11.3 x10*3/uL    nRBC 2.2 (H) 0.0 - 0.0 /100 WBCs    RBC 2.18 (L) 4.00 - 5.20 x10*6/uL    Hemoglobin 6.9 (L) 12.0 - 16.0 g/dL    Hematocrit 18.9 (L) 36.0 - 46.0 %    MCV 87 80 - 100 fL    MCH 31.7 26.0 - 34.0 pg    MCHC 36.5 (H) 32.0 - 36.0 g/dL    RDW 21.1 (H) 11.5 - 14.5 %    Platelets 60 (L) 150 - 450 x10*3/uL    Immature Granulocytes %, Automated 1.5 (H) 0.0 - 0.9 %    Immature Granulocytes Absolute, Automated 0.23 0.00 - 0.70 x10*3/uL   Phosphorus   Result Value Ref Range    Phosphorus 3.6 2.5 - 4.9 mg/dL   Basic Metabolic Panel   Result Value Ref Range    Glucose 116 (H) 74 - 99 mg/dL    Sodium 133 (L) 136 - 145 mmol/L    Potassium 3.7 3.5 - 5.3 mmol/L    Chloride 94 (L) 98 - 107 mmol/L    Bicarbonate 27 21 - 32 mmol/L    Anion Gap 16 10 - 20 mmol/L    Urea Nitrogen 21 6 - 23 mg/dL    Creatinine 2.40 (H) 0.50 - 1.05 mg/dL    eGFR 21 (L) >60 mL/min/1.73m*2    Calcium 8.0 (L) 8.6 - 10.6 mg/dL   Type and screen   Result Value Ref Range    ABO TYPE O     Rh TYPE POS     ANTIBODY SCREEN NEG    Manual Differential   Result Value Ref Range    Neutrophils %, Manual 90.3 40.0 - 80.0 %     Bands %, Manual 1.6 0.0 - 5.0 %    Lymphocytes %, Manual 1.6 13.0 - 44.0 %    Monocytes %, Manual 4.9 2.0 - 10.0 %    Eosinophils %, Manual 0.0 0.0 - 6.0 %    Basophils %, Manual 0.0 0.0 - 2.0 %    Metamyelocytes %, Manual 1.6 0.0 - 0.0 %    Seg Neutrophils Absolute, Manual 13.73 (H) 1.20 - 7.00 x10*3/uL    Bands Absolute, Manual 0.24 0.00 - 0.70 x10*3/uL    Lymphocytes Absolute, Manual 0.24 (L) 1.20 - 4.80 x10*3/uL    Monocytes Absolute, Manual 0.74 0.10 - 1.00 x10*3/uL    Eosinophils Absolute, Manual 0.00 0.00 - 0.70 x10*3/uL    Basophils Absolute, Manual 0.00 0.00 - 0.10 x10*3/uL    Metamyelocytes Absolute, Manual 0.24 0.00 - 0.00 x10*3/uL    Total Cells Counted 124     Neutrophils Absolute, Manual 13.97 (H) 1.20 - 7.70 x10*3/uL    RBC Morphology See Below     Polychromasia Mild     Target Cells Few     Ovalocytes Few     Teardrop Cells Few     [place] Cells Few    B-type natriuretic peptide   Result Value Ref Range    BNP 1,237 (H) 0 - 99 pg/mL   BLOOD GAS MIXED VENOUS FULL PANEL   Result Value Ref Range    POCT pH, Mixed 7.43 7.33 - 7.43 pH    POCT pCO2, Mixed 40 (L) 41 - 51 mm Hg    POCT pO2, Mixed 44 35 - 45 mm Hg    POCT SO2, Mixed 69 45 - 75 %    POCT Oxy Hemoglobin, Mixed 66.9 45.0 - 75.0 %    POCT Hematocrit Calculated, Mixed 27.0 (L) 36.0 - 46.0 %    POCT Sodium, Mixed 128 (L) 136 - 145 mmol/L    POCT Potassium, Mixed 3.8 3.5 - 5.3 mmol/L    POCT Chloride, Mixed 95 (L) 98 - 107 mmol/L    POCT Ionized Calcium, Mixed 1.08 (L) 1.10 - 1.33 mmol/L    POCT Glucose, Mixed 133 (H) 74 - 99 mg/dL    POCT Lactate, Mixed 0.9 0.4 - 2.0 mmol/L    POCT Base Excess, Mixed 2.0 -2.0 - 3.0 mmol/L    POCT HCO3 Calculated, Mixed 26.5 (H) 22.0 - 26.0 mmol/L    POCT Hemoglobin, Mixed 8.9 (L) 12.0 - 16.0 g/dL    POCT Anion Gap, Mixed 10 10 - 25 mmo/L    Patient Temperature 37.0 degrees Celsius    FiO2 28 %   POCT GLUCOSE   Result Value Ref Range    POCT Glucose 133 (H) 74 - 99 mg/dL   POCT GLUCOSE   Result Value Ref Range     POCT Glucose 120 (H) 74 - 99 mg/dL   POCT GLUCOSE   Result Value Ref Range    POCT Glucose 151 (H) 74 - 99 mg/dL   Blood Gas Venous Full Panel   Result Value Ref Range    POCT pH, Venous 7.44 (H) 7.33 - 7.43 pH    POCT pCO2, Venous 40 (L) 41 - 51 mm Hg    POCT pO2, Venous 42 35 - 45 mm Hg    POCT SO2, Venous 72 45 - 75 %    POCT Oxy Hemoglobin, Venous 70.4 45.0 - 75.0 %    POCT Hematocrit Calculated, Venous 14.0 (L) 36.0 - 46.0 %    POCT Sodium, Venous 129 (L) 136 - 145 mmol/L    POCT Potassium, Venous 3.9 3.5 - 5.3 mmol/L    POCT Chloride, Venous 97 (L) 98 - 107 mmol/L    POCT Ionized Calicum, Venous 1.08 (L) 1.10 - 1.33 mmol/L    POCT Glucose, Venous 134 (H) 74 - 99 mg/dL    POCT Lactate, Venous 0.9 0.4 - 2.0 mmol/L    POCT Base Excess, Venous 2.8 -2.0 - 3.0 mmol/L    POCT HCO3 Calculated, Venous 27.2 (H) 22.0 - 26.0 mmol/L    POCT Hemoglobin, Venous 4.8 (LL) 12.0 - 16.0 g/dL    POCT Anion Gap, Venous 9.0 (L) 10.0 - 25.0 mmol/L    Patient Temperature 37.0 degrees Celsius    FiO2 21 %   CBC and Auto Differential   Result Value Ref Range    WBC 14.3 (H) 4.4 - 11.3 x10*3/uL    nRBC 1.7 (H) 0.0 - 0.0 /100 WBCs    RBC 2.16 (L) 4.00 - 5.20 x10*6/uL    Hemoglobin 6.6 (L) 12.0 - 16.0 g/dL    Hematocrit 18.9 (L) 36.0 - 46.0 %    MCV 88 80 - 100 fL    MCH 30.6 26.0 - 34.0 pg    MCHC 34.9 32.0 - 36.0 g/dL    RDW 20.4 (H) 11.5 - 14.5 %    Platelets 57 (L) 150 - 450 x10*3/uL    Neutrophils % 92.5 40.0 - 80.0 %    Immature Granulocytes %, Automated 1.6 (H) 0.0 - 0.9 %    Lymphocytes % 1.5 13.0 - 44.0 %    Monocytes % 4.3 2.0 - 10.0 %    Eosinophils % 0.0 0.0 - 6.0 %    Basophils % 0.1 0.0 - 2.0 %    Neutrophils Absolute 13.23 (H) 1.20 - 7.70 x10*3/uL    Immature Granulocytes Absolute, Automated 0.23 0.00 - 0.70 x10*3/uL    Lymphocytes Absolute 0.21 (L) 1.20 - 4.80 x10*3/uL    Monocytes Absolute 0.62 0.10 - 1.00 x10*3/uL    Eosinophils Absolute 0.00 0.00 - 0.70 x10*3/uL    Basophils Absolute 0.01 0.00 - 0.10 x10*3/uL    Morphology   Result Value Ref Range    RBC Morphology See Below     Polychromasia Mild     Hypochromia Mild     RBC Fragments Few     Target Cells Few    Prepare RBC: 1 Units   Result Value Ref Range    PRODUCT CODE [id number]     Unit Number [id number]     Unit ABO O     Unit RH POS     XM INTEP COMP     Dispense Status XM     Blood Expiration Date 10/25/2024 11:59:00 PM EDT     PRODUCT BLOOD TYPE 5100     UNIT VOLUME 283    Ammonia   Result Value Ref Range    Ammonia 29 16 - 53 umol/L   BLOOD GAS MIXED VENOUS FULL PANEL   Result Value Ref Range    POCT pH, Mixed 7.43 7.33 - 7.43 pH    POCT pCO2, Mixed 41 41 - 51 mm Hg    POCT pO2, Mixed 42 35 - 45 mm Hg    POCT SO2, Mixed 66 45 - 75 %    POCT Oxy Hemoglobin, Mixed 64.4 45.0 - 75.0 %    POCT Hematocrit Calculated, Mixed 20.0 (L) 36.0 - 46.0 %    POCT Sodium, Mixed 128 (L) 136 - 145 mmol/L    POCT Potassium, Mixed 3.8 3.5 - 5.3 mmol/L    POCT Chloride, Mixed 96 (L) 98 - 107 mmol/L    POCT Ionized Calcium, Mixed 1.10 1.10 - 1.33 mmol/L    POCT Glucose, Mixed 120 (H) 74 - 99 mg/dL    POCT Lactate, Mixed 0.8 0.4 - 2.0 mmol/L    POCT Base Excess, Mixed 2.6 -2.0 - 3.0 mmol/L    POCT HCO3 Calculated, Mixed 27.2 (H) 22.0 - 26.0 mmol/L    POCT Hemoglobin, Mixed 6.8 (L) 12.0 - 16.0 g/dL    POCT Anion Gap, Mixed 9 (L) 10 - 25 mmo/L    Patient Temperature 37.0 degrees Celsius    FiO2 30 %   POCT GLUCOSE   Result Value Ref Range    POCT Glucose 116 (H) 74 - 99 mg/dL        Assessment/Plan   Assessment & Plan    Misael Ritchie is a 69 y.o. 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  Patient presenting to hospital after a fall. Found to have L trochanteric fracture with hematoma, hypoglycemia requiring dextrose drip  -post insulin for hyperkalemia iso acidosis- sp R ORIF 10/7, currently on pressors.        Endocrinology service is consulted for management of adrenal insufficiency in setting of recurrent unexplained hypoglycemic episodes.        [Of note:  - patient had similar episodes of hypoglycemia in 2024 for which she was managed at [facility].   Random blood cortisol level of 8.2 on 2/5/2024.   At that time, stim test was done on 2/5/2024 with results as under:  Basal cortisol: 2.6  30-minute cortisol: 2.6  60-minute cortisol: 2.7  -Patient reports that she has been on prednisone 2.5 mg since 2010.  She reports being off prednisone for 6 months in 2011 (patient not sure about the year when she was off).  She reports that she felt miserable when she was off prednisone.  -CT abdo/pelvis (10/3): Bilateral adrenal glands are unremarkable.  -Patient reports that she recently had steroid injection to her right shoulder.    -Received methylprednisolone acetate 40 mg on 9/20/2024.]        # Adrenal insufficiency in setting of chronic glucocorticoid use and recent steroid injection to right shoulder resulting treatment resistant hypoglycemia  #R ORIF 10/7  Remains on - off low dose Epi - off D10 50 mL/h  (Last episode of asymptomatic hypoglycemia down to 40s on 10/8 -C-peptide 5.2 [likely unreliable in the setting of end-stage renal disease on hemodialysis], blood glucose 36, but hydroxybutyrate normal, elevated lactic acid, low insulin and proinsulin)  - diet started-  -10/11-leave in [place] with cardiology, high-output heart failure       Recommendations   -When off pressors would recommend the following taper :  Hydrocortisone 25 mg every 6 hours for 3 days followed by hydrocortisone 15 mg every 6 hours for 3 days, followed by 15 mg 3 times daily (last dose before 6 PM) for 3 days, followed by prednisone 10 mg 3 times daily (last dose before 6 PM) for 3 days, followed by prednisone 5 mg 1 tablet orally daily in the morning, to be  continued until seen as outpatient   (Repeating workup for adrenal insufficiency is not recommended at this time as patient is on stress dose steroids.    ACTH is also expected to be suppressed in setting of recent steroid injection to shoulder (9/20/2024))  -Hold off on further workup for insulinoma as inpatient as hypoglycemia likely present only in the setting of critical illness.  Would reconsider as outpatient only if hypoglycemia recurs in normal state of health.  -will require non urgent endocrine follow up as outpatient  -endocrinology will sign off for now - kindly re-involve us if needed     Plan communicated to primary team via secure messaging.  Case discussed with Dr. Libby Rivera MD

## 2024-10-13 VITALS
DIASTOLIC BLOOD PRESSURE: 53 MMHG | SYSTOLIC BLOOD PRESSURE: 117 MMHG | OXYGEN SATURATION: 94 % | TEMPERATURE: 97 F | HEIGHT: 61 IN | HEART RATE: 76 BPM | RESPIRATION RATE: 16 BRPM | BODY MASS INDEX: 24.22 KG/M2 | WEIGHT: 128.31 LBS

## 2024-10-13 LAB
ALBUMIN SERPL BCP-MCNC: 2.6 G/DL (ref 3.4–5)
ANION GAP SERPL CALC-SCNC: 18 MMOL/L (ref 10–20)
BACTERIA BLD CULT: NORMAL
BASOPHILS # BLD AUTO: 0.01 X10*3/UL (ref 0–0.1)
BASOPHILS NFR BLD AUTO: 0.1 %
BLOOD EXPIRATION DATE: NORMAL
BUN SERPL-MCNC: 36 MG/DL (ref 6–23)
CALCIUM SERPL-MCNC: 8 MG/DL (ref 8.6–10.6)
CHLORIDE SERPL-SCNC: 91 MMOL/L (ref 98–107)
CO2 SERPL-SCNC: 24 MMOL/L (ref 21–32)
CREAT SERPL-MCNC: 3.39 MG/DL (ref 0.5–1.05)
DISPENSE STATUS: NORMAL
EGFRCR SERPLBLD CKD-EPI 2021: 14 ML/MIN/1.73M*2
EOSINOPHIL # BLD AUTO: 0 X10*3/UL (ref 0–0.7)
EOSINOPHIL NFR BLD AUTO: 0 %
ERYTHROCYTE [DISTWIDTH] IN BLOOD BY AUTOMATED COUNT: 18.8 % (ref 11.5–14.5)
GLUCOSE SERPL-MCNC: 152 MG/DL (ref 74–99)
HCT VFR BLD AUTO: 21.7 % (ref 36–46)
HGB BLD-MCNC: 7.9 G/DL (ref 12–16)
IMM GRANULOCYTES # BLD AUTO: 0.23 X10*3/UL (ref 0–0.7)
IMM GRANULOCYTES NFR BLD AUTO: 1.6 % (ref 0–0.9)
LYMPHOCYTES # BLD AUTO: 0.22 X10*3/UL (ref 1.2–4.8)
LYMPHOCYTES NFR BLD AUTO: 1.5 %
MAGNESIUM SERPL-MCNC: 2.03 MG/DL (ref 1.6–2.4)
MCH RBC QN AUTO: 31.7 PG (ref 26–34)
MCHC RBC AUTO-ENTMCNC: 36.4 G/DL (ref 32–36)
MCV RBC AUTO: 87 FL (ref 80–100)
MONOCYTES # BLD AUTO: 0.61 X10*3/UL (ref 0.1–1)
MONOCYTES NFR BLD AUTO: 4.2 %
NEUTROPHILS # BLD AUTO: 13.31 X10*3/UL (ref 1.2–7.7)
NEUTROPHILS NFR BLD AUTO: 92.6 %
NRBC BLD-RTO: 0.9 /100 WBCS (ref 0–0)
PHOSPHATE SERPL-MCNC: 5.3 MG/DL (ref 2.5–4.9)
PLATELET # BLD AUTO: 45 X10*3/UL (ref 150–450)
POTASSIUM SERPL-SCNC: 3.7 MMOL/L (ref 3.5–5.3)
PRODUCT BLOOD TYPE: 9500
PRODUCT CODE: NORMAL
RBC # BLD AUTO: 2.49 X10*6/UL (ref 4–5.2)
SODIUM SERPL-SCNC: 129 MMOL/L (ref 136–145)
UFH PPP CHRO-ACNC: 0.7 IU/ML
UFH PPP CHRO-ACNC: 0.8 IU/ML
UNIT ABO: NORMAL
UNIT NUMBER: NORMAL
UNIT RH: NORMAL
UNIT VOLUME: 281
WBC # BLD AUTO: 14.4 X10*3/UL (ref 4.4–11.3)
XM INTEP: NORMAL

## 2024-10-13 PROCEDURE — 83735 ASSAY OF MAGNESIUM: CPT

## 2024-10-13 PROCEDURE — 1200000002 HC GENERAL ROOM WITH TELEMETRY DAILY

## 2024-10-13 PROCEDURE — 2500000005 HC RX 250 GENERAL PHARMACY W/O HCPCS

## 2024-10-13 PROCEDURE — 2500000001 HC RX 250 WO HCPCS SELF ADMINISTERED DRUGS (ALT 637 FOR MEDICARE OP)

## 2024-10-13 PROCEDURE — 37799 UNLISTED PX VASCULAR SURGERY: CPT

## 2024-10-13 PROCEDURE — 80069 RENAL FUNCTION PANEL: CPT

## 2024-10-13 PROCEDURE — 2500000004 HC RX 250 GENERAL PHARMACY W/ HCPCS (ALT 636 FOR OP/ED)

## 2024-10-13 PROCEDURE — 99233 SBSQ HOSP IP/OBS HIGH 50: CPT

## 2024-10-13 PROCEDURE — 2500000002 HC RX 250 W HCPCS SELF ADMINISTERED DRUGS (ALT 637 FOR MEDICARE OP, ALT 636 FOR OP/ED)

## 2024-10-13 PROCEDURE — 85520 HEPARIN ASSAY: CPT

## 2024-10-13 PROCEDURE — 85025 COMPLETE CBC W/AUTO DIFF WBC: CPT

## 2024-10-13 PROCEDURE — 99291 CRITICAL CARE FIRST HOUR: CPT

## 2024-10-13 RX ORDER — CALCIUM ACETATE 667 MG/1
667 CAPSULE ORAL
Status: DISCONTINUED | OUTPATIENT
Start: 2024-10-13 | End: 2024-10-18 | Stop reason: HOSPADM

## 2024-10-13 RX ORDER — PREDNISONE 10 MG/1
10 TABLET ORAL EVERY 8 HOURS
Status: DISCONTINUED | OUTPATIENT
Start: 2024-10-22 | End: 2024-10-16

## 2024-10-13 RX ORDER — EPINEPHRINE 1 MG/ML
INJECTION, SOLUTION, CONCENTRATE INTRAVENOUS
Status: DISCONTINUED
Start: 2024-10-13 | End: 2024-10-13 | Stop reason: WASHOUT

## 2024-10-13 RX ORDER — PREDNISONE 5 MG/1
5 TABLET ORAL DAILY
Status: DISCONTINUED | OUTPATIENT
Start: 2024-10-25 | End: 2024-10-16

## 2024-10-13 RX ADMIN — RENO CAPS 1 CAPSULE: 100; 1.5; 1.7; 20; 10; 1; 150; 5; 6 CAPSULE ORAL at 08:08

## 2024-10-13 RX ADMIN — OXYCODONE HYDROCHLORIDE 10 MG: 5 TABLET ORAL at 12:04

## 2024-10-13 RX ADMIN — PANTOPRAZOLE SODIUM 40 MG: 20 TABLET, DELAYED RELEASE ORAL at 06:29

## 2024-10-13 RX ADMIN — ACETAMINOPHEN 975 MG: 325 TABLET ORAL at 17:29

## 2024-10-13 RX ADMIN — HYDROCORTISONE SODIUM SUCCINATE 25 MG: 100 INJECTION, POWDER, FOR SOLUTION INTRAMUSCULAR; INTRAVENOUS at 06:29

## 2024-10-13 RX ADMIN — AMIODARONE HYDROCHLORIDE 200 MG: 200 TABLET ORAL at 08:08

## 2024-10-13 RX ADMIN — HEPARIN SODIUM 700 UNITS/HR: 10000 INJECTION, SOLUTION INTRAVENOUS at 04:49

## 2024-10-13 RX ADMIN — MIDODRINE HYDROCHLORIDE 10 MG: 10 TABLET ORAL at 12:04

## 2024-10-13 RX ADMIN — CALCIUM ACETATE 667 MG: 667 CAPSULE ORAL at 12:04

## 2024-10-13 RX ADMIN — POLYETHYLENE GLYCOL 3350 17 G: 17 POWDER, FOR SOLUTION ORAL at 21:01

## 2024-10-13 RX ADMIN — HYDROCORTISONE SODIUM SUCCINATE 25 MG: 100 INJECTION, POWDER, FOR SOLUTION INTRAMUSCULAR; INTRAVENOUS at 00:27

## 2024-10-13 RX ADMIN — ACETAMINOPHEN 975 MG: 325 TABLET ORAL at 21:01

## 2024-10-13 RX ADMIN — MIDODRINE HYDROCHLORIDE 10 MG: 10 TABLET ORAL at 08:08

## 2024-10-13 RX ADMIN — CLOPIDOGREL BISULFATE 75 MG: 75 TABLET ORAL at 08:08

## 2024-10-13 RX ADMIN — CALCIUM ACETATE 667 MG: 667 CAPSULE ORAL at 08:08

## 2024-10-13 RX ADMIN — MIDODRINE HYDROCHLORIDE 10 MG: 10 TABLET ORAL at 17:29

## 2024-10-13 RX ADMIN — ACETAMINOPHEN 975 MG: 325 TABLET ORAL at 08:08

## 2024-10-13 RX ADMIN — HYDROCORTISONE SODIUM SUCCINATE 25 MG: 100 INJECTION, POWDER, FOR SOLUTION INTRAMUSCULAR; INTRAVENOUS at 12:03

## 2024-10-13 RX ADMIN — Medication 2 L/MIN: at 08:12

## 2024-10-13 RX ADMIN — SENNOSIDES 17.2 MG: 8.6 TABLET, FILM COATED ORAL at 21:01

## 2024-10-13 RX ADMIN — ATORVASTATIN CALCIUM 40 MG: 40 TABLET, FILM COATED ORAL at 21:01

## 2024-10-13 RX ADMIN — CALCIUM ACETATE 667 MG: 667 CAPSULE ORAL at 17:29

## 2024-10-13 ASSESSMENT — COGNITIVE AND FUNCTIONAL STATUS - GENERAL
EATING MEALS: A LITTLE
MOVING FROM LYING ON BACK TO SITTING ON SIDE OF FLAT BED WITH BEDRAILS: A LITTLE
TURNING FROM BACK TO SIDE WHILE IN FLAT BAD: A LITTLE
TOILETING: A LITTLE
STANDING UP FROM CHAIR USING ARMS: A LITTLE
PERSONAL GROOMING: A LITTLE
MOVING TO AND FROM BED TO CHAIR: A LITTLE
MOVING FROM LYING ON BACK TO SITTING ON SIDE OF FLAT BED WITH BEDRAILS: A LITTLE
DRESSING REGULAR UPPER BODY CLOTHING: A LITTLE
MOBILITY SCORE: 18
STANDING UP FROM CHAIR USING ARMS: A LITTLE
CLIMB 3 TO 5 STEPS WITH RAILING: A LITTLE
MOVING TO AND FROM BED TO CHAIR: A LITTLE
WALKING IN HOSPITAL ROOM: A LITTLE
DRESSING REGULAR UPPER BODY CLOTHING: A LITTLE
HELP NEEDED FOR BATHING: A LITTLE
HELP NEEDED FOR BATHING: A LITTLE
MOBILITY SCORE: 18
DRESSING REGULAR LOWER BODY CLOTHING: A LITTLE
EATING MEALS: A LITTLE
TOILETING: A LITTLE
WALKING IN HOSPITAL ROOM: A LITTLE
DAILY ACTIVITIY SCORE: 18
TURNING FROM BACK TO SIDE WHILE IN FLAT BAD: A LITTLE
DRESSING REGULAR LOWER BODY CLOTHING: A LITTLE
DAILY ACTIVITIY SCORE: 18
CLIMB 3 TO 5 STEPS WITH RAILING: A LITTLE
PERSONAL GROOMING: A LITTLE

## 2024-10-13 ASSESSMENT — PAIN - FUNCTIONAL ASSESSMENT
PAIN_FUNCTIONAL_ASSESSMENT: 0-10

## 2024-10-13 ASSESSMENT — PAIN DESCRIPTION - LOCATION
LOCATION: COCCYX
LOCATION: COCCYX
LOCATION: HEAD

## 2024-10-13 ASSESSMENT — PAIN SCALES - GENERAL
PAINLEVEL_OUTOF10: 3
PAINLEVEL_OUTOF10: 0 - NO PAIN
PAINLEVEL_OUTOF10: 3
PAINLEVEL_OUTOF10: 8
PAINLEVEL_OUTOF10: 2
PAINLEVEL_OUTOF10: 0 - NO PAIN
PAINLEVEL_OUTOF10: 3
PAINLEVEL_OUTOF10: 6

## 2024-10-13 ASSESSMENT — PAIN DESCRIPTION - ORIENTATION
ORIENTATION: LOWER
ORIENTATION: MID

## 2024-10-13 NOTE — NURSING NOTE
Received from MICU via stretcher.A&Ox3,respers easy,4 eyes skin check performed with PCNA [place].Heparin infusion not in place.team placed order to hold due to [place] and [place] removal.No complaints voiced,no signs or symptoms of distress observed.Call bell in reach,oriented to surroundings,reminded call for assistance as needed.

## 2024-10-13 NOTE — PROGRESS NOTES
Misael Ritchie is a 69 y.o. female on day 10 of admission presenting with Hyperkalemia.      Subjective   Patient states that she feels improved compared to yesterday. No acute complaints at this time.        Objective     Last Recorded Vitals  /64   Pulse 75   Temp 36.1 °C (97 °F)   Resp 15   Wt 58.2 kg (128 lb 4.9 oz)   SpO2 95%   Intake/Output last 3 Shifts:    Intake/Output Summary (Last 24 hours) at 10/13/2024 1619  Last data filed at 10/13/2024 1300  Gross per 24 hour   Intake 958.85 ml   Output 1600 ml   Net -641.15 ml       Admission Weight  Weight: 53.5 kg (118 lb) (10/03/24 0941)    Daily Weight  10/10/24 : 58.2 kg (128 lb 4.9 oz)    Image Results  XR chest 1 view  Narrative: Interpreted By:  Avni York,   STUDY:  XR CHEST 1 VIEW;  10/12/2024 9:36 am      INDICATION:  Signs/Symptoms:hernandez.      COMPARISON:  Chest radiograph 10/08/2024 and chest CT 10/03/2024      ACCESSION NUMBER(S):  [id number]      ORDERING CLINICIAN:  KAREN MANTILLA      FINDINGS:  AP radiograph of the chest. Patient is now significantly rotated  towards right, limiting evaluation and comparison. Interval placement  of right IJ approach [place]-Rasheed catheter, the tip of which overlies  main pulmonary artery. Stable positioning of left subclavian approach  biventricular AICD. Status post prior TAVR.      CARDIOMEDIASTINAL SILHOUETTE:  The cardiomediastinal silhouette persistently enlarged, grossly  unchanged from prior. Aortic knob calcifications. Poorly visualized  coronary artery stents.      LUNGS:  There has been interval worsening of hazy opacification of right  hemithorax, which is likely due to layering of moderate right pleural  effusion on present supine radiograph. A component of background  pulmonary edema/superimposed infectious process is not excluded. Mild  left basilar atelectasis. There is no pneumothorax.      ABDOMEN:  No remarkable upper abdominal findings.      BONES:  No acute osseous  abnormality.      Impression: 1. Interval worsening of hazy opacification of right hemithorax,  which is likely due to layering of moderate right pleural effusion on  present supine radiograph. A component of background pulmonary  edema/superimposed infectious process is not excluded.  2. Medical devices as above. No pneumothorax.      Signed by: Avni York 10/12/2024 10:48 AM  Dictation workstation:   [id number]      Physical Exam  General: Awake, alert, in no acute distress  HENT: Normo-cephalic, right periorbital hematoma No stridor  CV: Regular rate, regular rhythm. Radial pulses 2+ bilaterally  Resp: Breathing non-labored, speaking in full sentences.  Clear to auscultation bilaterally  GI: Soft, non-distended, non-tender. No rebound or guarding.  : deferred   MSK/Extremities: No gross bony deformities. Moving all extremities  Skin: Warm. Appropriate color  Neuro: Aox2 Face symmetric. Speech is fluent.  Gross strength and sensation intact in b/l UE and LEs  Psych: Appropriate mood and affect    Relevant Results      Results for orders placed or performed during the hospital encounter of 10/03/24 (from the past 24 hour(s))   BLOOD GAS MIXED VENOUS FULL PANEL   Result Value Ref Range    POCT pH, Mixed 7.36 7.33 - 7.43 pH    POCT pCO2, Mixed 39 (L) 41 - 51 mm Hg    POCT pO2, Mixed 42 35 - 45 mm Hg    POCT SO2, Mixed 69 45 - 75 %    POCT Oxy Hemoglobin, Mixed 67.8 45.0 - 75.0 %    POCT Hematocrit Calculated, Mixed 28.0 (L) 36.0 - 46.0 %    POCT Sodium, Mixed 129 (L) 136 - 145 mmol/L    POCT Potassium, Mixed 3.3 (L) 3.5 - 5.3 mmol/L    POCT Chloride, Mixed 97 (L) 98 - 107 mmol/L    POCT Ionized Calcium, Mixed 1.02 (L) 1.10 - 1.33 mmol/L    POCT Glucose, Mixed 189 (H) 74 - 99 mg/dL    POCT Lactate, Mixed 2.4 (H) 0.4 - 2.0 mmol/L    POCT Base Excess, Mixed -3.2 (L) -2.0 - 3.0 mmol/L    POCT HCO3 Calculated, Mixed 22.0 22.0 - 26.0 mmol/L    POCT Hemoglobin, Mixed 9.3 (L) 12.0 - 16.0 g/dL    POCT Anion Gap, Mixed 13  10 - 25 mmo/L    Patient Temperature 37.0 degrees Celsius    FiO2 30 %   Lactate   Result Value Ref Range    Lactate 2.5 (H) 0.4 - 2.0 mmol/L   CBC and Auto Differential   Result Value Ref Range    WBC 15.2 (H) 4.4 - 11.3 x10*3/uL    nRBC 0.8 (H) 0.0 - 0.0 /100 WBCs    RBC 2.64 (L) 4.00 - 5.20 x10*6/uL    Hemoglobin 8.4 (L) 12.0 - 16.0 g/dL    Hematocrit 23.0 (L) 36.0 - 46.0 %    MCV 87 80 - 100 fL    MCH 31.8 26.0 - 34.0 pg    MCHC 36.5 (H) 32.0 - 36.0 g/dL    RDW 18.5 (H) 11.5 - 14.5 %    Platelets 48 (L) 150 - 450 x10*3/uL    Neutrophils %      Immature Granulocytes %, Automated 2.0 (H) 0.0 - 0.9 %    Lymphocytes %      Monocytes %      Eosinophils %      Basophils %      Neutrophils Absolute      Immature Granulocytes Absolute, Automated 0.30 0.00 - 0.70 x10*3/uL    Lymphocytes Absolute      Monocytes Absolute      Eosinophils Absolute      Basophils Absolute     Lactate   Result Value Ref Range    Lactate 2.4 (H) 0.4 - 2.0 mmol/L   Blood Gas Mixed Venous Full Panel   Result Value Ref Range    POCT pH, Mixed 7.37 7.33 - 7.43 pH    POCT pCO2, Mixed 44 41 - 51 mm Hg    POCT pO2, Mixed 45 35 - 45 mm Hg    POCT SO2, Mixed 71 45 - 75 %    POCT Oxy Hemoglobin, Mixed 69.2 45.0 - 75.0 %    POCT Hematocrit Calculated, Mixed 25.0 (L) 36.0 - 46.0 %    POCT Sodium, Mixed 127 (L) 136 - 145 mmol/L    POCT Potassium, Mixed 3.7 3.5 - 5.3 mmol/L    POCT Chloride, Mixed 95 (L) 98 - 107 mmol/L    POCT Ionized Calcium, Mixed 1.13 1.10 - 1.33 mmol/L    POCT Glucose, Mixed 188 (H) 74 - 99 mg/dL    POCT Lactate, Mixed 2.0 0.4 - 2.0 mmol/L    POCT Base Excess, Mixed 0.0 -2.0 - 3.0 mmol/L    POCT HCO3 Calculated, Mixed 25.4 22.0 - 26.0 mmol/L    POCT Hemoglobin, Mixed 8.3 (L) 12.0 - 16.0 g/dL    POCT Anion Gap, Mixed 10 10 - 25 mmo/L    Patient Temperature 37.0 degrees Celsius    FiO2 21 %   Heparin Assay   Result Value Ref Range    Heparin Unfractionated 0.8 See Comment Below for Therapeutic Ranges IU/mL   Magnesium   Result Value Ref  Range    Magnesium 2.03 1.60 - 2.40 mg/dL   Renal function panel   Result Value Ref Range    Glucose 152 (H) 74 - 99 mg/dL    Sodium 129 (L) 136 - 145 mmol/L    Potassium 3.7 3.5 - 5.3 mmol/L    Chloride 91 (L) 98 - 107 mmol/L    Bicarbonate 24 21 - 32 mmol/L    Anion Gap 18 10 - 20 mmol/L    Urea Nitrogen 36 (H) 6 - 23 mg/dL    Creatinine 3.39 (H) 0.50 - 1.05 mg/dL    eGFR 14 (L) >60 mL/min/1.73m*2    Calcium 8.0 (L) 8.6 - 10.6 mg/dL    Phosphorus 5.3 (H) 2.5 - 4.9 mg/dL    Albumin 2.6 (L) 3.4 - 5.0 g/dL   CBC and Auto Differential   Result Value Ref Range    WBC 14.4 (H) 4.4 - 11.3 x10*3/uL    nRBC 0.9 (H) 0.0 - 0.0 /100 WBCs    RBC 2.49 (L) 4.00 - 5.20 x10*6/uL    Hemoglobin 7.9 (L) 12.0 - 16.0 g/dL    Hematocrit 21.7 (L) 36.0 - 46.0 %    MCV 87 80 - 100 fL    MCH 31.7 26.0 - 34.0 pg    MCHC 36.4 (H) 32.0 - 36.0 g/dL    RDW 18.8 (H) 11.5 - 14.5 %    Platelets 45 (L) 150 - 450 x10*3/uL    Neutrophils % 92.6 40.0 - 80.0 %    Immature Granulocytes %, Automated 1.6 (H) 0.0 - 0.9 %    Lymphocytes % 1.5 13.0 - 44.0 %    Monocytes % 4.2 2.0 - 10.0 %    Eosinophils % 0.0 0.0 - 6.0 %    Basophils % 0.1 0.0 - 2.0 %    Neutrophils Absolute 13.31 (H) 1.20 - 7.70 x10*3/uL    Immature Granulocytes Absolute, Automated 0.23 0.00 - 0.70 x10*3/uL    Lymphocytes Absolute 0.22 (L) 1.20 - 4.80 x10*3/uL    Monocytes Absolute 0.61 0.10 - 1.00 x10*3/uL    Eosinophils Absolute 0.00 0.00 - 0.70 x10*3/uL    Basophils Absolute 0.01 0.00 - 0.10 x10*3/uL   Heparin Assay, UFH   Result Value Ref Range    Heparin Unfractionated 0.7 See Comment Below for Therapeutic Ranges IU/mL        Assessment/Plan        Misael Ritchie is a 69 y.o. female PMH aortic stenosis s/p TAVR 11/23, atrial fibrillation (on Eliquis) s/p DCCV, CAD, ESRD on hemodialysis T/Th/Sat, HFrEF (6/2023 EF 48%), MR, TR, pacemaker CRT-P 5/2023, adrenal insufficiency admitted to the MICU on 10/03/24 for emergent dialysis for hyperkalemia 7.4. Known RV systolic dysfunction,  swan in MICU revealed mild PA hypertension, severe TR, and RV dysfunciton. Patient had new systolic goal of , mixed venous shoed normal SO2 and lactate has been stable. Patient has been off of pressors since 10/12 and will be transferred to the floor from the MICU.    Updates 10/12/24  - Transferred from MICU to floor  - HF team is following   - Phos binder was added   - Nephrology is following plan for HD 10/15   - Lactate 2.4 today down from 2.6     #A-fib on eliquis ,   #CAD s/p stent 2003,  # Pace maker CRT-P in 5/23   #pericardial effusion  :: TTE 10/4: LV EF 50-55%, reduced RV systolic function, severely enlarged RV, LA/RA severely dilated, small pericardial effusion, mod mitral annular calcification, severe TR, presence of TAVR, severely elevated PA pressure   :: s/p swan alex placement on 10/11 removed prior to floor transfer. Patient with prolonged bleed after removal.   :: s/p Right heart cath on 10/11  :: RHC hemodynamics: RA 3, RVSP 53, RVEDP 9 , PCWP 10, PA 57/9, mean PAP 29,CO/CI 4.61/2.95. PA sat 55, PVR 4.3     Management:  - Continue home Plavix 75mg daily   - Continue home amiodarone, atorvastatin  - Hold metoprolol for HR iso of shock on admission  - Consult HF team and recs appreciated     #Adrenal insufficiency  #Hypoglycemia 2/2 insulin patient received for hyperkalemia  Management:  - Per Endocrine okay to taper down to hydrocortisone 25mg IV q6H (for 3 days currently on day 1) --> 15 mg q6H for 3 days --> 15mg TID for 3 days (last dose before 6 PM) --> prednisone 10 TID for 3 days--> prednisone 5 mg daily until follow up as outpatient.   - Endocrine recommends re-engaging if necessary     #ESRD on HD TTS   :: Potassium of 7.4 on admission requiring  urgent dialysis   :: HD unit: [facility]   :: Access: RUE AVF   :: EDW 56kg      Management:  - Patient set up for HD 10/15, Nephrology following  - Hold home torsemide   - Hold home allopurinol     #Right troc fracture with  hematoma  Management:  - Hgb 7.9 this AM  will continue to monitor   - S/p Hip Fracture ORIF w/ Nail Trochanteric surgery with ortho 10/7  - pain control with Dilaudid, oxy and tylenol  - Tylenol 975 TID, Oxy 10mg Q4h for severe pain      #COPD  Management:  - Continue home albuterol PRN     FEN  Fluids:on HD, be cautious with fluid   Electrolytes: PRN  Nutrition: renal diet   Prophylaxis:  DVT ppx: Heparin gtt  GI ppx: PPI  Bowel care: Miralax  Code: Full Code    HPOA:  Erma Zeke () [phone]   Assessment & Plan  Hyperkalemia    ESRD (end stage renal disease) on dialysis (Multi)    Pacemaker    HTN (hypertension)    CAD (coronary artery disease)    CHF (congestive heart failure)    Atrial fibrillation (Multi)    Difficult intubation    Anemia    Nondisplaced intertrochanteric fracture of right femur, initial encounter for closed fracture    Pulmonary hypertension (Multi)                  Patrice Ramos MD  PGY-1 Internal Medicine Resident

## 2024-10-13 NOTE — PROGRESS NOTES
ICU to [facility] Transfer Summary     I:  ICU Admission Reason & Brief ICU Course:    Misael Ritchie 69 y.o. female PMH aortic stenosis, atrial fibrillation, CAD, CKD, ESRD on hemodialysis, CHF (8/2024 EF ~48%) presented via EMS after a fall patient states she was feeling dizzy this morning and got up to turn a fan on because she was feeling warm and fell on her right side. Patient states she hit her head. She also reports she did not take any of her medications this morning because she fell prior to the time she normally takes her medications. Patient states she did get her dialysis on Tuesday and completed the session without issue. In the ED the patient was found to have a potassium of 7.4 and was given 5 units of regular insulin and D50. She also received calcium gluconate 2g and sodium bicarbonate.  Shortly after she was found to be obtunded and blood sugar was found to be 52. The decision was made to transfer the patient to the MICU for emergent dialysis.     On arrival to the MICU, patient is HDS she is mentating appropriately. She reports she last took her Elliquis yesterday. She reports feeling cold and somewhat short of breath which improved with albuterol inhaler in the ED and she's currently on 6L NC. During ICU stay she was started on emergent bedside hemodialysis. Endocrinology consulted for hypoglycemia and known adrenal insufficiency, for which they recommended 50mg Hydrocortisone q6h.     She went to the OR on 10/07 for repair of R trochanteric fracture. Post operatively managed with dilaudid and oxycodone for pain control.     Post operatively, on and off epi with widened pulse pressures. On 10/09 mixed venous O2 was normal and we switched from Epi to Levo. Overnight she had round of hypoglycemia with a lactate rise to 10.5 She was switched back to Epi and placed on dextrose fluids after amps of D50 with normlaization of blood glucose and MAPs. By morning her lactate was downtrending with plateau  to 3.     Due to inability to completely wean her off Epi, we attempted to place a bedside [place] Rasheed Catheter with the HF ICU team. Unfortunately we could not advance wire past the pacemaker,and so aborted procedure. She was taken to cath lab on 10/11 for successful [place] Rasheed placement.     [facility] hemodynamics:  RA 3  RVSP 53, RVEDP 9   PCWP 10  PA 57/9, mean PAP 29  CO/CI 4.61/2.95  PA sat 55%  PVR 4.3     RV systolic dysfunction seemed known given numerous echos and notes from [facility]. Used systolic goal of > , mixed venous shows normal SO2 and lactate stable. Off epi since yesterday. Stable and ready to go        C: Code Status/DPOA Info/Goals of Care/ACP Note    Full Code  DPOA/Contact Number:  Zeke Ritchie () [phone]     U: Unprescribing & Pertinent High-Risk Medications     Anticoagulation: hep ggt, Afib    Antibiotics:   [x] N/A - no current planned antimicrobioals      P: Pending Tests at the Time of Transfer   N/A      A: Active consultants, including Rehab:   [x]  Subspecialty Consultants: nephrology, heart failure  [x]  PT  [x]  OT  []  SLP  []  Wound Care    U: Uncertainty Measure/Diagnostic Pause:    Working diagnosis at the time of transfer urgent dialysis and ORIF, wide pulse pressure concerning fort cardiogenic shock and adrenal insufficiency however normal lactate and gas shows new baseline.      Diagnosis Degree of Certainty: 1. High degree of certainty about the clinical diagnosis.     S: Summary of Major Problems and To-Dos:   NEUROLOGY/PSYCH:  #Pain s/p fall   Scheduled Tylenol  PRN Oxy 5/10     CARDIOVASCULAR:  #PMH A-fib on eliquis , CAD s/p stent 2003, Pace maker CRT-P in 5/23   #pericardial effusion  - RV enlargement in TTE 10/4  -s/p swan rasheed placement on 10/11  -s/p Right heart cath on 10/11     Management:  Continue home Plavix 75mg daily  Continue home amiodarone, atorvastatin  Hold metoprolol for HR iso of shock on admission  Wean epi as tolerated, last lactate  0.8  Consult HF team and recs appreciated      PULMONARY:  #COPD  Management:  Continue home albuterol PRN     RENAL/GENITOURINARY:  #ESRD  Management:  Continue with HD, Nephrology following  Hold home torsemide   Hold home allopurinol   Plan for HD today      GASTROENTEROLOGY:  Continue home Protonix, miralax     ENDOCRINOLOGY:  #Adrenal insufficiency  #Hypoglycemia 2/2 insulin patient received for hyperkalemia  Management:  Hydrocortisone 50 mg q6h  Endocrine consult given patient's repeated hypoglycemia c/f possible insulinoma      HEMATOLOGY:  #hematoma   Management:  Continue Heparin  Hgb 6.9 this AM, down from 7.3. Will continue to monitor, particularly with restarting plavix yesterday. No signs of acute bleeding, and Hgb has been steadily downtrending since admission, likely iatrogenic iso of blood draws and ESRD .     SKIN:  No acute concerns     MUSCULOSKELETAL:  #Right troc fracture with hematoma  Management:  S/p surgery with ortho, pain control with Dilaudid, oxy and tylenol     INFECTIOUS DISEASE:  N/A    ICU Check List     FEN  Fluids:on HD, be cautious with fluid   Electrolytes: PRN  Nutrition: renal diet   Prophylaxis:  DVT ppx: Heparin gtt  GI ppx: PPI  Bowel care: Miralax  Code: Full Code    HPOA:  Zeke Ritchie () [phone]     To-do transfer:  []Steroid taper: Hydrocortisone 25 mg every 6 hours for 2 days followed by hydrocortisone 15 mg every 6 hours for 3 days, followed by 15 mg 3 times daily (last dose before 6 PM) for 3 days, followed by prednisone 10 mg 3 times daily (last dose before 6 PM) for 3 days, followed by prednisone 5 mg 1 tablet orally daily in the morning, to be continued until seen as outpatient   []Endo f/up O/P, insulinoma workup when stable     E: Exam, including Lines/Drains/Airways & Data Review:   General: Awake, alert, in no acute distress  Eyes: Gaze conjugate.  No scleral icterus or injection  HENT: Normo-cephalic, right periorbital hematoma No stridor  CV:  Regular rate, regular rhythm. Radial pulses 2+ bilaterally  Resp: Breathing non-labored, speaking in full sentences.  Clear to auscultation bilaterally  GI: Soft, non-distended, non-tender. No rebound or guarding.  : deferred   MSK/Extremities: No gross bony deformities. Moving all extremities  Skin: Warm. Appropriate color  Neuro: Aox2 Face symmetric. Speech is fluent.  Gross strength and sensation intact in b/l UE and LEs  Psych: Appropriate mood and affect    Difficult airway? N/A  Lines/drains assessed for removal? YES    Within 30 minutes of the patient physically leaving the floor, a Floor Readiness Note needs to be placed with updated vitals.

## 2024-10-13 NOTE — PROGRESS NOTES
Misael Ritchie   69 y.o. female   MRN/Room: [id number]/[id number]  Chief Concern: Hyperkalemia  Nephrology following for: emergent HD MICU     Subjective    Subjective: patient was seen at bedside. She says she is feeling significantly better today. She denies any shortness of breath , chest pain, or abdominal pain. She is breathing comfortably on 2 lit NC. and she is off pressors since yesterday. Yesterday she received 2 hours ultrafiltration dialysis and she tolerated very well without dropping in blood pressure. No new complaints.      Objective    Vitals:  Temp:  [35.3 °C (95.5 °F)-36.2 °C (97.2 °F)] 35.6 °C (96.1 °F)  Heart Rate:  [73-76] 75  Resp:  [8-24] 16  BP: (109-113)/(43-56) 109/56  Arterial Line BP 1: ()/(37-67) 118/47  FiO2 (%):  [28 %] 28 %   FiO2 (%):  [28 %] 28 %   Meds:   Medications   Scheduled Medications  acetaminophen, 975 mg, oral, TID  [Held by provider] allopurinol, 100 mg, oral, Daily  amiodarone, 200 mg, oral, Daily  atorvastatin, 40 mg, oral, Nightly  calcium acetate, 667 mg, oral, TID  clopidogrel, 75 mg, oral, Daily  diphenhydrAMINE, 12.5 mg, intravenous, Once per day on Monday Wednesday Friday  [START ON 10/14/2024] epoetin bridgett or biosimilar, 8,000 Units, subcutaneous, Once per day on Monday Wednesday Friday  hydrocortisone sodium succinate, 25 mg, intravenous, q6h  midodrine, 10 mg, oral, TID  oxygen, , inhalation, Continuous - Inhalation  pantoprazole, 40 mg, oral, Daily before breakfast  polyethylene glycol, 17 g, oral, q12h  prochlorperazine, 10 mg, oral, Once  sennosides, 2 tablet, oral, BID  vitamin B complex-vitamin C-folic acid, 1 capsule, oral, Daily     Continuous Medications  EPINEPHrine, 0-1 mcg/kg/min, Last Rate: Stopped (10/12/24 0915)  heparin, 0-4,000 Units/hr, Last Rate: 500 Units/hr (10/13/24 1127)     PRN Medications  PRN medications: albuterol, bisacodyl, bisacodyl, dextrose, dextrose, glucagon, glucagon, heparin, oxyCODONE   I/O:   Intake/Output Summary  (Last 24 hours) at 10/13/2024 1200  Last data filed at 10/13/2024 1100  Gross per 24 hour   Intake 1223.4 ml   Output 1600 ml   Net -376.6 ml     Wt Readings from Last 3 Encounters:   10/10/24 58.2 kg (128 lb 4.9 oz)       Physical Exam:  General: Awake, alert, in no acute distress  Eyes: Gaze conjugate.  No scleral icterus or injection  HENT: Normo-cephalic, right periorbital hematoma No stridor, left EJ, right swan in place.   CV: Regular rate, regular rhythm. Radial pulses 2+ bilaterally  Resp: Breathing non-labored, speaking in full sentences.  Clear to auscultation bilaterally  GI: Soft, non-distended, non-tender. No rebound or guarding.  : deferred   MSK/Extremities: No gross bony deformities. Moving all extremities  Skin: Warm. Appropriate color  Neuro: Aox2 Face symmetric. Speech is fluent.  Gross strength and sensation intact in b/l UE and LEs  Psych: Appropriate mood and affect      Labs:  CBC:  Results from last 7 days   Lab Units 10/13/24  0440 10/12/24  1742 10/12/24  0914   WBC AUTO x10*3/uL 14.4* 15.2* 14.3*   HEMOGLOBIN g/dL 7.9* 8.4* 6.6*   HEMATOCRIT % 21.7* 23.0* 18.9*   MCV fL 87 87 88   PLATELETS AUTO x10*3/uL 45* 48* 57*     RFP:  Results from last 7 days   Lab Units 10/13/24  0440 10/12/24  0500 10/11/24  0611   SODIUM mmol/L 129* 133* 126*   POTASSIUM mmol/L 3.7 3.7 3.6   CHLORIDE mmol/L 91* 94* 88*   CO2 mmol/L 24 27 24   BUN mg/dL 36* 21 31*   CREATININE mg/dL 3.39* 2.40* 3.59*   CALCIUM mg/dL 8.0* 8.0* 7.9*   MAGNESIUM mg/dL 2.03 1.91 1.94   PHOSPHORUS mg/dL 5.3* 3.6 3.8     HFP:  Results from last 7 days   Lab Units 10/13/24  0440 10/12/24  0500 10/11/24  0611 10/10/24  0400   AST U/L  --  50* 50* 78*   ALT U/L  --  11 11 21   ALK PHOS U/L  --  154* 133 146*   BILIRUBIN TOTAL mg/dL  --  3.1* 2.3* 2.5*   BILIRUBIN DIRECT mg/dL  --  2.1* 1.5* 1.5*   ALBUMIN g/dL 2.6* 2.6* 2.5* 2.9*     Cardiac:  Results from last 7 days   Lab Units 10/12/24  0500 10/11/24  0611 10/09/24  0416  10/09/24  0212 10/08/24  2225   TROPHSC ng/L  --   --   --  1,351* 1,491*   BNP pg/mL 1,237* 1,876* 987*  --  1,164*     Coag:  Results from last 7 days   Lab Units 10/10/24  0400 10/09/24  0812 10/09/24  0623   APTT seconds 54* 65* 52*     ABG/VBG:   Results from last 7 days   Lab Units 10/11/24  2250 10/11/24  1840 10/08/24  2227   POCT PH, ARTERIAL pH 7.45* 7.47* 7.25*   POCT PCO2, ARTERIAL mm Hg 41 37* 27*   POCT PO2, ARTERIAL mm Hg 39* 66* 79*   POCT HCO3 CALCULATED, ARTERIAL mmol/L 28.5* 26.9* 11.8*   POCT BASE EXCESS, ARTERIAL mmol/L 4.1* 3.0 -14.0*     Results from last 7 days   Lab Units 10/12/24  0853 10/11/24  2126 10/11/24  0145   POCT PH, VENOUS pH 7.44* 7.43 7.37   POCT PCO2, VENOUS mm Hg 40* 43 46   POCT PO2, VENOUS mm Hg 42 41 32*   POCT HCO3 CALCULATED, VENOUS mmol/L 27.2* 28.5* 26.6*            Assessment/Plan    ASSESSMENT:  Misael Ritchie is a  69 y.o. F with PMH ESRD on HD TTS, HFrEF, ortic stenosis s/p TAVR 11/23, atrial fibrillation (on Eliquis) s/p DCCV, CAD, pacemaker CRT-P 5/2023, adrenal insufficiency who is currently admitted to the MICU on account of suspected cardiogenic shock, RV failure. Nephrology following to facilitate inpatient RRT.   #ESRD on HD TTS   -HD unit: [facility] [place]   -Access: [facility]AN [facility] AVF   -EDW 56kg       #Fluid status  :: Hypervolemic  :: Lungs clear to auscultation, no respiratory distress   :: Pitting edema BLE  :: Saturating well on 2L NC  Plan:  - Continue iHD while inpatient next session on Tuesday     #Electrolytes  :: Hypophosphatemia  ::hyponatremia    :: Phos 5.3 today  :: sodium 129, given she is receiving her meds in [facility]  :: phos 10/12 post-dialysis is 3.6, down from 3.8 pre-dialysis yesterday and jumped up to 5.3 this AM  :: Dialysis appears to be effective judging by satisfactory improvement in laboratory values on 10/13  Plan:  - iHD while inpatient for correction of electrolytes  - agree to add calcium acetate for phos binder   - Follow on daily RFP      #CKD Anemia  -HGB 6.9  -Planned for PRBC transfusion today  -Added Epo     #CKD-MBD  -Liset Ca WNL, Phos 3.6, stable   -No phos binder ordered      RECOMMENDATIONS:  -Plan for next iHD on Tuesday 10/15  - continue to RFP daily  - Renally dose medications   - Renal diet  - Renal multivitamin   - Nephrology will continue to follow        Assessment and plan discussed with Dr. Patel. Recommendations not final until signed by attending physician.      ---  Merlyn Garcia MD  PGY1, Internal Medicine  [facility] Nephrology Team  Renal Pager [phone]

## 2024-10-13 NOTE — CARE PLAN
Problem: Skin  Goal: Prevent/manage excess moisture  Outcome: Progressing  Goal: Prevent/minimize sheer/friction injuries  Outcome: Progressing  Goal: Promote/optimize nutrition  Outcome: Progressing  Goal: Promote skin healing  Outcome: Progressing  Goal: Decreased wound size/increased tissue granulation at next dressing change  Outcome: Progressing

## 2024-10-13 NOTE — SIGNIFICANT EVENT
Floor Readiness Note       I, personally, evaluated Chrysanthemum Erma prior to transfer to the floor, including reviewing all current laboratory and imaging studies. The patient remains appropriate for transfer to the floor. Bedside nurse and respiratory therapy are also in agreement of patient's readiness for the floor.     Brief summary:  Misael Ritchie is a 69 y.o. female who was admitted to the MICU for urgent dialysis and ORIF procedure, done and has widened pulse pressure. Known RV systolic dysfunction, swan in place and number shows mild PA hypertension, severe TR, and RV dysfunciton. Used systolic goal of > , mixed venous shows normal SO2 and lactate stable. Off pressors since yesterday. Stable and ready to go     Updated focused Physical Exam:  General: Awake, alert, in no acute distress  Eyes: Gaze conjugate.  No scleral icterus or injection  HENT: Normo-cephalic, right periorbital hematoma No stridor  CV: Regular rate, regular rhythm. Radial pulses 2+ bilaterally  Resp: Breathing non-labored, speaking in full sentences.  Clear to auscultation bilaterally  GI: Soft, non-distended, non-tender. No rebound or guarding.  : deferred   MSK/Extremities: No gross bony deformities. Moving all extremities  Skin: Warm. Appropriate color  Neuro: Aox2 Face symmetric. Speech is fluent.  Gross strength and sensation intact in b/l UE and LEs  Psych: Appropriate mood and affect      Current Vital Signs:  Heart Rate: 75 (10/13/24 1300 : Neela Johnson, RN)  BP: 109/56 (10/12/24 1621 : Silvana Lira, RN)  Temp: 35.5 °C (95.9 °F) (10/13/24 1200 : Alyssa Castro)  Resp: 11 (10/13/24 1300 : Neela Johnson, RN)  SpO2: 100 % (10/13/24 1300 : Neela Johnson, RN)    Relevant updates since rounds:  Added phosph binder    Accepting team,  Bandar , received verbal sign out and the Provider Care team/Attending has been updated. Bedside nurse will now call accepting nurse for report and patient will be transferred to  [place] [id number].    Amos Tapia MD

## 2024-10-14 ENCOUNTER — TELEPHONE (OUTPATIENT)
Dept: OTOLARYNGOLOGY | Facility: HOSPITAL | Age: 69
End: 2024-10-14
Payer: MEDICARE

## 2024-10-14 ENCOUNTER — APPOINTMENT (OUTPATIENT)
Dept: CARDIOLOGY | Facility: HOSPITAL | Age: 69
DRG: 480 | End: 2024-10-14
Payer: MEDICARE

## 2024-10-14 PROBLEM — R60.0 EDEMA OF BOTH UPPER EXTREMITIES: Status: ACTIVE | Noted: 2024-10-14

## 2024-10-14 LAB
ACANTHOCYTES BLD QL SMEAR: NORMAL
ALBUMIN SERPL BCP-MCNC: 2.7 G/DL (ref 3.4–5)
ANION GAP SERPL CALC-SCNC: 22 MMOL/L (ref 10–20)
BASOPHILS # BLD AUTO: 0.03 X10*3/UL (ref 0–0.1)
BASOPHILS NFR BLD AUTO: 0.2 %
BUN SERPL-MCNC: 49 MG/DL (ref 6–23)
BURR CELLS BLD QL SMEAR: NORMAL
CALCIUM SERPL-MCNC: 8.3 MG/DL (ref 8.6–10.6)
CHLORIDE SERPL-SCNC: 88 MMOL/L (ref 98–107)
CO2 SERPL-SCNC: 23 MMOL/L (ref 21–32)
CREAT SERPL-MCNC: 4.61 MG/DL (ref 0.5–1.05)
DACRYOCYTES BLD QL SMEAR: NORMAL
EGFRCR SERPLBLD CKD-EPI 2021: 10 ML/MIN/1.73M*2
EOSINOPHIL # BLD AUTO: 0 X10*3/UL (ref 0–0.7)
EOSINOPHIL NFR BLD AUTO: 0 %
ERYTHROCYTE [DISTWIDTH] IN BLOOD BY AUTOMATED COUNT: 20.9 % (ref 11.5–14.5)
GLUCOSE BLD MANUAL STRIP-MCNC: 111 MG/DL (ref 74–99)
GLUCOSE BLD MANUAL STRIP-MCNC: 112 MG/DL (ref 74–99)
GLUCOSE BLD MANUAL STRIP-MCNC: 124 MG/DL (ref 74–99)
GLUCOSE BLD MANUAL STRIP-MCNC: 50 MG/DL (ref 74–99)
GLUCOSE BLD MANUAL STRIP-MCNC: 53 MG/DL (ref 74–99)
GLUCOSE BLD MANUAL STRIP-MCNC: 62 MG/DL (ref 74–99)
GLUCOSE BLD MANUAL STRIP-MCNC: 75 MG/DL (ref 74–99)
GLUCOSE SERPL-MCNC: 61 MG/DL (ref 74–99)
HCT VFR BLD AUTO: 22.5 % (ref 36–46)
HGB BLD-MCNC: 7.5 G/DL (ref 12–16)
IMM GRANULOCYTES # BLD AUTO: 0.19 X10*3/UL (ref 0–0.7)
IMM GRANULOCYTES NFR BLD AUTO: 1.2 % (ref 0–0.9)
LYMPHOCYTES # BLD AUTO: 0.29 X10*3/UL (ref 1.2–4.8)
LYMPHOCYTES NFR BLD AUTO: 1.8 %
MAGNESIUM SERPL-MCNC: 2.27 MG/DL (ref 1.6–2.4)
MCH RBC QN AUTO: 31.4 PG (ref 26–34)
MCHC RBC AUTO-ENTMCNC: 33.3 G/DL (ref 32–36)
MCV RBC AUTO: 94 FL (ref 80–100)
MONOCYTES # BLD AUTO: 0.77 X10*3/UL (ref 0.1–1)
MONOCYTES NFR BLD AUTO: 4.8 %
NEUTROPHILS # BLD AUTO: 14.9 X10*3/UL (ref 1.2–7.7)
NEUTROPHILS NFR BLD AUTO: 92 %
NRBC BLD-RTO: 1.1 /100 WBCS (ref 0–0)
OVALOCYTES BLD QL SMEAR: NORMAL
PHOSPHATE SERPL-MCNC: 6.4 MG/DL (ref 2.5–4.9)
PLATELET # BLD AUTO: 54 X10*3/UL (ref 150–450)
POLYCHROMASIA BLD QL SMEAR: NORMAL
POTASSIUM SERPL-SCNC: 4.9 MMOL/L (ref 3.5–5.3)
RBC # BLD AUTO: 2.39 X10*6/UL (ref 4–5.2)
RBC MORPH BLD: NORMAL
SODIUM SERPL-SCNC: 128 MMOL/L (ref 136–145)
UFH PPP CHRO-ACNC: 0.1 IU/ML
UFH PPP CHRO-ACNC: 0.1 IU/ML
UFH PPP CHRO-ACNC: 0.8 IU/ML
WBC # BLD AUTO: 16.2 X10*3/UL (ref 4.4–11.3)

## 2024-10-14 PROCEDURE — 93005 ELECTROCARDIOGRAM TRACING: CPT

## 2024-10-14 PROCEDURE — 36415 COLL VENOUS BLD VENIPUNCTURE: CPT

## 2024-10-14 PROCEDURE — 85520 HEPARIN ASSAY: CPT

## 2024-10-14 PROCEDURE — 99233 SBSQ HOSP IP/OBS HIGH 50: CPT | Performed by: NURSE PRACTITIONER

## 2024-10-14 PROCEDURE — 2500000002 HC RX 250 W HCPCS SELF ADMINISTERED DRUGS (ALT 637 FOR MEDICARE OP, ALT 636 FOR OP/ED)

## 2024-10-14 PROCEDURE — 83735 ASSAY OF MAGNESIUM: CPT

## 2024-10-14 PROCEDURE — 82947 ASSAY GLUCOSE BLOOD QUANT: CPT

## 2024-10-14 PROCEDURE — 2500000004 HC RX 250 GENERAL PHARMACY W/ HCPCS (ALT 636 FOR OP/ED)

## 2024-10-14 PROCEDURE — 2500000005 HC RX 250 GENERAL PHARMACY W/O HCPCS

## 2024-10-14 PROCEDURE — 2500000001 HC RX 250 WO HCPCS SELF ADMINISTERED DRUGS (ALT 637 FOR MEDICARE OP)

## 2024-10-14 PROCEDURE — 99233 SBSQ HOSP IP/OBS HIGH 50: CPT

## 2024-10-14 PROCEDURE — 93010 ELECTROCARDIOGRAM REPORT: CPT | Performed by: INTERNAL MEDICINE

## 2024-10-14 PROCEDURE — 99223 1ST HOSP IP/OBS HIGH 75: CPT | Performed by: INTERNAL MEDICINE

## 2024-10-14 PROCEDURE — 1200000002 HC GENERAL ROOM WITH TELEMETRY DAILY

## 2024-10-14 PROCEDURE — 80069 RENAL FUNCTION PANEL: CPT

## 2024-10-14 PROCEDURE — 85025 COMPLETE CBC W/AUTO DIFF WBC: CPT

## 2024-10-14 RX ORDER — MIRTAZAPINE 15 MG/1
7.5 TABLET, FILM COATED ORAL NIGHTLY
Status: DISCONTINUED | OUTPATIENT
Start: 2024-10-14 | End: 2024-10-17

## 2024-10-14 ASSESSMENT — COGNITIVE AND FUNCTIONAL STATUS - GENERAL
MOVING TO AND FROM BED TO CHAIR: A LOT
STANDING UP FROM CHAIR USING ARMS: TOTAL
TOILETING: A LITTLE
STANDING UP FROM CHAIR USING ARMS: TOTAL
DRESSING REGULAR UPPER BODY CLOTHING: A LITTLE
DRESSING REGULAR LOWER BODY CLOTHING: A LITTLE
MOBILITY SCORE: 11
PERSONAL GROOMING: A LITTLE
MOBILITY SCORE: 11
HELP NEEDED FOR BATHING: A LOT
WALKING IN HOSPITAL ROOM: TOTAL
TURNING FROM BACK TO SIDE WHILE IN FLAT BAD: A LITTLE
DRESSING REGULAR LOWER BODY CLOTHING: A LITTLE
WALKING IN HOSPITAL ROOM: TOTAL
MOVING FROM LYING ON BACK TO SITTING ON SIDE OF FLAT BED WITH BEDRAILS: A LITTLE
EATING MEALS: A LITTLE
MOVING TO AND FROM BED TO CHAIR: A LOT
DAILY ACTIVITIY SCORE: 17
TOILETING: A LITTLE
DAILY ACTIVITIY SCORE: 17
CLIMB 3 TO 5 STEPS WITH RAILING: TOTAL
PERSONAL GROOMING: A LITTLE
TURNING FROM BACK TO SIDE WHILE IN FLAT BAD: A LITTLE
MOVING FROM LYING ON BACK TO SITTING ON SIDE OF FLAT BED WITH BEDRAILS: A LITTLE
HELP NEEDED FOR BATHING: A LOT
CLIMB 3 TO 5 STEPS WITH RAILING: TOTAL
EATING MEALS: A LITTLE
DRESSING REGULAR UPPER BODY CLOTHING: A LITTLE

## 2024-10-14 ASSESSMENT — PAIN SCALES - GENERAL
PAINLEVEL_OUTOF10: 0 - NO PAIN
PAINLEVEL_OUTOF10: 9
PAINLEVEL_OUTOF10: 0 - NO PAIN
PAINLEVEL_OUTOF10: 9

## 2024-10-14 ASSESSMENT — PAIN - FUNCTIONAL ASSESSMENT: PAIN_FUNCTIONAL_ASSESSMENT: 0-10

## 2024-10-14 ASSESSMENT — PAIN DESCRIPTION - ORIENTATION: ORIENTATION: RIGHT

## 2024-10-14 NOTE — PROGRESS NOTES
Misael Ritchie is a 69 y.o. female on day 11 of admission presenting with Hyperkalemia.      Subjective   Patient reports that she is still feeling improved from yesterday. She does have some sacral pain related to a pressure wound. Patient states that her mood is down due to her recent hospitalization but not SI/HI.        Objective     Last Recorded Vitals  /58   Pulse 75   Temp 36.2 °C (97.2 °F)   Resp 16   Wt 58.2 kg (128 lb 4.9 oz)   SpO2 92%   Intake/Output last 3 Shifts:    Intake/Output Summary (Last 24 hours) at 10/14/2024 1550  Last data filed at 10/14/2024 1016  Gross per 24 hour   Intake 900 ml   Output --   Net 900 ml       Admission Weight  Weight: 53.5 kg (118 lb) (10/03/24 0941)    Daily Weight  10/10/24 : 58.2 kg (128 lb 4.9 oz)    Image Results  Cardiac Catheterization Procedure     [facility], Cath Lab, [address] [place] [address]    Cardiovascular Catheterization Report    Patient Name:      MISAEL RITCHIE Performing Physician:  [id number] Stevenson MD  Study Date:        10/11/2024            Verifying Physician:   Holli Stevenson MD  MRN/PID:           [id number]              Cardiologist/Co-Scrub:  Accession#:        [id number]          Ordering Provider:     [id number] KAREN MANTILLA  Date of Birth/Age: 1955 / 69 years  Cardiologist:  Gender:            F                     Fellow:                [id number] Binu Maurer MD  Encounter#:        [id number]            Surgeon:       Study: Right Heart Cath       Indications:  Heart failure.     Appropriate Use Criteria:  Resting pulmonary hypertension, determine response after initiation of drug therapy; AUC score = 7.      Procedure Description:  After infiltration of local anesthetic, the right internal jugular vein was identified with two-dimensional ultrasound. Under direct ultrasound visualization, the right internal jugular vein was cannulated with a micropuncture technique. A 9 [place] sheath was placed in the vein. Cardiac output was calculated via the Christiano method.     Right Heart Catheterization:  Cardiac output was calculated via the Christiano method. Cardiac output is normal. [facility] hemodynamics: RA 3 RVSP 53, RVEDP 9, PCWP 10, PA 57/9, mean PAP 29, CO/CI 4.61/2.95, PA sat 55%, PVR 4.3.     Hemo Personnel:  +--------------------+---------+  Name                Duty       +--------------------+---------+  Alex StevensonROC MD 1  +--------------------+---------+       Hemodynamic Pressures:     +----+----------+---------+------------+-------------+---+-----+-------+-------+  SiteDate Time   Phase    Systolic    Diastolic  ED Mean A-Wave V-Wave                   Name       mmHg        mmHg     mmHmmHg  mmHg   mmHg                                                     g                      +----+----------+---------+------------+-------------+---+-----+-------+-------+    AO10/11/2024     Rest         155           57      86                    4:05:34 PM                                                          +----+----------+---------+------------+-------------+---+-----+-------+-------+    RA10/11/2024     Rest                                2      3      2      4:28:46 PM                                                          +----+----------+---------+------------+-------------+---+-----+-------+-------+   Art10/11/2024     Rest         160           54      86                    4:28:46 PM                                                           +----+----------+---------+------------+-------------+---+-----+-------+-------+    RA10/11/2024     Rest                                0      2      1      4:33:08 PM                                                          +----+----------+---------+------------+-------------+---+-----+-------+-------+   Art10/11/2024     Rest         161           55      88                    4:33:08 PM                                                          +----+----------+---------+------------+-------------+---+-----+-------+-------+    RV10/11/2024     Rest          53           -2  9                         4:33:44 PM                                                          +----+----------+---------+------------+-------------+---+-----+-------+-------+   Art10/11/2024     Rest         153           50      80                    4:33:44 PM                                                          +----+----------+---------+------------+-------------+---+-----+-------+-------+    PW10/11/2024     Rest                                7     13     10      4:35:36 PM                                                          +----+----------+---------+------------+-------------+---+-----+-------+-------+   Art10/11/2024     Rest         163           53      85                    4:35:36 PM                                                          +----+----------+---------+------------+-------------+---+-----+-------+-------+    PA10/11/2024     Rest          57            9      29                    4:36:17 PM                                                          +----+----------+---------+------------+-------------+---+-----+-------+-------+   Art10/11/2024     Rest         162           53      86                    4:36:17 PM                                                           +----+----------+---------+------------+-------------+---+-----+-------+-------+         Oxygen Saturation %:  +-----------+----------+------------+  Sample SiteO2 Sat (%)HB (g/100ml)  +-----------+----------+------------+           FA        96         7.6  +-----------+----------+------------+           PA        55         7.6  +-----------+----------+------------+           FA        96         7.6  +-----------+----------+------------+           PA        55         7.6  +-----------+----------+------------+       Cardiac Outputs:  +---------------+------------------+-------+  IVONNE CO (l/min)IVONNE CI (l/min/m2)IVONNE SV  +---------------+------------------+-------+              4.6               3.0   55.6  +---------------+------------------+-------+       Vascular Resistance Calculated Values (Wood Units):  +-----+---+----+-------+----+----+---+----+----+----+-------+  PhasePVRPVRIPVR/SVRSVR SVRITPRTPRITVR TVRITPR/TVR  +-----+---+----+-------+----+----+---+----+----+----+-------+  0    4.87.5 0      [id number] 18.629.20        +-----+---+----+-------+----+----+---+----+----+----+-------+       Complications:  No in-lab complications observed.     Cardiac Cath Post Procedure Notes:  Post Procedure Diagnosis: Pre-capillary pulmonary hypertension.  Blood Loss:               Estimated blood loss during the procedure was 5 cc                            mls.  Specimens Removed:        Number of specimen(s) removed: none.       Recommendations:  Further management per MICU and HF team.    ____________________________________________________________________________________  CONCLUSIONS:   1. Pre-capillary pulmonary hypertension as evident by [facility] hemodynamics .    ICD 10 Codes:  Pulmonary hypertension, unspecified-I27.20     CPT Codes:  Right Heart Cath O2/Cardiac output without biopsy ([facility])-07309; Ultrasound guidance for vascular access-94381;  Moderate Sedation Services initial 15 minutes patient >5 years-22774     [id number] Alex Stevenson MD  Performing Physician  Electronically signed by [id number] Alex Stevenson MD on 10/14/2024 at 12:14:50 PM         ** Final **      Physical Exam  General: Awake, alert, in no acute distress  HENT: Normo-cephalic, right periorbital hematoma No stridor  CV: Regular rate, regular rhythm. Radial pulses 2+ bilaterally  Resp: Breathing non-labored, speaking in full sentences.  Clear to auscultation bilaterally  GI: Soft, non-distended, non-tender. No rebound or guarding.  : deferred   MSK/Extremities: No gross bony deformities. Moving all extremities  Skin: Warm. Appropriate color  Neuro: Aox2 Face symmetric. Speech is fluent.  Gross strength and sensation intact in b/l UE and LEs  Psych: Appropriate mood and affect    Relevant Results      Results for orders placed or performed during the hospital encounter of 10/03/24 (from the past 24 hour(s))   CBC and Auto Differential   Result Value Ref Range    WBC 16.2 (H) 4.4 - 11.3 x10*3/uL    nRBC 1.1 (H) 0.0 - 0.0 /100 WBCs    RBC 2.39 (L) 4.00 - 5.20 x10*6/uL    Hemoglobin 7.5 (L) 12.0 - 16.0 g/dL    Hematocrit 22.5 (L) 36.0 - 46.0 %    MCV 94 80 - 100 fL    MCH 31.4 26.0 - 34.0 pg    MCHC 33.3 32.0 - 36.0 g/dL    RDW 20.9 (H) 11.5 - 14.5 %    Platelets 54 (L) 150 - 450 x10*3/uL    Neutrophils % 92.0 40.0 - 80.0 %    Immature Granulocytes %, Automated 1.2 (H) 0.0 - 0.9 %    Lymphocytes % 1.8 13.0 - 44.0 %    Monocytes % 4.8 2.0 - 10.0 %    Eosinophils % 0.0 0.0 - 6.0 %    Basophils % 0.2 0.0 - 2.0 %    Neutrophils Absolute 14.90 (H) 1.20 - 7.70 x10*3/uL    Immature Granulocytes Absolute, Automated 0.19 0.00 - 0.70 x10*3/uL    Lymphocytes Absolute 0.29 (L) 1.20 - 4.80 x10*3/uL    Monocytes Absolute 0.77 0.10 - 1.00 x10*3/uL    Eosinophils Absolute 0.00 0.00 - 0.70 x10*3/uL    Basophils Absolute 0.03 0.00 - 0.10 x10*3/uL   Morphology   Result Value Ref Range    RBC Morphology See  Below     Polychromasia Mild     Ovalocytes Few     Teardrop Cells Few     [place] Cells Few     Acanthocytes Few    Heparin Assay   Result Value Ref Range    Heparin Unfractionated 0.1 See Comment Below for Therapeutic Ranges IU/mL   Magnesium   Result Value Ref Range    Magnesium 2.27 1.60 - 2.40 mg/dL   Renal function panel   Result Value Ref Range    Glucose 61 (L) 74 - 99 mg/dL    Sodium 128 (L) 136 - 145 mmol/L    Potassium 4.9 3.5 - 5.3 mmol/L    Chloride 88 (L) 98 - 107 mmol/L    Bicarbonate 23 21 - 32 mmol/L    Anion Gap 22 (H) 10 - 20 mmol/L    Urea Nitrogen 49 (H) 6 - 23 mg/dL    Creatinine 4.61 (H) 0.50 - 1.05 mg/dL    eGFR 10 (L) >60 mL/min/1.73m*2    Calcium 8.3 (L) 8.6 - 10.6 mg/dL    Phosphorus 6.4 (H) 2.5 - 4.9 mg/dL    Albumin 2.7 (L) 3.4 - 5.0 g/dL   POCT GLUCOSE   Result Value Ref Range    POCT Glucose 50 (L) 74 - 99 mg/dL   POCT GLUCOSE   Result Value Ref Range    POCT Glucose 62 (L) 74 - 99 mg/dL   POCT GLUCOSE   Result Value Ref Range    POCT Glucose 53 (L) 74 - 99 mg/dL   POCT GLUCOSE   Result Value Ref Range    POCT Glucose 75 74 - 99 mg/dL   POCT GLUCOSE   Result Value Ref Range    POCT Glucose 112 (H) 74 - 99 mg/dL   Heparin Assay, UFH   Result Value Ref Range    Heparin Unfractionated 0.1 See Comment Below for Therapeutic Ranges IU/mL   POCT GLUCOSE   Result Value Ref Range    POCT Glucose 111 (H) 74 - 99 mg/dL        Assessment/Plan        Misael Ritchie is a 69 y.o. female PMH aortic stenosis s/p TAVR 11/23, atrial fibrillation (on Eliquis) s/p DCCV, CAD, ESRD on hemodialysis T/Th/Sat, HFrEF (6/2023 EF 48%), MR, TR, pacemaker CRT-P 5/2023, adrenal insufficiency admitted to the MICU on 10/03/24 for emergent dialysis for hyperkalemia 7.4. Known RV systolic dysfunction, swan in MICU revealed mild PA hypertension, severe TR, and RV dysfunciton. Patient had new systolic goal of , mixed venous shoed normal SO2 and lactate has been stable. Patient has been off of pressors since 10/12  and will be transferred to the floor from the MICU.    Updates 10/14/24  - Cardiology consulted regarding anticoagulation given history of CAD awaiting recommendations  - Patient with upper extremity swelling will get bilateral upper extremity venous duplex to rule out DVT.   - HF team is following  - Patient to receive HD tomorrow     #A-fib on eliquis ,   #CAD s/p stent 2003,  # Pace maker CRT-P in 5/23   #pericardial effusion  :: TTE 10/4: LV EF 50-55%, reduced RV systolic function, severely enlarged RV, LA/RA severely dilated, small pericardial effusion, mod mitral annular calcification, severe TR, presence of TAVR, severely elevated PA pressure   :: s/p swan alex placement on 10/11 removed prior to floor transfer. Patient with prolonged bleed after removal.   :: s/p Right heart cath on 10/11  :: [facility] hemodynamics: RA 3, RVSP 53, RVEDP 9 , PCWP 10, PA 57/9, mean PAP 29,CO/CI 4.61/2.95. PA sat 55, PVR 4.3     Management:  - Continue home Plavix 75mg daily   - Continue home amiodarone, atorvastatin  - Hold metoprolol for HR iso of shock on admission  - Consult HF team and recs appreciated     #Adrenal insufficiency  #Hypoglycemia 2/2 insulin patient received for hyperkalemia  Management:  - Per Endocrine okay to taper down to hydrocortisone 25mg IV q6H (for 3 days currently on day 1) --> 15 mg q6H for 3 days --> 15mg TID for 3 days (last dose before 6 PM) --> prednisone 10 TID for 3 days--> prednisone 5 mg daily until follow up as outpatient.   - Endocrine recommends re-engaging if necessary     #ESRD on HD TTS   :: Potassium of 7.4 on admission requiring  urgent dialysis   :: HD unit: [facility]   :: Access: RUE AVF   :: EDW 56kg      Management:  - Patient set up for HD 10/15, Nephrology following  - Hold home torsemide   - Hold home allopurinol     #Right troc fracture with hematoma  Management:  - Hgb 7.9 this AM  will continue to monitor   - S/p Hip Fracture ORIF w/ Nail Trochanteric surgery with ortho 10/7  -  pain control with Dilaudid, oxy and tylenol  - Tylenol 975 TID, Oxy 10mg Q4h for severe pain      #COPD  Management:  - Continue home albuterol PRN     FEN  Fluids:on HD, be cautious with fluid   Electrolytes: PRN  Nutrition: renal diet   Prophylaxis:  DVT ppx: Heparin gtt  GI ppx: PPI  Bowel care: Miralax  Code: Full Code    HPOA:  Zeke Ritchie () [phone]   Assessment & Plan  Hyperkalemia    ESRD (end stage renal disease) on dialysis (Multi)    Pacemaker    HTN (hypertension)    CAD (coronary artery disease)    CHF (congestive heart failure)    Atrial fibrillation (Multi)    Difficult intubation    Anemia    Nondisplaced intertrochanteric fracture of right femur, initial encounter for closed fracture    Pulmonary hypertension (Multi)                  Patrice Ramos MD  PGY-1 Internal Medicine Resident

## 2024-10-14 NOTE — PROGRESS NOTES
Misael Ritchie is a 69 y.o. female on day 11 of admission presenting with Hyperkalemia.      Subjective   ***       Objective     Last Recorded Vitals  /65 (BP Location: Left arm, Patient Position: Lying)   Pulse 78   Temp 36.2 °C (97.2 °F) (Temporal)   Resp 16   Wt 58.2 kg (128 lb 4.9 oz)   SpO2 98%   Intake/Output last 3 Shifts:    Intake/Output Summary (Last 24 hours) at 10/14/2024 0656  Last data filed at 10/14/2024 0610  Gross per 24 hour   Intake 994.85 ml   Output --   Net 994.85 ml       Admission Weight  Weight: 53.5 kg (118 lb) (10/03/24 0941)    Daily Weight  10/10/24 : 58.2 kg (128 lb 4.9 oz)    Image Results  XR chest 1 view  Narrative: Interpreted By:  Avni York,   STUDY:  XR CHEST 1 VIEW;  10/12/2024 9:36 am      INDICATION:  Signs/Symptoms:hernandez.      COMPARISON:  Chest radiograph 10/08/2024 and chest CT 10/03/2024      ACCESSION NUMBER(S):  [id number]      ORDERING CLINICIAN:  KAREN MANTILLA      FINDINGS:  AP radiograph of the chest. Patient is now significantly rotated  towards right, limiting evaluation and comparison. Interval placement  of right IJ approach [place]-Rasheed catheter, the tip of which overlies  main pulmonary artery. Stable positioning of left subclavian approach  biventricular AICD. Status post prior TAVR.      CARDIOMEDIASTINAL SILHOUETTE:  The cardiomediastinal silhouette persistently enlarged, grossly  unchanged from prior. Aortic knob calcifications. Poorly visualized  coronary artery stents.      LUNGS:  There has been interval worsening of hazy opacification of right  hemithorax, which is likely due to layering of moderate right pleural  effusion on present supine radiograph. A component of background  pulmonary edema/superimposed infectious process is not excluded. Mild  left basilar atelectasis. There is no pneumothorax.      ABDOMEN:  No remarkable upper abdominal findings.      BONES:  No acute osseous abnormality.      Impression: 1. Interval  worsening of hazy opacification of right hemithorax,  which is likely due to layering of moderate right pleural effusion on  present supine radiograph. A component of background pulmonary  edema/superimposed infectious process is not excluded.  2. Medical devices as above. No pneumothorax.      Signed by: Avni York 10/12/2024 10:48 AM  Dictation workstation:   [id number]      Physical Exam    Relevant Results  {If you would like to pull in Medications, type .meds     If you would like to pull in Lab results for the last 24 hours, type .[id number]    If you would like to pull in Imaging results, type .imgrslt :99}      {Link to Stroke Scoring tools - Link :99}       Assessment/Plan        This patient has a central line   Reason for the central line remaining today? {Central Line Risk Screen:97408}            Assessment & Plan  Hyperkalemia    ESRD (end stage renal disease) on dialysis (Multi)    Pacemaker    HTN (hypertension)    CAD (coronary artery disease)    CHF (congestive heart failure)    Atrial fibrillation (Multi)    Difficult intubation    Anemia    Nondisplaced intertrochanteric fracture of right femur, initial encounter for closed fracture    Pulmonary hypertension (Multi)    ***              Patrice Ramos MD

## 2024-10-14 NOTE — PROGRESS NOTES
10/14/24 1215 Transitional Care Coordinator Notes:    Transitional Care Coordination Progress Note:  Patient discussed during interdisciplinary rounds.   Team members present: MD and TCC  Plan per Medical/Surgical team: Patient was transferred from the units. Admitted for emergency dialysis due to hyperkalemia. Nephrology is consulted. Patient currently not interested in SNF but agreeable to home care. Patient has a home health aide through the [employer]. She receives 13 hours but program is working to increase her hours to 20. Patient will need transportation assistance at discharge.    Home Care: agreeable to home care  DME: walker, cane, shower chair, hospital bed. Requesting wheelchair.  Falls: fell prior to admission  Dialysis: [facility]    Payor: [employer]  Discharge disposition: home with home care  Potential Barriers: none  ADOD: 2-4 days                    Assessment/Plan   Assessment & Plan  Hyperkalemia    ESRD (end stage renal disease) on dialysis (Multi)    Pacemaker    HTN (hypertension)    CAD (coronary artery disease)    CHF (congestive heart failure)    Atrial fibrillation (Multi)    Difficult intubation    Anemia    Nondisplaced intertrochanteric fracture of right femur, initial encounter for closed fracture    Pulmonary hypertension (Multi)               Marylin Denson RN

## 2024-10-14 NOTE — PROGRESS NOTES
"Misael Ritchie is a 69 y.o. female on day 11 of admission presenting with Hyperkalemia.    Subjective   Pt resting in bed. Denies sob, n/v/d, fever, cough, chills, pain, chest pains, light head, dizziness, constipation        Objective     Physical Exam  Vitals and nursing note reviewed.   Cardiovascular:      Rate and Rhythm: Normal rate and regular rhythm.   Pulmonary:      Comments: Hu lung sounds dim  Abdominal:      General: There is distension.      Comments: Minor distention non-tender to palpation   Genitourinary:     Comments: oligouric  Musculoskeletal:      Comments: Ble without edema   Skin:     General: Skin is warm and dry.   Neurological:      Mental Status: She is alert and oriented to person, place, and time.   Psychiatric:         Mood and Affect: Mood normal.         Behavior: Behavior normal.         Last Recorded Vitals  Blood pressure (!) 108/49, pulse 76, temperature 36.5 °C (97.7 °F), resp. rate 17, height 1.549 m (5' 1\"), weight 58.2 kg (128 lb 4.9 oz), SpO2 97%.  Intake/Output last 3 Shifts:  I/O last 3 completed shifts:  In: 1064.7 (19.8 mL/kg) [P.O.:940; I.V.:124.7 (2.3 mL/kg)]  Out: - (0 mL/kg)   Dosing Weight: 53.9 kg     Relevant Results    Scheduled medications  acetaminophen, 975 mg, oral, TID  [Held by provider] allopurinol, 100 mg, oral, Daily  amiodarone, 200 mg, oral, Daily  atorvastatin, 40 mg, oral, Nightly  calcium acetate, 667 mg, oral, TID  clopidogrel, 75 mg, oral, Daily  [START ON 10/15/2024] epoetin bridgett or biosimilar, 10,000 Units, intravenous, Once per day on Tuesday Thursday Saturday  hydrocortisone sodium succinate, 25 mg, intravenous, q6h   Followed by  [START ON 10/16/2024] hydrocortisone sodium succinate, 15 mg, intravenous, q6h   Followed by  [START ON 10/19/2024] hydrocortisone sodium succinate, 15 mg, intravenous, q8h   Followed by  [START ON 10/22/2024] predniSONE, 10 mg, oral, q8h   Followed by  [START ON 10/25/2024] predniSONE, 5 mg, oral, " Daily  midodrine, 10 mg, oral, TID  mirtazapine, 7.5 mg, oral, Nightly  pantoprazole, 40 mg, oral, Daily before breakfast  polyethylene glycol, 17 g, oral, q12h  sennosides, 2 tablet, oral, BID  vitamin B complex-vitamin C-folic acid, 1 capsule, oral, Daily      Continuous medications  heparin, 0-4,000 Units/hr, Last Rate: 700 Units/hr (10/14/24 0936)      PRN medications  PRN medications: albuterol, bisacodyl, bisacodyl, dextrose, dextrose, glucagon, glucagon, heparin, oxyCODONE     Results for orders placed or performed during the hospital encounter of 10/03/24 (from the past 24 hour(s))   CBC and Auto Differential   Result Value Ref Range    WBC 16.2 (H) 4.4 - 11.3 x10*3/uL    nRBC 1.1 (H) 0.0 - 0.0 /100 WBCs    RBC 2.39 (L) 4.00 - 5.20 x10*6/uL    Hemoglobin 7.5 (L) 12.0 - 16.0 g/dL    Hematocrit 22.5 (L) 36.0 - 46.0 %    MCV 94 80 - 100 fL    MCH 31.4 26.0 - 34.0 pg    MCHC 33.3 32.0 - 36.0 g/dL    RDW 20.9 (H) 11.5 - 14.5 %    Platelets 54 (L) 150 - 450 x10*3/uL    Neutrophils % 92.0 40.0 - 80.0 %    Immature Granulocytes %, Automated 1.2 (H) 0.0 - 0.9 %    Lymphocytes % 1.8 13.0 - 44.0 %    Monocytes % 4.8 2.0 - 10.0 %    Eosinophils % 0.0 0.0 - 6.0 %    Basophils % 0.2 0.0 - 2.0 %    Neutrophils Absolute 14.90 (H) 1.20 - 7.70 x10*3/uL    Immature Granulocytes Absolute, Automated 0.19 0.00 - 0.70 x10*3/uL    Lymphocytes Absolute 0.29 (L) 1.20 - 4.80 x10*3/uL    Monocytes Absolute 0.77 0.10 - 1.00 x10*3/uL    Eosinophils Absolute 0.00 0.00 - 0.70 x10*3/uL    Basophils Absolute 0.03 0.00 - 0.10 x10*3/uL   Morphology   Result Value Ref Range    RBC Morphology See Below     Polychromasia Mild     Ovalocytes Few     Teardrop Cells Few     John Cells Few     Acanthocytes Few    Heparin Assay   Result Value Ref Range    Heparin Unfractionated 0.1 See Comment Below for Therapeutic Ranges IU/mL   Magnesium   Result Value Ref Range    Magnesium 2.27 1.60 - 2.40 mg/dL   Renal function panel   Result Value Ref Range     Glucose 61 (L) 74 - 99 mg/dL    Sodium 128 (L) 136 - 145 mmol/L    Potassium 4.9 3.5 - 5.3 mmol/L    Chloride 88 (L) 98 - 107 mmol/L    Bicarbonate 23 21 - 32 mmol/L    Anion Gap 22 (H) 10 - 20 mmol/L    Urea Nitrogen 49 (H) 6 - 23 mg/dL    Creatinine 4.61 (H) 0.50 - 1.05 mg/dL    eGFR 10 (L) >60 mL/min/1.73m*2    Calcium 8.3 (L) 8.6 - 10.6 mg/dL    Phosphorus 6.4 (H) 2.5 - 4.9 mg/dL    Albumin 2.7 (L) 3.4 - 5.0 g/dL   POCT GLUCOSE   Result Value Ref Range    POCT Glucose 50 (L) 74 - 99 mg/dL   POCT GLUCOSE   Result Value Ref Range    POCT Glucose 62 (L) 74 - 99 mg/dL   POCT GLUCOSE   Result Value Ref Range    POCT Glucose 53 (L) 74 - 99 mg/dL   POCT GLUCOSE   Result Value Ref Range    POCT Glucose 75 74 - 99 mg/dL   POCT GLUCOSE   Result Value Ref Range    POCT Glucose 112 (H) 74 - 99 mg/dL   Heparin Assay, UFH   Result Value Ref Range    Heparin Unfractionated 0.1 See Comment Below for Therapeutic Ranges IU/mL                           Assessment/Plan   Assessment & Plan  Hyperkalemia    ESRD (end stage renal disease) on dialysis (Multi)    Pacemaker    HTN (hypertension)    CAD (coronary artery disease)    CHF (congestive heart failure)    Atrial fibrillation (Multi)    Difficult intubation    Anemia    Nondisplaced intertrochanteric fracture of right femur, initial encounter for closed fracture    Pulmonary hypertension (Multi)    Tolerated hemodialysis Saturday with net fluid loss 1.4L    Is hemodynamically stable, euvolemic on exam and has stable electrolytes. K+=4.9      Outpatient Dialysis schedule:TTS [facility] [place]/Dr Garrido        Access: rt arm fist with +thrill/bruit- no issues - able to achieve   Non-functional lt arm fistula     Anemia of ESRD: epoetin bridgett-epbx (Retacrit) injection 10,000 Units on dialysis days.. current hgb 7.5.. will cont to monitor.. (Retacrit 2000 qtx)       CKD-MBD Phosphate Binder:calcium acetate (Phoslo) capsule 667 mg tid ac, vitamin B complex-vitamin C-folic acid  (Nephrocaps) capsule 1 capsule daily     Plan HD tomorrow with UF as tolerated     Renal diet      Please obtain daily standing wt (if possible)     Medication to be adjusted for ESRD      Patient to continue regular HD schedule while inpatient and to follow with the outpatient nephrologist at discharge              PANFILO Arias-CNP

## 2024-10-14 NOTE — TELEPHONE ENCOUNTER
3rd attempt to contact pt to schedule ENT consult. Unable to lvm due to being full. Certified letter sent to pt.   Annual physical labs to [facility]

## 2024-10-14 NOTE — PROGRESS NOTES
"Miasel Ritchie is a 69 y.o. female on day 11 of admission presenting with Hyperkalemia.      Subjective   Pt states she feels \"fine\" other than burning pain around her intergluteal cleft for about a day. She voiced desire to leave as soon as possible because she wants to be home.       Objective     Last Recorded Vitals  /65 (BP Location: Left arm, Patient Position: Lying)   Pulse 78   Temp 36.2 °C (97.2 °F) (Temporal)   Resp 16   Wt 58.2 kg (128 lb 4.9 oz)   SpO2 98%   Intake/Output last 3 Shifts:    Intake/Output Summary (Last 24 hours) at 10/14/2024 0706  Last data filed at 10/14/2024 0610  Gross per 24 hour   Intake 754.45 ml   Output --   Net 754.45 ml       Admission Weight  Weight: 53.5 kg (118 lb) (10/03/24 0941)    Daily Weight  10/10/24 : 58.2 kg (128 lb 4.9 oz)    Image Results  XR chest 1 view  Narrative: Interpreted By:  Avni York,   STUDY:  XR CHEST 1 VIEW;  10/12/2024 9:36 am      INDICATION:  Signs/Symptoms:hernandez.      COMPARISON:  Chest radiograph 10/08/2024 and chest CT 10/03/2024      ACCESSION NUMBER(S):  [id number]      ORDERING CLINICIAN:  KAREN MANTILLA      FINDINGS:  AP radiograph of the chest. Patient is now significantly rotated  towards right, limiting evaluation and comparison. Interval placement  of right IJ approach [place]-Rasheed catheter, the tip of which overlies  main pulmonary artery. Stable positioning of left subclavian approach  biventricular AICD. Status post prior TAVR.      CARDIOMEDIASTINAL SILHOUETTE:  The cardiomediastinal silhouette persistently enlarged, grossly  unchanged from prior. Aortic knob calcifications. Poorly visualized  coronary artery stents.      LUNGS:  There has been interval worsening of hazy opacification of right  hemithorax, which is likely due to layering of moderate right pleural  effusion on present supine radiograph. A component of background  pulmonary edema/superimposed infectious process is not excluded. Mild  left basilar " atelectasis. There is no pneumothorax.      ABDOMEN:  No remarkable upper abdominal findings.      BONES:  No acute osseous abnormality.      Impression: 1. Interval worsening of hazy opacification of right hemithorax,  which is likely due to layering of moderate right pleural effusion on  present supine radiograph. A component of background pulmonary  edema/superimposed infectious process is not excluded.  2. Medical devices as above. No pneumothorax.      Signed by: Avni York 10/12/2024 10:48 AM  Dictation workstation:   [id number]      Physical Exam  General: Awake, alert, in no acute distress  HENT: Normo-cephalic, No stridor  CV: Regular rate, regular rhythm. Radial pulses 2+ bilaterally  Resp: Breathing non-labored, speaking in full sentences.  Clear to auscultation bilaterally  GI: Soft, non-distended, non-tender. No rebound or guarding.  : deferred   MSK/Extremities: No gross bony deformities. Moving all extremities  Skin: Warm. Appropriate color  Neuro: Ao to year, self, place. Difficulty with hearing. Face symmetric. Speech is fluent.  Gross strength and sensation intact in b/l UE and LEs  Psych: Appropriate mood and affect    Relevant Results      Results for orders placed or performed during the hospital encounter of 10/03/24 (from the past 24 hour(s))   Heparin Assay, UFH   Result Value Ref Range    Heparin Unfractionated 0.7 See Comment Below for Therapeutic Ranges IU/mL   Heparin Assay   Result Value Ref Range    Heparin Unfractionated 0.1 See Comment Below for Therapeutic Ranges IU/mL        Assessment/Plan      Misael Ritchie is a 69 y.o. female PMH aortic stenosis s/p TAVR 11/23, atrial fibrillation (on Eliquis) s/p DCCV, CAD, ESRD on hemodialysis T/Th/Sat, HFrEF (6/2023 EF 48%), MR, TR, pacemaker CRT-P 5/2023, adrenal insufficiency admitted to the MICU on 10/03/24 for emergent dialysis for hyperkalemia 7.4. Known RV systolic dysfunction, swan in MICU revealed mild PA hypertension,  "severe TR, and RV dysfunciton. Patient had new systolic goal of , mixed venous shoed normal SO2 and lactate has been stable. Patient has been off of pressors since 10/12 and will be transferred to the floor from the MICU.    Updates 10/14/24  - HF team is following   - Nephrology is following plan for HD 10/15   - Lactate 2.4 last measured 10/12  - Pt states she wants to leave ASAP  - Phosphorous 5.3, Calcium acetate scheduled  - Pt has hypoglycemia in 50s --> put on hypoglycemia protocol for 24 hours    To do:  - Consult cards: if pt should be on plavix vs ASA/Apixaban/Warfarin, pt has low platelets. Pt states her cardiologist wants it \"forever\"  - Confirm if she has gout (why pt is on home allopurinol)    #A-fib on eliquis ,   #CAD s/p stent 2003,  # Pace maker CRT-P in 5/23   #pericardial effusion  :: TTE 10/4: LV EF 50-55%, reduced RV systolic function, severely enlarged RV, LA/RA severely dilated, small pericardial effusion, mod mitral annular calcification, severe TR, presence of TAVR, severely elevated PA pressure   :: s/p swan alex placement on 10/11 removed prior to floor transfer. Patient with prolonged bleed after removal.   :: s/p Right heart cath on 10/11  :: RHC hemodynamics: RA 3, RVSP 53, RVEDP 9 , PCWP 10, PA 57/9, mean PAP 29,CO/CI 4.61/2.95. PA sat 55, PVR 4.3     Management:  - Continue home Plavix 75mg daily  - Continue home amiodarone, atorvastatin  - Hold metoprolol for HF iso of shock on admission  - Consult HF team and recs appreciated     #Adrenal insufficiency  #Hypoglycemia 2/2 insulin patient received for hyperkalemia  Management:  - Per Endocrine okay to taper down to hydrocortisone 25mg IV q6H (for 3 days) --> 15 mg q6H for 3 days --> 15mg TID for 3 days (last dose before 6 PM) --> prednisone 10 TID for 3 days--> prednisone 5 mg daily until follow up as outpatient.   - Endocrine recommends re-engaging if necessary     #ESRD on HD TTS   :: Potassium of 7.4 on admission requiring  " urgent dialysis   :: HD unit: [facility] [place]   :: Access: RUE AVF   :: EDW 56kg      Management:  - Patient set up for HD 10/15, Nephrology following  - Hold home torsemide  - Hold home allopurinol    #Right troc fracture with hematoma  Management:  - Hgb 7.9 yesterday AM  will continue to monitor   - S/p Hip Fracture ORIF w/ Nail Trochanteric surgery with ortho 10/7  - pain control with Dilaudid, oxy and tylenol  - Tylenol 975 TID, Oxy 10mg Q4h for severe pain      #COPD  Management:  - Continue home albuterol PRN     FEN  Fluids:on HD, be cautious with fluid   Electrolytes: PRN  Nutrition: renal diet   Prophylaxis:  DVT ppx: Heparin gtt  GI ppx: PPI  Bowel care: Miralax  Code: Full Code    HPOA:  Zkee Ritchie () [phone]   Assessment & Plan  Hyperkalemia    ESRD (end stage renal disease) on dialysis (Multi)    Pacemaker    HTN (hypertension)    CAD (coronary artery disease)    CHF (congestive heart failure)    Atrial fibrillation (Multi)    Difficult intubation    Anemia    Nondisplaced intertrochanteric fracture of right femur, initial encounter for closed fracture    Pulmonary hypertension (Multi)                  EVAN SOL  Medical Student

## 2024-10-14 NOTE — CARE PLAN
Problem: Skin  Goal: Participates in plan/prevention/treatment measures  Outcome: Progressing  Flowsheets (Taken 10/14/2024 1832)  Participates in plan/prevention/treatment measures:   Elevate heels   Discuss with provider PT/OT consult  Goal: Prevent/manage excess moisture  Outcome: Progressing  Flowsheets (Taken 10/14/2024 1832)  Prevent/manage excess moisture:   Cleanse incontinence/protect with barrier cream   Moisturize dry skin  Goal: Prevent/minimize sheer/friction injuries  Outcome: Progressing  Flowsheets (Taken 10/14/2024 1832)  Prevent/minimize sheer/friction injuries:   Complete micro-shifts as needed if patient unable. Adjust patient position to relieve pressure points, not a full turn   Use pull sheet   HOB 30 degrees or less   Turn/reposition every 2 hours/use positioning/transfer devices  Goal: Promote/optimize nutrition  Outcome: Progressing  Flowsheets (Taken 10/14/2024 1832)  Promote/optimize nutrition:   Assist with feeding   Consume > 50% meals/supplements   Discuss with provider if NPO > 2 days   Offer water/supplements/favorite foods   Monitor/record intake including meals  Goal: Promote skin healing  Outcome: Progressing  Flowsheets (Taken 10/14/2024 1832)  Promote skin healing:   Assess skin/pad under line(s)/device(s)   Protective dressings over bony prominences   Turn/reposition every 2 hours/use positioning/transfer devices  Goal: Decreased wound size/increased tissue granulation at next dressing change  Outcome: Progressing  Flowsheets (Taken 10/14/2024 1832)  Decreased wound size/increased tissue granulation at next dressing change:   Promote sleep for wound healing   Protective dressings over bony prominences     Problem: Fall/Injury  Goal: Not fall by end of shift  Outcome: Progressing  Goal: Be free from injury by end of the shift  Outcome: Progressing  Goal: Verbalize understanding of personal risk factors for fall in the hospital  Outcome: Progressing  Goal: Verbalize  understanding of risk factor reduction measures to prevent injury from fall in the home  Outcome: Progressing     Problem: Pain - Adult  Goal: Verbalizes/displays adequate comfort level or baseline comfort level  Outcome: Progressing     Problem: Safety - Adult  Goal: Free from fall injury  Outcome: Progressing     Problem: Pain  Goal: Turns in bed with improved pain control throughout the shift  Outcome: Progressing  Goal: Participates in PT with improved pain control throughout the shift  Outcome: Progressing  Goal: Free from opioid side effects throughout the shift  Outcome: Progressing  Goal: Free from acute confusion related to pain meds throughout the shift  Outcome: Progressing     Problem: Nutrition  Goal: Oral intake greater than 50%  Outcome: Progressing  Goal: Oral intake greater 75%  Outcome: Progressing  Goal: Consume prescribed supplement  Outcome: Progressing  Goal: Adequate PO fluid intake  Outcome: Progressing  Goal: Nutrition support goals are met within 48 hrs  Outcome: Progressing  Goal: Nutrition support is meeting 75% of nutrient needs  Outcome: Progressing  Goal: BG  mg/dL  Outcome: Progressing  Goal: Lab values WNL  Outcome: Progressing  Goal: Electrolytes WNL  Outcome: Progressing  Goal: Promote healing  Outcome: Progressing  Goal: Maintain stable weight  Outcome: Progressing  Goal: Reduce weight from edema/fluid  Outcome: Progressing  Goal: Gradual weight gain  Outcome: Progressing   The patient's goals for the shift include      The clinical goals for the shift include Pt will be HDS during this shift

## 2024-10-15 ENCOUNTER — APPOINTMENT (OUTPATIENT)
Dept: RADIOLOGY | Facility: HOSPITAL | Age: 69
DRG: 480 | End: 2024-10-15
Payer: MEDICARE

## 2024-10-15 ENCOUNTER — APPOINTMENT (OUTPATIENT)
Dept: DIALYSIS | Facility: HOSPITAL | Age: 69
End: 2024-10-15
Payer: MEDICARE

## 2024-10-15 ENCOUNTER — APPOINTMENT (OUTPATIENT)
Dept: CARDIOLOGY | Facility: HOSPITAL | Age: 69
DRG: 480 | End: 2024-10-15
Payer: MEDICARE

## 2024-10-15 ENCOUNTER — APPOINTMENT (OUTPATIENT)
Dept: VASCULAR MEDICINE | Facility: HOSPITAL | Age: 69
DRG: 480 | End: 2024-10-15
Payer: MEDICARE

## 2024-10-15 LAB
ABO GROUP (TYPE) IN BLOOD: NORMAL
ALBUMIN SERPL BCP-MCNC: 2.3 G/DL (ref 3.4–5)
ALBUMIN SERPL BCP-MCNC: 2.5 G/DL (ref 3.4–5)
ANION GAP BLDA CALCULATED.4IONS-SCNC: 18 MMO/L (ref 10–25)
ANION GAP BLDV CALCULATED.4IONS-SCNC: 14 MMOL/L (ref 10–25)
ANION GAP SERPL CALC-SCNC: 20 MMOL/L (ref 10–20)
ANION GAP SERPL CALC-SCNC: 22 MMOL/L (ref 10–20)
ANION GAP SERPL CALC-SCNC: 24 MMOL/L (ref 10–20)
ANTIBODY SCREEN: NORMAL
ATRIAL RATE: 75 BPM
BASE EXCESS BLDA CALC-SCNC: -1.8 MMOL/L (ref -2–3)
BASE EXCESS BLDV CALC-SCNC: -0.3 MMOL/L (ref -2–3)
BASOPHILS # BLD AUTO: 0.01 X10*3/UL (ref 0–0.1)
BASOPHILS # BLD AUTO: ABNORMAL 10*3/UL
BASOPHILS NFR BLD AUTO: 0.1 %
BASOPHILS NFR BLD AUTO: ABNORMAL %
BODY TEMPERATURE: 37 DEGREES CELSIUS
BODY TEMPERATURE: 37 DEGREES CELSIUS
BUN SERPL-MCNC: 26 MG/DL (ref 6–23)
BUN SERPL-MCNC: 63 MG/DL (ref 6–23)
BUN SERPL-MCNC: 66 MG/DL (ref 6–23)
BURR CELLS BLD QL SMEAR: NORMAL
CA-I BLDA-SCNC: 1.08 MMOL/L (ref 1.1–1.33)
CA-I BLDV-SCNC: 1.05 MMOL/L (ref 1.1–1.33)
CALCIUM SERPL-MCNC: 7.4 MG/DL (ref 8.6–10.6)
CALCIUM SERPL-MCNC: 7.7 MG/DL (ref 8.6–10.6)
CALCIUM SERPL-MCNC: 7.8 MG/DL (ref 8.6–10.6)
CHLORIDE BLDA-SCNC: 90 MMOL/L (ref 98–107)
CHLORIDE BLDV-SCNC: 93 MMOL/L (ref 98–107)
CHLORIDE SERPL-SCNC: 86 MMOL/L (ref 98–107)
CHLORIDE SERPL-SCNC: 86 MMOL/L (ref 98–107)
CHLORIDE SERPL-SCNC: 91 MMOL/L (ref 98–107)
CO2 SERPL-SCNC: 22 MMOL/L (ref 21–32)
CO2 SERPL-SCNC: 24 MMOL/L (ref 21–32)
CO2 SERPL-SCNC: 25 MMOL/L (ref 21–32)
CREAT SERPL-MCNC: 2.55 MG/DL (ref 0.5–1.05)
CREAT SERPL-MCNC: 5.37 MG/DL (ref 0.5–1.05)
CREAT SERPL-MCNC: 5.4 MG/DL (ref 0.5–1.05)
DACRYOCYTES BLD QL SMEAR: NORMAL
EGFRCR SERPLBLD CKD-EPI 2021: 20 ML/MIN/1.73M*2
EGFRCR SERPLBLD CKD-EPI 2021: 8 ML/MIN/1.73M*2
EGFRCR SERPLBLD CKD-EPI 2021: 8 ML/MIN/1.73M*2
EOSINOPHIL # BLD AUTO: 0 X10*3/UL (ref 0–0.7)
EOSINOPHIL # BLD AUTO: ABNORMAL 10*3/UL
EOSINOPHIL NFR BLD AUTO: 0 %
EOSINOPHIL NFR BLD AUTO: ABNORMAL %
ERYTHROCYTE [DISTWIDTH] IN BLOOD BY AUTOMATED COUNT: 18.6 % (ref 11.5–14.5)
ERYTHROCYTE [DISTWIDTH] IN BLOOD BY AUTOMATED COUNT: 20.2 % (ref 11.5–14.5)
ERYTHROCYTE [DISTWIDTH] IN BLOOD BY AUTOMATED COUNT: 20.4 % (ref 11.5–14.5)
GLUCOSE BLD MANUAL STRIP-MCNC: 100 MG/DL (ref 74–99)
GLUCOSE BLD MANUAL STRIP-MCNC: 17 MG/DL (ref 74–99)
GLUCOSE BLD MANUAL STRIP-MCNC: 70 MG/DL (ref 74–99)
GLUCOSE BLD MANUAL STRIP-MCNC: 83 MG/DL (ref 74–99)
GLUCOSE BLD MANUAL STRIP-MCNC: 87 MG/DL (ref 74–99)
GLUCOSE BLD MANUAL STRIP-MCNC: 88 MG/DL (ref 74–99)
GLUCOSE BLD MANUAL STRIP-MCNC: 90 MG/DL (ref 74–99)
GLUCOSE BLD MANUAL STRIP-MCNC: 98 MG/DL (ref 74–99)
GLUCOSE BLD MANUAL STRIP-MCNC: 98 MG/DL (ref 74–99)
GLUCOSE BLD MANUAL STRIP-MCNC: <10 MG/DL (ref 74–99)
GLUCOSE BLD MANUAL STRIP-MCNC: <10 MG/DL (ref 74–99)
GLUCOSE BLDA-MCNC: 180 MG/DL (ref 74–99)
GLUCOSE BLDV-MCNC: 258 MG/DL (ref 74–99)
GLUCOSE SERPL-MCNC: 104 MG/DL (ref 74–99)
GLUCOSE SERPL-MCNC: 116 MG/DL (ref 74–99)
GLUCOSE SERPL-MCNC: 83 MG/DL (ref 74–99)
HCO3 BLDA-SCNC: 25.6 MMOL/L (ref 22–26)
HCO3 BLDV-SCNC: 24.8 MMOL/L (ref 22–26)
HCT VFR BLD AUTO: 17.9 % (ref 36–46)
HCT VFR BLD AUTO: 18.9 % (ref 36–46)
HCT VFR BLD AUTO: 20.2 % (ref 36–46)
HCT VFR BLD EST: 25 % (ref 36–46)
HCT VFR BLD EST: 27 % (ref 36–46)
HGB BLD-MCNC: 6.5 G/DL (ref 12–16)
HGB BLD-MCNC: 6.7 G/DL (ref 12–16)
HGB BLD-MCNC: 7.1 G/DL (ref 12–16)
HGB BLDA-MCNC: 9 G/DL (ref 12–16)
HGB BLDV-MCNC: 8.3 G/DL (ref 12–16)
IMM GRANULOCYTES # BLD AUTO: 0.18 X10*3/UL (ref 0–0.7)
IMM GRANULOCYTES # BLD AUTO: 0.18 X10*3/UL (ref 0–0.7)
IMM GRANULOCYTES NFR BLD AUTO: 1.3 % (ref 0–0.9)
IMM GRANULOCYTES NFR BLD AUTO: 1.4 % (ref 0–0.9)
INHALED O2 CONCENTRATION: 32 %
INHALED O2 CONCENTRATION: 44 %
LACTATE BLDA-SCNC: 8.2 MMOL/L (ref 0.4–2)
LACTATE BLDV-SCNC: 6.8 MMOL/L (ref 0.4–2)
LACTATE BLDV-SCNC: 8.7 MMOL/L (ref 0.4–2)
LYMPHOCYTES # BLD AUTO: 0.2 X10*3/UL (ref 1.2–4.8)
LYMPHOCYTES # BLD AUTO: ABNORMAL 10*3/UL
LYMPHOCYTES NFR BLD AUTO: 1.6 %
LYMPHOCYTES NFR BLD AUTO: ABNORMAL %
MAGNESIUM SERPL-MCNC: 2.05 MG/DL (ref 1.6–2.4)
MCH RBC QN AUTO: 31.3 PG (ref 26–34)
MCH RBC QN AUTO: 31.5 PG (ref 26–34)
MCH RBC QN AUTO: 32.2 PG (ref 26–34)
MCHC RBC AUTO-ENTMCNC: 35.1 G/DL (ref 32–36)
MCHC RBC AUTO-ENTMCNC: 35.4 G/DL (ref 32–36)
MCHC RBC AUTO-ENTMCNC: 36.3 G/DL (ref 32–36)
MCV RBC AUTO: 89 FL (ref 80–100)
MONOCYTES # BLD AUTO: 0.65 X10*3/UL (ref 0.1–1)
MONOCYTES # BLD AUTO: ABNORMAL 10*3/UL
MONOCYTES NFR BLD AUTO: 5.1 %
MONOCYTES NFR BLD AUTO: ABNORMAL %
NEUTROPHILS # BLD AUTO: 11.75 X10*3/UL (ref 1.2–7.7)
NEUTROPHILS # BLD AUTO: ABNORMAL 10*3/UL
NEUTROPHILS NFR BLD AUTO: 91.8 %
NEUTROPHILS NFR BLD AUTO: ABNORMAL %
NRBC BLD-RTO: 0.9 /100 WBCS (ref 0–0)
NRBC BLD-RTO: 1 /100 WBCS (ref 0–0)
NRBC BLD-RTO: 1.7 /100 WBCS (ref 0–0)
OSMOLALITY SERPL: 286 MOSM/KG (ref 280–300)
OVALOCYTES BLD QL SMEAR: NORMAL
OXYHGB MFR BLDA: 32.3 % (ref 94–98)
OXYHGB MFR BLDV: 60.8 % (ref 45–75)
P AXIS: 3 DEGREES
P OFFSET: 150 MS
P ONSET: 128 MS
PCO2 BLDA: 57 MM HG (ref 38–42)
PCO2 BLDV: 42 MM HG (ref 41–51)
PH BLDA: 7.26 PH (ref 7.38–7.42)
PH BLDV: 7.38 PH (ref 7.33–7.43)
PHOSPHATE SERPL-MCNC: 6.4 MG/DL (ref 2.5–4.9)
PHOSPHATE SERPL-MCNC: 6.4 MG/DL (ref 2.5–4.9)
PLATELET # BLD AUTO: 32 X10*3/UL (ref 150–450)
PLATELET # BLD AUTO: 42 X10*3/UL (ref 150–450)
PLATELET # BLD AUTO: 45 X10*3/UL (ref 150–450)
PO2 BLDA: 29 MM HG (ref 85–95)
PO2 BLDV: 40 MM HG (ref 35–45)
POLYCHROMASIA BLD QL SMEAR: NORMAL
POTASSIUM BLDA-SCNC: 4.1 MMOL/L (ref 3.5–5.3)
POTASSIUM BLDV-SCNC: 3.5 MMOL/L (ref 3.5–5.3)
POTASSIUM SERPL-SCNC: 3.8 MMOL/L (ref 3.5–5.3)
POTASSIUM SERPL-SCNC: 4.7 MMOL/L (ref 3.5–5.3)
POTASSIUM SERPL-SCNC: 4.8 MMOL/L (ref 3.5–5.3)
PR INTERVAL: 124 MS
Q ONSET: 179 MS
QRS COUNT: 12 BEATS
QRS DURATION: 168 MS
QT INTERVAL: 460 MS
QTC CALCULATION(BAZETT): 513 MS
QTC FREDERICIA: 495 MS
R AXIS: 144 DEGREES
RBC # BLD AUTO: 2.02 X10*6/UL (ref 4–5.2)
RBC # BLD AUTO: 2.13 X10*6/UL (ref 4–5.2)
RBC # BLD AUTO: 2.27 X10*6/UL (ref 4–5.2)
RBC MORPH BLD: NORMAL
RH FACTOR (ANTIGEN D): NORMAL
SAO2 % BLDA: 33 % (ref 94–100)
SAO2 % BLDV: 63 % (ref 45–75)
SODIUM BLDA-SCNC: 129 MMOL/L (ref 136–145)
SODIUM BLDV-SCNC: 128 MMOL/L (ref 136–145)
SODIUM SERPL-SCNC: 125 MMOL/L (ref 136–145)
SODIUM SERPL-SCNC: 125 MMOL/L (ref 136–145)
SODIUM SERPL-SCNC: 136 MMOL/L (ref 136–145)
T AXIS: -40 DEGREES
T OFFSET: 409 MS
TARGETS BLD QL SMEAR: NORMAL
UFH PPP CHRO-ACNC: 0.8 IU/ML
UFH PPP CHRO-ACNC: 0.9 IU/ML
VENTRICULAR RATE: 75 BPM
WBC # BLD AUTO: 12.8 X10*3/UL (ref 4.4–11.3)
WBC # BLD AUTO: 14.4 X10*3/UL (ref 4.4–11.3)
WBC # BLD AUTO: 9 X10*3/UL (ref 4.4–11.3)

## 2024-10-15 PROCEDURE — 87040 BLOOD CULTURE FOR BACTERIA: CPT

## 2024-10-15 PROCEDURE — 2500000001 HC RX 250 WO HCPCS SELF ADMINISTERED DRUGS (ALT 637 FOR MEDICARE OP)

## 2024-10-15 PROCEDURE — 86901 BLOOD TYPING SEROLOGIC RH(D): CPT

## 2024-10-15 PROCEDURE — 2500000004 HC RX 250 GENERAL PHARMACY W/ HCPCS (ALT 636 FOR OP/ED)

## 2024-10-15 PROCEDURE — 80069 RENAL FUNCTION PANEL: CPT | Mod: CCI

## 2024-10-15 PROCEDURE — 2020000001 HC ICU ROOM DAILY

## 2024-10-15 PROCEDURE — 84075 ASSAY ALKALINE PHOSPHATASE: CPT

## 2024-10-15 PROCEDURE — 85027 COMPLETE CBC AUTOMATED: CPT

## 2024-10-15 PROCEDURE — 2500000005 HC RX 250 GENERAL PHARMACY W/O HCPCS

## 2024-10-15 PROCEDURE — 83930 ASSAY OF BLOOD OSMOLALITY: CPT

## 2024-10-15 PROCEDURE — 93010 ELECTROCARDIOGRAM REPORT: CPT | Performed by: INTERNAL MEDICINE

## 2024-10-15 PROCEDURE — 86923 COMPATIBILITY TEST ELECTRIC: CPT

## 2024-10-15 PROCEDURE — 82947 ASSAY GLUCOSE BLOOD QUANT: CPT

## 2024-10-15 PROCEDURE — 36600 WITHDRAWAL OF ARTERIAL BLOOD: CPT

## 2024-10-15 PROCEDURE — 85520 HEPARIN ASSAY: CPT

## 2024-10-15 PROCEDURE — 84484 ASSAY OF TROPONIN QUANT: CPT

## 2024-10-15 PROCEDURE — 85025 COMPLETE CBC W/AUTO DIFF WBC: CPT

## 2024-10-15 PROCEDURE — 83735 ASSAY OF MAGNESIUM: CPT

## 2024-10-15 PROCEDURE — 84145 PROCALCITONIN (PCT): CPT

## 2024-10-15 PROCEDURE — 85610 PROTHROMBIN TIME: CPT

## 2024-10-15 PROCEDURE — 84132 ASSAY OF SERUM POTASSIUM: CPT

## 2024-10-15 PROCEDURE — P9016 RBC LEUKOCYTES REDUCED: HCPCS

## 2024-10-15 PROCEDURE — 83605 ASSAY OF LACTIC ACID: CPT

## 2024-10-15 PROCEDURE — 36430 TRANSFUSION BLD/BLD COMPNT: CPT

## 2024-10-15 PROCEDURE — 84132 ASSAY OF SERUM POTASSIUM: CPT | Performed by: INTERNAL MEDICINE

## 2024-10-15 PROCEDURE — 71045 X-RAY EXAM CHEST 1 VIEW: CPT

## 2024-10-15 PROCEDURE — 36415 COLL VENOUS BLD VENIPUNCTURE: CPT

## 2024-10-15 PROCEDURE — 80048 BASIC METABOLIC PNL TOTAL CA: CPT | Mod: CCI

## 2024-10-15 PROCEDURE — 71045 X-RAY EXAM CHEST 1 VIEW: CPT | Performed by: STUDENT IN AN ORGANIZED HEALTH CARE EDUCATION/TRAINING PROGRAM

## 2024-10-15 PROCEDURE — 84100 ASSAY OF PHOSPHORUS: CPT

## 2024-10-15 PROCEDURE — 2500000002 HC RX 250 W HCPCS SELF ADMINISTERED DRUGS (ALT 637 FOR MEDICARE OP, ALT 636 FOR OP/ED)

## 2024-10-15 PROCEDURE — 99233 SBSQ HOSP IP/OBS HIGH 50: CPT | Performed by: INTERNAL MEDICINE

## 2024-10-15 PROCEDURE — 93005 ELECTROCARDIOGRAM TRACING: CPT

## 2024-10-15 PROCEDURE — 6350000001 HC RX 635 EPOETIN >10,000 UNITS: Mod: JZ

## 2024-10-15 RX ORDER — ASPIRIN 81 MG/1
81 TABLET ORAL DAILY
Status: DISCONTINUED | OUTPATIENT
Start: 2024-10-16 | End: 2024-10-18 | Stop reason: HOSPADM

## 2024-10-15 RX ORDER — ONDANSETRON HYDROCHLORIDE 2 MG/ML
4 INJECTION, SOLUTION INTRAVENOUS ONCE
Status: COMPLETED | OUTPATIENT
Start: 2024-10-15 | End: 2024-10-15

## 2024-10-15 RX ORDER — NALOXONE HYDROCHLORIDE 0.4 MG/ML
0.2 INJECTION, SOLUTION INTRAMUSCULAR; INTRAVENOUS; SUBCUTANEOUS ONCE
Status: DISCONTINUED | OUTPATIENT
Start: 2024-10-15 | End: 2024-10-18 | Stop reason: HOSPADM

## 2024-10-15 RX ORDER — VANCOMYCIN HYDROCHLORIDE 750 MG/150ML
15 INJECTION, SOLUTION INTRAVENOUS ONCE
Status: COMPLETED | OUTPATIENT
Start: 2024-10-15 | End: 2024-10-16

## 2024-10-15 ASSESSMENT — PAIN - FUNCTIONAL ASSESSMENT
PAIN_FUNCTIONAL_ASSESSMENT: 0-10
PAIN_FUNCTIONAL_ASSESSMENT: UNABLE TO SELF-REPORT
PAIN_FUNCTIONAL_ASSESSMENT: 0-10

## 2024-10-15 ASSESSMENT — PAIN DESCRIPTION - LOCATION: LOCATION: BACK

## 2024-10-15 ASSESSMENT — PAIN SCALES - GENERAL
PAINLEVEL_OUTOF10: 10 - WORST POSSIBLE PAIN
PAINLEVEL_OUTOF10: 0 - NO PAIN

## 2024-10-15 NOTE — NURSING NOTE
Report from Sending RN:    Report From: NANCY Mays  Recent Surgery of Procedure: No  Baseline Level of Consciousness (LOC): A/O X 4  Oxygen Use: No  Type: N/A  Diabetic: No  Last BP Med Given Day of Dialysis: yes, Midodrine  Last Pain Med Given:  yes, see EMR  Lab Tests to be Obtained with Dialysis: No  Blood Transfusion to be Given During Dialysis: Yes, 1 unit PRBC  Available IV Access: Yes  Medications to be Administered During Dialysis: No  Continuous IV Infusion Running: No  Restraints on Currently or in the Last 24 Hours: No  Hand-Off Communication: full code, no isolation, pt can not stand safely on a scale, travel by bed  Dialysis Catheter Dressing: N/A, fistula  Last Dressing Change: N/A,

## 2024-10-15 NOTE — NURSING NOTE
1758 Patient returned to unit from dialysis. Patient lethargic but oriented. Vitals 97.2-98-/61-pox 94% on 4l. Blood sugar reading LO on glucometer. Team made aware. Patient remains arousable, answering questions but remains lethargic. Dextrose 25gm iv given   1812 repeat poc glucose 17, second amp of dextrose given   1832 glucose 70 12.5gm dextrose given  1907 glucose 98  Patient remains lethargic but oriented. VS 36.1-85-/72  Critical value reported from lab. Platelets 32. Paged team, awaiting call.   1924 Night team updated. Will see patient at bedside.

## 2024-10-15 NOTE — PROGRESS NOTES
Physical Therapy                 Therapy Communication Note    Patient Name: Misael Ritchie  MRN: [id number]  Department: [place] [address]  Room: 60/6016-A  Today's Date: 10/15/2024     Discipline: Physical Therapy    Missed Visit Reason: Missed Visit Reason: Patient placed on medical hold (Pt with low Hgb 6.7. Will re-attempt as appropriate)    Missed Time: 10:26 AM

## 2024-10-15 NOTE — CARE PLAN
The patient's goals for the shift include      The clinical goals for the shift include Pt will remain hds throughout shift    Over the shift, the patient did not make progress toward the following goals. Barriers to progression include . Recommendations to address these barriers include .

## 2024-10-15 NOTE — PROGRESS NOTES
Misael Ritchie is a 69 y.o. female on day 12 of admission presenting with Hyperkalemia.      Subjective   Pt is not oriented to time and place. She states there is abdominal pain that started recently. Last bowel movement reportedly several days ago. Overnight nurse unsure when pt started being confused. Nurse said at 3am they noticed the NC fell off. Pt was put back on NC 6L afterwards. There are also some light red bloody smudges on the blankets. Pt is unable to state where it came from. Nurse believe it is from bandage site on left neck.       Objective     Last Recorded Vitals  /52 (BP Location: Right arm, Patient Position: Lying)   Pulse 75   Temp 36.2 °C (97.2 °F) (Temporal)   Resp 16   Wt 58.2 kg (128 lb 4.9 oz)   SpO2 93%   Intake/Output last 3 Shifts:    Intake/Output Summary (Last 24 hours) at 10/15/2024 0948  Last data filed at 10/14/2024 1830  Gross per 24 hour   Intake 680 ml   Output --   Net 680 ml       Admission Weight  Weight: 53.5 kg (118 lb) (10/03/24 0941)    Daily Weight  10/10/24 : 58.2 kg (128 lb 4.9 oz)    Image Results  Cardiac Catheterization Procedure     [facility], Cath Lab, [address] [place] [address]    Cardiovascular Catheterization Report    Patient Name:      MISAEL RITCHIE Performing Physician:  [id number] Stevenson MD  Study Date:        10/11/2024            Verifying Physician:   [id number] Stevenson MD  MRN/PID:           [id number]              Cardiologist/Co-Scrub:  Accession#:        [id number]          Ordering Provider:     [id number] KAREN MANTILLA  Date of Birth/Age: 1955 / 69 years  Cardiologist:  Gender:            F                     Fellow:                [id number] Schmid                                                                   Libertad GUSMAN  Encounter#:        [id number]            Surgeon:       Study: Right Heart Cath       Indications:  Heart failure.     Appropriate Use Criteria:  Resting pulmonary hypertension, determine response after initiation of drug therapy; AUC score = 7.     Procedure Description:  After infiltration of local anesthetic, the right internal jugular vein was identified with two-dimensional ultrasound. Under direct ultrasound visualization, the right internal jugular vein was cannulated with a micropuncture technique. A 9 Urdu sheath was placed in the vein. Cardiac output was calculated via the Christiano method.     Right Heart Catheterization:  Cardiac output was calculated via the Christiano method. Cardiac output is normal. RHC hemodynamics: RA 3 RVSP 53, RVEDP 9, PCWP 10, PA 57/9, mean PAP 29, CO/CI 4.61/2.95, PA sat 55%, PVR 4.3.     Hemo Personnel:  +--------------------+---------+  Name                Duty       +--------------------+---------+  Alex Stevenson MD 1  +--------------------+---------+       Hemodynamic Pressures:     +----+----------+---------+------------+-------------+---+-----+-------+-------+  SiteDate Time   Phase    Systolic    Diastolic  ED Mean A-Wave V-Wave                   Name       mmHg        mmHg     mmHmmHg  mmHg   mmHg                                                     g                      +----+----------+---------+------------+-------------+---+-----+-------+-------+    AO10/11/2024     Rest         155           57      86                    4:05:34 PM                                                          +----+----------+---------+------------+-------------+---+-----+-------+-------+    RA10/11/2024     Rest                                2      3      2      4:28:46 PM                                                           +----+----------+---------+------------+-------------+---+-----+-------+-------+   Art10/11/2024     Rest         160           54      86                    4:28:46 PM                                                          +----+----------+---------+------------+-------------+---+-----+-------+-------+    RA10/11/2024     Rest                                0      2      1      4:33:08 PM                                                          +----+----------+---------+------------+-------------+---+-----+-------+-------+   Art10/11/2024     Rest         161           55      88                    4:33:08 PM                                                          +----+----------+---------+------------+-------------+---+-----+-------+-------+    RV10/11/2024     Rest          53           -2  9                         4:33:44 PM                                                          +----+----------+---------+------------+-------------+---+-----+-------+-------+   Art10/11/2024     Rest         153           50      80                    4:33:44 PM                                                          +----+----------+---------+------------+-------------+---+-----+-------+-------+    PW10/11/2024     Rest                                7     13     10      4:35:36 PM                                                          +----+----------+---------+------------+-------------+---+-----+-------+-------+   Art10/11/2024     Rest         163           53      85                    4:35:36 PM                                                          +----+----------+---------+------------+-------------+---+-----+-------+-------+    PA10/11/2024     Rest          57            9      29                    4:36:17 PM                                                           +----+----------+---------+------------+-------------+---+-----+-------+-------+   Art10/11/2024     Rest         162           53      86                    4:36:17 PM                                                          +----+----------+---------+------------+-------------+---+-----+-------+-------+         Oxygen Saturation %:  +-----------+----------+------------+  Sample SiteO2 Sat (%)HB (g/100ml)  +-----------+----------+------------+           FA        96         7.6  +-----------+----------+------------+           PA        55         7.6  +-----------+----------+------------+           FA        96         7.6  +-----------+----------+------------+           PA        55         7.6  +-----------+----------+------------+       Cardiac Outputs:  +---------------+------------------+-------+  IVONNE CO (l/min)IVONNE CI (l/min/m2)IVONNE SV  +---------------+------------------+-------+              4.6               3.0   55.6  +---------------+------------------+-------+       Vascular Resistance Calculated Values (Wood Units):  +-----+---+----+-------+----+----+---+----+----+----+-------+  PhasePVRPVRIPVR/SVRSVR SVRITPRTPRITVR TVRITPR/TVR  +-----+---+----+-------+----+----+---+----+----+----+-------+  0    4.87.5 0      [id number] 18.629.20        +-----+---+----+-------+----+----+---+----+----+----+-------+       Complications:  No in-lab complications observed.     Cardiac Cath Post Procedure Notes:  Post Procedure Diagnosis: Pre-capillary pulmonary hypertension.  Blood Loss:               Estimated blood loss during the procedure was 5 cc                            mls.  Specimens Removed:        Number of specimen(s) removed: none.       Recommendations:  Further management per MICU and HF team.    ____________________________________________________________________________________  CONCLUSIONS:   1.  Pre-capillary pulmonary hypertension as evident by RHC hemodynamics .    ICD 10 Codes:  Pulmonary hypertension, unspecified-I27.20     CPT Codes:  Right Heart Cath O2/Cardiac output without biopsy (RHC)-97960; Ultrasound guidance for vascular access-69901; Moderate Sedation Services initial 15 minutes patient >5 years-87914     [id number] Alex Stevenson MD  Performing Physician  Electronically signed by [id number] Alex Stevenson MD on 10/14/2024 at 12:14:50 PM         ** Final **      Physical exam:    General: Awake, alert, in no acute distress  HENT: Normo-cephalic, right periorbital hematoma No stridor  CV: Regular rate, regular rhythm. Radial pulses 2+ bilaterally  Resp: Breathing non-labored, speaking in full sentences.  Clear to auscultation bilaterally  GI: Soft, non-distended, mild diffuse tenderness on palpation. No rebound or guarding.  : deferred   MSK/Extremities: No gross bony deformities. Moving all extremities.  Skin: LUE is swollen and cool to touch.  Neuro: A&O only to self. Face symmetric. Speech is fluent.  Unable to assess gross strength in extremities as pt is not following instruction.   Psych: Appropriate mood and affect    Relevant Results      Results for orders placed or performed during the hospital encounter of 10/03/24 (from the past 24 hour(s))   POCT GLUCOSE   Result Value Ref Range    POCT Glucose 53 (L) 74 - 99 mg/dL   POCT GLUCOSE   Result Value Ref Range    POCT Glucose 75 74 - 99 mg/dL   POCT GLUCOSE   Result Value Ref Range    POCT Glucose 112 (H) 74 - 99 mg/dL   Heparin Assay, UFH   Result Value Ref Range    Heparin Unfractionated 0.1 See Comment Below for Therapeutic Ranges IU/mL   POCT GLUCOSE   Result Value Ref Range    POCT Glucose 111 (H) 74 - 99 mg/dL   Heparin Assay, UFH   Result Value Ref Range    Heparin Unfractionated 0.8 See Comment Below for Therapeutic Ranges IU/mL   POCT GLUCOSE   Result Value Ref Range    POCT Glucose 124 (H) 74 - 99 mg/dL   Heparin Assay, UFH    Result Value Ref Range    Heparin Unfractionated 0.8 See Comment Below for Therapeutic Ranges IU/mL   POCT GLUCOSE   Result Value Ref Range    POCT Glucose 98 74 - 99 mg/dL   Heparin Assay   Result Value Ref Range    Heparin Unfractionated 0.9 See Comment Below for Therapeutic Ranges IU/mL   Magnesium   Result Value Ref Range    Magnesium 2.05 1.60 - 2.40 mg/dL   Renal function panel   Result Value Ref Range    Glucose 116 (H) 74 - 99 mg/dL    Sodium 125 (L) 136 - 145 mmol/L    Potassium 4.7 3.5 - 5.3 mmol/L    Chloride 86 (L) 98 - 107 mmol/L    Bicarbonate 24 21 - 32 mmol/L    Anion Gap 20 10 - 20 mmol/L    Urea Nitrogen 63 (H) 6 - 23 mg/dL    Creatinine 5.40 (H) 0.50 - 1.05 mg/dL    eGFR 8 (L) >60 mL/min/1.73m*2    Calcium 7.8 (L) 8.6 - 10.6 mg/dL    Phosphorus 6.4 (H) 2.5 - 4.9 mg/dL    Albumin 2.5 (L) 3.4 - 5.0 g/dL   CBC and Auto Differential   Result Value Ref Range    WBC 14.4 (H) 4.4 - 11.3 x10*3/uL    nRBC 0.9 (H) 0.0 - 0.0 /100 WBCs    RBC 2.13 (L) 4.00 - 5.20 x10*6/uL    Hemoglobin 6.7 (L) 12.0 - 16.0 g/dL    Hematocrit 18.9 (L) 36.0 - 46.0 %    MCV 89 80 - 100 fL    MCH 31.5 26.0 - 34.0 pg    MCHC 35.4 32.0 - 36.0 g/dL    RDW 20.4 (H) 11.5 - 14.5 %    Platelets 45 (L) 150 - 450 x10*3/uL    Neutrophils %      Immature Granulocytes %, Automated      Lymphocytes %      Monocytes %      Eosinophils %      Basophils %      Neutrophils Absolute      Lymphocytes Absolute      Monocytes Absolute      Eosinophils Absolute      Basophils Absolute          Assessment/Plan      Misael Ritchie is a 69 y.o. female PMH aortic stenosis s/p TAVR 11/23, atrial fibrillation (on Eliquis) s/p DCCV, CAD, ESRD on hemodialysis T/Th/Sat, HFrEF (6/2023 EF 48%), MR, TR, pacemaker CRT-P 5/2023, adrenal insufficiency admitted to the MICU on 10/03/24 for emergent dialysis for hyperkalemia 7.4. Known RV systolic dysfunction, swan in MICU revealed mild PA hypertension, severe TR, and RV dysfunciton. Patient had new systolic  goal of , mixed venous shoed normal SO2 and lactate has been stable. Patient has been off of pressors since 10/12 and will be transferred to the floor from the MICU.    Updates 10/15/24  - Pt only oriented to self. CXR unremarkable. UA pending. EKG pending. Mirtazapine held.  - In the morning, pt nauseas and vomited x1. IV zofran ordered.  - Patient to receive HD today. Sodium 125, phosphorous 6.4, calcium 7.8 alb 2.5  - Plt 45, Hg 6.7, wbc 14.4 (pt on steroids) --> transfusion ordered  - Patient with upper extremity swelling will get bilateral upper extremity venous duplex STAT to rule out DVT.     To do:  - FU on UA, EKG, upper extremity venous duplex     #A-fib on eliquis ,   #CAD s/p stent 2003,  # Pace maker CRT-P in 5/23   #pericardial effusion  :: TTE 10/4: LV EF 50-55%, reduced RV systolic function, severely enlarged RV, LA/RA severely dilated, small pericardial effusion, mod mitral annular calcification, severe TR, presence of TAVR, severely elevated PA pressure   :: s/p swan alex placement on 10/11 removed prior to floor transfer. Patient with prolonged bleed after removal.   :: s/p Right heart cath on 10/11  :: RHC hemodynamics: RA 3, RVSP 53, RVEDP 9 , PCWP 10, PA 57/9, mean PAP 29,CO/CI 4.61/2.95. PA sat 55, PVR 4.3     Management:  - Plavix stopped. ASA 81mg daily started. Heparin held.  - Continue home amiodarone, atorvastatin  - Hold metoprolol for HR iso of shock on admission  - Consult HF team and recs appreciated     #Adrenal insufficiency  #Hypoglycemia 2/2 insulin patient received for hyperkalemia  Management:  - Per Endocrine okay to taper down to hydrocortisone 25mg IV q6H (for 3 days currently on day 1) --> 15 mg q6H for 3 days --> 15mg TID for 3 days (last dose before 6 PM) --> prednisone 10 TID for 3 days--> prednisone 5 mg daily until follow up as outpatient.   - Endocrine recommends re-engaging if necessary     #ESRD on HD TTS   :: Potassium of 7.4 on admission requiring  urgent  dialysis   :: HD unit: [facility]   :: Access: RUE AVF   :: EDW 56kg      Management:  - Patient set up for HD 10/15, Nephrology following  - Hold home torsemide   - Hold home allopurinol     #Right troc fracture with hematoma  Management:  - Hgb 7.9 this AM  will continue to monitor   - S/p Hip Fracture ORIF w/ Nail Trochanteric surgery with ortho 10/7  - pain control with Dilaudid, oxy and tylenol  - Tylenol 975 TID, Oxy 10mg Q4h for severe pain      #COPD  Management:  - Continue home albuterol PRN     FEN  Fluids:on HD, be cautious with fluid   Electrolytes: PRN  Nutrition: renal diet   Prophylaxis:  DVT ppx: Heparin gtt  GI ppx: PPI  Bowel care: Miralax  Code: Full Code    HPOA:  Zeke Ritchie () [phone]   Assessment & Plan  Hyperkalemia    ESRD (end stage renal disease) on dialysis (Multi)    Pacemaker    HTN (hypertension)    CAD (coronary artery disease)    CHF (congestive heart failure)    Atrial fibrillation (Multi)    Difficult intubation    Anemia    Nondisplaced intertrochanteric fracture of right femur, initial encounter for closed fracture    Pulmonary hypertension (Multi)    Edema of both upper extremities                  EVAN SOL  Medical Student

## 2024-10-15 NOTE — SIGNIFICANT EVENT
Rapid Response Nurse Note: [] Rapid Response [x] RADAR alert: Score 6    Pager time: [phone]  Arrival time: 1245  Event end time: 1300  Location: [facility]  [] Phone triage     Rapid response initiated by:  [] Rapid response RN [] Family [] Nursing Supervisor [] Physician   [x] RADAR auto page [] Sepsis auto-page [] RN [] RT   [] NP/PA [] Other:     Primary reason for call:   [] BAT [] New CPAP/BiPAP [] Bleeding [] Change in mental status   [] Chest pain [] Code blue [] FiO2 >/= 50% [] HR </= 40 bpm   [] HR >/= 130 bpm [] Hyperglycemia [] Hypoglycemia [] RADAR    [] RR </= 8 bpm [] RR >/= 30 bpm [] SBP </= 90 mmHg [] SpO2 < 90%   [] Seizure [] Sepsis [] Shorness of breath  [] Staff concern: see comments     Initial VS and/or RADAR VS: T 36.2 °C; HR 92; RR 20; /45; SPO2 93%.  Providers present at bedside (if applicable):     Interventions:  [x] None [] ABG/VBG [] Assist w/ICU transfer [] BAT paged    [] Bag mask [] Blood [] Cardioversion [] Code Blue   [] Code blue for intubation [] Code status changed [] Chest x-ray [] EKG   [] IV fluid/bolus [] KUB x-ray [] Labs/cultures [] Medication   [] Nebulizer treatment [] NIPPV (CPAP/BiPAP) [] Oxygen [] Oral airway   [] Peripheral IV [] Palliative care consult [] CT/MRI [] Sepsis protocol    [] Suctioned [] Other:     Name of ICU Provider contacted (if applicable):   Outcome:  [] Coded and  [] Code blue for intubation [] Coded and transferred to ICU []  on division   [x] Remained on division (no change) [] Remained on division + additional monitoring [] Remained in ED [] Transferred to ED   [] Transferred to ICU [] Transferred to inpatient status [] Transferred for interventions (procedure) [] Transferred to ICU stepdown    [] Transferred to surgery [] Transferred to telemetry [] Sepsis protocol [] STEMI protocol   [] Stroke protocol [x] Bedside nurse instructed to page rapid response for any concerns or acute change in condition/VS     Additional Comments:  RADAR alert with pt-messaged with bedside RN and pt receives Midodrine dosing- also a dialysis pt. At this time no immediate concerns and ok for continued monitoring on the floor- tele. Will be to follow up on pt.

## 2024-10-15 NOTE — NURSING NOTE
Report to Receiving RN:    Report To: 17:20  Time Report Called: NANCY Dodson   Hand-Off Communication: BP was soft throughout tx, pt was restless and appeared to be uncomfortable, she was reposition multiple times; 618 mL of fluid removed, post tx BP 94/31 , alert and stable post tx   Complications During Treatment: No  Ultrafiltration Treatment: Yes  Medications Administered During Dialysis: No  Blood Products Administered During Dialysis: Yes, I unit of PRBC  Labs Sent During Dialysis: Yes  Heparin Drip Rate Changes: N/A  Dialysis Catheter Dressing: N/A fistula  Last Dressing Change: N/A      Last Updated: 7:39 PM by SCAR LUGO

## 2024-10-15 NOTE — PROGRESS NOTES
Misael Ritchie is a 69 y.o. female on day 12 of admission presenting with Hyperkalemia.      Subjective   NAEON. Patient expresses that she feels increased fatigue compared to yesterday. States that she did not have a good night sleep due to light sensitivity.        Objective     Last Recorded Vitals  BP (!) 105/43 Comment: end of blood transfusion  Pulse 97   Temp 35.2 °C (95.4 °F) (Temporal)   Resp 12   Wt 58.2 kg (128 lb 4.9 oz)   SpO2 93%   Intake/Output last 3 Shifts:    Intake/Output Summary (Last 24 hours) at 10/15/2024 1654  Last data filed at 10/15/2024 1635  Gross per 24 hour   Intake 900 ml   Output --   Net 900 ml       Admission Weight  Weight: 53.5 kg (118 lb) (10/03/24 0941)    Daily Weight  10/10/24 : 58.2 kg (128 lb 4.9 oz)    Image Results  Cardiac Catheterization Procedure     [facility] Cath Lab, [address] [place] [address]    Cardiovascular Catheterization Report    Patient Name:      MISAEL RITCHIE Performing Physician:  [id number] Stevenson MD  Study Date:        10/11/2024            Verifying Physician:   Holli Stevenson MD  MRN/PID:           [id number]              Cardiologist/Co-Scrub:  Accession#:        [id number]          Ordering Provider:     [id number] KAREN MANTILLA  Date of Birth/Age: 1955 / 69 years  Cardiologist:  Gender:            F                     Fellow:                [id number] Binu Maurer MD  Encounter#:        [id number]            Surgeon:       Study: Right Heart Cath       Indications:  Heart failure.     Appropriate Use Criteria:  Resting pulmonary hypertension, determine response after initiation of drug therapy; AUC score = 7.     Procedure  Description:  After infiltration of local anesthetic, the right internal jugular vein was identified with two-dimensional ultrasound. Under direct ultrasound visualization, the right internal jugular vein was cannulated with a micropuncture technique. A 9 [place]ian sheath was placed in the vein. Cardiac output was calculated via the Christiano method.     Right Heart Catheterization:  Cardiac output was calculated via the Christiano method. Cardiac output is normal. [facility] hemodynamics: RA 3 RVSP 53, RVEDP 9, PCWP 10, PA 57/9, mean PAP 29, CO/CI 4.61/2.95, PA sat 55%, PVR 4.3.     Hemo Personnel:  +--------------------+---------+  Name                Duty       +--------------------+---------+  Alex StevensonROC MD 1  +--------------------+---------+       Hemodynamic Pressures:     +----+----------+---------+------------+-------------+---+-----+-------+-------+  SiteDate Time   Phase    Systolic    Diastolic  ED Mean A-Wave V-Wave                   Name       mmHg        mmHg     mmHmmHg  mmHg   mmHg                                                     g                      +----+----------+---------+------------+-------------+---+-----+-------+-------+    AO10/11/2024     Rest         155           57      86                    4:05:34 PM                                                          +----+----------+---------+------------+-------------+---+-----+-------+-------+    RA10/11/2024     Rest                                2      3      2      4:28:46 PM                                                          +----+----------+---------+------------+-------------+---+-----+-------+-------+   Art10/11/2024     Rest         160           54      86                    4:28:46 PM                                                          +----+----------+---------+------------+-------------+---+-----+-------+-------+     RA10/11/2024     Rest                                0      2      1      4:33:08 PM                                                          +----+----------+---------+------------+-------------+---+-----+-------+-------+   Art10/11/2024     Rest         161           55      88                    4:33:08 PM                                                          +----+----------+---------+------------+-------------+---+-----+-------+-------+    RV10/11/2024     Rest          53           -2  9                         4:33:44 PM                                                          +----+----------+---------+------------+-------------+---+-----+-------+-------+   Art10/11/2024     Rest         153           50      80                    4:33:44 PM                                                          +----+----------+---------+------------+-------------+---+-----+-------+-------+    PW10/11/2024     Rest                                7     13     10      4:35:36 PM                                                          +----+----------+---------+------------+-------------+---+-----+-------+-------+   Art10/11/2024     Rest         163           53      85                    4:35:36 PM                                                          +----+----------+---------+------------+-------------+---+-----+-------+-------+    PA10/11/2024     Rest          57            9      29                    4:36:17 PM                                                          +----+----------+---------+------------+-------------+---+-----+-------+-------+   Art10/11/2024     Rest         162           53      86                    4:36:17 PM                                                           +----+----------+---------+------------+-------------+---+-----+-------+-------+         Oxygen Saturation %:  +-----------+----------+------------+  Sample SiteO2 Sat (%)HB (g/100ml)  +-----------+----------+------------+           FA        96         7.6  +-----------+----------+------------+           PA        55         7.6  +-----------+----------+------------+           FA        96         7.6  +-----------+----------+------------+           PA        55         7.6  +-----------+----------+------------+       Cardiac Outputs:  +---------------+------------------+-------+  IVONNE CO (l/min)IVONNE CI (l/min/m2)IVONNE SV  +---------------+------------------+-------+              4.6               3.0   55.6  +---------------+------------------+-------+       Vascular Resistance Calculated Values (Wood Units):  +-----+---+----+-------+----+----+---+----+----+----+-------+  PhasePVRPVRIPVR/SVRSVR SVRITPRTPRITVR TVRITPR/TVR  +-----+---+----+-------+----+----+---+----+----+----+-------+  0    4.87.5 0      [id number] 18.629.20        +-----+---+----+-------+----+----+---+----+----+----+-------+       Complications:  No in-lab complications observed.     Cardiac Cath Post Procedure Notes:  Post Procedure Diagnosis: Pre-capillary pulmonary hypertension.  Blood Loss:               Estimated blood loss during the procedure was 5 cc                            mls.  Specimens Removed:        Number of specimen(s) removed: none.       Recommendations:  Further management per MICU and HF team.    ____________________________________________________________________________________  CONCLUSIONS:   1. Pre-capillary pulmonary hypertension as evident by [facility] hemodynamics .    ICD 10 Codes:  Pulmonary hypertension, unspecified-I27.20     CPT Codes:  Right Heart Cath O2/Cardiac output without biopsy ([facility])-79740; Ultrasound guidance for vascular access-30123; Moderate Sedation  Services initial 15 minutes patient >5 years-[id number]     [id number] Alex Stevenson MD  Performing Physician  Electronically signed by [id number] Alex Stevenson MD on 10/14/2024 at 12:14:50 PM         ** Final **      Physical Exam  General: Awake, somnolent on exam but responding appropriately   HENT: Normo-cephalic, right periorbital hematoma No stridor  CV: Regular rate, regular rhythm. Radial pulses 2+ bilaterally  Resp:  Clear to auscultation bilaterally  GI: Soft, non-distended, non-tender. No rebound or guarding.  : deferred   MSK/Extremities: Patient with 2+ edema at the left upper extremity. 1+ edema of right upper extremity. Lower extremities with trace edema bilaterally.   Neuro: Aox2 Face symmetric. Moving all extremities   Psych: Depressed  mood   Relevant Results      Results for orders placed or performed during the hospital encounter of 10/03/24 (from the past 24 hour(s))   ECG 12 lead   Result Value Ref Range    Ventricular Rate 75 BPM    Atrial Rate 75 BPM    VA Interval 124 ms    QRS Duration 168 ms    QT Interval 460 ms    QTC Calculation(Bazett) 513 ms    P Axis 3 degrees    R Axis 144 degrees    T Axis -40 degrees    QRS Count 12 beats    Q Onset 179 ms    P Onset 128 ms    P Offset 150 ms    T Offset 409 ms    QTC Fredericia 495 ms   Heparin Assay, UFH   Result Value Ref Range    Heparin Unfractionated 0.8 See Comment Below for Therapeutic Ranges IU/mL   POCT GLUCOSE   Result Value Ref Range    POCT Glucose 124 (H) 74 - 99 mg/dL   Heparin Assay, UFH   Result Value Ref Range    Heparin Unfractionated 0.8 See Comment Below for Therapeutic Ranges IU/mL   POCT GLUCOSE   Result Value Ref Range    POCT Glucose 98 74 - 99 mg/dL   Heparin Assay   Result Value Ref Range    Heparin Unfractionated 0.9 See Comment Below for Therapeutic Ranges IU/mL   Magnesium   Result Value Ref Range    Magnesium 2.05 1.60 - 2.40 mg/dL   Renal function panel   Result Value Ref Range    Glucose 116 (H) 74 - 99 mg/dL    Sodium  125 (L) 136 - 145 mmol/L    Potassium 4.7 3.5 - 5.3 mmol/L    Chloride 86 (L) 98 - 107 mmol/L    Bicarbonate 24 21 - 32 mmol/L    Anion Gap 20 10 - 20 mmol/L    Urea Nitrogen 63 (H) 6 - 23 mg/dL    Creatinine 5.40 (H) 0.50 - 1.05 mg/dL    eGFR 8 (L) >60 mL/min/1.73m*2    Calcium 7.8 (L) 8.6 - 10.6 mg/dL    Phosphorus 6.4 (H) 2.5 - 4.9 mg/dL    Albumin 2.5 (L) 3.4 - 5.0 g/dL   CBC and Auto Differential   Result Value Ref Range    WBC 14.4 (H) 4.4 - 11.3 x10*3/uL    nRBC 0.9 (H) 0.0 - 0.0 /100 WBCs    RBC 2.13 (L) 4.00 - 5.20 x10*6/uL    Hemoglobin 6.7 (L) 12.0 - 16.0 g/dL    Hematocrit 18.9 (L) 36.0 - 46.0 %    MCV 89 80 - 100 fL    MCH 31.5 26.0 - 34.0 pg    MCHC 35.4 32.0 - 36.0 g/dL    RDW 20.4 (H) 11.5 - 14.5 %    Platelets 45 (L) 150 - 450 x10*3/uL    Neutrophils %      Immature Granulocytes %, Automated 1.3 (H) 0.0 - 0.9 %    Lymphocytes %      Monocytes %      Eosinophils %      Basophils %      Neutrophils Absolute      Immature Granulocytes Absolute, Automated 0.18 0.00 - 0.70 x10*3/uL    Lymphocytes Absolute      Monocytes Absolute      Eosinophils Absolute      Basophils Absolute     Osmolality   Result Value Ref Range    Osmolality, Serum 286 280 - 300 mOsm/kg   Type and screen   Result Value Ref Range    ANTIBODY SCREEN NEG    Abo/Rh   Result Value Ref Range    ABO TYPE O     Rh TYPE POS    POCT GLUCOSE   Result Value Ref Range    POCT Glucose 83 74 - 99 mg/dL   CBC and Auto Differential   Result Value Ref Range    WBC 12.8 (H) 4.4 - 11.3 x10*3/uL    nRBC 1.0 (H) 0.0 - 0.0 /100 WBCs    RBC 2.02 (L) 4.00 - 5.20 x10*6/uL    Hemoglobin 6.5 (LL) 12.0 - 16.0 g/dL    Hematocrit 17.9 (L) 36.0 - 46.0 %    MCV 89 80 - 100 fL    MCH 32.2 26.0 - 34.0 pg    MCHC 36.3 (H) 32.0 - 36.0 g/dL    RDW 20.2 (H) 11.5 - 14.5 %    Platelets 42 (L) 150 - 450 x10*3/uL    Neutrophils % 91.8 40.0 - 80.0 %    Immature Granulocytes %, Automated 1.4 (H) 0.0 - 0.9 %    Lymphocytes % 1.6 13.0 - 44.0 %    Monocytes % 5.1 2.0 - 10.0 %     Eosinophils % 0.0 0.0 - 6.0 %    Basophils % 0.1 0.0 - 2.0 %    Neutrophils Absolute 11.75 (H) 1.20 - 7.70 x10*3/uL    Immature Granulocytes Absolute, Automated 0.18 0.00 - 0.70 x10*3/uL    Lymphocytes Absolute 0.20 (L) 1.20 - 4.80 x10*3/uL    Monocytes Absolute 0.65 0.10 - 1.00 x10*3/uL    Eosinophils Absolute 0.00 0.00 - 0.70 x10*3/uL    Basophils Absolute 0.01 0.00 - 0.10 x10*3/uL   Renal function panel   Result Value Ref Range    Glucose 83 74 - 99 mg/dL    Sodium 125 (L) 136 - 145 mmol/L    Potassium 4.8 3.5 - 5.3 mmol/L    Chloride 86 (L) 98 - 107 mmol/L    Bicarbonate 22 21 - 32 mmol/L    Anion Gap 22 (H) 10 - 20 mmol/L    Urea Nitrogen 66 (H) 6 - 23 mg/dL    Creatinine 5.37 (H) 0.50 - 1.05 mg/dL    eGFR 8 (L) >60 mL/min/1.73m*2    Calcium 7.4 (L) 8.6 - 10.6 mg/dL    Phosphorus 6.4 (H) 2.5 - 4.9 mg/dL    Albumin 2.3 (L) 3.4 - 5.0 g/dL   Morphology   Result Value Ref Range    RBC Morphology See Below     Polychromasia Mild     Target Cells Few     Ovalocytes Few     Teardrop Cells Few     John Cells Few         Assessment/Plan        Misael Ritchie is a 69 y.o. female PMH aortic stenosis s/p TAVR 11/23, atrial fibrillation (on Eliquis) s/p DCCV, CAD, ESRD on hemodialysis T/Th/Sat, HFrEF (6/2023 EF 48%), MR, TR, pacemaker CRT-P 5/2023, adrenal insufficiency admitted to the MICU on 10/03/24 for emergent dialysis for hyperkalemia 7.4. Known RV systolic dysfunction, swan in MICU revealed mild PA hypertension, severe TR, and RV dysfunciton. Patient had new systolic goal of , mixed venous shoed normal SO2 and lactate has been stable. Patient has been off of pressors since 10/12 and will be transferred to the floor from the MICU.    Updates 10/15/24  - Cardiology consulted states that it is reasonable to switch from plavix to ASA 81 mg daily (while on systemic AC) given risk of bleeding.   - Evening CBC of 6.5 patient to receive one unit during dialysis   - Infectious workup sent CXR, negative pending  blood cx and urine cx   - Pending upper ext US      #A-fib on eliquis ,   #CAD s/p stent 2003,  # Pace maker CRT-P in 5/23   #pericardial effusion  :: TTE 10/4: LV EF 50-55%, reduced RV systolic function, severely enlarged RV, LA/RA severely dilated, small pericardial effusion, mod mitral annular calcification, severe TR, presence of TAVR, severely elevated PA pressure   :: s/p swan alex placement on 10/11 removed prior to floor transfer. Patient with prolonged bleed after removal.   :: s/p Right heart cath on 10/11  :: [facility] hemodynamics: RA 3, RVSP 53, RVEDP 9 , PCWP 10, PA 57/9, mean PAP 29,CO/CI 4.61/2.95. PA sat 55, PVR 4.3     Management:  - Discontinued Plavix will start ASA 81 in the AM    - Continue home amiodarone, atorvastatin  - Hold metoprolol for HR iso of shock on admission  -  HF team following and recs appreciated     #Adrenal insufficiency  #Hypoglycemia 2/2 insulin patient received for hyperkalemia  Management:  - Per Endocrine okay to taper down to hydrocortisone 25mg IV q6H (for 3 days currently on day 1) --> 15 mg q6H for 3 days --> 15mg TID for 3 days (last dose before 6 PM) --> prednisone 10 TID for 3 days--> prednisone 5 mg daily until follow up as outpatient.   - Endocrine recommends re-engaging if necessary     #ESRD on HD TTS   :: Potassium of 7.4 on admission requiring  urgent dialysis   :: HD unit: [facility]   :: Access: RUE AVF   :: EDW 56kg      Management:  - Patient set up for HD 10/15, Nephrology following  - Hold home torsemide   - Hold home allopurinol     #Right troc fracture with hematoma  Management:  - Hgb 7.9 this AM  will continue to monitor   - S/p Hip Fracture ORIF w/ Nail Trochanteric surgery with ortho 10/7  - pain control with Dilaudid, oxy and tylenol  - Tylenol 975 TID, Oxy 10mg Q4h for severe pain      #COPD  Management:  - Continue home albuterol PRN     FEN  Fluids:on HD, be cautious with fluid   Electrolytes: PRN  Nutrition: renal diet   Prophylaxis:  DVT ppx:  Heparin gtt  GI ppx: PPI  Bowel care: Miralax  Code: Full Code    HPOA:  Zeke Ritchie () [phone]   Assessment & Plan  Hyperkalemia    ESRD (end stage renal disease) on dialysis (Multi)    Pacemaker    HTN (hypertension)    CAD (coronary artery disease)    CHF (congestive heart failure)    Atrial fibrillation (Multi)    Difficult intubation    Anemia    Nondisplaced intertrochanteric fracture of right femur, initial encounter for closed fracture    Pulmonary hypertension (Multi)    Edema of both upper extremities                  Patrice Ramos MD  PGY-1 Internal Medicine Resident

## 2024-10-15 NOTE — CARE PLAN
Problem: Skin  Goal: Participates in plan/prevention/treatment measures  Outcome: Progressing  Flowsheets (Taken 10/15/2024 1848)  Participates in plan/prevention/treatment measures:   Elevate heels   Discuss with provider PT/OT consult  Goal: Prevent/manage excess moisture  Outcome: Progressing  Flowsheets (Taken 10/15/2024 1848)  Prevent/manage excess moisture: Moisturize dry skin  Goal: Prevent/minimize sheer/friction injuries  Outcome: Progressing  Flowsheets (Taken 10/15/2024 1848)  Prevent/minimize sheer/friction injuries:   Turn/reposition every 2 hours/use positioning/transfer devices   HOB 30 degrees or less  Goal: Promote/optimize nutrition  Outcome: Progressing  Flowsheets (Taken 10/15/2024 1848)  Promote/optimize nutrition:   Assist with feeding   Offer water/supplements/favorite foods  Goal: Promote skin healing  Outcome: Progressing  Flowsheets (Taken 10/15/2024 1848)  Promote skin healing: Protective dressings over bony prominences  Goal: Decreased wound size/increased tissue granulation at next dressing change  Outcome: Progressing  Flowsheets (Taken 10/15/2024 1848)  Decreased wound size/increased tissue granulation at next dressing change: Protective dressings over bony prominences     Problem: Fall/Injury  Goal: Not fall by end of shift  Outcome: Progressing  Goal: Be free from injury by end of the shift  Outcome: Progressing  Goal: Verbalize understanding of personal risk factors for fall in the hospital  Outcome: Progressing  Goal: Verbalize understanding of risk factor reduction measures to prevent injury from fall in the home  Outcome: Progressing     Problem: Pain - Adult  Goal: Verbalizes/displays adequate comfort level or baseline comfort level  Outcome: Progressing     Problem: Safety - Adult  Goal: Free from fall injury  Outcome: Progressing     Problem: Pain  Goal: Turns in bed with improved pain control throughout the shift  Outcome: Progressing  Goal: Participates in PT with improved  pain control throughout the shift  Outcome: Progressing  Goal: Free from opioid side effects throughout the shift  Outcome: Progressing  Goal: Free from acute confusion related to pain meds throughout the shift  Outcome: Progressing     Problem: Nutrition  Goal: Oral intake greater than 50%  Outcome: Progressing  Goal: Oral intake greater 75%  Outcome: Progressing  Goal: Consume prescribed supplement  Outcome: Progressing  Goal: Adequate PO fluid intake  Outcome: Progressing  Goal: Nutrition support goals are met within 48 hrs  Outcome: Progressing  Goal: Nutrition support is meeting 75% of nutrient needs  Outcome: Progressing  Goal: BG  mg/dL  Outcome: Progressing  Goal: Lab values WNL  Outcome: Progressing  Goal: Electrolytes WNL  Outcome: Progressing  Goal: Promote healing  Outcome: Progressing  Goal: Maintain stable weight  Outcome: Progressing  Goal: Reduce weight from edema/fluid  Outcome: Progressing  Goal: Gradual weight gain  Outcome: Progressing   The patient's goals for the shift include      The clinical goals for the shift include pt will remain jds throughout shift

## 2024-10-16 ENCOUNTER — APPOINTMENT (OUTPATIENT)
Dept: CARDIOLOGY | Facility: HOSPITAL | Age: 69
DRG: 480 | End: 2024-10-16
Payer: MEDICARE

## 2024-10-16 ENCOUNTER — APPOINTMENT (OUTPATIENT)
Dept: RADIOLOGY | Facility: HOSPITAL | Age: 69
DRG: 480 | End: 2024-10-16
Payer: MEDICARE

## 2024-10-16 ENCOUNTER — APPOINTMENT (OUTPATIENT)
Dept: VASCULAR MEDICINE | Facility: HOSPITAL | Age: 69
DRG: 480 | End: 2024-10-16
Payer: MEDICARE

## 2024-10-16 ENCOUNTER — HOSPITAL ENCOUNTER (OUTPATIENT)
Dept: CARDIOLOGY | Facility: HOSPITAL | Age: 69
Discharge: HOME | End: 2024-10-16
Payer: COMMERCIAL

## 2024-10-16 LAB
ABO GROUP (TYPE) IN BLOOD: NORMAL
ACANTHOCYTES BLD QL SMEAR: ABNORMAL
ALBUMIN SERPL BCP-MCNC: 1.9 G/DL (ref 3.4–5)
ALBUMIN SERPL BCP-MCNC: 2 G/DL (ref 3.4–5)
ALBUMIN SERPL BCP-MCNC: 2.3 G/DL (ref 3.4–5)
ALBUMIN SERPL BCP-MCNC: 2.3 G/DL (ref 3.4–5)
ALBUMIN SERPL BCP-MCNC: 2.5 G/DL (ref 3.4–5)
ALP SERPL-CCNC: 209 U/L (ref 33–136)
ALP SERPL-CCNC: 209 U/L (ref 33–136)
ALP SERPL-CCNC: 252 U/L (ref 33–136)
ALT SERPL W P-5'-P-CCNC: 593 U/L (ref 7–45)
ALT SERPL W P-5'-P-CCNC: 593 U/L (ref 7–45)
ALT SERPL W P-5'-P-CCNC: 624 U/L (ref 7–45)
ANION GAP BLDA CALCULATED.4IONS-SCNC: 26 MMO/L (ref 10–25)
ANION GAP BLDA CALCULATED.4IONS-SCNC: 26 MMO/L (ref 10–25)
ANION GAP BLDA CALCULATED.4IONS-SCNC: 27 MMO/L (ref 10–25)
ANION GAP BLDV CALCULATED.4IONS-SCNC: 15 MMOL/L (ref 10–25)
ANION GAP BLDV CALCULATED.4IONS-SCNC: 15 MMOL/L (ref 10–25)
ANION GAP BLDV CALCULATED.4IONS-SCNC: 26 MMOL/L (ref 10–25)
ANION GAP BLDV CALCULATED.4IONS-SCNC: 27 MMOL/L (ref 10–25)
ANION GAP SERPL CALC-SCNC: 20 MMOL/L (ref 10–20)
ANION GAP SERPL CALC-SCNC: 24 MMOL/L (ref 10–20)
ANION GAP SERPL CALC-SCNC: 28 MMOL/L (ref 10–20)
ANION GAP SERPL CALC-SCNC: 33 MMOL/L (ref 10–20)
ANTIBODY SCREEN: NORMAL
APTT PPP: 32 SECONDS (ref 27–38)
APTT PPP: 38 SECONDS (ref 27–38)
AST SERPL W P-5'-P-CCNC: 1277 U/L (ref 9–39)
AST SERPL W P-5'-P-CCNC: 1775 U/L (ref 9–39)
AST SERPL W P-5'-P-CCNC: 1775 U/L (ref 9–39)
ATRIAL RATE: 83 BPM
BASE EXCESS BLDA CALC-SCNC: -10.9 MMOL/L (ref -2–3)
BASE EXCESS BLDA CALC-SCNC: -14.2 MMOL/L (ref -2–3)
BASE EXCESS BLDA CALC-SCNC: -8.3 MMOL/L (ref -2–3)
BASE EXCESS BLDV CALC-SCNC: -1 MMOL/L (ref -2–3)
BASE EXCESS BLDV CALC-SCNC: -12.5 MMOL/L (ref -2–3)
BASE EXCESS BLDV CALC-SCNC: -3.8 MMOL/L (ref -2–3)
BASE EXCESS BLDV CALC-SCNC: -8.6 MMOL/L (ref -2–3)
BASO STIPL BLD QL SMEAR: PRESENT
BASO STIPL BLD QL SMEAR: PRESENT
BASOPHILS # BLD AUTO: 0.01 X10*3/UL (ref 0–0.1)
BASOPHILS # BLD MANUAL: 0 X10*3/UL (ref 0–0.1)
BASOPHILS # BLD MANUAL: 0 X10*3/UL (ref 0–0.1)
BASOPHILS NFR BLD AUTO: 0.1 %
BASOPHILS NFR BLD MANUAL: 0 %
BASOPHILS NFR BLD MANUAL: 0 %
BILIRUB DIRECT SERPL-MCNC: 3.7 MG/DL (ref 0–0.3)
BILIRUB DIRECT SERPL-MCNC: ABNORMAL MG/DL
BILIRUB SERPL-MCNC: 3.7 MG/DL (ref 0–1.2)
BILIRUB SERPL-MCNC: 3.7 MG/DL (ref 0–1.2)
BILIRUB SERPL-MCNC: 5.3 MG/DL (ref 0–1.2)
BLOOD EXPIRATION DATE: NORMAL
BLOOD EXPIRATION DATE: NORMAL
BODY TEMPERATURE: 37 DEGREES CELSIUS
BUN SERPL-MCNC: 26 MG/DL (ref 6–23)
BUN SERPL-MCNC: 36 MG/DL (ref 6–23)
BUN SERPL-MCNC: 38 MG/DL (ref 6–23)
BUN SERPL-MCNC: 43 MG/DL (ref 6–23)
BURR CELLS BLD QL SMEAR: ABNORMAL
BURR CELLS BLD QL SMEAR: ABNORMAL
BURR CELLS BLD QL SMEAR: NORMAL
CA-I BLDA-SCNC: 0.95 MMOL/L (ref 1.1–1.33)
CA-I BLDA-SCNC: 0.99 MMOL/L (ref 1.1–1.33)
CA-I BLDA-SCNC: 1.06 MMOL/L (ref 1.1–1.33)
CA-I BLDV-SCNC: 0.97 MMOL/L (ref 1.1–1.33)
CA-I BLDV-SCNC: 0.99 MMOL/L (ref 1.1–1.33)
CA-I BLDV-SCNC: 1.02 MMOL/L (ref 1.1–1.33)
CA-I BLDV-SCNC: 1.05 MMOL/L (ref 1.1–1.33)
CALCIUM SERPL-MCNC: 7.1 MG/DL (ref 8.6–10.6)
CALCIUM SERPL-MCNC: 7.3 MG/DL (ref 8.6–10.6)
CALCIUM SERPL-MCNC: 7.6 MG/DL (ref 8.6–10.6)
CALCIUM SERPL-MCNC: 7.7 MG/DL (ref 8.6–10.6)
CARDIAC TROPONIN I PNL SERPL HS: 3095 NG/L (ref 0–34)
CARDIAC TROPONIN I PNL SERPL HS: 3336 NG/L (ref 0–34)
CARDIAC TROPONIN I PNL SERPL HS: 3506 NG/L (ref 0–34)
CHLORIDE BLDA-SCNC: 90 MMOL/L (ref 98–107)
CHLORIDE BLDA-SCNC: 91 MMOL/L (ref 98–107)
CHLORIDE BLDA-SCNC: 94 MMOL/L (ref 98–107)
CHLORIDE BLDV-SCNC: 91 MMOL/L (ref 98–107)
CHLORIDE BLDV-SCNC: 91 MMOL/L (ref 98–107)
CHLORIDE BLDV-SCNC: 92 MMOL/L (ref 98–107)
CHLORIDE BLDV-SCNC: 92 MMOL/L (ref 98–107)
CHLORIDE SERPL-SCNC: 88 MMOL/L (ref 98–107)
CHLORIDE SERPL-SCNC: 90 MMOL/L (ref 98–107)
CHLORIDE SERPL-SCNC: 90 MMOL/L (ref 98–107)
CHLORIDE SERPL-SCNC: 91 MMOL/L (ref 98–107)
CO2 SERPL-SCNC: 16 MMOL/L (ref 21–32)
CO2 SERPL-SCNC: 18 MMOL/L (ref 21–32)
CO2 SERPL-SCNC: 25 MMOL/L (ref 21–32)
CO2 SERPL-SCNC: 27 MMOL/L (ref 21–32)
CREAT SERPL-MCNC: 2.55 MG/DL (ref 0.5–1.05)
CREAT SERPL-MCNC: 3.38 MG/DL (ref 0.5–1.05)
CREAT SERPL-MCNC: 3.54 MG/DL (ref 0.5–1.05)
CREAT SERPL-MCNC: 3.86 MG/DL (ref 0.5–1.05)
CRP SERPL-MCNC: 20.19 MG/DL
DISPENSE STATUS: NORMAL
DISPENSE STATUS: NORMAL
EGFRCR SERPLBLD CKD-EPI 2021: 12 ML/MIN/1.73M*2
EGFRCR SERPLBLD CKD-EPI 2021: 13 ML/MIN/1.73M*2
EGFRCR SERPLBLD CKD-EPI 2021: 14 ML/MIN/1.73M*2
EGFRCR SERPLBLD CKD-EPI 2021: 20 ML/MIN/1.73M*2
EOSINOPHIL # BLD AUTO: 0 X10*3/UL (ref 0–0.7)
EOSINOPHIL # BLD MANUAL: 0 X10*3/UL (ref 0–0.7)
EOSINOPHIL # BLD MANUAL: 2.31 X10*3/UL (ref 0–0.7)
EOSINOPHIL NFR BLD AUTO: 0 %
EOSINOPHIL NFR BLD MANUAL: 0 %
EOSINOPHIL NFR BLD MANUAL: 14 %
ERYTHROCYTE [DISTWIDTH] IN BLOOD BY AUTOMATED COUNT: 19.7 % (ref 11.5–14.5)
ERYTHROCYTE [DISTWIDTH] IN BLOOD BY AUTOMATED COUNT: 20.1 % (ref 11.5–14.5)
ERYTHROCYTE [DISTWIDTH] IN BLOOD BY AUTOMATED COUNT: 20.1 % (ref 11.5–14.5)
GLUCOSE BLD MANUAL STRIP-MCNC: 105 MG/DL (ref 74–99)
GLUCOSE BLD MANUAL STRIP-MCNC: 111 MG/DL (ref 74–99)
GLUCOSE BLD MANUAL STRIP-MCNC: 129 MG/DL (ref 74–99)
GLUCOSE BLD MANUAL STRIP-MCNC: 135 MG/DL (ref 74–99)
GLUCOSE BLD MANUAL STRIP-MCNC: 141 MG/DL (ref 74–99)
GLUCOSE BLD MANUAL STRIP-MCNC: 150 MG/DL (ref 74–99)
GLUCOSE BLD MANUAL STRIP-MCNC: 155 MG/DL (ref 74–99)
GLUCOSE BLD MANUAL STRIP-MCNC: 163 MG/DL (ref 74–99)
GLUCOSE BLD MANUAL STRIP-MCNC: 192 MG/DL (ref 74–99)
GLUCOSE BLD MANUAL STRIP-MCNC: 27 MG/DL (ref 74–99)
GLUCOSE BLD MANUAL STRIP-MCNC: 70 MG/DL (ref 74–99)
GLUCOSE BLD MANUAL STRIP-MCNC: 73 MG/DL (ref 74–99)
GLUCOSE BLD MANUAL STRIP-MCNC: 82 MG/DL (ref 74–99)
GLUCOSE BLD MANUAL STRIP-MCNC: 83 MG/DL (ref 74–99)
GLUCOSE BLD MANUAL STRIP-MCNC: 91 MG/DL (ref 74–99)
GLUCOSE BLD MANUAL STRIP-MCNC: 92 MG/DL (ref 74–99)
GLUCOSE BLD MANUAL STRIP-MCNC: 96 MG/DL (ref 74–99)
GLUCOSE BLDA-MCNC: 178 MG/DL (ref 74–99)
GLUCOSE BLDA-MCNC: 184 MG/DL (ref 74–99)
GLUCOSE BLDA-MCNC: 85 MG/DL (ref 74–99)
GLUCOSE BLDV-MCNC: 126 MG/DL (ref 74–99)
GLUCOSE BLDV-MCNC: 178 MG/DL (ref 74–99)
GLUCOSE BLDV-MCNC: 242 MG/DL (ref 74–99)
GLUCOSE BLDV-MCNC: 79 MG/DL (ref 74–99)
GLUCOSE SERPL-MCNC: 104 MG/DL (ref 74–99)
GLUCOSE SERPL-MCNC: 158 MG/DL (ref 74–99)
GLUCOSE SERPL-MCNC: 167 MG/DL (ref 74–99)
GLUCOSE SERPL-MCNC: 224 MG/DL (ref 74–99)
HCO3 BLDA-SCNC: 12.4 MMOL/L (ref 22–26)
HCO3 BLDA-SCNC: 13.9 MMOL/L (ref 22–26)
HCO3 BLDA-SCNC: 14.3 MMOL/L (ref 22–26)
HCO3 BLDV-SCNC: 15 MMOL/L (ref 22–26)
HCO3 BLDV-SCNC: 17.4 MMOL/L (ref 22–26)
HCO3 BLDV-SCNC: 24.8 MMOL/L (ref 22–26)
HCO3 BLDV-SCNC: 24.8 MMOL/L (ref 22–26)
HCT VFR BLD AUTO: 19.7 % (ref 36–46)
HCT VFR BLD AUTO: 20.2 % (ref 36–46)
HCT VFR BLD AUTO: 26.3 % (ref 36–46)
HCT VFR BLD EST: 21 % (ref 36–46)
HCT VFR BLD EST: 23 % (ref 36–46)
HCT VFR BLD EST: 28 % (ref 36–46)
HCT VFR BLD EST: 30 % (ref 36–46)
HCT VFR BLD EST: 32 % (ref 36–46)
HCT VFR BLD EST: 33 % (ref 36–46)
HCT VFR BLD EST: 37 % (ref 36–46)
HGB BLD-MCNC: 6.6 G/DL (ref 12–16)
HGB BLD-MCNC: 6.9 G/DL (ref 12–16)
HGB BLD-MCNC: 9.2 G/DL (ref 12–16)
HGB BLDA-MCNC: 12.4 G/DL (ref 12–16)
HGB BLDA-MCNC: 7.6 G/DL (ref 12–16)
HGB BLDA-MCNC: 9.9 G/DL (ref 12–16)
HGB BLDV-MCNC: 10.8 G/DL (ref 12–16)
HGB BLDV-MCNC: 11.1 G/DL (ref 12–16)
HGB BLDV-MCNC: 7 G/DL (ref 12–16)
HGB BLDV-MCNC: 9.2 G/DL (ref 12–16)
IMM GRANULOCYTES # BLD AUTO: 0.14 X10*3/UL (ref 0–0.7)
IMM GRANULOCYTES # BLD AUTO: 0.15 X10*3/UL (ref 0–0.7)
IMM GRANULOCYTES # BLD AUTO: 0.84 X10*3/UL (ref 0–0.7)
IMM GRANULOCYTES NFR BLD AUTO: 0.9 % (ref 0–0.9)
IMM GRANULOCYTES NFR BLD AUTO: 1.5 % (ref 0–0.9)
IMM GRANULOCYTES NFR BLD AUTO: 5.1 % (ref 0–0.9)
INHALED O2 CONCENTRATION: 100 %
INHALED O2 CONCENTRATION: 36 %
INHALED O2 CONCENTRATION: 36 %
INHALED O2 CONCENTRATION: 40 %
INHALED O2 CONCENTRATION: 52 %
INR PPP: 1.3 (ref 0.9–1.1)
INR PPP: 1.8 (ref 0.9–1.1)
LACTATE BLDA-SCNC: 12.7 MMOL/L (ref 0.4–2)
LACTATE BLDA-SCNC: 13.3 MMOL/L (ref 0.4–2)
LACTATE BLDA-SCNC: ABNORMAL MMOL/L
LACTATE BLDV-SCNC: 13.1 MMOL/L (ref 0.4–2)
LACTATE BLDV-SCNC: 15.1 MMOL/L (ref 0.4–2)
LACTATE BLDV-SCNC: 6 MMOL/L (ref 0.4–2)
LACTATE BLDV-SCNC: 7.7 MMOL/L (ref 0.4–2)
LYMPHOCYTES # BLD AUTO: 0.15 X10*3/UL (ref 1.2–4.8)
LYMPHOCYTES # BLD MANUAL: 0 X10*3/UL (ref 1.2–4.8)
LYMPHOCYTES # BLD MANUAL: 0.5 X10*3/UL (ref 1.2–4.8)
LYMPHOCYTES NFR BLD AUTO: 1.6 %
LYMPHOCYTES NFR BLD MANUAL: 0 %
LYMPHOCYTES NFR BLD MANUAL: 3 %
MAGNESIUM SERPL-MCNC: 2 MG/DL (ref 1.6–2.4)
MAGNESIUM SERPL-MCNC: 2.14 MG/DL (ref 1.6–2.4)
MAGNESIUM SERPL-MCNC: 2.2 MG/DL (ref 1.6–2.4)
MCH RBC QN AUTO: 30.4 PG (ref 26–34)
MCH RBC QN AUTO: 30.9 PG (ref 26–34)
MCH RBC QN AUTO: 31.3 PG (ref 26–34)
MCHC RBC AUTO-ENTMCNC: 33.5 G/DL (ref 32–36)
MCHC RBC AUTO-ENTMCNC: 34.2 G/DL (ref 32–36)
MCHC RBC AUTO-ENTMCNC: 35 G/DL (ref 32–36)
MCV RBC AUTO: 87 FL (ref 80–100)
MCV RBC AUTO: 91 FL (ref 80–100)
MCV RBC AUTO: 93 FL (ref 80–100)
METAMYELOCYTES # BLD MANUAL: 1.61 X10*3/UL
METAMYELOCYTES NFR BLD MANUAL: 10 %
MONOCYTES # BLD AUTO: 0.37 X10*3/UL (ref 0.1–1)
MONOCYTES # BLD MANUAL: 0.66 X10*3/UL (ref 0.1–1)
MONOCYTES # BLD MANUAL: 0.87 X10*3/UL (ref 0.1–1)
MONOCYTES NFR BLD AUTO: 4 %
MONOCYTES NFR BLD MANUAL: 4 %
MONOCYTES NFR BLD MANUAL: 5.4 %
MYELOCYTES # BLD MANUAL: 0.5 X10*3/UL
MYELOCYTES NFR BLD MANUAL: 3.1 %
NEUTROPHILS # BLD AUTO: 8.64 X10*3/UL (ref 1.2–7.7)
NEUTROPHILS # BLD MANUAL: 12.88 X10*3/UL (ref 1.2–7.7)
NEUTROPHILS # BLD MANUAL: 13.04 X10*3/UL (ref 1.2–7.7)
NEUTROPHILS NFR BLD AUTO: 92.8 %
NEUTS BAND # BLD MANUAL: 1.32 X10*3/UL (ref 0–0.7)
NEUTS BAND # BLD MANUAL: 2.72 X10*3/UL (ref 0–0.7)
NEUTS BAND NFR BLD MANUAL: 16.9 %
NEUTS BAND NFR BLD MANUAL: 8 %
NEUTS SEG # BLD MANUAL: 10.16 X10*3/UL (ref 1.2–7)
NEUTS SEG # BLD MANUAL: 11.72 X10*3/UL (ref 1.2–7)
NEUTS SEG NFR BLD MANUAL: 63.1 %
NEUTS SEG NFR BLD MANUAL: 71 %
NRBC BLD-RTO: 2.7 /100 WBCS (ref 0–0)
NRBC BLD-RTO: 7 /100 WBCS (ref 0–0)
NRBC BLD-RTO: 9.5 /100 WBCS (ref 0–0)
OXYHGB MFR BLDA: 94.6 % (ref 94–98)
OXYHGB MFR BLDA: 95.9 % (ref 94–98)
OXYHGB MFR BLDA: 96.8 % (ref 94–98)
OXYHGB MFR BLDV: 37.8 % (ref 45–75)
OXYHGB MFR BLDV: 38.7 % (ref 45–75)
OXYHGB MFR BLDV: 53.9 % (ref 45–75)
OXYHGB MFR BLDV: 77 % (ref 45–75)
P AXIS: 27 DEGREES
P OFFSET: 167 MS
P ONSET: 132 MS
PCO2 BLDA: 22 MM HG (ref 38–42)
PCO2 BLDA: 27 MM HG (ref 38–42)
PCO2 BLDA: 31 MM HG (ref 38–42)
PCO2 BLDV: 37 MM HG (ref 41–51)
PCO2 BLDV: 41 MM HG (ref 41–51)
PCO2 BLDV: 45 MM HG (ref 41–51)
PCO2 BLDV: 62 MM HG (ref 41–51)
PH BLDA: 7.21 PH (ref 7.38–7.42)
PH BLDA: 7.32 PH (ref 7.38–7.42)
PH BLDA: 7.42 PH (ref 7.38–7.42)
PH BLDV: 7.17 PH (ref 7.33–7.43)
PH BLDV: 7.21 PH (ref 7.33–7.43)
PH BLDV: 7.28 PH (ref 7.33–7.43)
PH BLDV: 7.35 PH (ref 7.33–7.43)
PHOSPHATE SERPL-MCNC: 4.6 MG/DL (ref 2.5–4.9)
PHOSPHATE SERPL-MCNC: 5.5 MG/DL (ref 2.5–4.9)
PHOSPHATE SERPL-MCNC: 6.2 MG/DL (ref 2.5–4.9)
PHOSPHATE SERPL-MCNC: 6.7 MG/DL (ref 2.5–4.9)
PLATELET # BLD AUTO: 28 X10*3/UL (ref 150–450)
PLATELET # BLD AUTO: 32 X10*3/UL (ref 150–450)
PLATELET # BLD AUTO: 33 X10*3/UL (ref 150–450)
PO2 BLDA: 130 MM HG (ref 85–95)
PO2 BLDA: 153 MM HG (ref 85–95)
PO2 BLDA: 84 MM HG (ref 85–95)
PO2 BLDV: 30 MM HG (ref 35–45)
PO2 BLDV: 30 MM HG (ref 35–45)
PO2 BLDV: 38 MM HG (ref 35–45)
PO2 BLDV: 51 MM HG (ref 35–45)
POLYCHROMASIA BLD QL SMEAR: ABNORMAL
POLYCHROMASIA BLD QL SMEAR: ABNORMAL
POLYCHROMASIA BLD QL SMEAR: NORMAL
POTASSIUM BLDA-SCNC: 4.4 MMOL/L (ref 3.5–5.3)
POTASSIUM BLDA-SCNC: 4.5 MMOL/L (ref 3.5–5.3)
POTASSIUM BLDA-SCNC: 4.5 MMOL/L (ref 3.5–5.3)
POTASSIUM BLDV-SCNC: 4.3 MMOL/L (ref 3.5–5.3)
POTASSIUM BLDV-SCNC: 4.4 MMOL/L (ref 3.5–5.3)
POTASSIUM BLDV-SCNC: 5.1 MMOL/L (ref 3.5–5.3)
POTASSIUM BLDV-SCNC: 6.3 MMOL/L (ref 3.5–5.3)
POTASSIUM SERPL-SCNC: 3.8 MMOL/L (ref 3.5–5.3)
POTASSIUM SERPL-SCNC: 4.6 MMOL/L (ref 3.5–5.3)
POTASSIUM SERPL-SCNC: 4.9 MMOL/L (ref 3.5–5.3)
POTASSIUM SERPL-SCNC: 5.9 MMOL/L (ref 3.5–5.3)
PR INTERVAL: 122 MS
PROCALCITONIN SERPL-MCNC: 7.59 NG/ML
PRODUCT BLOOD TYPE: 5100
PRODUCT BLOOD TYPE: 5100
PRODUCT CODE: NORMAL
PRODUCT CODE: NORMAL
PROMYELOCYTES # BLD MANUAL: 0.13 X10*3/UL
PROMYELOCYTES NFR BLD MANUAL: 0.8 %
PROT SERPL-MCNC: 4.3 G/DL (ref 6.4–8.2)
PROT SERPL-MCNC: 4.8 G/DL (ref 6.4–8.2)
PROT SERPL-MCNC: 4.8 G/DL (ref 6.4–8.2)
PROTHROMBIN TIME: 14.4 SECONDS (ref 9.8–12.8)
PROTHROMBIN TIME: 20.3 SECONDS (ref 9.8–12.8)
Q ONSET: 192 MS
QRS COUNT: 14 BEATS
QRS DURATION: 168 MS
QT INTERVAL: 466 MS
QTC CALCULATION(BAZETT): 547 MS
QTC FREDERICIA: 519 MS
R AXIS: 142 DEGREES
RBC # BLD AUTO: 2.11 X10*6/UL (ref 4–5.2)
RBC # BLD AUTO: 2.23 X10*6/UL (ref 4–5.2)
RBC # BLD AUTO: 3.03 X10*6/UL (ref 4–5.2)
RBC MORPH BLD: ABNORMAL
RBC MORPH BLD: ABNORMAL
RBC MORPH BLD: NORMAL
RH FACTOR (ANTIGEN D): NORMAL
SAO2 % BLDA: 97 % (ref 94–100)
SAO2 % BLDA: 98 % (ref 94–100)
SAO2 % BLDA: 99 % (ref 94–100)
SAO2 % BLDV: 39 % (ref 45–75)
SAO2 % BLDV: 39 % (ref 45–75)
SAO2 % BLDV: 55 % (ref 45–75)
SAO2 % BLDV: 79 % (ref 45–75)
SODIUM BLDA-SCNC: 126 MMOL/L (ref 136–145)
SODIUM BLDA-SCNC: 127 MMOL/L (ref 136–145)
SODIUM BLDA-SCNC: 128 MMOL/L (ref 136–145)
SODIUM BLDV-SCNC: 125 MMOL/L (ref 136–145)
SODIUM BLDV-SCNC: 127 MMOL/L (ref 136–145)
SODIUM BLDV-SCNC: 127 MMOL/L (ref 136–145)
SODIUM BLDV-SCNC: 131 MMOL/L (ref 136–145)
SODIUM SERPL-SCNC: 131 MMOL/L (ref 136–145)
SODIUM SERPL-SCNC: 131 MMOL/L (ref 136–145)
SODIUM SERPL-SCNC: 132 MMOL/L (ref 136–145)
SODIUM SERPL-SCNC: 136 MMOL/L (ref 136–145)
T AXIS: -35 DEGREES
T OFFSET: 425 MS
TOTAL CELLS COUNTED BLD: 100
TOTAL CELLS COUNTED BLD: 130
UNIT ABO: NORMAL
UNIT ABO: NORMAL
UNIT NUMBER: NORMAL
UNIT NUMBER: NORMAL
UNIT RH: NORMAL
UNIT RH: NORMAL
UNIT VOLUME: 282
UNIT VOLUME: 283
VARIANT LYMPHS # BLD MANUAL: 0.11 X10*3/UL (ref 0–0.5)
VARIANT LYMPHS NFR BLD: 0.7 %
VENTRICULAR RATE: 83 BPM
WBC # BLD AUTO: 16.1 X10*3/UL (ref 4.4–11.3)
WBC # BLD AUTO: 16.5 X10*3/UL (ref 4.4–11.3)
WBC # BLD AUTO: 9.3 X10*3/UL (ref 4.4–11.3)
XM INTEP: NORMAL
XM INTEP: NORMAL

## 2024-10-16 PROCEDURE — 84132 ASSAY OF SERUM POTASSIUM: CPT

## 2024-10-16 PROCEDURE — 2500000004 HC RX 250 GENERAL PHARMACY W/ HCPCS (ALT 636 FOR OP/ED)

## 2024-10-16 PROCEDURE — 71045 X-RAY EXAM CHEST 1 VIEW: CPT

## 2024-10-16 PROCEDURE — 99221 1ST HOSP IP/OBS SF/LOW 40: CPT | Performed by: STUDENT IN AN ORGANIZED HEALTH CARE EDUCATION/TRAINING PROGRAM

## 2024-10-16 PROCEDURE — 93005 ELECTROCARDIOGRAM TRACING: CPT

## 2024-10-16 PROCEDURE — P9016 RBC LEUKOCYTES REDUCED: HCPCS

## 2024-10-16 PROCEDURE — 85007 BL SMEAR W/DIFF WBC COUNT: CPT

## 2024-10-16 PROCEDURE — 84484 ASSAY OF TROPONIN QUANT: CPT

## 2024-10-16 PROCEDURE — 2020000001 HC ICU ROOM DAILY

## 2024-10-16 PROCEDURE — 93281 PM DEVICE PROGR EVAL MULTI: CPT

## 2024-10-16 PROCEDURE — 2550000001 HC RX 255 CONTRASTS: Performed by: INTERNAL MEDICINE

## 2024-10-16 PROCEDURE — 85610 PROTHROMBIN TIME: CPT

## 2024-10-16 PROCEDURE — 80069 RENAL FUNCTION PANEL: CPT | Mod: CCI

## 2024-10-16 PROCEDURE — 2500000001 HC RX 250 WO HCPCS SELF ADMINISTERED DRUGS (ALT 637 FOR MEDICARE OP)

## 2024-10-16 PROCEDURE — 86901 BLOOD TYPING SEROLOGIC RH(D): CPT

## 2024-10-16 PROCEDURE — 84100 ASSAY OF PHOSPHORUS: CPT

## 2024-10-16 PROCEDURE — 84132 ASSAY OF SERUM POTASSIUM: CPT | Performed by: STUDENT IN AN ORGANIZED HEALTH CARE EDUCATION/TRAINING PROGRAM

## 2024-10-16 PROCEDURE — 36415 COLL VENOUS BLD VENIPUNCTURE: CPT

## 2024-10-16 PROCEDURE — 2500000005 HC RX 250 GENERAL PHARMACY W/O HCPCS: Performed by: STUDENT IN AN ORGANIZED HEALTH CARE EDUCATION/TRAINING PROGRAM

## 2024-10-16 PROCEDURE — 99292 CRITICAL CARE ADDL 30 MIN: CPT

## 2024-10-16 PROCEDURE — 82947 ASSAY GLUCOSE BLOOD QUANT: CPT

## 2024-10-16 PROCEDURE — 93325 DOPPLER ECHO COLOR FLOW MAPG: CPT | Performed by: INTERNAL MEDICINE

## 2024-10-16 PROCEDURE — 31500 INSERT EMERGENCY AIRWAY: CPT | Performed by: STUDENT IN AN ORGANIZED HEALTH CARE EDUCATION/TRAINING PROGRAM

## 2024-10-16 PROCEDURE — 93010 ELECTROCARDIOGRAM REPORT: CPT | Performed by: INTERNAL MEDICINE

## 2024-10-16 PROCEDURE — 37799 UNLISTED PX VASCULAR SURGERY: CPT

## 2024-10-16 PROCEDURE — 93321 DOPPLER ECHO F-UP/LMTD STD: CPT | Performed by: INTERNAL MEDICINE

## 2024-10-16 PROCEDURE — 99222 1ST HOSP IP/OBS MODERATE 55: CPT | Performed by: STUDENT IN AN ORGANIZED HEALTH CARE EDUCATION/TRAINING PROGRAM

## 2024-10-16 PROCEDURE — 85027 COMPLETE CBC AUTOMATED: CPT

## 2024-10-16 PROCEDURE — 99291 CRITICAL CARE FIRST HOUR: CPT | Performed by: INTERNAL MEDICINE

## 2024-10-16 PROCEDURE — 36430 TRANSFUSION BLD/BLD COMPNT: CPT

## 2024-10-16 PROCEDURE — 71260 CT THORAX DX C+: CPT | Performed by: RADIOLOGY

## 2024-10-16 PROCEDURE — 36620 INSERTION CATHETER ARTERY: CPT | Performed by: STUDENT IN AN ORGANIZED HEALTH CARE EDUCATION/TRAINING PROGRAM

## 2024-10-16 PROCEDURE — 84075 ASSAY ALKALINE PHOSPHATASE: CPT

## 2024-10-16 PROCEDURE — 71260 CT THORAX DX C+: CPT

## 2024-10-16 PROCEDURE — 0BH17EZ INSERTION OF ENDOTRACHEAL AIRWAY INTO TRACHEA, VIA NATURAL OR ARTIFICIAL OPENING: ICD-10-PCS | Performed by: INTERNAL MEDICINE

## 2024-10-16 PROCEDURE — 36556 INSERT NON-TUNNEL CV CATH: CPT | Performed by: STUDENT IN AN ORGANIZED HEALTH CARE EDUCATION/TRAINING PROGRAM

## 2024-10-16 PROCEDURE — 93308 TTE F-UP OR LMTD: CPT | Performed by: INTERNAL MEDICINE

## 2024-10-16 PROCEDURE — 94002 VENT MGMT INPAT INIT DAY: CPT

## 2024-10-16 PROCEDURE — 2500000004 HC RX 250 GENERAL PHARMACY W/ HCPCS (ALT 636 FOR OP/ED): Performed by: STUDENT IN AN ORGANIZED HEALTH CARE EDUCATION/TRAINING PROGRAM

## 2024-10-16 PROCEDURE — 99232 SBSQ HOSP IP/OBS MODERATE 35: CPT | Performed by: INTERNAL MEDICINE

## 2024-10-16 PROCEDURE — 83735 ASSAY OF MAGNESIUM: CPT

## 2024-10-16 PROCEDURE — 06HY33Z INSERTION OF INFUSION DEVICE INTO LOWER VEIN, PERCUTANEOUS APPROACH: ICD-10-PCS | Performed by: STUDENT IN AN ORGANIZED HEALTH CARE EDUCATION/TRAINING PROGRAM

## 2024-10-16 PROCEDURE — 74174 CTA ABD&PLVS W/CONTRAST: CPT

## 2024-10-16 PROCEDURE — 93321 DOPPLER ECHO F-UP/LMTD STD: CPT

## 2024-10-16 PROCEDURE — 93970 EXTREMITY STUDY: CPT

## 2024-10-16 PROCEDURE — 2500000005 HC RX 250 GENERAL PHARMACY W/O HCPCS

## 2024-10-16 PROCEDURE — 86140 C-REACTIVE PROTEIN: CPT

## 2024-10-16 PROCEDURE — 71045 X-RAY EXAM CHEST 1 VIEW: CPT | Performed by: STUDENT IN AN ORGANIZED HEALTH CARE EDUCATION/TRAINING PROGRAM

## 2024-10-16 PROCEDURE — 93970 EXTREMITY STUDY: CPT | Performed by: SURGERY

## 2024-10-16 PROCEDURE — 93281 PM DEVICE PROGR EVAL MULTI: CPT | Performed by: STUDENT IN AN ORGANIZED HEALTH CARE EDUCATION/TRAINING PROGRAM

## 2024-10-16 PROCEDURE — 5A1945Z RESPIRATORY VENTILATION, 24-96 CONSECUTIVE HOURS: ICD-10-PCS | Performed by: INTERNAL MEDICINE

## 2024-10-16 RX ORDER — POLYETHYLENE GLYCOL 3350 17 G/17G
17 POWDER, FOR SOLUTION ORAL DAILY
Status: DISCONTINUED | OUTPATIENT
Start: 2024-10-16 | End: 2024-10-18 | Stop reason: HOSPADM

## 2024-10-16 RX ORDER — ACETAMINOPHEN 325 MG/1
975 TABLET ORAL EVERY 8 HOURS
Status: DISCONTINUED | OUTPATIENT
Start: 2024-10-16 | End: 2024-10-18 | Stop reason: HOSPADM

## 2024-10-16 RX ORDER — NOREPINEPHRINE BITARTRATE/D5W 8 MG/250ML
0-.5 PLASTIC BAG, INJECTION (ML) INTRAVENOUS CONTINUOUS
Status: DISCONTINUED | OUTPATIENT
Start: 2024-10-16 | End: 2024-10-18 | Stop reason: HOSPADM

## 2024-10-16 RX ORDER — INDOMETHACIN 25 MG/1
50 CAPSULE ORAL ONCE
Status: COMPLETED | OUTPATIENT
Start: 2024-10-16 | End: 2024-10-16

## 2024-10-16 RX ORDER — EPINEPHRINE HCL IN DEXTROSE 5% 4MG/250ML
0-.2 PLASTIC BAG, INJECTION (ML) INTRAVENOUS CONTINUOUS
Status: DISCONTINUED | OUTPATIENT
Start: 2024-10-16 | End: 2024-10-17

## 2024-10-16 RX ORDER — INDOMETHACIN 25 MG/1
CAPSULE ORAL
Status: COMPLETED
Start: 2024-10-16 | End: 2024-10-16

## 2024-10-16 RX ORDER — DEXTROSE 50 % IN WATER (D50W) INTRAVENOUS SYRINGE
25 ONCE
Status: COMPLETED | OUTPATIENT
Start: 2024-10-16 | End: 2024-10-16

## 2024-10-16 RX ORDER — ROCURONIUM BROMIDE 10 MG/ML
INJECTION, SOLUTION INTRAVENOUS CODE/TRAUMA/SEDATION MEDICATION
Status: COMPLETED | OUTPATIENT
Start: 2024-10-16 | End: 2024-10-16

## 2024-10-16 RX ORDER — DEXTROSE MONOHYDRATE 100 MG/ML
25 INJECTION, SOLUTION INTRAVENOUS CONTINUOUS
Status: DISPENSED | OUTPATIENT
Start: 2024-10-16 | End: 2024-10-17

## 2024-10-16 RX ORDER — ETOMIDATE 2 MG/ML
INJECTION INTRAVENOUS
Status: COMPLETED
Start: 2024-10-16 | End: 2024-10-16

## 2024-10-16 RX ORDER — FENTANYL CITRATE-0.9 % NACL/PF 10 MCG/ML
PLASTIC BAG, INJECTION (ML) INTRAVENOUS
Status: COMPLETED
Start: 2024-10-16 | End: 2024-10-16

## 2024-10-16 RX ORDER — HEPARIN SODIUM 1000 [USP'U]/ML
INJECTION, SOLUTION INTRAVENOUS; SUBCUTANEOUS
Status: DISPENSED
Start: 2024-10-16 | End: 2024-10-17

## 2024-10-16 RX ORDER — PHENYLEPHRINE HCL IN 0.9% NACL 0.4MG/10ML
SYRINGE (ML) INTRAVENOUS
Status: COMPLETED
Start: 2024-10-16 | End: 2024-10-16

## 2024-10-16 RX ORDER — ROCURONIUM BROMIDE 10 MG/ML
INJECTION, SOLUTION INTRAVENOUS
Status: COMPLETED
Start: 2024-10-16 | End: 2024-10-16

## 2024-10-16 RX ORDER — NOREPINEPHRINE BITARTRATE/D5W 8 MG/250ML
PLASTIC BAG, INJECTION (ML) INTRAVENOUS
Status: COMPLETED
Start: 2024-10-16 | End: 2024-10-16

## 2024-10-16 RX ORDER — EPINEPHRINE 1 MG/ML
INJECTION INTRAMUSCULAR; INTRAVENOUS; SUBCUTANEOUS
Status: DISPENSED
Start: 2024-10-16 | End: 2024-10-16

## 2024-10-16 RX ORDER — HYDROMORPHONE HYDROCHLORIDE 0.2 MG/ML
0.2 INJECTION INTRAMUSCULAR; INTRAVENOUS; SUBCUTANEOUS ONCE
Status: DISCONTINUED | OUTPATIENT
Start: 2024-10-16 | End: 2024-10-16

## 2024-10-16 RX ORDER — OXYCODONE HYDROCHLORIDE 5 MG/1
10 TABLET ORAL EVERY 6 HOURS PRN
Status: DISCONTINUED | OUTPATIENT
Start: 2024-10-16 | End: 2024-10-18 | Stop reason: HOSPADM

## 2024-10-16 RX ORDER — FENTANYL CITRATE-0.9 % NACL/PF 10 MCG/ML
0-300 PLASTIC BAG, INJECTION (ML) INTRAVENOUS CONTINUOUS
Status: DISCONTINUED | OUTPATIENT
Start: 2024-10-16 | End: 2024-10-18 | Stop reason: HOSPADM

## 2024-10-16 RX ORDER — PHENYLEPHRINE HCL IN 0.9% NACL 0.4MG/10ML
40 SYRINGE (ML) INTRAVENOUS ONCE
Status: COMPLETED | OUTPATIENT
Start: 2024-10-16 | End: 2024-10-16

## 2024-10-16 RX ORDER — HYDROMORPHONE HYDROCHLORIDE 1 MG/ML
INJECTION, SOLUTION INTRAMUSCULAR; INTRAVENOUS; SUBCUTANEOUS
Status: COMPLETED
Start: 2024-10-16 | End: 2024-10-16

## 2024-10-16 RX ORDER — ETOMIDATE 2 MG/ML
INJECTION INTRAVENOUS CODE/TRAUMA/SEDATION MEDICATION
Status: COMPLETED | OUTPATIENT
Start: 2024-10-16 | End: 2024-10-16

## 2024-10-16 RX ORDER — DEXTROSE MONOHYDRATE 100 MG/ML
50 INJECTION, SOLUTION INTRAVENOUS CONTINUOUS
Status: DISPENSED | OUTPATIENT
Start: 2024-10-16 | End: 2024-10-16

## 2024-10-16 RX ORDER — HYDROMORPHONE HYDROCHLORIDE 1 MG/ML
0.4 INJECTION, SOLUTION INTRAMUSCULAR; INTRAVENOUS; SUBCUTANEOUS ONCE
Status: COMPLETED | OUTPATIENT
Start: 2024-10-16 | End: 2024-10-16

## 2024-10-16 RX ORDER — SENNOSIDES 8.6 MG/1
2 TABLET ORAL 3 TIMES DAILY
Status: DISCONTINUED | OUTPATIENT
Start: 2024-10-16 | End: 2024-10-17

## 2024-10-16 ASSESSMENT — PAIN SCALES - GENERAL
PAINLEVEL_OUTOF10: 10 - WORST POSSIBLE PAIN
PAINLEVEL_OUTOF10: 10 - WORST POSSIBLE PAIN
PAINLEVEL_OUTOF10: 0 - NO PAIN
PAINLEVEL_OUTOF10: 4
PAINLEVEL_OUTOF10: 10 - WORST POSSIBLE PAIN

## 2024-10-16 ASSESSMENT — PAIN - FUNCTIONAL ASSESSMENT
PAIN_FUNCTIONAL_ASSESSMENT: CPOT (CRITICAL CARE PAIN OBSERVATION TOOL)
PAIN_FUNCTIONAL_ASSESSMENT: 0-10
PAIN_FUNCTIONAL_ASSESSMENT: CPOT (CRITICAL CARE PAIN OBSERVATION TOOL)
PAIN_FUNCTIONAL_ASSESSMENT: 0-10
PAIN_FUNCTIONAL_ASSESSMENT: CPOT (CRITICAL CARE PAIN OBSERVATION TOOL)
PAIN_FUNCTIONAL_ASSESSMENT: CPOT (CRITICAL CARE PAIN OBSERVATION TOOL)
PAIN_FUNCTIONAL_ASSESSMENT: 0-10
PAIN_FUNCTIONAL_ASSESSMENT: CPOT (CRITICAL CARE PAIN OBSERVATION TOOL)

## 2024-10-16 ASSESSMENT — PAIN DESCRIPTION - LOCATION
LOCATION: HIP
LOCATION: HIP

## 2024-10-16 ASSESSMENT — PAIN DESCRIPTION - ORIENTATION
ORIENTATION: RIGHT
ORIENTATION: RIGHT

## 2024-10-16 NOTE — H&P
Medical Intensive Care - History and Physical   Subjective    Misael Ritchie is a 69 y.o. year old female patient admitted on 10/3/2024 with following ICU needs: c/f shock    HPI:  Misael Ritchie is a 69 y.o. 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     Brief hospital course:  Pt received emergent dialysis on arrival to MICU. Her MICU course was complicated by hypotension and hypoglycemia - pt was started on multiple pressors (epinephrine, levophed, and vasopressin) and endocrinology was consulted for known hx of adrenal insufficiency and pt was started on high dose hydrocortisone. CT on admission had also demonstrated R femoral greater trochanteric fracture. Orthopedic surgery was consulted and pt was taken for R hip ORIF w/ nail on 10/7, which was complicated by R hip hematoma at procedure site. MICU course was further complicated by elevated lactates and wide pulse pressures with difficulty weaning off pressors. Pt was taken for RHC on 10/11 which showed RAP 3, RVSP 53, PCWP 10, CO/CI 4.61/2.95, PA sat 55%. Levophed and vasopressin were successfully weaned off, and epinephrine was gradually weaned off with stable mixed venous sats and pt was sent to floors 10/13 where she was continued on HD on the floors.    Overnight on 10/15, pt was noted to be lethargic following dialysis. VBG lactate was noted to be uptrending 6.8 > 8.7. Rapid response was called, small fluid bolus of 250 mL was given. AMS noted to worsen w/ pt appearing more stuporous and decision was made to escalate care to MICU.     Upon arrival to MICU, pt remained lethargic but arousable to voice, Aox3, and responsive to  "commands. She remains HDS off pressors. Initially she was on non-rebreather, but was weaned to 7 L NC and is satting 100% and breathing comfortably. Repeat VBG showed downtrending lactate from 8.7 > 7.7 > 6.0. Hgb 6.9 from 7.1, but held off transfusing given no significant downtrend in hgb and chronic anemia.  Pt had no complaints on exam except for R hip/buttock pain.      Meds    Home medications:  No current outpatient medications     Inpatient medications:  Scheduled medications  acetaminophen, 975 mg, oral, TID  [Held by provider] allopurinol, 100 mg, oral, Daily  amiodarone, 200 mg, oral, Daily  aspirin, 81 mg, oral, Daily  atorvastatin, 40 mg, oral, Nightly  calcium acetate, 667 mg, oral, TID  epoetin bridgett or biosimilar, 10,000 Units, intravenous, Once per day on Tuesday Thursday Saturday  hydrocortisone sodium succinate, 15 mg, intravenous, q6h   Followed by  [START ON 10/19/2024] hydrocortisone sodium succinate, 15 mg, intravenous, q8h   Followed by  [START ON 10/22/2024] predniSONE, 10 mg, oral, q8h   Followed by  [START ON 10/25/2024] predniSONE, 5 mg, oral, Daily  lactated Ringer's, 250 mL, intravenous, Once  midodrine, 10 mg, oral, TID  [Held by provider] mirtazapine, 7.5 mg, oral, Nightly  naloxone, 0.2 mg, intravenous, Once  pantoprazole, 40 mg, oral, Daily before breakfast  piperacillin-tazobactam, 2.25 g, intravenous, q8h  polyethylene glycol, 17 g, oral, q12h  sennosides, 2 tablet, oral, BID  vitamin B complex-vitamin C-folic acid, 1 capsule, oral, Daily      Continuous medications  [Held by provider] heparin, 0-4,000 Units/hr, Last Rate: Stopped (10/15/24 1240)      PRN medications  PRN medications: albuterol, bisacodyl, bisacodyl, dextrose, dextrose, glucagon, glucagon, heparin, oxyCODONE     Objective    Blood pressure 105/78, pulse 75, temperature 35.8 °C (96.4 °F), temperature source Temporal, resp. rate 10, height 1.549 m (5' 1\"), weight 56.7 kg (125 lb), SpO2 98%.     Physical " Exam  Constitutional:       Comments: Lethargic, but arousable   Cardiovascular:      Rate and Rhythm: Normal rate and regular rhythm.      Heart sounds: Murmur (systolic murmur loudest at RUSB) heard.      Comments: Radial pulses difficult to palpate, bilateral dialysis fistulas  Pulmonary:      Effort: Pulmonary effort is normal. No respiratory distress.      Breath sounds: No wheezing, rhonchi or rales.   Abdominal:      General: Abdomen is flat. Bowel sounds are normal.      Palpations: Abdomen is soft.      Tenderness: There is no guarding or rebound.   Musculoskeletal:      Right lower leg: No edema.      Left lower leg: No edema.   Neurological:      General: No focal deficit present.      Mental Status: She is oriented to person, place, and time.      Comments: Lethargic but arousable, AOx3            Intake/Output Summary (Last 24 hours) at 10/16/2024 0256  Last data filed at 10/16/2024 0006  Gross per 24 hour   Intake 1900 ml   Output 600 ml   Net 1300 ml     Labs:   Results from last 72 hours   Lab Units 10/15/24  2343 10/15/24  1758 10/15/24  1412 10/15/24  0813   SODIUM mmol/L 131* 136 125* 125*   POTASSIUM mmol/L 5.9* 3.8 4.8 4.7   CHLORIDE mmol/L 90* 91* 86* 86*   CO2 mmol/L 27 25 22 24   BUN mg/dL 38* 26* 66* 63*   CREATININE mg/dL 3.38* 2.55* 5.37* 5.40*   GLUCOSE mg/dL 158* 104* 83 116*   CALCIUM mg/dL 7.1* 7.7* 7.4* 7.8*   ANION GAP mmol/L 20 24* 22* 20   EGFR mL/min/1.73m*2 14* 20* 8* 8*   PHOSPHORUS mg/dL 5.5*  --  6.4* 6.4*      Results from last 72 hours   Lab Units 10/15/24  2341 10/15/24  1838 10/15/24  1412 10/15/24  0813 10/14/24  0549   WBC AUTO x10*3/uL 9.3 9.0 12.8*   < > 16.2*   HEMOGLOBIN g/dL 6.9* 7.1* 6.5*   < > 7.5*   HEMATOCRIT % 20.2* 20.2* 17.9*   < > 22.5*   PLATELETS AUTO x10*3/uL 32* 32* 42*   < > 54*   NEUTROS PCT AUTO % 92.8  --  91.8  --  92.0   LYMPHS PCT AUTO % 1.6  --  1.6  --  1.8   MONOS PCT AUTO % 4.0  --  5.1  --  4.8   EOS PCT AUTO % 0.0  --  0.0  --  0.0    < > =  values in this interval not displayed.      Results from last 72 hours   Lab Units 10/15/24  2212   POCT PH, ARTERIAL pH 7.26*   POCT PCO2, ARTERIAL mm Hg 57*   POCT PO2, ARTERIAL mm Hg 29*   POCT SO2, ARTERIAL % 33*     Results from last 72 hours   Lab Units 10/15/24  2343 10/15/24  2027   POCT PH, VENOUS pH 7.21* 7.38   POCT PCO2, VENOUS mm Hg 62* 42   POCT PO2, VENOUS mm Hg 30* 40        Micro/ID:     Lab Results   Component Value Date    BLOODCULT No growth at 4 days -  FINAL REPORT 10/09/2024       Summary of Key Imaging Results  CXR 10/15:  IMPRESSION:  1. No significant change in cardiopulmonary status, including diffuse  pulmonary edema and layering bilateral pleural effusions, right more  than left. Again, underlying acute infectious process can not be  entirely excluded on imaging.      Assessment and Plan     Assessment:  Misael Ritchie is a 69 y.o. female PMH HFrecEF/ICM ( EF 33->10/2024 EF 50-55%) s/p CRT-P, CAD s/p PCI SHASHI to mLAD 5/2023 and remote PCI to [facility], MR/TR, aortic stenosis s/p TAVR (11/2023), atrial fibrillation, ESRD on hemodialysis T/Th/Sat, adrenal insufficiency who was initially admitted to the MICU on 10/03/24 for emergent dialysis for hyperkalemia 7.4, now transferred back to MICU from floors due to c/f shock.     Plan:  NEUROLOGY/PSYCH:  #Pain s/p fall and R greater trochanteric fracture  Scheduled Tylenol 975 mg Q8H  PRN Oxy 10 mg q6H    CARDIOVASCULAR:  #Cardiogenic shock  #HFrecEF/ICM ( EF 33->10/2024 EF 50-55%) s/p CRT-P  #CAD s/p PCI SHASHI to mLAD 5/2023 and remote PCI to [facility]  #MR/TR, aortic stenosis s/p TAVR (11/2023)  #atrial fibrillation on home eliquis s/p DCCV  #pulmonary HTN (group 2, 5) c/b R sided HF  -RHC on 10/11 which showed RAP 3, RVSP 53, PCWP 10, CO/CI 4.61/2.95, PA sat 55%  -TTE 10/4/24: EF 50-55%, severely enlarged RV, severely dilated LA/RA, severe TR, mild MR, prosthetic AV w/ no signs of AR  -pt initially required inotropic support of epinephrine due to  severe RHF  -Transferred back to MICU 10/15 w/ uptrending lactates likely 2/2 cardiogenic shock, improved s/p fluids    Plan:  AP: Pt on plavix 75 mg daily at home. Cardiology consulted and agreeable w/ switching to ASA 81 mg to decrease risk ofh bleeding given recent falls  AC: was on HIH drip but ts was held 10/15 due to c/f bleeding from R trochanteric hematoma  Continue home amiodarone, atorvastatin  Continue midodrine 10 mg TID  S/p 250 mL LR bolus, will give another 250 mL LR and continue to give small fluid boluses as needed  Hold metoprolol for HR iso of shock on admission  Consult HF team and recs appreciated     PULMONARY:  #COPD  Management:  Continue home albuterol PRN  Continue supplemental O2 as needed, wean to goal spo2 88-92%    RENAL/GENITOURINARY:  #ESRD  Management:  Continue with HD, Nephrology following  Hold home torsemide   Hold home allopurinol     GASTROENTEROLOGY:  Continue home Protonix, miralax     ENDOCRINOLOGY:  # Adrenal insufficiency in setting of chronic glucocorticoid use and recent steroid injection to right shoulder resulting treatment resistant hypoglycemia     Management:  Continue Stress dose steroid taper as recommended by endocrinology: Hydrocortisone 25 mg every 6 hours for 3 days followed by hydrocortisone 15 mg every 6 hours for 3 days, followed by 15 mg 3 times daily (last dose before 6 PM) for 3 days, followed by prednisone 10 mg 3 times daily (last dose before 6 PM) for 3 days, followed by prednisone 5 mg 1 tablet orally daily in the morning, to be continued until seen as outpatient   Will need endocrinology outpt follow-up    HEMATOLOGY:  #R greater trochanteric hematoma   #Acute on Chronic anemia (of CKD)  #Thrombocytopenia  -Hgb was 13 on admission (there was likely an aspect of hemoconcentration), but has downtrended over the course of admission, thought to be 2/2 R hip fracture c/b Hematoma that formed at R femur following R trochanteric ORIF 10/7 in addition to  resolution of hemoconcentration and gradual downtrend in Hgb from lab draws w/ low marrow function given ESRD status  -Chronically thrombocytopenic, however this has been worsening throughout admission and platelets now 32  Management:  Heparin was held 10/15 due to c/f bleeding, heparin should be restarted if CBC stable  Will continue to check CBC BID until stable to ensure no bleed  EPO w/ dialysis per renal    SKIN:  AMARJIT    MUSCULOSKELETAL:  #Right troc fracture with hematoma  - S/p Hip Fracture ORIF w/ Nail Trochanteric surgery with ortho 10/7  Management:  -CTM R hip hematoma, trend CBCs as above  - Tylenol 975 Q6H, Oxy 10mg Q6h for severe pain    INFECTIOUS DISEASE:  #Shock  -No leukocytosis, no fever, no sign of infection  -Lactate uptrending initially  Plan:  -1 x Bcx obtained, ordered another set of Bcx  -Sent CRP to trend as needed  -Pt anuric, unable to send UA  -Pt given vanc + zosyn given during RR 10/15, however can likely stop if no signs of infection    ICU Check List     FEN  Fluids: small boluses (250 mL x 2)  Electrolytes: on dialysis  Nutrition: NPO for now, OK to resume during the day  Prophylaxis:  DVT ppx: on ASA, holding heparin drip due to c/f bleed, SCDs  GI ppx: PPI  Bowel care: miralax                   Social:  Code: Full Code    NOK:  Zeke Ritchie () [phone]     Wander Martinez MD   10/16/24 at 2:56 AM     Disclaimer: Documentation completed with the information available at the time of input. The times in the chart may not be reflective of actual patient care times, interventions, or procedures. Documentation occurs after the physical care of the patient.     Brief Attending Summary:  Chronic RV failure and likely hypovolemia affecting preload. BP improved with IVF. Might need inotrope support.    This critically ill patient continues to be at-risk for deterioration / failure due to the above  mentioned dysfunctional unstable organ systems.  I have personally  identified and managed all critical care issues.  Assessment, impressions and plans are reflected in the note above as well as the orders.  Critical care time is spent at bedside includes review of diagnostic tests, labs, and radiographs, serial assessments and management of hemodynamics, respiratory status, ventilation and coordination of care. Teaching and any separately billable procedures are not included in the time calculation.      Billing Provider Critical Care Time: 45 minutes

## 2024-10-16 NOTE — NURSING NOTE
Intubation at bedside:    1124 increased levo drip to 0.2mcg/kg/min  1124 increased levo drip to 0.3mcg/kg/min  1124 0.2mg phenylephrine given  1124 0.2mg phenylephrine given  1125 1 amp bicarb given  1125 1 amp bicarb given  1126 0.4 phenylephrine given  1126 increased levo drip to 1mcg/kg/min  1126 increased epi drip to 1mcg/kg/min  1127 1 amp bicarb given  1134 epi dripped turned off, decreased levo drip to 0.3 mcg/kg/min

## 2024-10-16 NOTE — SIGNIFICANT EVENT
"Rapid/Transfer Note    At ~1930 was made aware by nursing that patient with altered mental status after receiving dialysis. Patient had received one unit of pRBC during dialysis for bleeding presumed into right thigh hematoma. At bedside patient A&Ox3-4 but stuporous, needing to be continuouly addressed to stay awake. Vitals stable with BP at baseline of 110s/60s. On physical exam, noted to have cold extremities and pinpoint pupils. Had alst recevied opiates at 1233. Patient without focal neurologic defect but neuro exam limited 2/2 patient cooperation. Notable labs from scheduled evening labs were Plt 32 and Hgb 7.1 (6.5 prior to receiving unit).  Patient stopped heparin today at 1240 and is on ASA. Infectious work up was started by day team but no WBC elevation. Ordered VBG and CT head w/o contrast to rule out bleed.      Lactate on VBG was 6.8 and repeat was 8.6. Called rapid response to facilitate infectious work up and labs. Fluid bolus of 250mL (heart failure) was given and once set of blood cultures were drawn. Notably, patient very hard stick with thrombosed veins and upper extremity edema. Repeat \"ABG\" (likely venous per rapid nurse) returned lactate of 8.0, pH 7.26. Limbs were considerably colder and lower extremity pulses were not palpable. Patient more stuporous than prior. Vitals remained stable and with pressures at 120s/60s. Started vanc/zosyn. Called MICU fellow who were very familiar with patient indicated patient should be elevated to higher level of care for pressor support. Pt placed on 100% FiO2 non-rebreather and transferred to MICU.      Suspect cardiogenic vs septic shock. Possible sources of infection include pulmonary (bilateral effusions could be hiding consolidation) or UTI (anuric).   "

## 2024-10-16 NOTE — PROGRESS NOTES
Occupational Therapy    Communication Note    Patient Name: Misael Ritchie  MRN: [id number]  Today's Date: 10/16/2024   Room: 20/20A    Discipline: Occupational Therapy      Missed Visit: Yes  Missed Visit Reason:  (patient readmitted to MICU 2/2 c/f shock, currently with uptrending troponin, low blood glucose this AM; will attempt OT next visit as appropriate.)      10/16/24 at 9:22 AM   Amanda Bunn OT   Rehab Office: [phone]

## 2024-10-16 NOTE — PROGRESS NOTES
Communication Note    Patient Name: Misael Ritchie  MRN: [id number]  Today's Date: 10/16/2024   Room: 20/20-A    Discipline: Physical Therapy      Missed Visit: Yes  Missed Visit Reason:  (PT re-evaluation reviewed, readmitted to MICU 2/2 shock c uptrending troponin.  Will monitor and follow up as appropriate.)      10/16/24 at 9:05 AM   Mj Lemon, PT   Rehab Office: [phone]

## 2024-10-16 NOTE — CONSULTS
Inpatient consult to Endocrinology  Consult performed by: Segun Charles MD  Consult ordered by: Katja Lozano MD  Reason for consult: Adrenal insufficiency, recent MICU admission c/f shock and increased lactate          Reason For Consult  Adrenal insufficiency, recent MICU admission c/f shock and increased lactate     History Of Present Illness  Misael Ritchie is a 69 y.o. female with a past medical history significant of but not limited to two unsuccessful kidney transplants (failed in 2004, 2nd transplant 2010, biventricular pacemaker, aortic stenosis s/p TAVR (11/13/2023), CAD s/p PCI-mLAD (5/24/2023), history of hypoglycemia with workup suggestive of adrenal insufficiency, atrial fibrillation who initially presented to the hospital post a fall. She was initially admitted to the MICU on 10/3 for emergent dialysis for hyperkalemia with K >7. Her MICU course was complicated by hypotension and persistent hypoglycemia despite steroid use. She was eventually started on pressors, and endocrinology was consulted for management of AI and steroid dose. Because of the fall, she had a CT done which revealed a R femoral greater trochanteric fracture, for which she had ORIF done on 10/7 which was complicated by R hip hematoma. MICU course was further complicated by elevated lactates and wide pulse pressures with difficulty weaning off pressors. Pt was taken for RHC on 10/11 which showed RAP 3, RVSP 53, PCWP 10, CO/CI 4.61/2.95, PA sat 55%. Levophed and vasopressin were successfully weaned off, and epinephrine was gradually weaned off with stable mixed venous sats and pt was sent to floors 10/13 where she was continued on HD on the floor, however had to return back to the MICU on 10/15 due to elevated lactate and lethargy. Endocrinology has been re-consulted for management of her stress dose steroids in the setting of AI and hypoglycemia.       Past Medical History  She has a past medical history of Difficult  "intubation (10/7/2024).    Surgical History  She has a past surgical history that includes Cardiac catheterization (N/A, 10/11/2024).     Social History  She reports that she has never smoked. She has never used smokeless tobacco. No history on file for alcohol use and drug use.    Family History  No family history on file.     Allergies  Codeine, Azithromycin, Nifedipine, Nifedipine (bulk), Nsaids (non-steroidal anti-inflammatory drug), Prochlorperazine, Vancomycin, Erythromycin, and Erythromycin base    Review of Systems  Negative unless noted above    Physical Exam    General: female patient intubated and sedated  HEENT: ETT in place  Neck: trachea in midline  Resp: mechanical breath sounds   CVS: normal s1 and s2  Abdomen: soft and non tender to palpation, BS+  Skin: warm, dry and intact  Neuro: unable to assess due to intubation  Psych: unable to assess due to intubation    ROS, PMH, FH/SH, surgical history and allergies have been reviewed.    Last Recorded Vitals  Blood pressure 82/72, pulse 75, temperature 36.4 °C (97.5 °F), temperature source Temporal, resp. rate 15, height 1.549 m (5' 1\"), weight 57.6 kg (126 lb 15.8 oz), SpO2 100%.    Relevant Results  Results from last 7 days   Lab Units 10/16/24  0840 10/16/24  0816 10/16/24  0321 10/16/24  0003 10/15/24  2343 10/15/24  2315 10/15/24  1812 10/15/24  1758 10/15/24  1746 10/15/24  1412 10/15/24  1228 10/15/24  0813 10/14/24  0928 10/14/24  0550   POCT GLUCOSE mg/dL 135* 27* 150* 163*  --  88   < >  --    < >  --    < >  --    < >  --    GLUCOSE mg/dL  --   --   --   --  158*  --   --  104*  104*  --  83  --  116*  --  61*    < > = values in this interval not displayed.      Latest Reference Range & Units 01/27/21 13:17 11/12/21 07:15 05/09/22 11:23 05/10/22 06:53 05/11/22 04:30 05/11/22 12:57 05/12/22 06:57 05/13/22 06:32 05/14/22 07:17 05/15/22 08:22 05/16/22 07:25 05/17/22 05:29 05/18/22 05:38 05/19/22 05:28 05/20/22 06:32 05/09/23 00:18 01/04/24 06:10 " 10/03/24 10:08 10/03/24 12:39 10/03/24 17:20 10/03/24 17:35 10/03/24 21:14 10/03/24 22:51 10/04/24 08:30 10/04/24 11:24 10/05/24 04:18 10/06/24 03:47 10/07/24 02:45 10/08/24 05:06 10/08/24 20:07 10/08/24 22:25 10/09/24 04:16 10/10/24 04:00 10/11/24 06:11 10/12/24 05:00 10/13/24 04:40 10/14/24 05:50 10/15/24 08:13 10/15/24 14:12 10/15/24 17:58 10/15/24 23:43   CORTISOL 2.5 - 20.0 ug/dL                    10.0  11.3    >150.0 (H)                    Hemoglobin A1C 4.3 - 5.6 % 5.3 (E) 5.1 (E)              5.7 (H) (E) 4.5 (E)                           Thyroxine, Free 0.78 - 1.48 ng/dL                     1.70 (H)                       Beta-Hydroxybutyrate 0.02 - 0.27 mmol/L                          0.16    0.22              C-Peptide 0.7 - 3.9 ng/mL                    26.0 (H)      18.2 (H)    5.2 (H)              Thyroid Stimulating Hormone 0.44 - 3.98 mIU/L    1.44                2.55                        Triiodothyronine, Free 2.3 - 4.2 pg/mL                     1.0 (L)                       Insulin, Free 3 - 25 uIU/mL                       10   6    2 (L)              GLUCOSE 74 - 99 mg/dL   97 92 85 112 (H) 78 67 (L) 82 86 87 85 87 84 77   87 40 (LL) 107 (H)  71 (L)   188 (H) 152 (H) 136 (H) 144 (H) 115 (H) 36 (LL) 110 (H) 135 (H) 181 (H) 149 (H) 116 (H) 152 (H) 61 (L) 116 (H) 83 104 (H)  104 (H) 158 (H)   Estimated Average Glucose mg/dL 105 (E) 100 (E)              117 (E) 82 (E)                           Insulin,Total 3 - 25 uIU/mL                       14   9    3              Proinsulin, Intact <=7.2 pmol/L                              5.6              (LL): Data is critically low  (H): Data is abnormally high  (L): Data is abnormally low  (E): External lab result     Latest Reference Range & Units 10/03/24 11:54 10/03/24 11:59 10/03/24 12:11 10/03/24 12:22 10/03/24 17:20 10/03/24 21:14 10/03/24 22:51 10/09/24 09:36 10/09/24 19:55 10/11/24 01:45 10/11/24 21:26 10/12/24 08:53 10/15/24 20:27 10/15/24 21:04  10/15/24 23:43 10/16/24 03:15   POCT pH, Venous 7.33 - 7.43 pH 7.19 (LL) 7.13 (LL) 7.19 (LL) 7.22 (LL) 7.29 (L) 7.39 7.33 7.39 7.42 7.37 7.43 7.44 (H) 7.38  7.21 (LL) 7.35   POCT pCO2, Venous 41 - 51 mm Hg 52 (H) 47 51 43 51 49 49 43 42 46 43 40 (L) 42  62 (H) 45   POCT pO2, Venous 35 - 45 mm Hg 29 (L) 28 (L) 36 36 33 (L) 34 (L) 38 31 (L) 31 (L) 32 (L) 41 42 40  30 (L) 51 (H)   POCT SO2, Venous 45 - 75 % 23 (L) 19 (L) 29 (L) 36 (L) 33 (L) 41 (L) 48 47 44 (L) 43 (L) 68 72 63  39 (L) 79 (H)   POCT Oxy Hemoglobin, Venous 45.0 - 75.0 % 22.7 (L) 19.3 (L) 29.1 (L) 35.8 (L) 33.0 (L) 40.1 (L) 47.3 45.9 43.4 (L) 42.1 (L) 66.3 70.4 60.8  38.7 (L) 77.0 (H)   POCT Hematocrit Calculated, Venous 36.0 - 46.0 % 39.0 28.0 (L) 38.0 37.0 35.0 (L) 34.0 (L) 30.0 (L) 16.0 (L) 24.0 (L) 22.0 (L) 20.0 (L) 14.0 (L) 25.0 (L)  33.0 (L) 32.0 (L)   POCT Sodium, Venous 136 - 145 mmol/L 131 (L) 139 134 (L) 133 (L) 134 (L) 133 (L) 127 (L) 132 (L) 130 (L) 123 (L) 128 (L) 129 (L) 128 (L)  125 (L) 127 (L)   POCT Potassium, Venous 3.5 - 5.3 mmol/L >10.0 (HH) 5.6 (H) 8.8 (HH) 7.7 (HH) 6.1 (HH) 4.6 5.0 4.0 3.6 3.7 3.5 3.9 3.5  6.3 (HH) 4.3   POCT Chloride, Venous 98 - 107 mmol/L 98 109 (H) 98 96 (L) 96 (L) 96 (L) 93 (L) 96 (L) 94 (L) 88 (L) 94 (L) 97 (L) 93 (L)  92 (L) 92 (L)   POCT Ionized Calicum, Venous 1.10 - 1.33 mmol/L 1.01 (L) 0.90 (L) 1.06 (L) 1.12 1.17 1.11 1.08 (L) 1.11 1.10 1.08 (L) 1.12 1.08 (L) 1.05 (L)  0.97 (L) 1.02 (L)   POCT Glucose, Venous 74 - 99 mg/dL 77 54 (L) 78 39 (LL) 114 (H) 72 (L) 444 (H) 125 (H) 169 (H) 167 (H) 110 (H) 134 (H) 258 (H)  178 (H) 126 (H)   POCT Lactate, Venous 0.4 - 2.0 mmol/L 8.5 (HH) 5.7 (HH) 7.4 (HH) 9.3 (HH) 4.0 (HH) 2.5 (H) 4.3 (HH) 3.3 (H) 3.8 (H) 2.1 (H) 1.3 0.9 6.8 (HH) 8.7 (HH) 7.7 (HH) 6.0 (HH)   POCT Base Excess, Venous -2.0 - 3.0 mmol/L -8.5 (L) -12.9 (L) -8.8 (L) -9.7 (L) -2.5 (L) 3.9 (H) -0.5 1.0 2.5 1.1 3.8 (H) 2.8 -0.3  -3.8 (L) -1.0   POCT HCO3 Calculated, Venous 22.0 - 26.0 mmol/L 19.9 (L) 15.6  (L) 19.5 (L) 17.6 (L) 24.5 29.7 (H) 25.8 26.0 27.2 (H) 26.6 (H) 28.5 (H) 27.2 (H) 24.8  24.8 24.8   POCT Hemoglobin, Venous 12.0 - 16.0 g/dL 13.1 9.3 (L) 12.6 12.3 11.8 (L) 11.2 (L) 10.1 (L) 5.3 (LL) 7.9 (L) 7.4 (L) 6.7 (L) 4.8 (LL) 8.3 (L)  11.1 (L) 10.8 (L)   POCT Anion Gap, Venous 10.0 - 25.0 mmol/L COMMENT ONLY 20.0 25.0 27.0 (H) 20.0 12.0 13.0 14.0 12.0 12.0 9.0 (L) 9.0 (L) 14.0  15.0 15.0   (LL): Data is critically low  (HH): Data is critically high  (L): Data is abnormally low  (H): Data is abnormally high        No current facility-administered medications on file prior to encounter.     No current outpatient medications on file prior to encounter.          Assessment/Plan   Assessment & Plan  Pulmonary hypertension (Multi)    Edema of both upper extremities    The patient is a 69 year old female with multiple co-morbidities who is re-admitted to the MICU on 10/15 for elevated lactate and lethargy. Endocrinology has been re-consulted after their sign off on 10/12 for AI and steroid dose management. The patient has chronic glucocorticoid use in the past ( 2010) and a steroid injection in her R shoulder PTA (09/2024). During our previous encounter with the patient, she had revealed that she has had a Stim test in 02/2024 at [facility] which revealed a basal cortisol of 2.6, 30 minute cortisol of 2.6 and 60 minute cortisol of 2.7. She is currently on 15 mg Q 6 hours which was started on 10/15 prior to her transfer to MICU.    - Her hypoglycemia is likely not due to AI, as 15 mg Q6 hourly is relatively a big dose of steroid. While reviewing her glucose summary, it was noted that she did not eat much throughout her day, which could have contributed to her hypoglycemia to an extent. She has had frequent episodes where her BG was < 40.  - We can not test her for any endocrine causes of hypoglycemia given her ESRD and cardiogenic shock at this point as the testing would be unreliable.  - For steroids, if she is on  pressors, we can increase to 50 mg Q6 hour and if not on pressors, we can continue with 15 mg  6 hour      The patient was seen and discussed with Dr. Souza. Thank you for the consult, please reach out incase you have any questions or concerns. We will sign off now. Please re-consult if you need help with tapering once ready.      Segun Charles MD  PGY -4 Endocrinology Fellow

## 2024-10-16 NOTE — CARE PLAN
The clinical goals for the shift include pt blood sugar will remain above 70 throughout shift.      Problem: Skin  Goal: Participates in plan/prevention/treatment measures  Outcome: Progressing  Flowsheets (Taken 10/16/2024 0638)  Participates in plan/prevention/treatment measures:   Discuss with provider PT/OT consult   Elevate heels  Goal: Prevent/manage excess moisture  Outcome: Progressing  Flowsheets (Taken 10/16/2024 0638)  Prevent/manage excess moisture:   Cleanse incontinence/protect with barrier cream   Monitor for/manage infection if present   Follow provider orders for dressing changes   Moisturize dry skin  Goal: Prevent/minimize sheer/friction injuries  Outcome: Progressing  Flowsheets (Taken 10/16/2024 0638)  Prevent/minimize sheer/friction injuries:   Utilize specialty bed per algorithm   Turn/reposition every 2 hours/use positioning/transfer devices   HOB 30 degrees or less   Use pull sheet   Complete micro-shifts as needed if patient unable. Adjust patient position to relieve pressure points, not a full turn  Goal: Promote skin healing  Outcome: Progressing  Flowsheets (Taken 10/16/2024 0638)  Promote skin healing:   Assess skin/pad under line(s)/device(s)   Protective dressings over bony prominences   Turn/reposition every 2 hours/use positioning/transfer devices   Ensure correct size (line/device) and apply per  instructions   Rotate device position/do not position patient on device  Goal: Decreased wound size/increased tissue granulation at next dressing change  Outcome: Progressing  Flowsheets (Taken 10/16/2024 0638)  Decreased wound size/increased tissue granulation at next dressing change:   Utilize specialty bed per algorithm   Protective dressings over bony prominences   Promote sleep for wound healing     Problem: Fall/Injury  Goal: Not fall by end of shift  Outcome: Met     Problem: Fall/Injury  Goal: Be free from injury by end of the shift  Outcome: Met     Problem: Pain -  Adult  Goal: Verbalizes/displays adequate comfort level or baseline comfort level  Outcome: Progressing  Flowsheets (Taken 10/16/2024 0638)  Verbalizes/displays adequate comfort level or baseline comfort level:   Encourage patient to monitor pain and request assistance   Assess pain using appropriate pain scale   Administer analgesics based on type and severity of pain and evaluate response   Implement non-pharmacological measures as appropriate and evaluate response   Consider cultural and social influences on pain and pain management   Notify Licensed Independent Practitioner if interventions unsuccessful or patient reports new pain     Problem: Pain  Goal: Turns in bed with improved pain control throughout the shift  Outcome: Progressing  Goal: Free from opioid side effects throughout the shift  Outcome: Progressing  Goal: Free from acute confusion related to pain meds throughout the shift  Outcome: Progressing     Problem: Heart Failure  Goal: Improved gas exchange this shift  Outcome: Progressing  Flowsheets (Taken 10/16/2024 0638)  Improved gas exchange this shift: Position to promote circulation/maximize ventilation  Goal: Reduction in peripheral edema within 24 hours  Outcome: Progressing  Flowsheets (Taken 10/16/2024 0638)  Reduction in peripheral edema within 24 hours:   Monitor edema/skin/mucous membrane integrity   SCDs/elevate extremities   Consult dietician  Goal: Report improvement of dyspnea/breathlessness this shift  Outcome: Progressing  Flowsheets (Taken 10/16/2024 0638)  Report improvement of dyspnea/breathlessness this shift:   Encourage activity/mobility/ROM   Incentive spirometry/deep breathing /HOB elevated elevated   Consider PT/OT consult   Facilitate activity/mobility per patient tolerance/advance       Over the shift, the patient did not make progress toward the following goals. Barriers to progression include altered mental status related to sickness. Recommendations to address these  barriers include reorientation, collaborating with medical team with orders.    Problem: Fall/Injury  Goal: Verbalize understanding of personal risk factors for fall in the hospital  Outcome: Not Progressing  Goal: Verbalize understanding of risk factor reduction measures to prevent injury from fall in the home  Outcome: Not Progressing

## 2024-10-16 NOTE — PROGRESS NOTES
"Misael Ritchie   69 y.o. female   MRN/Room: [id number]/20/20-A  Chief Concern: Hyperkalemia  Nephrology following for: emergent HD MICU     Subjective    Update 10/16/2024:   - Patient is back in MICU after lethargy, AMS, concern for shock after dialysis on 10/15.   - At time of seeing patient, she was hemodynamically stable and had not required pressors in the ICU  - Patient was intubated after [facility] team seeing her, and vent settings are reported in vitals for this note    Patient was seen at bedside in the morning. Her mental status is poor today and patient appears somnolent. Requires repeated stimulation to keep her attention. Patient denies shortness of breath on nasal cannula, complains of hip pain. No other complaints at this time.      Objective    Vitals:  BP (!) 85/46   Pulse 106   Temp 35.9 °C (96.6 °F) (Temporal)   Resp 20   Ht 1.549 m (5' 1\")   Wt 57.6 kg (126 lb 15.8 oz)   SpO2 100%   BMI 23.99 kg/m²      Temp:  [35.2 °C (95.4 °F)-36.4 °C (97.5 °F)] 35.9 °C (96.6 °F)  Heart Rate:  [] 106  Resp:  [10-23] 20  BP: ()/() 85/46  Arterial Line BP 1: ()/(27-66) 122/57   Vent Mode: Volume control/assist control  S RR:  [20] 20  S VT:  [380 mL] 380 mL  PEEP/CPAP (cm H2O):  [5 cm H20] 5 cm H20  MAP (cm H2O):  [9] 9      Medications   Scheduled Medications  acetaminophen, 975 mg, oral, q8h  [Held by provider] allopurinol, 100 mg, oral, Daily  amiodarone, 200 mg, oral, Daily  aspirin, 81 mg, oral, Daily  atorvastatin, 40 mg, oral, Nightly  calcium acetate, 667 mg, oral, TID  EPINEPHrine, , ,   epoetin bridgett or biosimilar, 10,000 Units, intravenous, Once per day on Tuesday Thursday Saturday  hydrocortisone sodium succinate, 50 mg, intravenous, q6h  lactated Ringer's, 250 mL, intravenous, Once  [Held by provider] midodrine, 10 mg, oral, TID  [Held by provider] mirtazapine, 7.5 mg, oral, Nightly  naloxone, 0.2 mg, intravenous, Once  pantoprazole, 40 mg, oral, Daily before " breakfast  phenylephrine HCl in 0.9% NaCl, , ,   phenylephrine HCl in 0.9% NaCl, 40 mcg, intravenous, Once  phenylephrine HCl in 0.9% NaCl, 40 mcg, intravenous, Once  piperacillin-tazobactam, 2.25 g, intravenous, q8h  polyethylene glycol, 17 g, oral, q12h  sennosides, 2 tablet, oral, BID  sodium bicarbonate, 50 mEq, intravenous, Once  sodium bicarbonate, 50 mEq, intravenous, Once  sodium bicarbonate, 50 mEq, intravenous, Once  sodium bicarbonate, , ,   vitamin B complex-vitamin C-folic acid, 1 capsule, oral, Daily     Continuous Medications  dextrose 10 % in water (D10W), 50 mL/hr, Last Rate: 50 mL/hr (10/16/24 1430)  EPINEPHrine, 0-0.2 mcg/kg/min (Dosing Weight), Last Rate: Stopped (10/16/24 1251)  [Held by provider] heparin, 0-4,000 Units/hr, Last Rate: Stopped (10/15/24 1240)  norepinephrine, 0-0.5 mcg/kg/min (Dosing Weight), Last Rate: 0.3 mcg/kg/min (10/16/24 1417)  vasopressin, 0-0.03 Units/min, Last Rate: 0.03 Units/min (10/16/24 1244)     PRN Medications  PRN medications: albuterol, bisacodyl, bisacodyl, dextrose, dextrose, EPINEPHrine, glucagon, glucagon, heparin, oxyCODONE, phenylephrine HCl in 0.9% NaCl, sodium bicarbonate   I/O:   Intake/Output Summary (Last 24 hours) at 10/16/2024 1459  Last data filed at 10/16/2024 1409  Gross per 24 hour   Intake 1920.79 ml   Output 600 ml   Net 1320.79 ml     Wt Readings from Last 3 Encounters:   10/16/24 57.6 kg (126 lb 15.8 oz)       Physical Exam:  General: Awake, fluctuating alertness, in no acute distress  Eyes: Gaze conjugate.  No scleral icterus, some injection in right sclera  HENT: Normo-cephalic, right periorbital hematoma No stridor, left EJ, right swan in place.   CV: Regular rate, regular rhythm. Radial pulses difficult to palpate bilaterally  Resp: Breathing non-labored on NC, speaking in short sentences.  Clear to auscultation bilaterally  GI: Soft, non-distended, non-tender. No rebound or guarding.  : deferred   MSK/Extremities: Right hip pain to  palpation. 1+ pitting edema in BLE.   Skin: Warm. Appropriate color  Neuro: Aox2 Face symmetric. Patient intermittently somnolent, needs repeat stimulus to get and maintain her attention.   Psych: Appropriate mood and affect      Labs:  CBC:  Results from last 7 days   Lab Units 10/16/24  0826 10/15/24  2341 10/15/24  1838   WBC AUTO x10*3/uL 16.5* 9.3 9.0   HEMOGLOBIN g/dL 6.6* 6.9* 7.1*   HEMATOCRIT % 19.7* 20.2* 20.2*   MCV fL 93 91 89   PLATELETS AUTO x10*3/uL 33* 32* 32*     RFP:  Results from last 7 days   Lab Units 10/16/24  0826 10/15/24  2343 10/15/24  1758   SODIUM mmol/L 131* 131* 136  136   POTASSIUM mmol/L 4.9 5.9* 3.8  3.8   CHLORIDE mmol/L 90* 90* 91*  91*   CO2 mmol/L 18* 27 25  25   BUN mg/dL 36* 38* 26*  26*   CREATININE mg/dL 3.54* 3.38* 2.55*  2.55*   CALCIUM mg/dL 7.6* 7.1* 7.7*  7.7*   MAGNESIUM mg/dL 2.14 2.20 2.00   PHOSPHORUS mg/dL 6.7* 5.5* 4.6     HFP:  Results from last 7 days   Lab Units 10/16/24  0826 10/15/24  2343 10/15/24  1758 10/13/24  0440 10/12/24  0500 10/11/24  0611 10/10/24  0400   AST U/L  --  1,775*  1,775*  --   --  50* 50* 78*   ALT U/L  --  593*  593*  --   --  11 11 21   ALK PHOS U/L  --  209*  209*  --   --  154* 133 146*   BILIRUBIN TOTAL mg/dL  --  3.7*  3.7*  --   --  3.1* 2.3* 2.5*   BILIRUBIN DIRECT mg/dL  --   --   --   --  2.1* 1.5* 1.5*   ALBUMIN g/dL 1.9* 2.3*  2.3* 2.5*   < > 2.6* 2.5* 2.9*    < > = values in this interval not displayed.     Cardiac:  Results from last 7 days   Lab Units 10/16/24  0826 10/16/24  0315 10/15/24  2343 10/12/24  0500 10/11/24  0611   TROPHSCMC ng/L 3,336* 3,506* 3,095*  --   --    BNP pg/mL  --   --   --  1,237* 1,876*     Coag:  Results from last 7 days   Lab Units 10/15/24  2343 10/10/24  0400   PROTIME seconds 14.4*  --    APTT seconds 32 54*   INR  1.3*  --      ABG/VBG:   Results from last 7 days   Lab Units 10/15/24  2212 10/11/24  2250 10/11/24  1840   POCT PH, ARTERIAL pH 7.26* 7.45* 7.47*   POCT PCO2, ARTERIAL  mm Hg 57* 41 37*   POCT PO2, ARTERIAL mm Hg 29* 39* 66*   POCT HCO3 CALCULATED, ARTERIAL mmol/L 25.6 28.5* 26.9*   POCT BASE EXCESS, ARTERIAL mmol/L -1.8 4.1* 3.0     Results from last 7 days   Lab Units 10/16/24  0315 10/15/24  2343 10/15/24  2027   POCT PH, VENOUS pH 7.35 7.21* 7.38   POCT PCO2, VENOUS mm Hg 45 62* 42   POCT PO2, VENOUS mm Hg 51* 30* 40   POCT HCO3 CALCULATED, VENOUS mmol/L 24.8 24.8 24.8          Assessment/Plan    ASSESSMENT:  Misael Ritchie is a  69 y.o. F with PMH ESRD on HD TTS, HFrEF, ortic stenosis s/p TAVR 11/23, atrial fibrillation (on Eliquis) s/p DCCV, CAD, pacemaker CRT-P 5/2023, adrenal insufficiency who is currently admitted to the MICU on account of suspected cardiogenic shock, RV failure. Nephrology following to facilitate inpatient RRT.     #ESRD on HD TTS   -HD unit: [facility]   -Access: RUE AVF   -EDW 56kg      #Fluid status  :: Slightly hypervolemic  :: Lungs clear to auscultation, no respiratory distress while on NC  :: Pitting edema BLE  :: Saturating well on 4L NC, though intubated later in the day  Plan:  - Continue iHD while inpatient as needed  - Last session 10/15  - No urgent need for dialysis on 10/16, though may need to consider CVVH if electrolytes become deranged/severe acidosis develops based on fluctuating pressor needs in MICU     #Electrolytes  :: Hyponatremia   :: Hypophosphatemia   :: Dialysis appears to be effective judging by satisfactory improvement in laboratory values on 10/13  Plan:  - iHD while inpatient for correction of electrolytes  - Agree to add calcium acetate for phos binder   - Follow on daily RFP     #CKD Anemia  -HGB 6.9  -Planned for PRBC transfusion today  -Added Epo     #CKD-MBD  -Liset Ca WNL, Phos 3.6, stable   -No phos binder ordered      RECOMMENDATIONS:  - No urgent need for dialysis on 10/16, though may need to consider CVVH if electrolytes become deranged/severe acidosis develops based on fluctuating pressor needs in MICU    -  Patient would need trialysis line placed for access if CVVH becomes required  - Continue to RFP daily  - Renally dose medications   - Renal diet  - Renal multivitamin   - Nephrology will continue to follow      Assessment and plan discussed with Dr. Garza and Dr. Patel. Recommendations not final until signed by attending physician.      ---    Patricia Earl, MS4  10/16/2024   [facility] Nephrology Team  Renal Pager [phone]

## 2024-10-16 NOTE — CARE PLAN
Problem: Safety - Medical Restraint  Goal: Remains free of injury from restraints (Restraint for Interference with Medical Device)  Outcome: Progressing  Flowsheets (Taken 10/16/2024 1926)  Remains free of injury from restraints (restraint for interference with medical device):   Determine that other, less restrictive measures have been tried or would not be effective before applying the restraint   Evaluate the patient's condition at the time of restraint application   Inform patient/family regarding the reason for restraint   Every 2 hours: Monitor safety, psychosocial status, comfort, nutrition and hydration  Goal: Free from restraint(s) (Restraint for Interference with Medical Device)  Outcome: Progressing   The patient's goals for the shift include      The clinical goals for the shift include pt will remain jds throughout shift

## 2024-10-16 NOTE — PROGRESS NOTES
Medical Intensive Care - Daily Progress Note   Subjective    Misael Ritchie is a 69 y.o. year old female patient admitted on 10/3/2024 with following ICU needs: concern for shock, acute hypercapnic respiratory failure requiring intubation    Interval History:  - pt appeared sick, developed lactic acidosis, VBG 7.17/41/38, lactate 15  - sedated and intubated. A-line and C-line placed  - started on pressors (levo and vaso)    Meds    Scheduled medications  acetaminophen, 975 mg, oral, q8h  [Held by provider] allopurinol, 100 mg, oral, Daily  amiodarone, 200 mg, oral, Daily  aspirin, 81 mg, oral, Daily  atorvastatin, 40 mg, oral, Nightly  calcium acetate, 667 mg, oral, TID  dextrose, 25 g, intravenous, Once  EPINEPHrine, , ,   epoetin bridgett or biosimilar, 10,000 Units, intravenous, Once per day on Tuesday Thursday Saturday  etomidate, , ,   hydrocortisone sodium succinate, 50 mg, intravenous, q6h  lactated Ringer's, 250 mL, intravenous, Once  midodrine, 10 mg, oral, TID  [Held by provider] mirtazapine, 7.5 mg, oral, Nightly  naloxone, 0.2 mg, intravenous, Once  norepinephrine in dextrose 5 %, , ,   pantoprazole, 40 mg, oral, Daily before breakfast  phenylephrine HCl in 0.9% NaCl, , ,   piperacillin-tazobactam, 2.25 g, intravenous, q8h  polyethylene glycol, 17 g, oral, q12h  rocuronium, , ,   sennosides, 2 tablet, oral, BID  sodium bicarbonate, , ,   vitamin B complex-vitamin C-folic acid, 1 capsule, oral, Daily      Continuous medications  EPINEPHrine, 0-0.2 mcg/kg/min (Dosing Weight), Last Rate: 0.16 mcg/kg/min (10/16/24 1047)  [Held by provider] heparin, 0-4,000 Units/hr, Last Rate: Stopped (10/15/24 1240)  norepinephrine, 0-0.5 mcg/kg/min (Dosing Weight)      PRN medications  PRN medications: albuterol, bisacodyl, bisacodyl, dextrose, dextrose, EPINEPHrine, etomidate, glucagon, glucagon, heparin, norepinephrine in dextrose 5 %, oxyCODONE, phenylephrine HCl in 0.9% NaCl, rocuronium, sodium bicarbonate  "    Objective    Blood pressure (!) 85/46, pulse 94, temperature 36.4 °C (97.5 °F), temperature source Temporal, resp. rate 18, height 1.549 m (5' 1\"), weight 57.6 kg (126 lb 15.8 oz), SpO2 100%.     Physical Exam  Constitutional:       Interventions: She is intubated.      Comments: Sedated and intubated   HENT:      Head: Normocephalic and atraumatic.   Cardiovascular:      Rate and Rhythm: Tachycardia present.      Heart sounds: Normal heart sounds.   Pulmonary:      Effort: She is intubated.      Breath sounds: Normal breath sounds and air entry.   Abdominal:      General: Abdomen is flat. Bowel sounds are normal.      Palpations: Abdomen is soft.            Intake/Output Summary (Last 24 hours) at 10/16/2024 1132  Last data filed at 10/16/2024 1000  Gross per 24 hour   Intake 2165.93 ml   Output 600 ml   Net 1565.93 ml     Labs:   Results from last 72 hours   Lab Units 10/15/24  2343 10/15/24  1758 10/15/24  1412   SODIUM mmol/L 131* 136  136 125*   POTASSIUM mmol/L 5.9* 3.8  3.8 4.8   CHLORIDE mmol/L 90* 91*  91* 86*   CO2 mmol/L 27 25  25 22   BUN mg/dL 38* 26*  26* 66*   CREATININE mg/dL 3.38* 2.55*  2.55* 5.37*   GLUCOSE mg/dL 158* 104*  104* 83   CALCIUM mg/dL 7.1* 7.7*  7.7* 7.4*   ANION GAP mmol/L 20 24*  24* 22*   EGFR mL/min/1.73m*2 14* 20*  20* 8*   PHOSPHORUS mg/dL 5.5* 4.6 6.4*      Results from last 72 hours   Lab Units 10/15/24  2341 10/15/24  1838 10/15/24  1412 10/15/24  0813 10/14/24  0549   WBC AUTO x10*3/uL 9.3 9.0 12.8*   < > 16.2*   HEMOGLOBIN g/dL 6.9* 7.1* 6.5*   < > 7.5*   HEMATOCRIT % 20.2* 20.2* 17.9*   < > 22.5*   PLATELETS AUTO x10*3/uL 32* 32* 42*   < > 54*   NEUTROS PCT AUTO % 92.8  --  91.8  --  92.0   LYMPHS PCT AUTO % 1.6  --  1.6  --  1.8   MONOS PCT AUTO % 4.0  --  5.1  --  4.8   EOS PCT AUTO % 0.0  --  0.0  --  0.0    < > = values in this interval not displayed.      Results from last 72 hours   Lab Units 10/15/24  4251   POCT PH, ARTERIAL pH 7.26*   POCT PCO2, " ARTERIAL mm Hg 57*   POCT PO2, ARTERIAL mm Hg 29*   POCT SO2, ARTERIAL % 33*     Results from last 72 hours   Lab Units 10/16/24  0315 10/15/24  2343 10/15/24  2027   POCT PH, VENOUS pH 7.35 7.21* 7.38   POCT PCO2, VENOUS mm Hg 45 62* 42   POCT PO2, VENOUS mm Hg 51* 30* 40        Micro/ID:     Lab Results   Component Value Date    BLOODCULT Loaded on Instrument - Culture in progress 10/15/2024       Summary of key imaging results from the last 24 hours  No interval imaging    Assessment and Plan     Assessment:   Misael Ritchie is a 69 y.o. female PMH HFrecEF/ICM ( EF 33->10/2024 EF 50-55%) s/p CRT-P, CAD s/p PCI SHASHI to mLAD 5/2023 and remote PCI to [facility], MR/TR, aortic stenosis s/p TAVR (11/2023), atrial fibrillation, ESRD on hemodialysis T/Th/Sat, adrenal insufficiency who was initially admitted to the MICU on 10/03/24 for emergent dialysis for hyperkalemia 7.4, now transferred back to MICU on 10/15 from floors due to c/f shock. Now sedated and intubated, requiring multiple pressors, lactate up to 15.    Mechanical Ventilation: < 4 days  Sedation/Analgesia:  none, recently intubated, received rocuronium  Restraints: Restraints indicated as alternative therapies have been attempted and have been ineffective.  Restrain with soft wrist restraints and side rails up x4 until medical devices discontinued and/or patient able to participate with plan of care.     Summary for 10/16/24  :  Sedated and intubated  Hydro 50q6  Lactate up to 15  Trop peaked at 3500  Pending CT chest and CTA A/P  Pending TTE      Plan:  NEUROLOGY/PSYCH:  #Sedated and intubated  received humberto during intubation  RASS -2    #Pain s/p fall and R greater trochanteric fracture  Scheduled Tylenol 975 mg Q8H  PRN Oxy 10 mg q6H     CARDIOVASCULAR:  #Shock, undifferentiated  #HFrecEF/ICM ( EF 33->10/2024 EF 50-55%) s/p CRT-P  #CAD s/p PCI SHASHI to mLAD 5/2023 and remote PCI to [facility]  #MR/TR, aortic stenosis s/p TAVR (11/2023)  #atrial fibrillation on home  eliquis s/p DCCV  #pulmonary HTN (group 2, 5) c/b R sided HF  -RHC on 10/11 which showed RAP 3, RVSP 53, PCWP 10, CO/CI 4.61/2.95, PA sat 55%  -TTE 10/4/24: EF 50-55%, severely enlarged RV, severely dilated LA/RA, severe TR, mild MR, prosthetic AV w/ no signs of AR  -pt initially required inotropic support of epinephrine due to severe RHF  -Transferred back to MICU 10/15 w/ uptrending lactates likely 2/2 cardiogenic shock, improved s/p fluids     Plan:  AP: Pt on plavix 75 mg daily at home. Cardiology consulted and agreeable w/ switching to ASA 81 mg to decrease risk ofh bleeding given recent falls  AC: was on HIH drip but ts was held 10/15 due to c/f bleeding from R trochanteric hematoma  Continue home amiodarone, atorvastatin  Hold midodrine 10 mg TID and home metop iso shock, on pressors  S/p 500mL LR bolus overnight  On pressors, levo and vaso  TTE pending  Trop peaked at 3.5k  Pending CTA AP     PULMONARY:  #Acute hypercapnic respiratory failure  #Intubated  #Mixed metabolic acidosis with respiratory alkalosis  - VBG prior to intubation: 7.17/41/38/lact 15, est bicarb 15   - Per Winter's formula: expected CO2 28-32, actual CO2 41   - Inadequate compensation, pt hypercapnic  Plan:  Intubated, maintain adequate ventilation  CXR 10/16: b/l airspace opacities, R>L pleural effusion  CT chest pending    #COPD  Management:  Continue home albuterol PRN  Continue supplemental O2 as needed, wean to goal spo2 88-92%     RENAL/GENITOURINARY:  #ESRD  Management:  Continue with HD, Nephrology following  Hold home torsemide   Hold home allopurinol      GASTROENTEROLOGY:  #Shock, undifferentiated  #Profound lactic acidosis  - Lactate elevated up to 15  Plan:  CTA A/P to evaluate c/f bowel pathologies     ENDOCRINOLOGY:  # Adrenal insufficiency in setting of chronic glucocorticoid use and recent steroid injection to right shoulder resulting treatment resistant hypoglycemia      Management:  Intubated today  Glucose levels low  this AM, requiring multiple amps of D50  D10 drip started  Endocrinology following  Increase to hydro 50q6 stress dose steroids     HEMATOLOGY:  #R greater trochanteric hematoma   #Acute on Chronic anemia (of CKD)  #Thrombocytopenia  -Hgb was 13 on admission (there was likely an aspect of hemoconcentration), but has downtrended over the course of admission, thought to be 2/2 R hip fracture c/b Hematoma that formed at R femur following R trochanteric ORIF 10/7 in addition to resolution of hemoconcentration and gradual downtrend in Hgb from lab draws w/ low marrow function given ESRD status  -Chronically thrombocytopenic, however this has been worsening throughout admission and platelets now 32  Management:  Heparin was held 10/15 due to c/f bleeding, heparin should be restarted if CBC stable  Will continue to check CBC BID until stable to ensure no bleed  EPO w/ dialysis per renal  Hgb 6.6, 1u RBC on 10/16, plt stable 33     SKIN:  AMARJIT     MUSCULOSKELETAL:  #Right troc fracture with hematoma  - S/p Hip Fracture ORIF w/ Nail Trochanteric surgery with ortho 10/7  Management:  -CTM R hip hematoma, trend CBCs as above  - Tylenol 975 Q6H, Oxy 10mg Q6h for severe pain     INFECTIOUS DISEASE:  #Shock  -Possible septic shock  -Leukocytosis, WBC 9.3>16.5  -Lactate uptrending, max at 15  Plan:  Bcx x2 pending  Pt anuric, unable to send UA  C/w vanc-zosyn  Requiring 2 pressors    ICU Check List     FEN  Fluids: small boluses (250 mL x 2)  Electrolytes: on dialysis  Nutrition: NPO for now, OK to resume during the day  Prophylaxis:  DVT ppx: on ASA, holding heparin drip due to c/f bleed, SCDs  GI ppx: PPI  Bowel care: miralax  Hardware:         ETT  7.5 mm (Active)   Placement Date/Time: 10/16/24 1126   ETT Type: ETT - single  Single Lumen Tube Size: 7.5 mm  Location: Oral   Number of days: 0       Social:  Code: Full Code    NOK:  Zeke Ritchie () [phone]   Disposition: ORA Long MD   10/16/24 at  11:32 AM     Disclaimer: Documentation completed with the information available at the time of input. The times in the chart may not be reflective of actual patient care times, interventions, or procedures. Documentation occurs after the physical care of the patient.

## 2024-10-16 NOTE — NURSING NOTE
IV team called to bedside for PIV placement. 20 gauge requested for IV contrast. Patient currently with 20 gauge in left ankle and 20 gauge in right ankle. EJ present on left neck. 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 Left arm +2 pitting edema. No viable veins seen on ultrasound. Unable to visualize any superficial veins. Bedside nurse and residents made aware that patient will need alternative access.

## 2024-10-16 NOTE — NURSING NOTE
Rapid Response Nurse Note: [x] Rapid Response   Pager time: [phone]  Arrival time: 2145  Event end time: 2305   Location: [place] [address] A      Rapid response initiated by:  [] Rapid Response RN [] Family [] Nursing Supervisor [] Physician   [] RADAR auto-page [] Sepsis auto-page [x] RN [] RT   [] NP/PA [] Other:     Primary reason for call:   [] BAT [] New CPAP/BiPAP [] Bleeding [] Change in mental status   [] Chest pain [] Code blue [] FiO2 >/= 50% [] HR </= 40 bpm   [] HR >/= 130 bpm [] Hyperglycemia [] Hypoglycemia [] RADAR    [] RR </= 8 bpm [] RR >/= 30 bpm [] SBP </= 90 mmHg [] SpO2 < 90%   [] Seizure [] Sepsis [x] Staff concern:       Vitals:    10/15/24 1858 10/15/24 2046 10/15/24 2158 10/15/24 2320   BP: 133/72 126/63 124/52 130/55   BP Location: Left arm      Patient Position:       Pulse: 85   75   Resp: 18   10   Temp: 36.1 °C (97 °F) 36.1 °C (97 °F)  35.8 °C (96.4 °F)   TempSrc: Temporal   Temporal   SpO2: 98%      Weight:    56.7 kg (125 lb)   Height:            Providers present at bedside (if applicable): Андрей ([facility]) and Dr Vila ([facility])   Objective/Subjective data relevant to event (if applicable): called to bedside for lethargy.    Interventions:  [] None [] ABG [] Assist w/ICU transfer [] BAT paged    [] Bag mask [] Blood [] Cardioversion [] Code Blue   [] Code blue for intubation [] Code status changed [] Chest x-ray [] EKG   [x] IV fluid/bolus [] KUB x-ray [x] Labs/cultures [x] Medication   [] Nebulizer treatment [] NIPPV (CPAP/BiPAP) [x] Oxygen [] Oral airway   [] Peripheral IV [] Palliative care consult [] CT/MRI [x] Sepsis protocol    [] Suctioned [] Other:     ABG obtained on 5 liters nasal cannula but appears to be venous.    Recent Labs     10/15/24  2212 10/11/24  2250 10/11/24  1840   PHART 7.26* 7.45* 7.47*   LBA8XPC 57* 41 37*   PO2ART 29* 39* 66*   HUF7XQO 25.6 28.5* 26.9*   SO2ART 33* 59* 94   BEART -1.8 4.1* 3.0   LACTATEART 8.2* 1.1 1.4       Name of ICU Provider contacted (if  applicable): Deejay (MICU)  Outcome:  [] Coded and  [] Code blue for intubation [] Coded and transferred to ICU []  on division   [] Remained on division (no change) [] Remained on division + additional monitoring [] Remained in ED [] Transferred to ED   [x] Transferred to ICU [] Transferred to inpatient status [] Transferred for interventions (procedure) [] Transferred to ICU stepdown    [] Transferred to surgery [] Transferred to telemetry [] Sepsis protocol [] STEMI protocol   [] Stroke protocol [] Bedside nurse instructed to page rapid response for any concerns or acute change in condition/VS     Additional Comments: Per RN, pt has been lethargic since coming back from dialysis.  Pt was able to open eyes and follow commands.  Oriented x 4.  Concern for sepsis.  Pt with skin cool to touch.  Unable to obtain a pox.  Very difficult in obtaining blood cultures.  1 set obtained.  Vancomycin and Zosyn ordered.  Zosyn started.  Lactate (8.2).  250 ml normal saline bolus x 2 given.  Dr Villalba notified regarding concerns.  Pt was accepted to MICU.  Pt transferred to MICU on monitored bed.  Bedside report updated once arrived to unit.

## 2024-10-17 ENCOUNTER — APPOINTMENT (OUTPATIENT)
Dept: RADIOLOGY | Facility: HOSPITAL | Age: 69
DRG: 480 | End: 2024-10-17
Payer: MEDICARE

## 2024-10-17 ENCOUNTER — APPOINTMENT (OUTPATIENT)
Dept: CARDIOLOGY | Facility: HOSPITAL | Age: 69
DRG: 480 | End: 2024-10-17
Payer: MEDICARE

## 2024-10-17 LAB
ALBUMIN SERPL BCP-MCNC: 1.7 G/DL (ref 3.4–5)
ALBUMIN SERPL BCP-MCNC: 1.7 G/DL (ref 3.4–5)
ALP SERPL-CCNC: 225 U/L (ref 33–136)
ALT SERPL W P-5'-P-CCNC: 522 U/L (ref 7–45)
ANION GAP BLDA CALCULATED.4IONS-SCNC: 21 MMO/L (ref 10–25)
ANION GAP BLDA CALCULATED.4IONS-SCNC: 23 MMO/L (ref 10–25)
ANION GAP BLDA CALCULATED.4IONS-SCNC: 24 MMO/L (ref 10–25)
ANION GAP BLDA CALCULATED.4IONS-SCNC: 24 MMO/L (ref 10–25)
ANION GAP BLDA CALCULATED.4IONS-SCNC: 27 MMO/L (ref 10–25)
ANION GAP BLDA CALCULATED.4IONS-SCNC: 28 MMO/L (ref 10–25)
ANION GAP BLDA CALCULATED.4IONS-SCNC: 29 MMO/L (ref 10–25)
ANION GAP BLDA CALCULATED.4IONS-SCNC: 30 MMO/L (ref 10–25)
ANION GAP BLDA CALCULATED.4IONS-SCNC: 30 MMO/L (ref 10–25)
ANION GAP BLDA CALCULATED.4IONS-SCNC: 31 MMO/L (ref 10–25)
ANION GAP BLDMV CALCULATED.4IONS-SCNC: 25 MMO/L (ref 10–25)
ANION GAP SERPL CALC-SCNC: 28 MMOL/L (ref 10–20)
ANION GAP SERPL CALC-SCNC: 31 MMOL/L (ref 10–20)
APTT PPP: 68 SECONDS (ref 27–38)
AST SERPL W P-5'-P-CCNC: 990 U/L (ref 9–39)
BASE EXCESS BLDA CALC-SCNC: -11.6 MMOL/L (ref -2–3)
BASE EXCESS BLDA CALC-SCNC: -16.5 MMOL/L (ref -2–3)
BASE EXCESS BLDA CALC-SCNC: -19.5 MMOL/L (ref -2–3)
BASE EXCESS BLDA CALC-SCNC: -20.4 MMOL/L (ref -2–3)
BASE EXCESS BLDA CALC-SCNC: -21.3 MMOL/L (ref -2–3)
BASE EXCESS BLDA CALC-SCNC: -21.9 MMOL/L (ref -2–3)
BASE EXCESS BLDA CALC-SCNC: -6.9 MMOL/L (ref -2–3)
BASE EXCESS BLDA CALC-SCNC: -7.4 MMOL/L (ref -2–3)
BASE EXCESS BLDA CALC-SCNC: -8.5 MMOL/L (ref -2–3)
BASE EXCESS BLDA CALC-SCNC: -8.6 MMOL/L (ref -2–3)
BASE EXCESS BLDMV CALC-SCNC: -17.6 MMOL/L (ref -2–3)
BASO STIPL BLD QL SMEAR: PRESENT
BASO STIPL BLD QL SMEAR: PRESENT
BASOPHILS # BLD MANUAL: 0 X10*3/UL (ref 0–0.1)
BASOPHILS # BLD MANUAL: 0 X10*3/UL (ref 0–0.1)
BASOPHILS NFR BLD MANUAL: 0 %
BASOPHILS NFR BLD MANUAL: 0 %
BILIRUB DIRECT SERPL-MCNC: 3.7 MG/DL (ref 0–0.3)
BILIRUB SERPL-MCNC: 5.3 MG/DL (ref 0–1.2)
BODY SURFACE AREA: 1.57 M2
BODY TEMPERATURE: 37 DEGREES CELSIUS
BUN SERPL-MCNC: 34 MG/DL (ref 6–23)
BUN SERPL-MCNC: 45 MG/DL (ref 6–23)
BURR CELLS BLD QL SMEAR: ABNORMAL
BURR CELLS BLD QL SMEAR: ABNORMAL
CA-I BLDA-SCNC: 0.82 MMOL/L (ref 1.1–1.33)
CA-I BLDA-SCNC: 0.89 MMOL/L (ref 1.1–1.33)
CA-I BLDA-SCNC: 0.92 MMOL/L (ref 1.1–1.33)
CA-I BLDA-SCNC: 0.93 MMOL/L (ref 1.1–1.33)
CA-I BLDA-SCNC: 0.94 MMOL/L (ref 1.1–1.33)
CA-I BLDA-SCNC: 0.94 MMOL/L (ref 1.1–1.33)
CA-I BLDA-SCNC: 0.95 MMOL/L (ref 1.1–1.33)
CA-I BLDA-SCNC: 0.98 MMOL/L (ref 1.1–1.33)
CA-I BLDA-SCNC: 1.02 MMOL/L (ref 1.1–1.33)
CA-I BLDA-SCNC: 1.14 MMOL/L (ref 1.1–1.33)
CA-I BLDMV-SCNC: 1.1 MMOL/L (ref 1.1–1.33)
CALCIUM SERPL-MCNC: 6.9 MG/DL (ref 8.6–10.6)
CALCIUM SERPL-MCNC: 9.3 MG/DL (ref 8.6–10.6)
CHLORIDE BLD-SCNC: 91 MMOL/L (ref 98–107)
CHLORIDE BLDA-SCNC: 88 MMOL/L (ref 98–107)
CHLORIDE BLDA-SCNC: 90 MMOL/L (ref 98–107)
CHLORIDE BLDA-SCNC: 91 MMOL/L (ref 98–107)
CHLORIDE BLDA-SCNC: 92 MMOL/L (ref 98–107)
CHLORIDE BLDA-SCNC: 93 MMOL/L (ref 98–107)
CHLORIDE BLDA-SCNC: 94 MMOL/L (ref 98–107)
CHLORIDE SERPL-SCNC: 88 MMOL/L (ref 98–107)
CHLORIDE SERPL-SCNC: 89 MMOL/L (ref 98–107)
CO2 SERPL-SCNC: 14 MMOL/L (ref 21–32)
CO2 SERPL-SCNC: 19 MMOL/L (ref 21–32)
CREAT SERPL-MCNC: 2.87 MG/DL (ref 0.5–1.05)
CREAT SERPL-MCNC: 3.88 MG/DL (ref 0.5–1.05)
EGFRCR SERPLBLD CKD-EPI 2021: 12 ML/MIN/1.73M*2
EGFRCR SERPLBLD CKD-EPI 2021: 17 ML/MIN/1.73M*2
EJECTION FRACTION APICAL 4 CHAMBER: 61.9
EJECTION FRACTION: 57 %
EOSINOPHIL # BLD MANUAL: 0 X10*3/UL (ref 0–0.7)
EOSINOPHIL # BLD MANUAL: 0.16 X10*3/UL (ref 0–0.7)
EOSINOPHIL NFR BLD MANUAL: 0 %
EOSINOPHIL NFR BLD MANUAL: 0.8 %
ERYTHROCYTE [DISTWIDTH] IN BLOOD BY AUTOMATED COUNT: 20 % (ref 11.5–14.5)
ERYTHROCYTE [DISTWIDTH] IN BLOOD BY AUTOMATED COUNT: 22.4 % (ref 11.5–14.5)
GLUCOSE BLD MANUAL STRIP-MCNC: 103 MG/DL (ref 74–99)
GLUCOSE BLD MANUAL STRIP-MCNC: 109 MG/DL (ref 74–99)
GLUCOSE BLD MANUAL STRIP-MCNC: 110 MG/DL (ref 74–99)
GLUCOSE BLD MANUAL STRIP-MCNC: 136 MG/DL (ref 74–99)
GLUCOSE BLD MANUAL STRIP-MCNC: 169 MG/DL (ref 74–99)
GLUCOSE BLD MANUAL STRIP-MCNC: 175 MG/DL (ref 74–99)
GLUCOSE BLD MANUAL STRIP-MCNC: 220 MG/DL (ref 74–99)
GLUCOSE BLD MANUAL STRIP-MCNC: 35 MG/DL (ref 74–99)
GLUCOSE BLD MANUAL STRIP-MCNC: 92 MG/DL (ref 74–99)
GLUCOSE BLD MANUAL STRIP-MCNC: 96 MG/DL (ref 74–99)
GLUCOSE BLD-MCNC: 137 MG/DL (ref 74–99)
GLUCOSE BLDA-MCNC: 120 MG/DL (ref 74–99)
GLUCOSE BLDA-MCNC: 174 MG/DL (ref 74–99)
GLUCOSE BLDA-MCNC: 177 MG/DL (ref 74–99)
GLUCOSE BLDA-MCNC: 180 MG/DL (ref 74–99)
GLUCOSE BLDA-MCNC: 186 MG/DL (ref 74–99)
GLUCOSE BLDA-MCNC: 202 MG/DL (ref 74–99)
GLUCOSE BLDA-MCNC: 96 MG/DL (ref 74–99)
GLUCOSE BLDA-MCNC: 97 MG/DL (ref 74–99)
GLUCOSE BLDA-MCNC: 97 MG/DL (ref 74–99)
GLUCOSE BLDA-MCNC: ABNORMAL MG/DL
GLUCOSE SERPL-MCNC: 113 MG/DL (ref 74–99)
GLUCOSE SERPL-MCNC: 194 MG/DL (ref 74–99)
HCO3 BLDA-SCNC: 10.6 MMOL/L (ref 22–26)
HCO3 BLDA-SCNC: 14.2 MMOL/L (ref 22–26)
HCO3 BLDA-SCNC: 14.6 MMOL/L (ref 22–26)
HCO3 BLDA-SCNC: 15.8 MMOL/L (ref 22–26)
HCO3 BLDA-SCNC: 15.8 MMOL/L (ref 22–26)
HCO3 BLDA-SCNC: 17.2 MMOL/L (ref 22–26)
HCO3 BLDA-SCNC: 5.7 MMOL/L (ref 22–26)
HCO3 BLDA-SCNC: 5.8 MMOL/L (ref 22–26)
HCO3 BLDA-SCNC: 7.2 MMOL/L (ref 22–26)
HCO3 BLDA-SCNC: 8 MMOL/L (ref 22–26)
HCO3 BLDMV-SCNC: 13.3 MMOL/L (ref 22–26)
HCT VFR BLD AUTO: 23.3 % (ref 36–46)
HCT VFR BLD AUTO: 24.6 % (ref 36–46)
HCT VFR BLD EST: 20 % (ref 36–46)
HCT VFR BLD EST: 25 % (ref 36–46)
HCT VFR BLD EST: 26 % (ref 36–46)
HCT VFR BLD EST: 27 % (ref 36–46)
HCT VFR BLD EST: 27 % (ref 36–46)
HCT VFR BLD EST: 28 % (ref 36–46)
HCT VFR BLD EST: 29 % (ref 36–46)
HCT VFR BLD EST: 29 % (ref 36–46)
HCT VFR BLD EST: 33 % (ref 36–46)
HGB BLD-MCNC: 7.7 G/DL (ref 12–16)
HGB BLD-MCNC: 8.3 G/DL (ref 12–16)
HGB BLDA-MCNC: 11 G/DL (ref 12–16)
HGB BLDA-MCNC: 6.7 G/DL (ref 12–16)
HGB BLDA-MCNC: 8.4 G/DL (ref 12–16)
HGB BLDA-MCNC: 8.4 G/DL (ref 12–16)
HGB BLDA-MCNC: 8.7 G/DL (ref 12–16)
HGB BLDA-MCNC: 8.9 G/DL (ref 12–16)
HGB BLDA-MCNC: 9 G/DL (ref 12–16)
HGB BLDA-MCNC: 9.3 G/DL (ref 12–16)
HGB BLDA-MCNC: 9.6 G/DL (ref 12–16)
HGB BLDA-MCNC: 9.7 G/DL (ref 12–16)
HGB BLDMV-MCNC: 8.2 G/DL (ref 12–16)
IMM GRANULOCYTES # BLD AUTO: 0.15 X10*3/UL (ref 0–0.7)
IMM GRANULOCYTES # BLD AUTO: 1.67 X10*3/UL (ref 0–0.7)
IMM GRANULOCYTES NFR BLD AUTO: 0.9 % (ref 0–0.9)
IMM GRANULOCYTES NFR BLD AUTO: 8.2 % (ref 0–0.9)
INHALED O2 CONCENTRATION: 3 %
INHALED O2 CONCENTRATION: 30 %
INHALED O2 CONCENTRATION: 40 %
INHALED O2 CONCENTRATION: 40 %
INR PPP: 2.2 (ref 0.9–1.1)
LACTATE BLDA-SCNC: 11.8 MMOL/L (ref 0.4–2)
LACTATE BLDA-SCNC: 13.7 MMOL/L (ref 0.4–2)
LACTATE BLDA-SCNC: 16.5 MMOL/L (ref 0.4–2)
LACTATE BLDA-SCNC: 17 MMOL/L (ref 0.4–2)
LACTATE BLDA-SCNC: 9.2 MMOL/L (ref 0.4–2)
LACTATE BLDA-SCNC: 9.3 MMOL/L (ref 0.4–2)
LACTATE BLDA-SCNC: 9.4 MMOL/L (ref 0.4–2)
LACTATE BLDA-SCNC: 9.5 MMOL/L (ref 0.4–2)
LACTATE BLDA-SCNC: >17 MMOL/L (ref 0.4–2)
LACTATE BLDA-SCNC: >17 MMOL/L (ref 0.4–2)
LACTATE BLDMV-SCNC: 16.9 MMOL/L (ref 0.4–2)
LEFT ATRIUM VOLUME AREA LENGTH INDEX BSA: 46.9 ML/M2
LEFT VENTRICLE INTERNAL DIMENSION DIASTOLE: 3.6 CM (ref 3.5–6)
LYMPHOCYTES # BLD MANUAL: 0.26 X10*3/UL (ref 1.2–4.8)
LYMPHOCYTES # BLD MANUAL: 1.45 X10*3/UL (ref 1.2–4.8)
LYMPHOCYTES NFR BLD MANUAL: 1.6 %
LYMPHOCYTES NFR BLD MANUAL: 7.1 %
MAGNESIUM SERPL-MCNC: 1.98 MG/DL (ref 1.6–2.4)
MAGNESIUM SERPL-MCNC: 2.24 MG/DL (ref 1.6–2.4)
MCH RBC QN AUTO: 30.2 PG (ref 26–34)
MCH RBC QN AUTO: 30.7 PG (ref 26–34)
MCHC RBC AUTO-ENTMCNC: 31.3 G/DL (ref 32–36)
MCHC RBC AUTO-ENTMCNC: 35.6 G/DL (ref 32–36)
MCV RBC AUTO: 86 FL (ref 80–100)
MCV RBC AUTO: 97 FL (ref 80–100)
METAMYELOCYTES # BLD MANUAL: 0.33 X10*3/UL
METAMYELOCYTES # BLD MANUAL: 0.64 X10*3/UL
METAMYELOCYTES NFR BLD MANUAL: 1.6 %
METAMYELOCYTES NFR BLD MANUAL: 3.9 %
MITRAL VALVE E/A RATIO: 1.43
MONOCYTES # BLD MANUAL: 0.13 X10*3/UL (ref 0.1–1)
MONOCYTES # BLD MANUAL: 1.12 X10*3/UL (ref 0.1–1)
MONOCYTES NFR BLD MANUAL: 0.8 %
MONOCYTES NFR BLD MANUAL: 5.5 %
MYELOCYTES # BLD MANUAL: 0.13 X10*3/UL
MYELOCYTES # BLD MANUAL: 0.16 X10*3/UL
MYELOCYTES NFR BLD MANUAL: 0.8 %
MYELOCYTES NFR BLD MANUAL: 0.8 %
NEUTROPHILS # BLD MANUAL: 15.11 X10*3/UL (ref 1.2–7.7)
NEUTROPHILS # BLD MANUAL: 16.53 X10*3/UL (ref 1.2–7.7)
NEUTS BAND # BLD MANUAL: 0.82 X10*3/UL (ref 0–0.7)
NEUTS BAND # BLD MANUAL: 4.43 X10*3/UL (ref 0–0.7)
NEUTS BAND NFR BLD MANUAL: 27 %
NEUTS BAND NFR BLD MANUAL: 4 %
NEUTS SEG # BLD MANUAL: 10.68 X10*3/UL (ref 1.2–7)
NEUTS SEG # BLD MANUAL: 15.71 X10*3/UL (ref 1.2–7)
NEUTS SEG NFR BLD MANUAL: 65.1 %
NEUTS SEG NFR BLD MANUAL: 77 %
NRBC BLD-RTO: 17 /100 WBCS (ref 0–0)
NRBC BLD-RTO: 4 /100 WBCS (ref 0–0)
OXYHGB MFR BLDA: 94.6 % (ref 94–98)
OXYHGB MFR BLDA: 94.8 % (ref 94–98)
OXYHGB MFR BLDA: 94.8 % (ref 94–98)
OXYHGB MFR BLDA: 95.2 % (ref 94–98)
OXYHGB MFR BLDA: 95.3 % (ref 94–98)
OXYHGB MFR BLDA: 96.5 % (ref 94–98)
OXYHGB MFR BLDA: 96.6 % (ref 94–98)
OXYHGB MFR BLDA: 96.7 % (ref 94–98)
OXYHGB MFR BLDA: 96.9 % (ref 94–98)
OXYHGB MFR BLDA: 97 % (ref 94–98)
OXYHGB MFR BLDMV: 27.2 % (ref 45–75)
PCO2 BLDA: 17 MM HG (ref 38–42)
PCO2 BLDA: 18 MM HG (ref 38–42)
PCO2 BLDA: 22 MM HG (ref 38–42)
PCO2 BLDA: 22 MM HG (ref 38–42)
PCO2 BLDA: 24 MM HG (ref 38–42)
PCO2 BLDA: 25 MM HG (ref 38–42)
PCO2 BLDA: 28 MM HG (ref 38–42)
PCO2 BLDA: 29 MM HG (ref 38–42)
PCO2 BLDA: 29 MM HG (ref 38–42)
PCO2 BLDA: 31 MM HG (ref 38–42)
PCO2 BLDMV: 58 MM HG (ref 41–51)
PH BLDA: 7.11 PH (ref 7.38–7.42)
PH BLDA: 7.12 PH (ref 7.38–7.42)
PH BLDA: 7.13 PH (ref 7.38–7.42)
PH BLDA: 7.14 PH (ref 7.38–7.42)
PH BLDA: 7.17 PH (ref 7.38–7.42)
PH BLDA: 7.27 PH (ref 7.38–7.42)
PH BLDA: 7.36 PH (ref 7.38–7.42)
PH BLDA: 7.38 PH (ref 7.38–7.42)
PH BLDA: 7.41 PH (ref 7.38–7.42)
PH BLDA: 7.43 PH (ref 7.38–7.42)
PH BLDMV: 6.97 PH (ref 7.33–7.43)
PHOSPHATE SERPL-MCNC: 5.9 MG/DL (ref 2.5–4.9)
PHOSPHATE SERPL-MCNC: 6.7 MG/DL (ref 2.5–4.9)
PLATELET # BLD AUTO: 16 X10*3/UL (ref 150–450)
PLATELET # BLD AUTO: 23 X10*3/UL (ref 150–450)
PO2 BLDA: 114 MM HG (ref 85–95)
PO2 BLDA: 127 MM HG (ref 85–95)
PO2 BLDA: 131 MM HG (ref 85–95)
PO2 BLDA: 140 MM HG (ref 85–95)
PO2 BLDA: 157 MM HG (ref 85–95)
PO2 BLDA: 162 MM HG (ref 85–95)
PO2 BLDA: 91 MM HG (ref 85–95)
PO2 BLDA: 93 MM HG (ref 85–95)
PO2 BLDA: 93 MM HG (ref 85–95)
PO2 BLDA: 95 MM HG (ref 85–95)
PO2 BLDMV: 30 MM HG (ref 35–45)
POLYCHROMASIA BLD QL SMEAR: ABNORMAL
POTASSIUM BLDA-SCNC: 4.2 MMOL/L (ref 3.5–5.3)
POTASSIUM BLDA-SCNC: 4.5 MMOL/L (ref 3.5–5.3)
POTASSIUM BLDA-SCNC: 5.1 MMOL/L (ref 3.5–5.3)
POTASSIUM BLDA-SCNC: 5.7 MMOL/L (ref 3.5–5.3)
POTASSIUM BLDA-SCNC: 6 MMOL/L (ref 3.5–5.3)
POTASSIUM BLDA-SCNC: 6.1 MMOL/L (ref 3.5–5.3)
POTASSIUM BLDA-SCNC: 6.4 MMOL/L (ref 3.5–5.3)
POTASSIUM BLDA-SCNC: 6.6 MMOL/L (ref 3.5–5.3)
POTASSIUM BLDA-SCNC: 6.8 MMOL/L (ref 3.5–5.3)
POTASSIUM BLDA-SCNC: 6.9 MMOL/L (ref 3.5–5.3)
POTASSIUM BLDMV-SCNC: 6.6 MMOL/L (ref 3.5–5.3)
POTASSIUM SERPL-SCNC: 4.5 MMOL/L (ref 3.5–5.3)
POTASSIUM SERPL-SCNC: 5.7 MMOL/L (ref 3.5–5.3)
PROMYELOCYTES # BLD MANUAL: 0.13 X10*3/UL
PROMYELOCYTES # BLD MANUAL: 0.49 X10*3/UL
PROMYELOCYTES NFR BLD MANUAL: 0.8 %
PROMYELOCYTES NFR BLD MANUAL: 2.4 %
PROT SERPL-MCNC: 3.9 G/DL (ref 6.4–8.2)
PROTHROMBIN TIME: 25.3 SECONDS (ref 9.8–12.8)
RBC # BLD AUTO: 2.55 X10*6/UL (ref 4–5.2)
RBC # BLD AUTO: 2.7 X10*6/UL (ref 4–5.2)
RBC MORPH BLD: ABNORMAL
RBC MORPH BLD: ABNORMAL
RIGHT VENTRICLE FREE WALL PEAK S': 6.2 CM/S
RIGHT VENTRICLE PEAK SYSTOLIC PRESSURE: 59 MMHG
SAO2 % BLDA: 100 % (ref 94–100)
SAO2 % BLDA: 98 % (ref 94–100)
SAO2 % BLDA: 99 % (ref 94–100)
SAO2 % BLDMV: 28 % (ref 45–75)
SODIUM BLDA-SCNC: 121 MMOL/L (ref 136–145)
SODIUM BLDA-SCNC: 122 MMOL/L (ref 136–145)
SODIUM BLDA-SCNC: 122 MMOL/L (ref 136–145)
SODIUM BLDA-SCNC: 123 MMOL/L (ref 136–145)
SODIUM BLDA-SCNC: 124 MMOL/L (ref 136–145)
SODIUM BLDA-SCNC: 125 MMOL/L (ref 136–145)
SODIUM BLDA-SCNC: 126 MMOL/L (ref 136–145)
SODIUM BLDA-SCNC: 127 MMOL/L (ref 136–145)
SODIUM BLDMV-SCNC: 123 MMOL/L (ref 136–145)
SODIUM SERPL-SCNC: 128 MMOL/L (ref 136–145)
SODIUM SERPL-SCNC: 130 MMOL/L (ref 136–145)
TOTAL CELLS COUNTED BLD: 126
TOTAL CELLS COUNTED BLD: 126
TRICUSPID ANNULAR PLANE SYSTOLIC EXCURSION: 0.9 CM
UFH PPP CHRO-ACNC: <0.1 IU/ML
VARIANT LYMPHS # BLD MANUAL: 0.16 X10*3/UL (ref 0–0.5)
VARIANT LYMPHS NFR BLD: 0.8 %
WBC # BLD AUTO: 16.4 X10*3/UL (ref 4.4–11.3)
WBC # BLD AUTO: 20.4 X10*3/UL (ref 4.4–11.3)

## 2024-10-17 PROCEDURE — 37799 UNLISTED PX VASCULAR SURGERY: CPT | Performed by: STUDENT IN AN ORGANIZED HEALTH CARE EDUCATION/TRAINING PROGRAM

## 2024-10-17 PROCEDURE — 76705 ECHO EXAM OF ABDOMEN: CPT | Performed by: RADIOLOGY

## 2024-10-17 PROCEDURE — 2500000004 HC RX 250 GENERAL PHARMACY W/ HCPCS (ALT 636 FOR OP/ED)

## 2024-10-17 PROCEDURE — 2500000002 HC RX 250 W HCPCS SELF ADMINISTERED DRUGS (ALT 637 FOR MEDICARE OP, ALT 636 FOR OP/ED)

## 2024-10-17 PROCEDURE — 2500000001 HC RX 250 WO HCPCS SELF ADMINISTERED DRUGS (ALT 637 FOR MEDICARE OP)

## 2024-10-17 PROCEDURE — 84132 ASSAY OF SERUM POTASSIUM: CPT

## 2024-10-17 PROCEDURE — 93321 DOPPLER ECHO F-UP/LMTD STD: CPT

## 2024-10-17 PROCEDURE — 85007 BL SMEAR W/DIFF WBC COUNT: CPT

## 2024-10-17 PROCEDURE — 2500000005 HC RX 250 GENERAL PHARMACY W/O HCPCS

## 2024-10-17 PROCEDURE — 85027 COMPLETE CBC AUTOMATED: CPT

## 2024-10-17 PROCEDURE — 93325 DOPPLER ECHO COLOR FLOW MAPG: CPT | Performed by: INTERNAL MEDICINE

## 2024-10-17 PROCEDURE — 37799 UNLISTED PX VASCULAR SURGERY: CPT

## 2024-10-17 PROCEDURE — 90937 HEMODIALYSIS REPEATED EVAL: CPT

## 2024-10-17 PROCEDURE — P9045 ALBUMIN (HUMAN), 5%, 250 ML: HCPCS | Mod: JZ

## 2024-10-17 PROCEDURE — 2500000004 HC RX 250 GENERAL PHARMACY W/ HCPCS (ALT 636 FOR OP/ED): Mod: JZ

## 2024-10-17 PROCEDURE — 82947 ASSAY GLUCOSE BLOOD QUANT: CPT

## 2024-10-17 PROCEDURE — 83735 ASSAY OF MAGNESIUM: CPT

## 2024-10-17 PROCEDURE — 76705 ECHO EXAM OF ABDOMEN: CPT

## 2024-10-17 PROCEDURE — 85610 PROTHROMBIN TIME: CPT

## 2024-10-17 PROCEDURE — 93321 DOPPLER ECHO F-UP/LMTD STD: CPT | Performed by: INTERNAL MEDICINE

## 2024-10-17 PROCEDURE — 84100 ASSAY OF PHOSPHORUS: CPT

## 2024-10-17 PROCEDURE — 85520 HEPARIN ASSAY: CPT

## 2024-10-17 PROCEDURE — 82248 BILIRUBIN DIRECT: CPT

## 2024-10-17 PROCEDURE — 93308 TTE F-UP OR LMTD: CPT | Performed by: INTERNAL MEDICINE

## 2024-10-17 PROCEDURE — 84132 ASSAY OF SERUM POTASSIUM: CPT | Performed by: STUDENT IN AN ORGANIZED HEALTH CARE EDUCATION/TRAINING PROGRAM

## 2024-10-17 PROCEDURE — 94003 VENT MGMT INPAT SUBQ DAY: CPT

## 2024-10-17 PROCEDURE — 99233 SBSQ HOSP IP/OBS HIGH 50: CPT | Performed by: INTERNAL MEDICINE

## 2024-10-17 PROCEDURE — 2020000001 HC ICU ROOM DAILY

## 2024-10-17 PROCEDURE — 99291 CRITICAL CARE FIRST HOUR: CPT

## 2024-10-17 PROCEDURE — 6350000001 HC RX 635 EPOETIN >10,000 UNITS: Mod: JZ

## 2024-10-17 PROCEDURE — 99231 SBSQ HOSP IP/OBS SF/LOW 25: CPT | Performed by: SURGERY

## 2024-10-17 PROCEDURE — 83735 ASSAY OF MAGNESIUM: CPT | Performed by: STUDENT IN AN ORGANIZED HEALTH CARE EDUCATION/TRAINING PROGRAM

## 2024-10-17 RX ORDER — INDOMETHACIN 25 MG/1
CAPSULE ORAL
Status: COMPLETED
Start: 2024-10-17 | End: 2024-10-17

## 2024-10-17 RX ORDER — MILRINONE LACTATE 0.2 MG/ML
0.12 INJECTION, SOLUTION INTRAVENOUS CONTINUOUS
Status: DISCONTINUED | OUTPATIENT
Start: 2024-10-17 | End: 2024-10-18 | Stop reason: HOSPADM

## 2024-10-17 RX ORDER — MEROPENEM AND SODIUM CHLORIDE 1 G/50ML
1000 INJECTION, SOLUTION INTRAVENOUS DAILY
Status: DISCONTINUED | OUTPATIENT
Start: 2024-10-17 | End: 2024-10-17

## 2024-10-17 RX ORDER — INDOMETHACIN 25 MG/1
50 CAPSULE ORAL ONCE
Status: DISCONTINUED | OUTPATIENT
Start: 2024-10-17 | End: 2024-10-17

## 2024-10-17 RX ORDER — ALBUMIN HUMAN 50 G/1000ML
12.5 SOLUTION INTRAVENOUS ONCE
Status: COMPLETED | OUTPATIENT
Start: 2024-10-17 | End: 2024-10-18

## 2024-10-17 RX ORDER — MEROPENEM AND SODIUM CHLORIDE 1 G/50ML
1000 INJECTION, SOLUTION INTRAVENOUS EVERY 12 HOURS
Status: DISCONTINUED | OUTPATIENT
Start: 2024-10-17 | End: 2024-10-18 | Stop reason: HOSPADM

## 2024-10-17 RX ORDER — EPINEPHRINE HCL IN DEXTROSE 5% 4MG/250ML
0-1 PLASTIC BAG, INJECTION (ML) INTRAVENOUS CONTINUOUS
Status: DISCONTINUED | OUTPATIENT
Start: 2024-10-17 | End: 2024-10-17

## 2024-10-17 RX ORDER — INDOMETHACIN 25 MG/1
50 CAPSULE ORAL ONCE
Status: COMPLETED | OUTPATIENT
Start: 2024-10-17 | End: 2024-10-17

## 2024-10-17 RX ORDER — CALCIUM GLUCONATE 20 MG/ML
2 INJECTION, SOLUTION INTRAVENOUS ONCE
Status: COMPLETED | OUTPATIENT
Start: 2024-10-17 | End: 2024-10-17

## 2024-10-17 RX ORDER — SENNOSIDES 8.6 MG/1
2 TABLET ORAL 2 TIMES DAILY
Status: DISCONTINUED | OUTPATIENT
Start: 2024-10-17 | End: 2024-10-18 | Stop reason: HOSPADM

## 2024-10-17 ASSESSMENT — PAIN - FUNCTIONAL ASSESSMENT
PAIN_FUNCTIONAL_ASSESSMENT: CPOT (CRITICAL CARE PAIN OBSERVATION TOOL)

## 2024-10-17 ASSESSMENT — PAIN SCALES - GENERAL
PAINLEVEL_OUTOF10: 0 - NO PAIN
PAINLEVEL_OUTOF10: 0 - NO PAIN

## 2024-10-17 NOTE — PROGRESS NOTES
Communication Note    Patient Name: Misael Ritchie  MRN: [id number]  Today's Date: 10/17/2024   Room: 20/20-A    Discipline: Physical Therapy      Missed Visit: Yes  Missed Visit Reason:  (PT re-evaluatoin reviewed, patient with increased pressor support pending further medical workup.  Will follow up as apropriate for re-evaluation.)      10/17/24 at 8:49 AM   Mj Lemon, PT   Rehab Office: [phone]

## 2024-10-17 NOTE — H&P
[facility]  ACUTE CARE SURGERY - CONSULT    Patient Name: Misael Ritchie  MRN: [id number]  Admit Date: 1003  : 1955  AGE: 69 y.o.   GENDER: female  ==============================================================================  TODAY'S ASSESSMENT AND PLAN OF CARE:  Undifferentiated shock resulting in decreased perfusion of distal transverse colon, no signs of overt bowel ischemia or necrosis  - continue resuscitation  - continue trending lactate  - serial abdominal exams    Patient discussed with attending Dr. Hernandez    Patient hypotension with concern for right sided heart failure. We suspect the elevated lactate might be secondary to low flow state and hypovolemia.   -Recommend resuscitation  -Serial abdominal exams and serial lactate  -No acute surgical intervention at this time      ==============================================================================  CHIEF COMPLAINT/REASON FOR CONSULT:  Concern for bowel ischemia    HPI: history obtained via chart review as patient is intubated and unable to answer questions    She is a 69yoF with extensive cardiac history, ESRD on HD (s/p 2 failed renal transplants), Afib on Eliquis, adrenal insufficiency that presented to the hospital on 10/3 from home after she was dizzy and fell on her right side. She has had a prolonged course in the hospital including OR with orthopedic surgery on 10/7 (ORIF for right greater trochanteric fracture c/b hematoma), right heart cath on 10/11 (RVSP 53, PCWP 10), previous MICU stay with pressor requirement with transfer to the floor 10/13. She was transferred back to the MICU the evening of 10/15 when she was found to be lethargic after HD with uptrending lactate. Due to her mental status and concern for worsening acidosis she was transferred back to the MICU. She is currently on vaso 0.03 and levo 0.2. She is receiving Zosyn, hydrocortisone. Her NG has put out close to a liter of  gastric contents, nonbloody in appearance.    She shakes her head no when asked if she has abdominal pain.     PAST MEDICAL HISTORY:   PMH: aortic stenosis (s/p TAVR), atrial fibrillation, CAD, CKD, ESRD on hemodialysis, CHF (last echo 10/2024 EF 50-55%),   PSH: ORIF R intertrochanteric fracture, unknown  Unknown other history    PHYSICAL EXAM:  Neurological: intubated, sedated but arouses easily  Head: NCAT  Respiratory/Thorax: venilated, bilateral chest rise  Gastrointestinal: soft, ND, tender in RUQ and RLQ but nontender on left side  Skin: warm, dry    IMAGING SUMMARY:  (summary of findings, not a copy of dictation)  Ctangio abdomen/pelvis: mild distention of distal transverse and descending colon with wall thickening and adjacent fat stranding concerning for early ischemia vs colitis    LABS:  Results from last 7 days   Lab Units 10/16/24  2204 10/16/24  0826 10/15/24  2341 10/15/24  1838 10/15/24  1412 10/15/24  0813 10/14/24  0549   WBC AUTO x10*3/uL 16.1* 16.5* 9.3   < > 12.8*   < > 16.2*   HEMOGLOBIN g/dL 9.2* 6.6* 6.9*   < > 6.5*   < > 7.5*   HEMATOCRIT % 26.3* 19.7* 20.2*   < > 17.9*   < > 22.5*   PLATELETS AUTO x10*3/uL 28* 33* 32*   < > 42*   < > 54*   NEUTROS PCT AUTO %  --   --  92.8  --  91.8  --  92.0   LYMPHO PCT MAN % 0.0 3.0  --   --   --   --   --    LYMPHS PCT AUTO %  --   --  1.6  --  1.6  --  1.8   MONO PCT MAN % 5.4 4.0  --   --   --   --   --    MONOS PCT AUTO %  --   --  4.0  --  5.1  --  4.8   EOSINO PCT MAN % 0.0 14.0  --   --   --   --   --    EOS PCT AUTO %  --   --  0.0  --  0.0  --  0.0    < > = values in this interval not displayed.     Results from last 7 days   Lab Units 10/16/24  2204 10/15/24  2343 10/10/24  0400   APTT seconds 38 32 54*   INR  1.8* 1.3*  --      Results from last 7 days   Lab Units 10/16/24  2204 10/16/24  0826 10/15/24  2343 10/13/24  0440 10/12/24  0500   SODIUM mmol/L 132* 131* 131*   < > 133*   POTASSIUM mmol/L 4.6 4.9 5.9*   < > 3.7   CHLORIDE mmol/L 88*  90* 90*   < > 94*   CO2 mmol/L 16* 18* 27   < > 27   BUN mg/dL 43* 36* 38*   < > 21   CREATININE mg/dL 3.86* 3.54* 3.38*   < > 2.40*   CALCIUM mg/dL 7.3* 7.6* 7.1*   < > 8.0*   PROTEIN TOTAL g/dL 4.3*  --  4.8*  4.8*  --  5.1*   BILIRUBIN TOTAL mg/dL 5.3*  --  3.7*  3.7*  --  3.1*   ALK PHOS U/L 252*  --  209*  209*  --  154*   ALT U/L 624*  --  593*  593*  --  11   AST U/L 1,277*  --  1,775*  1,775*  --  50*   GLUCOSE mg/dL 167* 224* 158*   < > 116*    < > = values in this interval not displayed.     Results from last 7 days   Lab Units 10/16/24  2204 10/15/24  2343 10/12/24  0500 10/11/24  0611   BILIRUBIN TOTAL mg/dL 5.3* 3.7*  3.7* 3.1* 2.3*   BILIRUBIN DIRECT mg/dL 3.7*  --  2.1* 1.5*     Results from last 7 days   Lab Units 10/16/24  2205 10/16/24  1639 10/16/24  1059   POCT PH, ARTERIAL pH 7.42 7.32* 7.21*   POCT PCO2, ARTERIAL mm Hg 22* 27* 31*   POCT PO2, ARTERIAL mm Hg 153* 84* 130*   POCT HCO3 CALCULATED, ARTERIAL mmol/L 14.3* 13.9* 12.4*   POCT BASE EXCESS, ARTERIAL mmol/L -8.3* -10.9* -14.2*         I have reviewed all laboratory and imaging results ordered/pertinent for this encounter.

## 2024-10-17 NOTE — PROGRESS NOTES
[facility]  ACUTE CARE SURGERY - PROGRESS NOTE    Patient Name: Misael Ritchie  MRN: [id number]  Admit Date: 1003  : 1955  AGE: 69 y.o.   GENDER: female  ==============================================================================  TODAY'S ASSESSMENT AND PLAN OF CARE:  Misael Ritchie is a 69 y.o. female PMH aortic stenosis s/p TAVR , atrial fibrillation (on Eliquis) s/p DCCV, CAD, ESRD on hemodialysis T//Sat, HFrecEF (10/2024 EF 50-55%), MR, TR, pacemaker CRT-P 2023, adrenal insufficiency who was initially admitted to the MICU on 10/03/24 for emergent dialysis for hyperkalemia, retransferred back to MICU on 10/16 for undifferentiated shock, concerning for RHF given recent TTE showing severe RV dysfunction    - Lactate has improved since overnight but remains elevated at 9.2  - Patient has been coming down on pressors now only on 0.16  - Will serial abdominal exam in setting of highly ill patient with elevated lactate but given benign abdominal exam, low suspicion for intraabdominal process  - Resuscitation per ICU    Discussed with attending Dr. Marj Oro MD  PGY1 Acute Care Surgery  Pager [phone]    ==============================================================================  CHIEF COMPLAINT / EVENTS LAST 24HRS / HPI:  NAEON    MEDICAL HISTORY / ROS:   Admission history and ROS reviewed. Pertinent changes as follows: None new    PHYSICAL EXAM:  Heart Rate:  []   Temp:  [35.3 °C (95.5 °F)-36.6 °C (97.9 °F)]   Resp:  [14-24]   BP: (154)/(59)   SpO2:  [60 %-100 %]   Physical Exam  Constitutional:       Comments: Pt intubated, sedated   Cardiovascular:      Rate and Rhythm: Normal rate.   Pulmonary:      Comments: Ventilated  Abdominal:      Comments: Abdomen soft, nondistended, no mass, overall unconcerning abdominal exam         IMAGING SUMMARY:  (summary of new imaging findings, not a copy of dictation)  CT  angio  IMPRESSION:  1. Mild distention of the distal transverse and proximal descending  colon with wall thickening and mild adjacent fat stranding. There is  also linear gas along the margin of the colon. This is not definitive  for pneumatosis, and may represent trapped gas, although early  pneumatosis could appear similar. There is no portal venous gas or  free air. The findings are nonspecific for colitis. Early ischemia  could appear like this.  2. Mildly dilated and fluid-filled small bowel with no evidence of  bowel wall thickening, surrounding fat stranding, mucosal hyperemia  or lack of bowel wall enhancement or portal venous gas. Findings are  favored to represent enteritis. Additionally, although heavily  calcified SMA and main branching vessels appear patent. Superior  mesenteric vein is also widely patent.  3. Interval development of new multiple areas of irregularly shaped  branching and coalesced hypodensities throughout the left hepatic  lobe. These findings may represent early abscess formation, focal  dilation of bile ducts, or early biliary necrosis.  4. Severe atherosclerotic disease throughout the abdominal aorta and  main branching vessels with no acute vasculopathy.  5. Re-demonstration of few right gluteal and thigh hematomas with no  active extravasation.  6. Large right-sided pleural effusion with associated atelectasis,  surrounding ground-glass opacities and consolidations as well as  tree-in-bud nodularities. Findings are compatible with underlying  infection. Please refer to dedicated CT of the chest for additional  findings.    LABS:  Results from last 7 days   Lab Units 10/17/24  0353 10/16/24  2204 10/16/24  0826 10/15/24  2341 10/15/24  1838 10/15/24  1412 10/15/24  0813 10/14/24  0549   WBC AUTO x10*3/uL 16.4* 16.1* 16.5* 9.3   < > 12.8*   < > 16.2*   HEMOGLOBIN g/dL 8.3* 9.2* 6.6* 6.9*   < > 6.5*   < > 7.5*   HEMATOCRIT % 23.3* 26.3* 19.7* 20.2*   < > 17.9*   < > 22.5*   PLATELETS  AUTO x10*3/uL 23* 28* 33* 32*   < > 42*   < > 54*   NEUTROS PCT AUTO %  --   --   --  92.8  --  91.8  --  92.0   LYMPHO PCT MAN % 1.6 0.0 3.0  --   --   --   --   --    LYMPHS PCT AUTO %  --   --   --  1.6  --  1.6  --  1.8   MONO PCT MAN % 0.8 5.4 4.0  --   --   --   --   --    MONOS PCT AUTO %  --   --   --  4.0  --  5.1  --  4.8   EOSINO PCT MAN % 0.0 0.0 14.0  --   --   --   --   --    EOS PCT AUTO %  --   --   --  0.0  --  0.0  --  0.0    < > = values in this interval not displayed.     Results from last 7 days   Lab Units 10/16/24  2204 10/15/24  2343   APTT seconds 38 32   INR  1.8* 1.3*     Results from last 7 days   Lab Units 10/17/24  0353 10/16/24  2204 10/16/24  0826 10/15/24  2343   SODIUM mmol/L 130* 132* 131* 131*   POTASSIUM mmol/L 4.5 4.6 4.9 5.9*   CHLORIDE mmol/L 88* 88* 90* 90*   CO2 mmol/L 19* 16* 18* 27   BUN mg/dL 45* 43* 36* 38*   CREATININE mg/dL 3.88* 3.86* 3.54* 3.38*   CALCIUM mg/dL 6.9* 7.3* 7.6* 7.1*   PROTEIN TOTAL g/dL 3.9* 4.3*  --  4.8*  4.8*   BILIRUBIN TOTAL mg/dL 5.3* 5.3*  --  3.7*  3.7*   ALK PHOS U/L 225* 252*  --  209*  209*   ALT U/L 522* 624*  --  593*  593*   AST U/L 990* 1,277*  --  1,775*  1,775*   GLUCOSE mg/dL 194* 167* 224* 158*     Results from last 7 days   Lab Units 10/17/24  0353 10/16/24  2204 10/15/24  2343 10/12/24  0500   BILIRUBIN TOTAL mg/dL 5.3* 5.3* 3.7*  3.7* 3.1*   BILIRUBIN DIRECT mg/dL 3.7* 3.7*  --  2.1*     Results from last 7 days   Lab Units 10/17/24  0805 10/17/24  0355 10/17/24  0030   POCT PH, ARTERIAL pH 7.38 7.41 7.43*   POCT PCO2, ARTERIAL mm Hg 29* 25* 22*   POCT PO2, ARTERIAL mm Hg 95 114* 162*   POCT HCO3 CALCULATED, ARTERIAL mmol/L 17.2* 15.8* 14.6*   POCT BASE EXCESS, ARTERIAL mmol/L -6.9* -7.4* -8.5*       I have reviewed all medications, laboratory results, and imaging pertinent for today's encounter.

## 2024-10-17 NOTE — PROGRESS NOTES
Medical Intensive Care - Daily Progress Note   Mechelle Ritchie is a 69 y.o. year old female patient admitted on 10/3/2024 with following ICU needs: concern for shock, acute hypercapnic respiratory failure requiring intubation    Interval History:  NAEON, Sedated and intubated, on fentanyl, epi and vaso. Becomes hypoglycemic if not on D10 drip. Had 3 stools overnight.    CT of CAP read returned overnight, ACS consulted for c/f liver and bowel abscess vs ischemia, low suspicion for acute surgical problem, they will follow with serial exams and trend lactate. Lactate stable but high at 9.2 this AM.    Meds    Scheduled medications  acetaminophen, 975 mg, oral, q8h  amiodarone, 200 mg, oral, Daily  aspirin, 81 mg, oral, Daily  atorvastatin, 40 mg, oral, Nightly  calcium acetate, 667 mg, oral, TID  epoetin bridgett or biosimilar, 10,000 Units, intravenous, Once per day on Tuesday Thursday Saturday  hydrocortisone sodium succinate, 50 mg, intravenous, q6h  lactated Ringer's, 250 mL, intravenous, Once  meropenem, 1,000 mg, intravenous, q12h  naloxone, 0.2 mg, intravenous, Once  pantoprazole, 40 mg, oral, Daily before breakfast  polyethylene glycol, 17 g, oral, Daily  sennosides, 2 tablet, oral, BID  vitamin B complex-vitamin C-folic acid, 1 capsule, oral, Daily      Continuous medications  dextrose 10 % in water (D10W), 25 mL/hr, Last Rate: 25 mL/hr (10/17/24 1106)  EPINEPHrine, 0-1 mcg/kg/min (Dosing Weight), Last Rate: 0.18 mcg/kg/min (10/17/24 1037)  fentaNYL, 0-300 mcg/hr, Last Rate: 25 mcg/hr (10/16/24 2006)  norepinephrine, 0-0.5 mcg/kg/min (Dosing Weight), Last Rate: Stopped (10/17/24 0638)  vasopressin, 0-0.03 Units/min, Last Rate: 0.03 Units/min (10/17/24 0622)      PRN medications  PRN medications: albuterol, alteplase, bisacodyl, bisacodyl, dextrose, dextrose, fentaNYL, glucagon, glucagon, oxyCODONE     Objective    Blood pressure 154/59, pulse 102, temperature 35.9 °C (96.6 °F), resp. rate 20,  "height 1.549 m (5' 1\"), weight 57.6 kg (126 lb 15.8 oz), SpO2 (!) 60%.     Physical Exam  Constitutional:       Interventions: She is intubated.      Comments: Sedated and intubated   HENT:      Head: Normocephalic and atraumatic.   Cardiovascular:      Rate and Rhythm: Tachycardia present.      Heart sounds: Normal heart sounds.   Pulmonary:      Effort: She is intubated.      Breath sounds: Normal breath sounds and air entry.   Abdominal:      General: Abdomen is flat. Bowel sounds are normal.      Palpations: Abdomen is soft.      Tenderness: There is no abdominal tenderness. There is no guarding.            Intake/Output Summary (Last 24 hours) at 10/17/2024 1139  Last data filed at 10/17/2024 1132  Gross per 24 hour   Intake 1988.89 ml   Output 750 ml   Net 1238.89 ml     Labs:   Results from last 72 hours   Lab Units 10/17/24  0353 10/16/24  2204 10/16/24  0826   SODIUM mmol/L 130* 132* 131*   POTASSIUM mmol/L 4.5 4.6 4.9   CHLORIDE mmol/L 88* 88* 90*   CO2 mmol/L 19* 16* 18*   BUN mg/dL 45* 43* 36*   CREATININE mg/dL 3.88* 3.86* 3.54*   GLUCOSE mg/dL 194* 167* 224*   CALCIUM mg/dL 6.9* 7.3* 7.6*   ANION GAP mmol/L 28* 33* 28*   EGFR mL/min/1.73m*2 12* 12* 13*   PHOSPHORUS mg/dL 5.9* 6.2* 6.7*      Results from last 72 hours   Lab Units 10/17/24  0353 10/16/24  2204 10/16/24  0826 10/15/24  2341 10/15/24  1838 10/15/24  1412   WBC AUTO x10*3/uL 16.4* 16.1* 16.5* 9.3   < > 12.8*   HEMOGLOBIN g/dL 8.3* 9.2* 6.6* 6.9*   < > 6.5*   HEMATOCRIT % 23.3* 26.3* 19.7* 20.2*   < > 17.9*   PLATELETS AUTO x10*3/uL 23* 28* 33* 32*   < > 42*   NEUTROS PCT AUTO %  --   --   --  92.8  --  91.8   LYMPHO PCT MAN % 1.6 0.0 3.0  --   --   --    LYMPHS PCT AUTO %  --   --   --  1.6  --  1.6   MONO PCT MAN % 0.8 5.4 4.0  --   --   --    MONOS PCT AUTO %  --   --   --  4.0  --  5.1   EOSINO PCT MAN % 0.0 0.0 14.0  --   --   --    EOS PCT AUTO %  --   --   --  0.0  --  0.0    < > = values in this interval not displayed.      Results " from last 72 hours   Lab Units 10/17/24  0805 10/17/24  0355 10/17/24  0030   POCT PH, ARTERIAL pH 7.38 7.41 7.43*   POCT PCO2, ARTERIAL mm Hg 29* 25* 22*   POCT PO2, ARTERIAL mm Hg 95 114* 162*   POCT SO2, ARTERIAL % 98 99 100     Results from last 72 hours   Lab Units 10/16/24  1346 10/16/24  0826 10/16/24  0315   POCT PH, VENOUS pH 7.28* 7.17* 7.35   POCT PCO2, VENOUS mm Hg 37* 41 45   POCT PO2, VENOUS mm Hg 30* 38 51*        Micro/ID:     Lab Results   Component Value Date    BLOODCULT No growth at 1 day 10/15/2024       Summary of key imaging results from the last 24 hours  CTA A/P (10/16)  IMPRESSION:  1. Mild distention of the distal transverse and proximal descending  colon with wall thickening and mild adjacent fat stranding. There is  also linear gas along the margin of the colon. This is not definitive  for pneumatosis, and may represent trapped gas, although early  pneumatosis could appear similar. There is no portal venous gas or  free air. The findings are nonspecific for colitis. Early ischemia  could appear like this.  2. Mildly dilated and fluid-filled small bowel with no evidence of  bowel wall thickening, surrounding fat stranding, mucosal hyperemia  or lack of bowel wall enhancement or portal venous gas. Findings are  favored to represent enteritis. Additionally, although heavily  calcified SMA and main branching vessels appear patent. Superior  mesenteric vein is also widely patent.  3. Interval development of new multiple areas of irregularly shaped  branching and coalesced hypodensities throughout the left hepatic  lobe. These findings may represent early abscess formation, focal  dilation of bile ducts, or early biliary necrosis.  4. Severe atherosclerotic disease throughout the abdominal aorta and  main branching vessels with no acute vasculopathy.  5. Re-demonstration of few right gluteal and thigh hematomas with no  active extravasation.  6. Large right-sided pleural effusion with  associated atelectasis,  surrounding ground-glass opacities and consolidations as well as  tree-in-bud nodularities. Findings are compatible with underlying  infection. Please refer to dedicated CT of the chest for additional  findings.  7. Additional chronic findings are as detailed in the body of the  report.    CT Chest w/ IV contrast (10/16)  IMPRESSION:  1.  Movement in right-sided pleural effusion with persistent  moderate-sized right-sided pleural effusion and perihilar edema.  2. Interval right middle lobe volume loss/collapse and correlate with  a component of mucoid impaction.  3. Diffuse parenchymal calcifications most consistent with renal  failure related pulmonary metastatic calcifications.  4. Global chamber enlargement with severe biatrial enlargement and  severe IVC reflux consistent with elevated right heart pressures.  Correlate with tricuspid insufficiency.  5.   Unusual pattern of branching hypodensities within the left lobe  of the liver with associated cystic changes. Correlate with multi  cystic hepatic changes versus intrahepatic duct dilatation. Consider  dedicated abdominal CT versus MRI/MRCP.  6. Extensive cardiovascular calcifications consistent with history of  renal failure.  7. Unusual pattern of mediastinal/perivascular fluid/thickening. Any  concern for inflammatory arthritis. Carotid sheath hematoma is felt  to be less likely.    TTE Limited (10/16)  CONCLUSIONS:   1. Left ventricular ejection fraction is normal, calculated by Jose's biplane at 57%.   2. Spectral Doppler shows an abnormal pattern of left ventricular diastolic filling.   3. Left ventricular cavity size is decreased.   4. There is severely reduced right ventricular systolic function.   5. Severely enlarged right ventricle.   6. The left atrium is moderate to severely dilated.   7. The right atrium is moderately dilated.   8. Small circumferential pericardial effusion with no RA or RV collapse and no IVC plethora.  There is no significant respiratory variation of the MV inflows, and the TV inflows wre difficult to assess. Overall no echocardiographic eviddence for tamponade.   9. There is moderate mitral annular calcification.  10. Moderately elevated right ventricular systolic pressure.  11. S/p 25mm Navitor TAVR - TAVR wa not assessed by spectral or color Doppler.  12. There is an Abbott transcatheter aortic valve replacement.  13. Compared with the prior exam from 10/4/2024, the RVSP appears to have decreased from 72.5mmHg to 59mmHg, and there is less TR today. Previously there was severe TR and today the TR appears to be only mild. The RV still appears to be severe dilated with significant RV systoic dysfunction. ( Note that today's exam is only a limited study and the TAVR gradients were not assessed.    Assessment and Plan     Assessment:   Misael Ritchie is a 69 y.o. female PMH HFrecEF/ICM ( EF 33->10/2024 EF 50-55%) s/p CRT-P, CAD s/p PCI SHASHI to mLAD 5/2023 and remote PCI to OM1, MR/TR, aortic stenosis s/p TAVR (11/2023), atrial fibrillation, ESRD on hemodialysis T/Th/Sat, adrenal insufficiency who was initially admitted to the MICU on 10/03/24 for emergent dialysis for hyperkalemia 7.4, now transferred back to MICU on 10/15 from floors due to c/f shock. Now sedated and intubated, requiring multiple pressors, lactate up to 15.    Mechanical Ventilation: < 4 days  Sedation/Analgesia:  none, recently intubated, received rocuronium  Restraints: Restraints indicated as alternative therapies have been attempted and have been ineffective.  Restrain with soft wrist restraints and side rails up x4 until medical devices discontinued and/or patient able to participate with plan of care.     Summary for 10/17/24  :  CTA CAP done, ACS consulted d/t c/f liver and bowel ischemia vs abscess  No surgical indication, will follow w/ serial abdominal exams and trend lactate  D10 drip for hypoglycemia  Pending liver US to follow-up  concerning liver findings on CT  Zosyn switched to phil  Keep NPO    Plan:  NEUROLOGY/PSYCH:  #Sedated and intubated  Sedated w/ fent  RASS -2    #Pain s/p fall and R greater trochanteric fracture  Scheduled Tylenol 975 mg Q8H  PRN Oxy 10 mg q6H     CARDIOVASCULAR:  #Shock, undifferentiated  #HFrecEF/ICM ( EF 33->10/2024 EF 50-55%) s/p CRT-P  #CAD s/p PCI SHASHI to mLAD 5/2023 and remote PCI to OM1  #MR/TR, aortic stenosis s/p TAVR (11/2023)  #atrial fibrillation on home eliquis s/p DCCV  #pulmonary HTN (group 2, 5) c/b R sided HF    -RHC on 10/11 which showed RAP 3, RVSP 53, PCWP 10, CO/CI 4.61/2.95, PA sat 55%  -TTE 10/4/24: EF 50-55%, severely enlarged RV, severely dilated LA/RA, severe TR, mild MR, prosthetic AV w/ no signs of AR  -pt initially required inotropic support of epinephrine due to severe RHF  -Transferred back to MICU 10/15 w/ uptrending lactates likely 2/2 cardiogenic shock, improved s/p fluids     Plan:  AP: Pt on plavix 75 mg daily at home. Cardiology consulted and agreeable w/ switching to ASA 81 mg to decrease risk ofh bleeding given recent falls  AC: was on HIH drip but ts was held 10/15 due to c/f bleeding from R trochanteric hematoma  Continue home amiodarone, atorvastatin  Hold midodrine 10 mg TID and home metop iso shock, on pressors  S/p 500mL LR bolus overnight  On pressors, levo and vaso  TTE (10/16) showing severe RV dilation and increased RVSP, LVEF 57%  Trop peaked at 3.5k  Possible component of cardiogenic shock given increased RV vol and pressure overload w/ progressive dilation, treated w/ vaso and epi     PULMONARY:  #Acute hypercapnic respiratory failure  #Intubated  #Mixed metabolic acidosis with respiratory alkalosis  - VBG prior to intubation: 7.17/41/38/lact 15, est bicarb 15   - Per Winter's formula: expected CO2 28-32, actual CO2 41   - Inadequate compensation, pt hypercapnic  Plan:  Intubated, maintain adequate ventilation  CXR 10/16: b/l airspace opacities, R>L pleural  effusion  CT chest pending    #COPD  Management:  Continue home albuterol PRN  Continue supplemental O2 as needed, wean to goal spo2 88-92%     RENAL/GENITOURINARY:  #ESRD  Management:  Continue with HD, Nephrology following  Hold home torsemide   Hold home allopurinol      GASTROENTEROLOGY:  #Shock, undifferentiated  #Profound lactic acidosis  - Lactate elevated up to 15  Plan:  CTA A/P c/f liver and colon pathologies  ACS following, benign abdominal exam, no surgical indication  Liver US pending  Trend lactate & serial abdominal exams     ENDOCRINOLOGY:  # Adrenal insufficiency in setting of chronic glucocorticoid use and recent steroid injection to right shoulder resulting treatment resistant hypoglycemia      Management:  Intubated today  Glucose levels on the lower side  D10 drip  Endocrinology following  C/w hydro 50q6 stress dose steroids     HEMATOLOGY:  #R greater trochanteric hematoma   #Acute on Chronic anemia (of CKD)  #Thrombocytopenia  -Hgb was 13 on admission (there was likely an aspect of hemoconcentration), but has downtrended over the course of admission, thought to be 2/2 R hip fracture c/b Hematoma that formed at R femur following R trochanteric ORIF 10/7 in addition to resolution of hemoconcentration and gradual downtrend in Hgb from lab draws w/ low marrow function given ESRD status  -Chronically thrombocytopenic, however this has been worsening throughout admission  Management:  Heparin was held 10/15 due to c/f bleeding, heparin should be restarted if CBC stable  Will continue to check CBC BID until stable to ensure no bleed  EPO w/ dialysis per renal  Hgb 6.6, 1u RBC on 10/16  plt 33>23 (likely dilutional)     SKIN:  AMARJIT     MUSCULOSKELETAL:  #Right troc fracture with hematoma  - S/p Hip Fracture ORIF w/ Nail Trochanteric surgery with ortho 10/7  Management:  -CTM R hip hematoma, trend CBCs as above  - Tylenol 975 Q6H, Oxy 10mg Q6h for severe pain     INFECTIOUS DISEASE:  #Shock  -Possible  septic shock  -Leukocytosis, WBC 9.3>16.5  -Lactate uptrending, max at 15  Plan:  Bcx x2 pending  Pt anuric, unable to send UA  Zosyn>phil  Requiring 2 pressors (epi and vaso)    ICU Check List     FEN  Fluids: small boluses (250 mL x 2)  Electrolytes: on dialysis  Nutrition: NPO  Prophylaxis:  DVT ppx: on ASA, holding heparin drip due to c/f bleed, SCDs  GI ppx: PPI  Bowel care: miralax  Hardware:         ETT  7.5 mm (Active)   Placement Date/Time: 10/16/24 1126   ETT Type: ETT - single  Single Lumen Tube Size: 7.5 mm  Location: Oral   Number of days: 0       Social:  Code: Full Code    NOK:  Zeke Ritchie () [phone]   Disposition: ORA Long MD   10/17/24 at 11:39 AM     Disclaimer: Documentation completed with the information available at the time of input. The times in the chart may not be reflective of actual patient care times, interventions, or procedures. Documentation occurs after the physical care of the patient.

## 2024-10-17 NOTE — PROGRESS NOTES
Occupational Therapy    Communication Note    Patient Name: Misael Ritchie  MRN: [id number]  Today's Date: 10/17/2024   Room: 20/20-A    Discipline: Occupational Therapy      Missed Visit: Yes  Missed Visit Reason:  (Patient is intubated, on x2 pressors, surgery consulted for possible ischemic bowel, no command following per RN; will hold.)      10/17/24 at 8:46 AM   Amanda Bunn OT   Rehab Office: [phone]

## 2024-10-17 NOTE — PROGRESS NOTES
"Misael Ritchie   69 y.o. female   MRN/Room: [id number]/20/20-A  Chief Concern: Hyperkalemia  Nephrology following for: emergent HD MICU     Subjective    Update 10/17/2024:   Patient on vasopressors, ventilation and with worsening lactic acidosis. Concern for GI source / ischemia      Objective    Vitals:  /59   Pulse 75   Temp 36.9 °C (98.4 °F)   Resp 18   Ht 1.549 m (5' 1\")   Wt 57.6 kg (126 lb 15.8 oz)   SpO2 90%   BMI 23.99 kg/m²      Temp:  [35.3 °C (95.5 °F)-36.9 °C (98.4 °F)] 36.9 °C (98.4 °F)  Heart Rate:  [] 75  Resp:  [14-24] 18  Arterial Line BP 1: (106-165)/(38-62) 106/48  FiO2 (%):  [30 %-40 %] 30 %   Vent Mode: Volume control/assist control  FiO2 (%):  [30 %-40 %] 30 %  S RR:  [16-20] 16  S VT:  [340 mL-380 mL] 340 mL  PEEP/CPAP (cm H2O):  [5 cm H20-55 cm H20] 5 cm H20  MAP (cm H2O):  [7-9] 7      Medications   Scheduled Medications  acetaminophen, 975 mg, oral, q8h  amiodarone, 200 mg, oral, Daily  aspirin, 81 mg, oral, Daily  atorvastatin, 40 mg, oral, Nightly  calcium acetate, 667 mg, oral, TID  epoetin bridgett or biosimilar, 10,000 Units, intravenous, Once per day on Tuesday Thursday Saturday  hydrocortisone sodium succinate, 50 mg, intravenous, q6h  lactated Ringer's, 250 mL, intravenous, Once  meropenem, 1,000 mg, intravenous, q12h  naloxone, 0.2 mg, intravenous, Once  pantoprazole, 40 mg, oral, Daily before breakfast  polyethylene glycol, 17 g, oral, Daily  sennosides, 2 tablet, oral, BID  vitamin B complex-vitamin C-folic acid, 1 capsule, oral, Daily     Continuous Medications  dextrose 10 % in water (D10W), 25 mL/hr, Last Rate: 25 mL/hr (10/17/24 1106)  EPINEPHrine, 0-1 mcg/kg/min (Dosing Weight), Last Rate: 0.2 mcg/kg/min (10/17/24 1807)  fentaNYL, 0-300 mcg/hr, Last Rate: 25 mcg/hr (10/17/24 1742)  norepinephrine, 0-0.5 mcg/kg/min (Dosing Weight), Last Rate: 0.26 mcg/kg/min (10/17/24 1804)  PrismaSol BGK 2/3.5, 25 mL/kg/hr (Ideal), Last Rate: 25 mL/kg/hr (10/17/24 " 4593)  vasopressin, 0-0.03 Units/min, Last Rate: 0.03 Units/min (10/17/24 1623)     PRN Medications  PRN medications: albuterol, alteplase, bisacodyl, bisacodyl, dextrose, dextrose, fentaNYL, glucagon, glucagon, oxyCODONE   I/O:   Intake/Output Summary (Last 24 hours) at 10/17/2024 1807  Last data filed at 10/17/2024 1132  Gross per 24 hour   Intake 1132.52 ml   Output 250 ml   Net 882.52 ml     Wt Readings from Last 3 Encounters:   10/16/24 57.6 kg (126 lb 15.8 oz)       Physical Exam:  Gen: NAD, on vent, sedation  CVS: Hypotensive   Resp: CTABL  Ext: +edema    Labs:  CBC:  Results from last 7 days   Lab Units 10/17/24  0353 10/16/24  2204 10/16/24  0826   WBC AUTO x10*3/uL 16.4* 16.1* 16.5*   HEMOGLOBIN g/dL 8.3* 9.2* 6.6*   HEMATOCRIT % 23.3* 26.3* 19.7*   MCV fL 86 87 93   PLATELETS AUTO x10*3/uL 23* 28* 33*     RFP:  Results from last 7 days   Lab Units 10/17/24  0353 10/16/24  2204 10/16/24  0826 10/15/24  2343   SODIUM mmol/L 130* 132* 131* 131*   POTASSIUM mmol/L 4.5 4.6 4.9 5.9*   CHLORIDE mmol/L 88* 88* 90* 90*   CO2 mmol/L 19* 16* 18* 27   BUN mg/dL 45* 43* 36* 38*   CREATININE mg/dL 3.88* 3.86* 3.54* 3.38*   CALCIUM mg/dL 6.9* 7.3* 7.6* 7.1*   MAGNESIUM mg/dL 1.98  --  2.14 2.20   PHOSPHORUS mg/dL 5.9* 6.2* 6.7* 5.5*     HFP:  Results from last 7 days   Lab Units 10/17/24  0353 10/16/24  2204 10/16/24  0826 10/15/24  2343 10/13/24  0440 10/12/24  0500   AST U/L 990* 1,277*  --  1,775*  1,775*  --  50*   ALT U/L 522* 624*  --  593*  593*  --  11   ALK PHOS U/L 225* 252*  --  209*  209*  --  154*   BILIRUBIN TOTAL mg/dL 5.3* 5.3*  --  3.7*  3.7*  --  3.1*   BILIRUBIN DIRECT mg/dL 3.7* 3.7*  --   --   --  2.1*   ALBUMIN g/dL 1.7* 2.0* 1.9* 2.3*  2.3*   < > 2.6*    < > = values in this interval not displayed.     Cardiac:  Results from last 7 days   Lab Units 10/16/24  0826 10/16/24  0315 10/15/24  2343 10/12/24  0500 10/11/24  0611   TROPHSCMC ng/L 3,336* 3,506* 3,095*  --   --    BNP pg/mL  --   --    --  1,237* 1,876*     Coag:  Results from last 7 days   Lab Units 10/16/24  2204 10/15/24  2343   PROTIME seconds 20.3* 14.4*   APTT seconds 38 32   INR  1.8* 1.3*     ABG/VBG:   Results from last 7 days   Lab Units 10/17/24  1704 10/17/24  1420 10/17/24  1249   POCT PH, ARTERIAL pH 7.17* 7.27* 7.36*   POCT PCO2, ARTERIAL mm Hg 29* 31* 28*   POCT PO2, ARTERIAL mm Hg 93 91 93   POCT HCO3 CALCULATED, ARTERIAL mmol/L 10.6* 14.2* 15.8*   POCT BASE EXCESS, ARTERIAL mmol/L -16.5* -11.6* -8.6*     Results from last 7 days   Lab Units 10/16/24  1346 10/16/24  0826 10/16/24  0315   POCT PH, VENOUS pH 7.28* 7.17* 7.35   POCT PCO2, VENOUS mm Hg 37* 41 45   POCT PO2, VENOUS mm Hg 30* 38 51*   POCT HCO3 CALCULATED, VENOUS mmol/L 17.4* 15.0* 24.8          Assessment/Plan    ASSESSMENT:  Misael Ritchie is a  69 y.o. F with PMH ESRD on HD TTS, HFrEF, ortic stenosis s/p TAVR 11/23, atrial fibrillation (on Eliquis) s/p DCCV, CAD, pacemaker CRT-P 5/2023, adrenal insufficiency who is currently admitted to the MICU on account of suspected cardiogenic shock, RV failure. Nephrology following to facilitate inpatient RRT.  She has worsening hemodynamics and has Femoral Dialysis catheter access    #Septic shock  #Lactic acidosis  #ESRD on HD TTS       RECOMMENDATIONS:  -Will start CVVH today given the poor hemodynamics  -weight-based presciption on 25ml/kg/hr based on IBW would total 1200ml/hr but given the severe acidosis, will start at 1800  -RFP, Mg and Ionized calcium BID while on CVVH  -replete Ionized calcium while on CVVH.     D/W Dr. Patel.     ---    Hamzah Garza  10/17/2024   [facility]  Renal Pager [phone]

## 2024-10-17 NOTE — PROCEDURES
Central Line Placement    A time out was performed. The patient was placed in correct position.  The right groin region was prepped and draped in sterile fashion using chlorhexidine scrub. Anesthesia was achieved with 1% lidocaine. Ultrasound guidance was used throughout the procedure to identify the correct vein. The introducer needle was inserted with ultrasound guidance into the right femoral vein. Venous blood was withdrawn. Syringe was removed and a guidewire was advanced into the introducer needle. Guidewire position was confirmed in the vein with ultrasound guidance. A small incision was made at the skin surface with a scalpel and the introducer needle was exchanged for a dilator over the guidewire. After appropriate dilation was obtained, the dilator was exchanged over the wire for a second dilator, followed by a trialysis catheter. The wire was removed and the catheter was sutured in place.

## 2024-10-17 NOTE — PROGRESS NOTES
SOCIAL WORK NOTE   -ICU Treatment Plan: ongoing GOC with , pressor requirement is improving   -Support System:    -Planned Disposition: TBD, most recently patient declined SNF for home care.   -Additional information:   returned VM for Selena Gutierrez ([phone]). Message left providing requested updates. Social work to follow.  MADHU Chavarria, LISW-S ([id number])

## 2024-10-17 NOTE — CARE PLAN
Problem: Skin  Goal: Prevent/manage excess moisture  Outcome: Progressing  Flowsheets (Taken 10/17/2024 1831)  Prevent/manage excess moisture:   Cleanse incontinence/protect with barrier cream   Monitor for/manage infection if present  Goal: Prevent/minimize sheer/friction injuries  Outcome: Progressing  Goal: Promote/optimize nutrition  Outcome: Progressing  Goal: Promote skin healing  Outcome: Progressing  Goal: Decreased wound size/increased tissue granulation at next dressing change  Flowsheets (Taken 10/17/2024 1831)  Decreased wound size/increased tissue granulation at next dressing change:   Promote sleep for wound healing   Protective dressings over bony prominences     Problem: Safety - Medical Restraint  Goal: Remains free of injury from restraints (Restraint for Interference with Medical Device)  Outcome: Progressing  Flowsheets (Taken 10/17/2024 1831)  Remains free of injury from restraints (restraint for interference with medical device):   Determine that other, less restrictive measures have been tried or would not be effective before applying the restraint   Evaluate the patient's condition at the time of restraint application   Inform patient/family regarding the reason for restraint   Every 2 hours: Monitor safety, psychosocial status, comfort, nutrition and hydration  Goal: Free from restraint(s) (Restraint for Interference with Medical Device)  Outcome: Progressing   The patient's goals for the shift include      The clinical goals for the shift include pt will remain jds throughout shift

## 2024-10-17 NOTE — SIGNIFICANT EVENT
Called by MICU team that patient has concern for persistent lactic acidosis and vasopressor requirements.     On exam, intubated and sedated. Abdomen appears soft, mildly tender but not peritonitic. Of note, exam was limited given sedation.  CT Angio abdomen and pelvis with IV contrast. Shows patent but atherosclerotic vessels. Concern for colonic wall thickening and mild adjacent fat stranding. Given imaging, will continue serial abdominal exams, serial labs and lactate and resuscitation with crystalloids. If lactic acidosis worsens and is refractory to resuscitation, we will rule out colonic ischemia in the OR.

## 2024-10-18 ENCOUNTER — APPOINTMENT (OUTPATIENT)
Dept: RADIOLOGY | Facility: HOSPITAL | Age: 69
DRG: 480 | End: 2024-10-18
Payer: MEDICARE

## 2024-10-18 VITALS
WEIGHT: 126.98 LBS | BODY MASS INDEX: 23.98 KG/M2 | OXYGEN SATURATION: 68 % | HEIGHT: 61 IN | HEART RATE: 91 BPM | SYSTOLIC BLOOD PRESSURE: 154 MMHG | TEMPERATURE: 93.9 F | RESPIRATION RATE: 24 BRPM | DIASTOLIC BLOOD PRESSURE: 59 MMHG

## 2024-10-18 LAB
ALBUMIN SERPL BCP-MCNC: <1.5 G/DL (ref 3.4–5)
ALP SERPL-CCNC: 372 U/L (ref 33–136)
ALT SERPL W P-5'-P-CCNC: 2710 U/L (ref 7–45)
ANION GAP BLDA CALCULATED.4IONS-SCNC: 30 MMO/L (ref 10–25)
ANION GAP SERPL CALC-SCNC: 28 MMOL/L (ref 10–20)
AST SERPL W P-5'-P-CCNC: ABNORMAL U/L (ref 9–39)
BASE EXCESS BLDA CALC-SCNC: -2.1 MMOL/L (ref -2–3)
BASOPHILS # BLD MANUAL: 0 X10*3/UL (ref 0–0.1)
BASOPHILS NFR BLD MANUAL: 0 %
BILIRUB DIRECT SERPL-MCNC: 2 MG/DL (ref 0–0.3)
BILIRUB SERPL-MCNC: 3.2 MG/DL (ref 0–1.2)
BODY TEMPERATURE: 37 DEGREES CELSIUS
BUN SERPL-MCNC: 30 MG/DL (ref 6–23)
BURR CELLS BLD QL SMEAR: ABNORMAL
CA-I BLDA-SCNC: 0.78 MMOL/L (ref 1.1–1.33)
CALCIUM SERPL-MCNC: 6.9 MG/DL (ref 8.6–10.6)
CARDIAC TROPONIN I PNL SERPL HS: 4391 NG/L (ref 0–34)
CHLORIDE BLDA-SCNC: 90 MMOL/L (ref 98–107)
CHLORIDE SERPL-SCNC: 87 MMOL/L (ref 98–107)
CO2 SERPL-SCNC: 36 MMOL/L (ref 21–32)
CREAT SERPL-MCNC: 2.39 MG/DL (ref 0.5–1.05)
EGFRCR SERPLBLD CKD-EPI 2021: 21 ML/MIN/1.73M*2
EJECTION FRACTION: 58 %
EOSINOPHIL # BLD MANUAL: 0 X10*3/UL (ref 0–0.7)
EOSINOPHIL NFR BLD MANUAL: 0 %
ERYTHROCYTE [DISTWIDTH] IN BLOOD BY AUTOMATED COUNT: 20.8 % (ref 11.5–14.5)
GLUCOSE BLDA-MCNC: 89 MG/DL (ref 74–99)
GLUCOSE SERPL-MCNC: 86 MG/DL (ref 74–99)
HCO3 BLDA-SCNC: 19.7 MMOL/L (ref 22–26)
HCT VFR BLD AUTO: 15.3 % (ref 36–46)
HCT VFR BLD EST: 17 % (ref 36–46)
HGB BLD-MCNC: 5.1 G/DL (ref 12–16)
HGB BLDA-MCNC: 5.5 G/DL (ref 12–16)
IMM GRANULOCYTES # BLD AUTO: 1.15 X10*3/UL (ref 0–0.7)
IMM GRANULOCYTES NFR BLD AUTO: 8.4 % (ref 0–0.9)
INHALED O2 CONCENTRATION: 100 %
LACTATE BLDA-SCNC: >17 MMOL/L (ref 0.4–2)
LACTATE SERPL-SCNC: 28.1 MMOL/L (ref 0.4–2)
LYMPHOCYTES # BLD MANUAL: 1.36 X10*3/UL (ref 1.2–4.8)
LYMPHOCYTES NFR BLD MANUAL: 10 %
MAGNESIUM SERPL-MCNC: 16.61 MG/DL (ref 1.6–2.4)
MCH RBC QN AUTO: 30.4 PG (ref 26–34)
MCHC RBC AUTO-ENTMCNC: 33.3 G/DL (ref 32–36)
MCV RBC AUTO: 91 FL (ref 80–100)
METAMYELOCYTES # BLD MANUAL: 0.82 X10*3/UL
METAMYELOCYTES NFR BLD MANUAL: 6 %
MONOCYTES # BLD MANUAL: 0.27 X10*3/UL (ref 0.1–1)
MONOCYTES NFR BLD MANUAL: 2 %
MYELOCYTES # BLD MANUAL: 0.54 X10*3/UL
MYELOCYTES NFR BLD MANUAL: 4 %
NEUTS SEG # BLD MANUAL: 10.61 X10*3/UL (ref 1.2–7)
NEUTS SEG NFR BLD MANUAL: 78 %
NRBC BLD-RTO: 34.8 /100 WBCS (ref 0–0)
OXYHGB MFR BLDA: 96.4 % (ref 94–98)
PCO2 BLDA: 21 MM HG (ref 38–42)
PH BLDA: 7.58 PH (ref 7.38–7.42)
PHOSPHATE SERPL-MCNC: 10.4 MG/DL (ref 2.5–4.9)
PLATELET # BLD AUTO: 22 X10*3/UL (ref 150–450)
PO2 BLDA: 190 MM HG (ref 85–95)
POTASSIUM BLDA-SCNC: 7.6 MMOL/L (ref 3.5–5.3)
POTASSIUM SERPL-SCNC: 7.6 MMOL/L (ref 3.5–5.3)
PROT SERPL-MCNC: <3 G/DL (ref 6.4–8.2)
RBC # BLD AUTO: 1.68 X10*6/UL (ref 4–5.2)
RBC MORPH BLD: ABNORMAL
SAO2 % BLDA: 100 % (ref 94–100)
SODIUM BLDA-SCNC: 132 MMOL/L (ref 136–145)
SODIUM SERPL-SCNC: 143 MMOL/L (ref 136–145)
TOTAL CELLS COUNTED BLD: 100
WBC # BLD AUTO: 13.6 X10*3/UL (ref 4.4–11.3)

## 2024-10-18 PROCEDURE — 94003 VENT MGMT INPAT SUBQ DAY: CPT

## 2024-10-18 PROCEDURE — 84132 ASSAY OF SERUM POTASSIUM: CPT

## 2024-10-18 PROCEDURE — 2500000004 HC RX 250 GENERAL PHARMACY W/ HCPCS (ALT 636 FOR OP/ED)

## 2024-10-18 PROCEDURE — 84100 ASSAY OF PHOSPHORUS: CPT

## 2024-10-18 PROCEDURE — 2500000004 HC RX 250 GENERAL PHARMACY W/ HCPCS (ALT 636 FOR OP/ED): Performed by: STUDENT IN AN ORGANIZED HEALTH CARE EDUCATION/TRAINING PROGRAM

## 2024-10-18 PROCEDURE — 83605 ASSAY OF LACTIC ACID: CPT

## 2024-10-18 PROCEDURE — 85027 COMPLETE CBC AUTOMATED: CPT

## 2024-10-18 PROCEDURE — 37799 UNLISTED PX VASCULAR SURGERY: CPT

## 2024-10-18 PROCEDURE — 83735 ASSAY OF MAGNESIUM: CPT

## 2024-10-18 PROCEDURE — 85007 BL SMEAR W/DIFF WBC COUNT: CPT

## 2024-10-18 PROCEDURE — 84484 ASSAY OF TROPONIN QUANT: CPT

## 2024-10-18 PROCEDURE — 93010 ELECTROCARDIOGRAM REPORT: CPT | Performed by: INTERNAL MEDICINE

## 2024-10-18 PROCEDURE — 82248 BILIRUBIN DIRECT: CPT

## 2024-10-18 RX ORDER — CALCIUM CHLORIDE INJECTION 100 MG/ML
INJECTION, SOLUTION INTRAVENOUS
Status: DISCONTINUED
Start: 2024-10-18 | End: 2024-10-18 | Stop reason: HOSPADM

## 2024-10-18 RX ORDER — INDOMETHACIN 25 MG/1
CAPSULE ORAL
Status: DISCONTINUED
Start: 2024-10-18 | End: 2024-10-18 | Stop reason: HOSPADM

## 2024-10-18 RX ORDER — EPINEPHRINE 0.1 MG/ML
INJECTION INTRACARDIAC; INTRAVENOUS CODE/TRAUMA/SEDATION MEDICATION
Status: COMPLETED | OUTPATIENT
Start: 2024-10-18 | End: 2024-10-18

## 2024-10-18 RX ORDER — AMIODARONE HYDROCHLORIDE 150 MG/3ML
INJECTION, SOLUTION INTRAVENOUS CODE/TRAUMA/SEDATION MEDICATION
Status: COMPLETED | OUTPATIENT
Start: 2024-10-18 | End: 2024-10-18

## 2024-10-18 RX ORDER — INDOMETHACIN 25 MG/1
CAPSULE ORAL CODE/TRAUMA/SEDATION MEDICATION
Status: COMPLETED | OUTPATIENT
Start: 2024-10-18 | End: 2024-10-18

## 2024-10-18 RX ORDER — EPINEPHRINE 1 MG/ML
INJECTION INTRAMUSCULAR; INTRAVENOUS; SUBCUTANEOUS
Status: DISCONTINUED
Start: 2024-10-18 | End: 2024-10-18 | Stop reason: HOSPADM

## 2024-10-18 ASSESSMENT — PAIN - FUNCTIONAL ASSESSMENT: PAIN_FUNCTIONAL_ASSESSMENT: CPOT (CRITICAL CARE PAIN OBSERVATION TOOL)

## 2024-10-18 NOTE — PROCEDURES
Intubation Note    Hands were washed immediately prior to the procedure, and a surgical mask and gloves worn throughout the procedure. The patient was placed on a cardiac monitor including continuous pulse oximetry.  The patient received 20mg etomidate and 60mg rocuronium for the procedure. Cricoid pressure was maintained from time sedative/analgesic agent was given to time of cuff balloon inflation. Using a glidescope and a size 7.5 endotracheal tube with stylet, the patient was intubated on the first attempt. The stylet was removed and cuff balloon was inflated. Appropriate endotracheal tube position was confirmed by direct visualization of vocal cord passage, fogging of the tube, CO2 colormetric indicator and symmetric breath sounds. The tube was secured at 23 cm at the lips. Post intubation chest x-ray confirmed positioning of the tube.

## 2024-10-18 NOTE — SIGNIFICANT EVENT
Minutes after ROSC, patient went into cardiac arrest.     Ultrasound without mechanical heart activity   Confirmed no carotid pulses x2 min.   Mechanical ventilator shut off.   No breath sounds x2 min   Absent corneal reflex.   Pupils fixed and dilated.   Patient unresponsive     Time of death called at 2:25AM by Dr. Justice (ICU attending)     Attempted to call  to update. Went straight to voicemail multiple times with mailbox being full.     May she rest in peace       Addendum:   and sister and niece had arrived to  after my initial notification of initial arrest. I informed them of her passing

## 2024-10-18 NOTE — SIGNIFICANT EVENT
Death Within 24 Hours of Soft Wrist Restraint Utilization    Death within 24 hours of soft wrist restraint logged at 10/18/2024 at 9:55 AM.    Shaneka Galvez RN  Date: 10/18/2024  Time: 9:55 AM

## 2024-10-18 NOTE — PROCEDURES
Hands were washed immediately prior to the  procedure. Surgical cap, mask, sterile gown, and sterile gloves throughout the procedure. Patient's right groin region was prepped using chlorhexidine scrub and draped in sterile fashion. The femoral artery was identified with ultrasound. Anesthesia was achieved using 1% lidocaine. A needle was inserted into the radial artery. Arterial blood was seen to pulsate in the flash chamber. A guidewire was advanced easily into the femoral artery. Needle was removed, and a catheter was then advanced over the wire and the needle and wire were withdrawn. The catheter was sutured in place. A sterile dressing was placed over the catheter at the insertion site after insertion of the trialysis catheter was also concluded. The patient tolerated the procedure without any  hemodynamic compromise. At the time of procedure completion, the catheter was connected to the cardiac monitor and calibrated. Appropriate waveform and blood pressure tracing was observed.

## 2024-10-18 NOTE — DISCHARGE SUMMARY
Discharge Diagnosis  Cardiac Arrest    Hospital Course  Misael Ritchie 69 y.o. female PMH aortic stenosis, atrial fibrillation, CAD, CKD, ESRD on hemodialysis, CHF (8/2024 EF ~48%) presented via EMS after a fall patient states she was feeling dizzy this morning and got up to turn a fan on because she was feeling warm and fell on her right side. Patient states she hit her head. She also reports she did not take any of her medications this morning because she fell prior to the time she normally takes her medications. Patient states she did get her dialysis on Tuesday and completed the session without issue. In the ED the patient was found to have a potassium of 7.4 and was given 5 units of regular insulin and D50. She also received calcium gluconate 2g and sodium bicarbonate.  Shortly after she was found to be obtunded and blood sugar was found to be 52. The decision was made to transfer the patient to the MICU for emergent dialysis.     On arrival to the MICU, patient is HDS she is mentating appropriately. She reports she last took her Elliquis yesterday. She reports feeling cold and somewhat short of breath which improved with albuterol inhaler in the ED and she's currently on 6L NC. During ICU stay she was started on emergent bedside hemodialysis. Endocrinology consulted for hypoglycemia and known adrenal insufficiency, for which they recommended 50mg Hydrocortisone q6h.     She went to the OR on 10/07 for repair of R trochanteric fracture. Post operatively managed with dilaudid and oxycodone for pain control.     Post operatively, on and off epi with widened pulse pressures. On 10/09 mixed venous O2 was normal and we switched from Epi to Levo. Overnight she had round of hypoglycemia with a lactate rise to 10.5 She was switched back to Epi and placed on dextrose fluids after amps of D50 with normlaization of blood glucose and MAPs. By morning her lactate was downtrending with plateau to 3.     Due to  inability to completely wean her off Epi, we attempted to place a bedside [place] Rasheed Catheter with the HF ICU team. Unfortunately we could not advance wire past the pacemaker,and so aborted procedure. She was taken to cath lab on 10/11 for successful [place] Rasheed placement.     [facility] hemodynamics:  RA 3  RVSP 53, RVEDP 9   PCWP 10  PA 57/9, mean PAP 29  CO/CI 4.61/2.95  PA sat 55%  PVR 4.3     RV systolic dysfunction seemed known given numerous echos and notes from [facility]. Used systolic goal of > , mixed venous shows normal SO2 and lactate stable. Off epi since yesterday. Transferred out of the MICU to floors on 10/13.    Overnight on 10/15, pt was noted to be lethargic following dialysis. VBG lactate uptrending 6.8 > 8.7. AMS noted to worsen w/ pt appearing more stuporous and decision, escalated care to MICU. Sedated, intubated 2/2 acute hypercapnic respiratory failure, started on multiple pressors, lactate peaked to 15. CTA CAP c/f liver and colon pathologies (ischemia vs abscess), ACS c/s, no surgical indication. Hydro increased to 50q6.     Lactate increasing and on 3 pressors on 10/17. Patient lost a pulse, status post ACLS ROSC was obtained. Was able to reach .  Explained dire situation, and worsening status leading up to the arrest.  Explained that she is very sick and that she is at risk of arresting again.  Explained that chances of meaningful recovery are poor.   agreeable to DNR. She went again into cardiac arrest and passed away.       Pertinent Physical Exam At Time of Discharge  Physical Exam  no carotid pulses x2 min.   Mechanical ventilator shut off.   No breath sounds x2 min   Absent corneal reflex.   Pupils fixed and dilated.   Patient unresponsive       Amos Tapia MD

## 2024-10-18 NOTE — SIGNIFICANT EVENT
Did the patient lose a pulse?  Yes    Was ACLS initiated?  Yes    What was the initial rhythm?  Pulseless Electrical Activity    How long was ACLS performed?  8 minutes    What interventions outside the scope of ACLS were performed? (i.e. calcium chloride, bicarbonate, blood product transfusions, alteplase, etc)  3 x bicarb  3 x epi   1 x amio 300 mg      Was an advanced airway obtained?  No, ETT already in place        Was ROSC achieved?  Yes

## 2024-10-18 NOTE — SIGNIFICANT EVENT
Patient on escalating doses of 3 pressors (Epi, nor epi, vaso). Worsening lactate and shock.     Called Zeke Ritchie () [phone] at 20:20. No answer, mailbox full.     Addendum:   Called again at 20:41, no answer. Mailbox full.     Addendum   Called again at 21:20, no answer, mailbox full    Addendum:   Called again 22:35, no answer. Mailbox full.     No other numbers listed on chart

## 2024-10-18 NOTE — SIGNIFICANT EVENT
Consulted by primary MICU team regarding concern for cardiogenic shock. Patient in three pressor shock. Bedside echo demonstrated significant RV dilation, severely reduced RV function, and LVOT VTI 8-10. Her shock state was likely mixed septic and cardiogenic.     Given her RV dysfunction, instructed team to trial IVF as well as milrinone if patient can tolerate it. However, her prognosis was very poor given her undetectably high lactate and unresponsiveness. MICU team had attempted to call family several times to discuss GOC.     Primary informed me that patient had cardiac arrest and ROSC was obtained in 10-12 minutes. Patient made DNR. She went again into cardiac arrest and passed away.

## 2024-10-18 NOTE — SIGNIFICANT EVENT
Patient lost a pulse, status post ACLS ROSC was obtained (Code blue note to follow by Code Leader Dr. Martinez). Was able to reach .  Explained dire situation, and worsening status leading up to the arrest.  Explained that she is very sick and that she is at risk of arresting again.  Explained that chances of meaningful recovery are poor.   agreeable to DNR.

## 2024-10-18 NOTE — CARE PLAN
Problem: Skin  Goal: Participates in plan/prevention/treatment measures  Outcome: Progressing  Flowsheets (Taken 10/18/2024 0020)  Participates in plan/prevention/treatment measures: Elevate heels  Goal: Prevent/manage excess moisture  Outcome: Progressing  Flowsheets (Taken 10/18/2024 0020)  Prevent/manage excess moisture: Moisturize dry skin  Goal: Prevent/minimize sheer/friction injuries  Outcome: Progressing  Flowsheets (Taken 10/18/2024 0020)  Prevent/minimize sheer/friction injuries: HOB 30 degrees or less  Goal: Promote/optimize nutrition  Outcome: Progressing  Flowsheets (Taken 10/18/2024 0020)  Promote/optimize nutrition: Discuss with provider if NPO > 2 days  Goal: Promote skin healing  Outcome: Progressing  Flowsheets (Taken 10/18/2024 0020)  Promote skin healing: Turn/reposition every 2 hours/use positioning/transfer devices  Goal: Decreased wound size/increased tissue granulation at next dressing change  Outcome: Progressing  Flowsheets (Taken 10/18/2024 0020)  Decreased wound size/increased tissue granulation at next dressing change: Protective dressings over bony prominences     Problem: Safety - Medical Restraint  Goal: Remains free of injury from restraints (Restraint for Interference with Medical Device)  Outcome: Progressing  Flowsheets (Taken 10/18/2024 0020)  Remains free of injury from restraints (restraint for interference with medical device): Every 2 hours: Monitor safety, psychosocial status, comfort, nutrition and hydration  Goal: Free from restraint(s) (Restraint for Interference with Medical Device)  Outcome: Progressing  Flowsheets (Taken 10/18/2024 0020)  Free from restraint(s) (restraint for interference with medical device): ONCE/SHIFT or MINIMUM Every 12 hours: Assess and document the continuing need for restraints

## 2024-10-19 LAB
BLOOD EXPIRATION DATE: NORMAL
DISPENSE STATUS: NORMAL
PRODUCT BLOOD TYPE: 5100
PRODUCT CODE: NORMAL
UNIT ABO: NORMAL
UNIT NUMBER: NORMAL
UNIT RH: NORMAL
UNIT VOLUME: 277
XM INTEP: NORMAL

## 2024-10-20 LAB — BACTERIA BLD CULT: NORMAL

## 2024-10-24 LAB
ATRIAL RATE: 79 BPM
P AXIS: 36 DEGREES
P OFFSET: 148 MS
P ONSET: 49 MS
PR INTERVAL: 218 MS
Q ONSET: 159 MS
QRS COUNT: 20 BEATS
QRS DURATION: 180 MS
QT INTERVAL: 434 MS
QTC CALCULATION(BAZETT): 605 MS
QTC FREDERICIA: 542 MS
R AXIS: -82 DEGREES
T AXIS: -26 DEGREES
T OFFSET: 376 MS
VENTRICULAR RATE: 117 BPM

## 2024-10-31 LAB
ATRIAL RATE: 83 BPM
P AXIS: 27 DEGREES
P OFFSET: 167 MS
P ONSET: 132 MS
PR INTERVAL: 122 MS
Q ONSET: 192 MS
QRS COUNT: 14 BEATS
QRS DURATION: 168 MS
QT INTERVAL: 466 MS
QTC CALCULATION(BAZETT): 547 MS
QTC FREDERICIA: 519 MS
R AXIS: 142 DEGREES
T AXIS: -35 DEGREES
T OFFSET: 425 MS
VENTRICULAR RATE: 83 BPM

## 2024-11-06 ENCOUNTER — APPOINTMENT (OUTPATIENT)
Dept: ORTHOPEDIC SURGERY | Facility: HOSPITAL | Age: 69
End: 2024-11-06
Payer: MEDICARE

## 2024-11-12 ENCOUNTER — APPOINTMENT (OUTPATIENT)
Dept: CARDIOLOGY | Facility: HOSPITAL | Age: 69
End: 2024-11-12
Payer: MEDICARE

## (undated) DEVICE — SUTURE, MONOSOF, 2-0, 30 IN, C15, BLACK

## (undated) DEVICE — Device

## (undated) DEVICE — ELECTRODE, ELECTROSURGICAL, BLADE, INSULATED, ENT/IMA, STERILE

## (undated) DEVICE — DRESSING, MEPILEX BORDER, POST-OP AG, 4 X 10 IN

## (undated) DEVICE — DRAPE, INCISE, ANTIMICROBIAL, IOBAN 2, LARGE, 17 X 23 IN, DISPOSABLE, STERILE

## (undated) DEVICE — CATHETER, THERMODILUTION, SWAN GANZ, VIP, 5 LUMEN, 7.5 FR, 110 CM

## (undated) DEVICE — SPONGE, LAP, XRAY DECT, 18IN X 18IN, W/LOOP, STERILE

## (undated) DEVICE — SHEATH, PINNACLE, 10 CM,  7FR INTRODUCER, 7FR DIA, 2.5 CM DIALATOR

## (undated) DEVICE — SUTURE, PDS II, 0, 27 IN, CT-2, VIOLET

## (undated) DEVICE — GUIDEWIRE, 3.2 X 400

## (undated) DEVICE — DRAPE, ISOLATION, ANTIMICROBIAL, W/POUCH, IOBAN 2, STERI DRAPE, 125 X 83 IN, DISPOSABLE, STERILE

## (undated) DEVICE — DRILL BIT, 4.2MM 3-FLUTED QC 330MM 100MM CALB

## (undated) DEVICE — DRESSING, MEPILEX BORDER, POST-OP AG, 4 X 6 IN

## (undated) DEVICE — STAPLER, SKIN PROXIMATE, 35 WIDE

## (undated) DEVICE — ACCESS KIT, S-MAK MINI, 4FR 10CM 0.018IN 40CM, NT/PT, ECHO ENHANCE NEEDLE

## (undated) DEVICE — INTRODUCER KIT, HEMOSTASIS, VALVE/SIDEPORT, W/GUIDEWIRE, 0.035 IN, 8.5 FR, 10 CM, LF

## (undated) DEVICE — SUTURE, MONOCRYL, 2-0, 27 IN, SH/V-20 , UNDYED

## (undated) DEVICE — BANDAGE, GAUZE, CONFORMING, KERLIX, 6 PLY, 4.5 IN X 4.1 YD

## (undated) DEVICE — COVER, C-ARM W/CLIPS, OEC GE

## (undated) DEVICE — COVER, CART, 45 X 27 X 48 IN, CLEAR

## (undated) DEVICE — DRAPE COVER, C ARM, FLOUROSCAN IMAGING SYS

## (undated) DEVICE — TUBING, SUCTION, CONNECTING, STERILE 0.25 X 120 IN., LF

## (undated) DEVICE — COVER, BACK TABLE, 65 X 90, HVY REINFORCED

## (undated) DEVICE — PREP, IODOPHOR, W/ALCOHOL, DURAPREP, W/APPLICATOR, 26 CC